# Patient Record
Sex: FEMALE | Race: WHITE | NOT HISPANIC OR LATINO | Employment: OTHER | URBAN - NONMETROPOLITAN AREA
[De-identification: names, ages, dates, MRNs, and addresses within clinical notes are randomized per-mention and may not be internally consistent; named-entity substitution may affect disease eponyms.]

---

## 2017-03-13 ENCOUNTER — OFFICE VISIT (OUTPATIENT)
Dept: OBSTETRICS AND GYNECOLOGY | Facility: CLINIC | Age: 82
End: 2017-03-13

## 2017-03-13 ENCOUNTER — PROCEDURE VISIT (OUTPATIENT)
Dept: OBSTETRICS AND GYNECOLOGY | Facility: CLINIC | Age: 82
End: 2017-03-13

## 2017-03-13 VITALS
BODY MASS INDEX: 17.85 KG/M2 | HEIGHT: 62 IN | SYSTOLIC BLOOD PRESSURE: 160 MMHG | WEIGHT: 97 LBS | DIASTOLIC BLOOD PRESSURE: 92 MMHG

## 2017-03-13 DIAGNOSIS — N83.202 CYSTS OF BOTH OVARIES: Primary | ICD-10-CM

## 2017-03-13 DIAGNOSIS — K58.1 IRRITABLE BOWEL SYNDROME WITH CONSTIPATION: ICD-10-CM

## 2017-03-13 DIAGNOSIS — N83.201 CYSTS OF BOTH OVARIES: Primary | ICD-10-CM

## 2017-03-13 PROCEDURE — 99213 OFFICE O/P EST LOW 20 MIN: CPT | Performed by: OBSTETRICS & GYNECOLOGY

## 2017-03-13 PROCEDURE — 76830 TRANSVAGINAL US NON-OB: CPT | Performed by: OBSTETRICS & GYNECOLOGY

## 2017-03-13 RX ORDER — MULTIVIT-MIN/FA/LYCOPEN/LUTEIN .4-300-25
TABLET ORAL
COMMUNITY

## 2017-03-13 RX ORDER — AMLODIPINE BESYLATE 5 MG/1
5 TABLET ORAL DAILY
COMMUNITY
Start: 2017-01-21 | End: 2020-11-09

## 2017-03-13 RX ORDER — CANDESARTAN 32 MG/1
TABLET ORAL
COMMUNITY
Start: 2017-02-27 | End: 2020-01-22

## 2017-03-13 RX ORDER — BUMETANIDE 0.5 MG/1
0.5 TABLET ORAL DAILY
COMMUNITY
Start: 2017-02-20 | End: 2020-08-17

## 2017-03-13 RX ORDER — CHOLECALCIFEROL (VITAMIN D3) 125 MCG
TABLET ORAL
COMMUNITY
End: 2020-06-10 | Stop reason: SDUPTHER

## 2017-03-13 RX ORDER — ESTRADIOL 0.1 MG/G
CREAM VAGINAL
COMMUNITY
Start: 2017-02-27 | End: 2017-06-26

## 2017-03-13 RX ORDER — LEVOTHYROXINE SODIUM 0.03 MG/1
25 TABLET ORAL DAILY
COMMUNITY
Start: 2016-03-22 | End: 2020-08-21

## 2017-03-13 RX ORDER — OMEPRAZOLE 20 MG/1
20 CAPSULE, DELAYED RELEASE ORAL 2 TIMES DAILY
COMMUNITY
Start: 2017-03-06 | End: 2021-05-07

## 2017-03-13 NOTE — PROGRESS NOTES
Subjective   Meenu Arteaga is a 89 y.o. female is here today for follow-up.    Ovarian Cyst   This is a chronic problem. The current episode started more than 1 year ago. The problem occurs constantly. The problem has been unchanged. Associated symptoms include abdominal pain (bloating). Pertinent negatives include no anorexia, arthralgias, change in bowel habit, chest pain, chills, congestion, coughing, diaphoresis, fatigue, fever, headaches, joint swelling, myalgias, nausea, neck pain, numbness, rash, sore throat, swollen glands, urinary symptoms, vertigo, visual change, vomiting or weakness. Nothing aggravates the symptoms. She has tried nothing for the symptoms. The treatment provided no relief.       The following portions of the patient's history were reviewed and updated as appropriate: allergies, current medications, past family history, past medical history, past social history, past surgical history and problem list.    Review of Systems   Constitutional: Negative for chills, diaphoresis, fatigue and fever.   HENT: Negative for congestion and sore throat.    Respiratory: Negative for cough.    Cardiovascular: Negative for chest pain.   Gastrointestinal: Positive for abdominal pain (bloating) and constipation. Negative for anorexia, blood in stool, change in bowel habit, diarrhea, nausea and vomiting.   Genitourinary: Negative for dyspareunia, menstrual problem, pelvic pain, vaginal bleeding and vaginal discharge.   Musculoskeletal: Negative for arthralgias, joint swelling, myalgias and neck pain.   Skin: Negative for rash.   Neurological: Negative for vertigo, weakness, numbness and headaches.   Psychiatric/Behavioral: Negative for confusion, decreased concentration and dysphoric mood.       Objective   Physical Exam   Constitutional: She is oriented to person, place, and time. She appears well-developed and well-nourished.   Neck: Normal range of motion. Neck supple. No tracheal deviation present. No  thyromegaly present.   Cardiovascular: Normal rate and regular rhythm.    Pulmonary/Chest: Effort normal and breath sounds normal.   Abdominal: Soft. Bowel sounds are normal. She exhibits no distension. There is no tenderness.   Neurological: She is alert and oriented to person, place, and time.   Skin: Skin is warm and dry.   Psychiatric: She has a normal mood and affect. Her behavior is normal. Judgment and thought content normal.   Nursing note and vitals reviewed.        Assessment/Plan   Meenu was seen today for ovarian cyst.    Diagnoses and all orders for this visit:    Cysts of both ovaries  Comments:  Stable in appearance and no symptoms    Irritable bowel syndrome with constipation  -     Ambulatory Referral to Gastroenterology        Kumar Short MD

## 2017-04-03 ENCOUNTER — OFFICE VISIT (OUTPATIENT)
Dept: GASTROENTEROLOGY | Facility: CLINIC | Age: 82
End: 2017-04-03

## 2017-04-03 VITALS
HEART RATE: 85 BPM | TEMPERATURE: 97.3 F | DIASTOLIC BLOOD PRESSURE: 90 MMHG | SYSTOLIC BLOOD PRESSURE: 166 MMHG | WEIGHT: 99.8 LBS | BODY MASS INDEX: 18.37 KG/M2 | OXYGEN SATURATION: 99 % | HEIGHT: 62 IN

## 2017-04-03 DIAGNOSIS — R10.13 EPIGASTRIC PAIN: ICD-10-CM

## 2017-04-03 DIAGNOSIS — R14.0 BLOATING: Primary | ICD-10-CM

## 2017-04-03 DIAGNOSIS — I10 ESSENTIAL HYPERTENSION: ICD-10-CM

## 2017-04-03 PROCEDURE — 99203 OFFICE O/P NEW LOW 30 MIN: CPT | Performed by: NURSE PRACTITIONER

## 2017-04-03 RX ORDER — DOCUSATE SODIUM 100 MG/1
CAPSULE, LIQUID FILLED ORAL 2 TIMES DAILY
COMMUNITY
End: 2017-06-02 | Stop reason: ALTCHOICE

## 2017-04-03 RX ORDER — ERGOCALCIFEROL 1.25 MG/1
50000 CAPSULE ORAL
COMMUNITY
End: 2020-04-20

## 2017-04-03 NOTE — PROGRESS NOTES
Chief Complaint   Patient presents with   • Bloated     Patient has been having problems with bloating and hasn't had an appetite over the past year but has gotten worse over the past 6 months.       Subjective     HPI    Persistent bloating that occurs daily and has been worsening over past 6 months.  Abdominal distention worsens through day.  Having a BM provides minimal relief.  She has difficulty with constipation.  This is relieved with use of MOM tablets and Metamucil.  No heartburn or indigestion.  No dysphagia.  Prilosec 20 mg bid controls heartburn.  No cough.  No N/V.  Last EGD 2013, procedure considered normal    CScope (Dr Patrick) 2012 anal tag removed      Past Medical History:   Diagnosis Date   • GERD (gastroesophageal reflux disease)    • Hypertension    • Hypothyroidism    • Liver cyst    • Neuropathy    • Ovarian cyst        Past Surgical History:   Procedure Laterality Date   • ABDOMINAL HYSTERECTOMY     • APPENDECTOMY     • BREAST BIOPSY     • CHOLECYSTECTOMY     • COLONOSCOPY     • COLONOSCOPY  01/12/2012   • HEMORRHOIDECTOMY     • UPPER GASTROINTESTINAL ENDOSCOPY  08/29/2013       Outpatient Prescriptions Marked as Taking for the 4/3/17 encounter (Office Visit) with CHAIM Roberts   Medication Sig Dispense Refill   • amLODIPine (NORVASC) 5 MG tablet      • bumetanide (BUMEX) 0.5 MG tablet      • candesartan (ATACAND) 32 MG tablet      • Cyanocobalamin ER 1000 MCG tablet controlled-release Take 1 tablet by mouth daily      • docusate sodium (COLACE) 100 MG capsule Take  by mouth 2 (Two) Times a Day.     • ESTRACE VAGINAL 0.1 MG/GM vaginal cream      • levothyroxine (SYNTHROID, LEVOTHROID) 25 MCG tablet Take 2 tablets by mouth Daily     • Multiple Vitamins-Minerals (CENTRUM SILVER ADULT 50+) tablet Take 1 tablet by mouth daily     • omeprazole (priLOSEC) 20 MG capsule      • Psyllium 30.9 % powder Take 2 tablets by mouth daily      • vitamin D (ERGOCALCIFEROL) 32777 UNITS capsule  capsule Take 50,000 Units by mouth Every 14 (Fourteen) Days.         Allergies   Allergen Reactions   • Ampicillin    • Cephalosporins    • Codeine    • Darifenacin Hydrobromide Er    • Doxycycline    • Duloxetine Hcl    • Fluocinolone    • Gabapentin    • Hydrochlorothiazide    • Levetiracetam    • Levofloxacin    • Phenazopyridine Hcl    • Pregabalin    • Quinolones    • Sulfa Antibiotics    • Sulfamethoxazole-Trimethoprim        Social History     Social History   • Marital status:      Spouse name: N/A   • Number of children: N/A   • Years of education: N/A     Occupational History   • Not on file.     Social History Main Topics   • Smoking status: Never Smoker   • Smokeless tobacco: Not on file   • Alcohol use No   • Drug use: Not on file   • Sexual activity: Not on file     Other Topics Concern   • Not on file     Social History Narrative       History reviewed. No pertinent family history.    Review of Systems   Constitutional: Negative for fatigue, fever and unexpected weight change.   HENT: Negative for hearing loss, sore throat and voice change.    Eyes: Negative for visual disturbance.   Respiratory: Negative for cough, shortness of breath and wheezing.    Cardiovascular: Negative for chest pain and palpitations.   Gastrointestinal: Negative for abdominal pain, blood in stool and vomiting.   Endocrine: Negative for polydipsia and polyuria.   Genitourinary: Negative for difficulty urinating, dysuria, hematuria and urgency.   Musculoskeletal: Negative for joint swelling and myalgias.   Skin: Negative for color change, rash and wound.   Neurological: Negative for dizziness, tremors, seizures and syncope.   Hematological: Does not bruise/bleed easily.   Psychiatric/Behavioral: Negative for agitation and confusion. The patient is not nervous/anxious.        Objective     Vitals:    04/03/17 1247   BP: 166/90   Pulse: 85   Temp: 97.3 °F (36.3 °C)   SpO2: 99%   Weight: 99 lb 12.8 oz (45.3 kg)   Height:  "62\" (157.5 cm)     Body mass index is 18.25 kg/(m^2).    Physical Exam   Constitutional: She is oriented to person, place, and time. She appears well-developed and well-nourished.   HENT:   Head: Normocephalic and atraumatic.   Eyes: Conjunctivae are normal. Pupils are equal, round, and reactive to light. No scleral icterus.   Neck: No JVD present. No thyroid mass and no thyromegaly present.   Cardiovascular: Normal rate, regular rhythm and normal heart sounds.  Exam reveals no gallop and no friction rub.    No murmur heard.  Pulmonary/Chest: Effort normal and breath sounds normal. No accessory muscle usage. No respiratory distress. She has no wheezes. She has no rales.   Abdominal: Soft. Bowel sounds are normal. She exhibits no distension, no ascites and no mass. There is no splenomegaly or hepatomegaly. There is tenderness (OSWALDO). There is no rebound and no guarding.   Genitourinary:   Genitourinary Comments: Rectal-Did not examine   Musculoskeletal: Normal range of motion. She exhibits no edema.   Neurological: She is alert and oriented to person, place, and time.   Deemed a reliable historian, able to converse without difficulty and able to move all extremities without difficulty   Skin: Skin is warm and dry.   Psychiatric: She has a normal mood and affect. Her behavior is normal.       Imaging Results (most recent)     None          Assessment/Plan     Meenu was seen today for bloated.    Diagnoses and all orders for this visit:    Bloating  -     Case request    Epigastric pain    Essential hypertension      ESOPHAGOGASTRODUODENOSCOPY WITH ANESTHESIA (N/A)    There are no Patient Instructions on file for this visit.     Constipation v H Pylori  If HPylori neg will discuss different bowel regimen    Patient is to continue all blood pressure and cardiac medications prior to procedure.     Advised pt to stop ASA day prior to procedure and to stop use of NSAIDs, Fish Oil, and MV 5 days prior to procedure.  Tylenol " based products are ok to take.  Pt verbalized understanding.    The risk of the endoscopy were discussed in detail.  We discussed the risk of perforation (one out of 7227-7932, riskier with dilation), bleeding (one out of 500), and the rare risks of infection, adverse reaction to anesthesia, respiratory failure, cardiac failure including MI and adverse reaction to medications, etc.  We discussed consequences that could occur if a risk were to develop such as the need for hospitalization, blood transfusion, surgical intervention, medications, pain and disability and death.  Alternatives include not doing anything, or pursuing an UGI series which only offers a diagnosis with potential less accuracy compared to egd.  The patient verbalizes understanding and agrees to proceed.

## 2017-06-02 ENCOUNTER — OFFICE VISIT (OUTPATIENT)
Dept: OBSTETRICS AND GYNECOLOGY | Facility: CLINIC | Age: 82
End: 2017-06-02

## 2017-06-02 VITALS
BODY MASS INDEX: 16.93 KG/M2 | DIASTOLIC BLOOD PRESSURE: 78 MMHG | SYSTOLIC BLOOD PRESSURE: 130 MMHG | HEIGHT: 62 IN | WEIGHT: 92 LBS

## 2017-06-02 DIAGNOSIS — N89.8 VAGINAL DRYNESS: ICD-10-CM

## 2017-06-02 DIAGNOSIS — R30.0 DYSURIA: Primary | ICD-10-CM

## 2017-06-02 DIAGNOSIS — R10.2 PELVIC PAIN: ICD-10-CM

## 2017-06-02 PROCEDURE — 99214 OFFICE O/P EST MOD 30 MIN: CPT | Performed by: OBSTETRICS & GYNECOLOGY

## 2017-06-02 NOTE — PROGRESS NOTES
Subjective   Meenu Arteaga is a 89 y.o. female is here today for follow-up.  2-3 month history of lower pelvic discomfort and dysuria.  No frequency.    Pelvic Pain   The patient's primary symptoms include pelvic pain. The patient's pertinent negatives include no genital itching, genital lesions, genital odor, genital rash, missed menses, vaginal bleeding or vaginal discharge. This is a new problem. The current episode started more than 1 month ago. The problem occurs daily. The problem has been unchanged. The pain is mild. The problem affects both sides. She is not pregnant. Associated symptoms include dysuria. Pertinent negatives include no abdominal pain, anorexia, back pain, chills, constipation, diarrhea, discolored urine, fever, flank pain, frequency, headaches, hematuria, joint pain, joint swelling, nausea, painful intercourse, rash, sore throat, urgency or vomiting. Nothing aggravates the symptoms. She has tried nothing for the symptoms. The treatment provided no relief.       The following portions of the patient's history were reviewed and updated as appropriate: allergies, current medications, past family history, past medical history, past social history, past surgical history and problem list.    Review of Systems   Constitutional: Negative for chills and fever.   HENT: Negative for sore throat.    Eyes: Negative for photophobia and visual disturbance.   Gastrointestinal: Negative for abdominal pain, anorexia, constipation, diarrhea, nausea and vomiting.   Endocrine: Negative for cold intolerance and heat intolerance.   Genitourinary: Positive for dysuria and pelvic pain. Negative for flank pain, frequency, hematuria, missed menses, urgency and vaginal discharge.   Musculoskeletal: Negative for back pain and joint pain.   Skin: Negative for rash.   Allergic/Immunologic: Negative for environmental allergies.   Neurological: Negative for headaches.   Hematological: Negative for adenopathy.    Psychiatric/Behavioral: Negative for confusion, decreased concentration and dysphoric mood.       Objective   Physical Exam   Constitutional: She is oriented to person, place, and time. She appears well-developed and well-nourished.   HENT:   Head: Normocephalic and atraumatic.   Neck: Normal range of motion. Neck supple.   Cardiovascular: Normal rate and regular rhythm.    Pulmonary/Chest: Effort normal and breath sounds normal.   Abdominal: Soft. Bowel sounds are normal. She exhibits no distension and no mass. There is no tenderness. There is no rebound and no guarding. No hernia. Hernia confirmed negative in the right inguinal area and confirmed negative in the left inguinal area.   Genitourinary: Vagina normal. Pelvic exam was performed with patient supine. No labial fusion. There is no rash, tenderness, lesion or injury on the right labia. There is no rash, tenderness, lesion or injury on the left labia. Right adnexum displays no mass, no tenderness and no fullness. Left adnexum displays no mass, no tenderness and no fullness. No erythema, tenderness or bleeding in the vagina. No foreign body in the vagina. No signs of injury around the vagina. No vaginal discharge found.       Lymphadenopathy:        Right: No inguinal adenopathy present.        Left: No inguinal adenopathy present.   Neurological: She is alert and oriented to person, place, and time.   Skin: Skin is warm and dry.   Psychiatric: She has a normal mood and affect. Her behavior is normal. Judgment and thought content normal.   Nursing note and vitals reviewed.        Assessment/Plan   Meenu was seen today for pelvic pain.    Diagnoses and all orders for this visit:    Dysuria  Comments:  Multiple allergies.  Given long duration of symptoms will not treat before U/A returns.  Orders:  -     Urinalysis With / Microscopic If Indicated  -     Urine Culture    Pelvic pain  Comments:  Ultrasound at follow up in 2 weeks    Vaginal  dryness  Comments:  Increase estrace to nightly for 2 weeks          Kumar Short MD

## 2017-06-07 LAB
APPEARANCE UR: CLEAR
BACTERIA #/AREA URNS HPF: ABNORMAL /HPF
BACTERIA UR CULT: ABNORMAL
BILIRUB UR QL STRIP: NEGATIVE
CASTS URNS MICRO: ABNORMAL
COLOR UR: YELLOW
EPI CELLS #/AREA URNS HPF: ABNORMAL /HPF
GLUCOSE UR QL: NEGATIVE
HGB UR QL STRIP: NEGATIVE
KETONES UR QL STRIP: NEGATIVE
LEUKOCYTE ESTERASE UR QL STRIP: ABNORMAL
MUCOUS THREADS URNS QL MICRO: ABNORMAL /HPF
NITRITE UR QL STRIP: NEGATIVE
OTHER ANTIBIOTIC SUSC ISLT: ABNORMAL
PH UR STRIP: 6.5 [PH] (ref 5–8)
PROT UR QL STRIP: ABNORMAL
RBC #/AREA URNS HPF: ABNORMAL /HPF
SP GR UR: 1.02 (ref 1–1.03)
UROBILINOGEN UR STRIP-MCNC: ABNORMAL MG/DL
WBC #/AREA URNS HPF: ABNORMAL /HPF

## 2017-06-08 RX ORDER — NITROFURANTOIN 25; 75 MG/1; MG/1
100 CAPSULE ORAL 2 TIMES DAILY
Qty: 6 CAPSULE | Refills: 0 | Status: SHIPPED | OUTPATIENT
Start: 2017-06-08 | End: 2017-06-11

## 2017-06-26 ENCOUNTER — PROCEDURE VISIT (OUTPATIENT)
Dept: OBSTETRICS AND GYNECOLOGY | Facility: CLINIC | Age: 82
End: 2017-06-26

## 2017-06-26 ENCOUNTER — OFFICE VISIT (OUTPATIENT)
Dept: OBSTETRICS AND GYNECOLOGY | Facility: CLINIC | Age: 82
End: 2017-06-26

## 2017-06-26 VITALS
HEIGHT: 62 IN | BODY MASS INDEX: 17.3 KG/M2 | SYSTOLIC BLOOD PRESSURE: 158 MMHG | DIASTOLIC BLOOD PRESSURE: 98 MMHG | WEIGHT: 94 LBS

## 2017-06-26 DIAGNOSIS — R10.2 PELVIC PAIN: ICD-10-CM

## 2017-06-26 DIAGNOSIS — N83.201 CYSTS OF BOTH OVARIES: ICD-10-CM

## 2017-06-26 DIAGNOSIS — N94.9 VAGINAL BURNING: Primary | ICD-10-CM

## 2017-06-26 DIAGNOSIS — N83.201 CYST OF RIGHT OVARY: Primary | ICD-10-CM

## 2017-06-26 DIAGNOSIS — Z78.9 NON-SMOKER: ICD-10-CM

## 2017-06-26 DIAGNOSIS — N83.202 CYSTS OF BOTH OVARIES: ICD-10-CM

## 2017-06-26 DIAGNOSIS — N95.1 VAGINAL DRYNESS, MENOPAUSAL: ICD-10-CM

## 2017-06-26 DIAGNOSIS — I10 ESSENTIAL HYPERTENSION: ICD-10-CM

## 2017-06-26 PROCEDURE — 76830 TRANSVAGINAL US NON-OB: CPT | Performed by: OBSTETRICS & GYNECOLOGY

## 2017-06-26 PROCEDURE — 99213 OFFICE O/P EST LOW 20 MIN: CPT | Performed by: OBSTETRICS & GYNECOLOGY

## 2017-06-26 NOTE — PROGRESS NOTES
Subjective   Meenu Arteaga is a 89 y.o. female is here today for follow-up.  Minimal improvement in symptoms.  Vag dryness no better with increase in estrace.  U/S shows stable appearance to ovarian cysts.    Ovarian Cyst   This is a chronic problem. The current episode started more than 1 year ago. The problem occurs constantly. The problem has been unchanged. Pertinent negatives include no abdominal pain, anorexia, arthralgias, change in bowel habit, chest pain, chills, congestion, coughing, diaphoresis, fatigue, fever, headaches, joint swelling, myalgias, nausea, neck pain, numbness, rash, sore throat, swollen glands, urinary symptoms, vertigo, visual change, vomiting or weakness. Nothing aggravates the symptoms. She has tried nothing for the symptoms. The treatment provided no relief.       The following portions of the patient's history were reviewed and updated as appropriate: allergies, current medications, past family history, past medical history, past social history, past surgical history and problem list.    Review of Systems   Constitutional: Negative for chills, diaphoresis, fatigue and fever.   HENT: Negative for congestion and sore throat.    Respiratory: Negative for cough.    Cardiovascular: Negative for chest pain.   Gastrointestinal: Negative for abdominal pain, anorexia, change in bowel habit, nausea and vomiting.   Genitourinary: Positive for pelvic pain. Negative for vaginal bleeding and vaginal discharge.   Musculoskeletal: Negative for arthralgias, joint swelling, myalgias and neck pain.   Skin: Negative for rash.   Neurological: Negative for vertigo, weakness, numbness and headaches.   All other systems reviewed and are negative.      Objective   Physical Exam   Constitutional: She is oriented to person, place, and time. She appears well-developed and well-nourished.   HENT:   Head: Normocephalic and atraumatic.   Neurological: She is alert and oriented to person, place, and time.   Skin: Skin  is warm and dry.   Psychiatric: She has a normal mood and affect. Her behavior is normal. Judgment and thought content normal.   Nursing note and vitals reviewed.        Assessment/Plan   Meenu was seen today for ovarian cyst.    Diagnoses and all orders for this visit:    Vaginal burning  Comments:  Unchanged      Cysts of both ovaries  Comments:  Images reviewed.  Stable in appearance    Pelvic pain    Vaginal dryness, menopausal  -     conjugated estrogens (PREMARIN) 0.625 MG/GM vaginal cream; Insert  into the vagina 3 (Three) Times a Week.    Non-smoker    Essential hypertension  Comments:  BP noted.  Did not take med today.  Will continue to follow up with PCP,        Kumar Short MD

## 2017-08-07 ENCOUNTER — OFFICE VISIT (OUTPATIENT)
Dept: UROLOGY | Age: 82
End: 2017-08-07
Payer: MEDICARE

## 2017-08-07 VITALS
SYSTOLIC BLOOD PRESSURE: 138 MMHG | WEIGHT: 97 LBS | TEMPERATURE: 97.4 F | OXYGEN SATURATION: 98 % | HEIGHT: 62 IN | HEART RATE: 68 BPM | DIASTOLIC BLOOD PRESSURE: 78 MMHG | BODY MASS INDEX: 17.85 KG/M2

## 2017-08-07 DIAGNOSIS — R30.0 DYSURIA: Primary | ICD-10-CM

## 2017-08-07 DIAGNOSIS — N95.2 ATROPHIC VAGINITIS: ICD-10-CM

## 2017-08-07 LAB
BILIRUBIN, POC: NORMAL
BLOOD URINE, POC: NORMAL
CLARITY, POC: NORMAL
COLOR, POC: NORMAL
GLUCOSE URINE, POC: NORMAL
KETONES, POC: NORMAL
LEUKOCYTE EST, POC: NORMAL
NITRITE, POC: NORMAL
PH, POC: 6.5
PROTEIN, POC: NORMAL
SPECIFIC GRAVITY, POC: 1
UROBILINOGEN, POC: 0.2

## 2017-08-07 PROCEDURE — 51798 US URINE CAPACITY MEASURE: CPT | Performed by: PHYSICIAN ASSISTANT

## 2017-08-07 PROCEDURE — 1090F PRES/ABSN URINE INCON ASSESS: CPT | Performed by: PHYSICIAN ASSISTANT

## 2017-08-07 PROCEDURE — 81002 URINALYSIS NONAUTO W/O SCOPE: CPT | Performed by: PHYSICIAN ASSISTANT

## 2017-08-07 PROCEDURE — 1036F TOBACCO NON-USER: CPT | Performed by: PHYSICIAN ASSISTANT

## 2017-08-07 PROCEDURE — G8419 CALC BMI OUT NRM PARAM NOF/U: HCPCS | Performed by: PHYSICIAN ASSISTANT

## 2017-08-07 PROCEDURE — 4040F PNEUMOC VAC/ADMIN/RCVD: CPT | Performed by: PHYSICIAN ASSISTANT

## 2017-08-07 PROCEDURE — G8427 DOCREV CUR MEDS BY ELIG CLIN: HCPCS | Performed by: PHYSICIAN ASSISTANT

## 2017-08-07 PROCEDURE — 1123F ACP DISCUSS/DSCN MKR DOCD: CPT | Performed by: PHYSICIAN ASSISTANT

## 2017-08-07 PROCEDURE — 99213 OFFICE O/P EST LOW 20 MIN: CPT | Performed by: PHYSICIAN ASSISTANT

## 2017-08-07 RX ORDER — HYDROCORTISONE 25 MG/ML
LOTION TOPICAL
COMMUNITY
Start: 2017-08-03 | End: 2019-09-14

## 2017-08-08 ASSESSMENT — ENCOUNTER SYMPTOMS
ABDOMINAL PAIN: 0
NAUSEA: 0
EYES NEGATIVE: 1
DIARRHEA: 0
RESPIRATORY NEGATIVE: 1
VOMITING: 0

## 2018-06-04 ENCOUNTER — PROCEDURE VISIT (OUTPATIENT)
Dept: OBSTETRICS AND GYNECOLOGY | Facility: CLINIC | Age: 83
End: 2018-06-04

## 2018-06-04 ENCOUNTER — OFFICE VISIT (OUTPATIENT)
Dept: OBSTETRICS AND GYNECOLOGY | Facility: CLINIC | Age: 83
End: 2018-06-04

## 2018-06-04 VITALS
DIASTOLIC BLOOD PRESSURE: 80 MMHG | HEIGHT: 62 IN | SYSTOLIC BLOOD PRESSURE: 162 MMHG | WEIGHT: 99 LBS | BODY MASS INDEX: 18.22 KG/M2

## 2018-06-04 DIAGNOSIS — N83.201 CYSTS OF BOTH OVARIES: Primary | ICD-10-CM

## 2018-06-04 DIAGNOSIS — Z78.9 NON-SMOKER: ICD-10-CM

## 2018-06-04 DIAGNOSIS — N83.202 CYSTS OF BOTH OVARIES: Primary | ICD-10-CM

## 2018-06-04 PROCEDURE — 99213 OFFICE O/P EST LOW 20 MIN: CPT | Performed by: OBSTETRICS & GYNECOLOGY

## 2018-06-04 PROCEDURE — 76830 TRANSVAGINAL US NON-OB: CPT | Performed by: OBSTETRICS & GYNECOLOGY

## 2018-06-04 NOTE — PROGRESS NOTES
Subjective   Meenu Arteaga is a 89 y.o. female is here today for follow-up.    Patient presents today for follow-up on her bilateral ovarian cyst.  She is without complaints.  She has no early satiety or abdominal bloating.  Ultrasound images are reviewed.  There is a right simple ovarian cyst measuring less than 5 cm and a smaller one on the left measuring 1.1 cm.  Comparisons are made to the ultrasound performed a year ago.  If anything, they are smaller today.  She is status post hysterectomy.    Ovarian Cyst   This is a chronic problem. The current episode started more than 1 year ago. The problem occurs constantly. The problem has been unchanged. Nothing aggravates the symptoms. She has tried nothing for the symptoms. The treatment provided no relief.       The following portions of the patient's history were reviewed and updated as appropriate: allergies, current medications, past family history, past medical history, past social history, past surgical history and problem list.    Review of Systems   All other systems reviewed and are negative.      Objective   Physical Exam   Constitutional: She is oriented to person, place, and time. She appears well-developed and well-nourished.   HENT:   Head: Normocephalic and atraumatic.   Neck: Normal range of motion. Neck supple. No tracheal deviation present. No thyromegaly present.   Cardiovascular: Normal rate and regular rhythm.    Pulmonary/Chest: Effort normal and breath sounds normal.   Abdominal: Soft. Bowel sounds are normal.   Neurological: She is alert and oriented to person, place, and time.   Skin: Skin is warm and dry.   Psychiatric: She has a normal mood and affect. Her behavior is normal. Judgment and thought content normal.   Nursing note and vitals reviewed.        Assessment/Plan   Meenu was seen today for ovarian cyst.    Diagnoses and all orders for this visit:    Cysts of both ovaries    Non-smoker      No need for further surveillance given the  stability and lack of symptoms.  Patient encouraged to call with pelvic pain, bloating, change in appetite or weight.    Kumar Short MD

## 2018-06-19 ENCOUNTER — TRANSCRIBE ORDERS (OUTPATIENT)
Dept: ADMINISTRATIVE | Facility: HOSPITAL | Age: 83
End: 2018-06-19

## 2018-06-19 ENCOUNTER — HOSPITAL ENCOUNTER (OUTPATIENT)
Dept: CT IMAGING | Facility: HOSPITAL | Age: 83
Discharge: HOME | End: 2018-06-19
Attending: INTERNAL MEDICINE | Admitting: INTERNAL MEDICINE

## 2018-06-19 DIAGNOSIS — R10.13 EPIGASTRIC PAIN: Primary | ICD-10-CM

## 2018-06-19 DIAGNOSIS — R10.13 EPIGASTRIC PAIN: ICD-10-CM

## 2018-06-19 PROCEDURE — 82565 ASSAY OF CREATININE: CPT

## 2018-06-19 PROCEDURE — 74177 CT ABD & PELVIS W/CONTRAST: CPT

## 2018-06-19 PROCEDURE — 0 IOHEXOL 300 MG/ML SOLUTION: Performed by: INTERNAL MEDICINE

## 2018-06-19 PROCEDURE — 25010000002 IOPAMIDOL 61 % SOLUTION: Performed by: INTERNAL MEDICINE

## 2018-06-19 RX ADMIN — IOPAMIDOL 100 ML: 612 INJECTION, SOLUTION INTRAVENOUS at 10:45

## 2018-06-19 RX ADMIN — IOHEXOL 50 ML: 300 INJECTION, SOLUTION INTRAVENOUS at 10:45

## 2018-06-28 LAB — CREAT BLDA-MCNC: 0.7 MG/DL (ref 0.6–1.3)

## 2018-07-25 ENCOUNTER — OFFICE VISIT (OUTPATIENT)
Dept: GASTROENTEROLOGY | Facility: CLINIC | Age: 83
End: 2018-07-25

## 2018-07-25 VITALS
OXYGEN SATURATION: 97 % | WEIGHT: 98 LBS | HEIGHT: 62 IN | TEMPERATURE: 97 F | BODY MASS INDEX: 18.03 KG/M2 | SYSTOLIC BLOOD PRESSURE: 150 MMHG | DIASTOLIC BLOOD PRESSURE: 84 MMHG | HEART RATE: 80 BPM

## 2018-07-25 DIAGNOSIS — R93.5 ABNORMAL CT OF THE ABDOMEN: Primary | ICD-10-CM

## 2018-07-25 PROCEDURE — 99214 OFFICE O/P EST MOD 30 MIN: CPT | Performed by: NURSE PRACTITIONER

## 2018-07-25 NOTE — PROGRESS NOTES
Chief Complaint   Patient presents with   • GI Problem     having stomach problems swelling can't eat lots of gas       Subjective     HPI    Persistent bloating x one year with abd distention.  Decreased appetite,  Food does not sound good.  No weight loss.  No heartburn or indigestion.  No dysphagia, no nausea or emesis.  Bowels move daily, she feels bowels are completely evacuated.  No BRBPR or melena.   Stomach under distended on CT 6/19/18.  She experiences flatulence.    CScope (Dr Patrick) 2012 anal tag removed    Past Medical History:   Diagnosis Date   • GERD (gastroesophageal reflux disease)    • Hypertension    • Hypothyroidism    • Liver cyst    • Neuropathy    • Ovarian cyst        Past Surgical History:   Procedure Laterality Date   • ABDOMINAL HYSTERECTOMY     • APPENDECTOMY     • BREAST BIOPSY     • CHOLECYSTECTOMY     • COLONOSCOPY  03/05/2008    normal   • COLONOSCOPY  01/12/2012   • ENDOSCOPY  08/29/2013    normal   • ENDOSCOPY  01/23/2012    eus with stacy  normal endoscopic ultrascope of the pancreas.fortunately there was no mass seen   • HEMORRHOIDECTOMY     • UPPER GASTROINTESTINAL ENDOSCOPY  08/29/2013       Outpatient Prescriptions Marked as Taking for the 7/25/18 encounter (Office Visit) with CHAIM Roberts   Medication Sig Dispense Refill   • amLODIPine (NORVASC) 5 MG tablet      • bumetanide (BUMEX) 0.5 MG tablet      • candesartan (ATACAND) 32 MG tablet      • conjugated estrogens (PREMARIN) 0.625 MG/GM vaginal cream Insert  into the vagina 3 (Three) Times a Week. 3 each 0   • Ergocalciferol (VITAMIN D2) 2000 UNITS tablet Take by mouth daily     • levothyroxine (SYNTHROID, LEVOTHROID) 25 MCG tablet Take 2 tablets by mouth Daily     • Magnesium Oxide 500 MG (LAX) tablet Take 2 tablets by mouth daily      • Multiple Vitamins-Minerals (CENTRUM SILVER ADULT 50+) tablet Take 1 tablet by mouth daily     • omeprazole (priLOSEC) 20 MG capsule      • terconazole (TERAZOL 7) 0.4 %  vaginal cream      • vitamin D (ERGOCALCIFEROL) 25841 UNITS capsule capsule Take 50,000 Units by mouth Every 14 (Fourteen) Days.         Allergies   Allergen Reactions   • Ampicillin    • Cephalosporins    • Codeine    • Darifenacin Hydrobromide Er    • Doxycycline    • Duloxetine Hcl    • Fluocinolone    • Gabapentin    • Hydrochlorothiazide    • Levetiracetam    • Levofloxacin    • Phenazopyridine Hcl    • Pregabalin    • Quinolones    • Sulfa Antibiotics    • Sulfamethoxazole-Trimethoprim        Social History     Social History   • Marital status:      Spouse name: N/A   • Number of children: N/A   • Years of education: N/A     Occupational History   • Not on file.     Social History Main Topics   • Smoking status: Never Smoker   • Smokeless tobacco: Never Used   • Alcohol use No   • Drug use: No   • Sexual activity: Not Currently     Other Topics Concern   • Not on file     Social History Narrative   • No narrative on file       Family History   Problem Relation Age of Onset   • Bipolar disorder Daughter    • Colon cancer Neg Hx    • Colon polyps Neg Hx    • Esophageal cancer Neg Hx        Review of Systems   Constitutional: Negative for fatigue, fever and unexpected weight change.   HENT: Negative for hearing loss, sore throat and voice change.    Eyes: Negative for visual disturbance.   Respiratory: Negative for cough, shortness of breath and wheezing.    Cardiovascular: Negative for chest pain and palpitations.   Gastrointestinal: Positive for abdominal distention. Negative for abdominal pain, blood in stool and vomiting.   Endocrine: Negative for polydipsia and polyuria.   Genitourinary: Negative for difficulty urinating, dysuria, hematuria and urgency.   Musculoskeletal: Negative for joint swelling and myalgias.   Skin: Negative for color change, rash and wound.   Neurological: Negative for dizziness, tremors, seizures and syncope.   Hematological: Does not bruise/bleed easily.  "  Psychiatric/Behavioral: Negative for agitation and confusion. The patient is not nervous/anxious.        Objective     Vitals:    07/25/18 1045   BP: 150/84   Pulse: 80   Temp: 97 °F (36.1 °C)   SpO2: 97%   Weight: 44.5 kg (98 lb)   Height: 157.5 cm (62\")     Body mass index is 17.92 kg/m².    Physical Exam   Constitutional: She is oriented to person, place, and time. She appears well-developed and well-nourished. She is cooperative.   HENT:   Head: Normocephalic and atraumatic.   Eyes: Pupils are equal, round, and reactive to light. Conjunctivae are normal. No scleral icterus.   Neck: Normal range of motion. Neck supple. No JVD present. No thyroid mass and no thyromegaly present.   Cardiovascular: Normal rate, regular rhythm and normal heart sounds.  Exam reveals no gallop and no friction rub.    No murmur heard.  Pulmonary/Chest: Effort normal and breath sounds normal. No accessory muscle usage. No respiratory distress. She has no wheezes. She has no rales.   Abdominal: Soft. Normal appearance and bowel sounds are normal. She exhibits no distension, no ascites and no mass. There is no hepatosplenomegaly. There is tenderness (periumbilical ). There is no rebound and no guarding.   Musculoskeletal: Normal range of motion. She exhibits no edema or tenderness.     Vascular Status -  Her right foot exhibits normal foot vasculature  and no edema. Her left foot exhibits normal foot vasculature  and no edema.  Lymphadenopathy:     She has no cervical adenopathy.   Neurological: She is alert and oriented to person, place, and time. She has normal strength. Gait normal.   Skin: Skin is warm, dry and intact. No rash noted.       Imaging Results (most recent)     None          Body mass index is 17.92 kg/m².    Assessment/Plan     Meenu was seen today for gi problem.    Diagnoses and all orders for this visit:    Abnormal CT of the abdomen  -     Case Request; Standing  -     Case Request    Other orders  -     Follow " Anesthesia Guidelines / Standing Orders; Future  -     Implement Anesthesia Orders Day of Procedure; Standing  -     Obtain Informed Consent; Standing        ESOPHAGOGASTRODUODENOSCOPY WITH ANESTHESIA (N/A)    CT scan from June 2018 reviewed with Dr Sim, Radiologist at [facility].  No abnormalities noted to pancreas  Encouraged Miralax on regular basis  Due to lack of cardiac/pulmonary related issues will proceed with EGD    Advised pt to stop ASA, use of NSAIDs, Fish Oil, and MV 5 days prior to procedure, per Dr Patrick protocol.  Tylenol based products are ok to take.  Pt verbalized understanding.    Patient is to continue all blood pressure and cardiac medications prior to procedure and has been advised to take medications morning of procedure   Pt verbalized understanding    The risk of the endoscopy were discussed in detail.  We discussed the risk of perforation (one out of 2574-6481, riskier with dilation), bleeding (one out of 500), and the rare risks of infection, adverse reaction to anesthesia, respiratory failure, cardiac failure including MI and adverse reaction to medications, etc.  We discussed consequences that could occur if a risk were to develop such as the need for hospitalization, blood transfusion, surgical intervention, medications, pain and disability and death.  Alternatives include not doing anything, or pursuing an UGI series which only offers a diagnosis with potential less accuracy compared to egd.  The patient verbalizes understanding and agrees to proceed.      Patient's Body mass index is 17.92 kg/m². BMI is below normal parameters. Recommendations include: no follow-up required.      There are no Patient Instructions on file for this visit.

## 2018-08-03 ENCOUNTER — HOSPITAL ENCOUNTER (OUTPATIENT)
Facility: HOSPITAL | Age: 83
Setting detail: HOSPITAL OUTPATIENT SURGERY
Discharge: HOME | End: 2018-08-03
Attending: INTERNAL MEDICINE | Admitting: INTERNAL MEDICINE

## 2018-08-03 ENCOUNTER — ANESTHESIA (OUTPATIENT)
Dept: GASTROENTEROLOGY | Facility: HOSPITAL | Age: 83
End: 2018-08-03

## 2018-08-03 ENCOUNTER — ANESTHESIA EVENT (OUTPATIENT)
Dept: GASTROENTEROLOGY | Facility: HOSPITAL | Age: 83
End: 2018-08-03

## 2018-08-03 VITALS
SYSTOLIC BLOOD PRESSURE: 140 MMHG | BODY MASS INDEX: 17.48 KG/M2 | OXYGEN SATURATION: 98 % | WEIGHT: 95 LBS | DIASTOLIC BLOOD PRESSURE: 67 MMHG | HEART RATE: 81 BPM | RESPIRATION RATE: 20 BRPM | TEMPERATURE: 97.3 F | HEIGHT: 62 IN

## 2018-08-03 DIAGNOSIS — R93.5 ABNORMAL CT OF THE ABDOMEN: ICD-10-CM

## 2018-08-03 PROCEDURE — 43239 EGD BIOPSY SINGLE/MULTIPLE: CPT | Performed by: INTERNAL MEDICINE

## 2018-08-03 PROCEDURE — 25010000002 PROPOFOL 10 MG/ML EMULSION: Performed by: NURSE ANESTHETIST, CERTIFIED REGISTERED

## 2018-08-03 PROCEDURE — 87081 CULTURE SCREEN ONLY: CPT | Performed by: INTERNAL MEDICINE

## 2018-08-03 RX ORDER — SODIUM CHLORIDE 0.9 % (FLUSH) 0.9 %
3 SYRINGE (ML) INJECTION AS NEEDED
Status: DISCONTINUED | OUTPATIENT
Start: 2018-08-03 | End: 2018-08-03 | Stop reason: HOSPADM

## 2018-08-03 RX ORDER — PROPOFOL 10 MG/ML
VIAL (ML) INTRAVENOUS AS NEEDED
Status: DISCONTINUED | OUTPATIENT
Start: 2018-08-03 | End: 2018-08-03 | Stop reason: SURG

## 2018-08-03 RX ORDER — LIDOCAINE HYDROCHLORIDE 20 MG/ML
INJECTION, SOLUTION INFILTRATION; PERINEURAL AS NEEDED
Status: DISCONTINUED | OUTPATIENT
Start: 2018-08-03 | End: 2018-08-03 | Stop reason: SURG

## 2018-08-03 RX ORDER — SODIUM CHLORIDE 9 MG/ML
500 INJECTION, SOLUTION INTRAVENOUS CONTINUOUS PRN
Status: DISCONTINUED | OUTPATIENT
Start: 2018-08-03 | End: 2018-08-03 | Stop reason: HOSPADM

## 2018-08-03 RX ADMIN — PROPOFOL 50 MG: 10 INJECTION, EMULSION INTRAVENOUS at 10:45

## 2018-08-03 RX ADMIN — SODIUM CHLORIDE 500 ML: 9 INJECTION, SOLUTION INTRAVENOUS at 09:20

## 2018-08-03 RX ADMIN — LIDOCAINE HYDROCHLORIDE 0.5 ML: 10 INJECTION, SOLUTION EPIDURAL; INFILTRATION; INTRACAUDAL; PERINEURAL at 09:20

## 2018-08-03 RX ADMIN — LIDOCAINE HYDROCHLORIDE 110 MG: 20 INJECTION, SOLUTION INFILTRATION; PERINEURAL at 10:45

## 2018-08-03 NOTE — H&P (VIEW-ONLY)
Chief Complaint   Patient presents with   • GI Problem     having stomach problems swelling can't eat lots of gas       Subjective     HPI    Persistent bloating x one year with abd distention.  Decreased appetite,  Food does not sound good.  No weight loss.  No heartburn or indigestion.  No dysphagia, no nausea or emesis.  Bowels move daily, she feels bowels are completely evacuated.  No BRBPR or melena.   Stomach under distended on CT 6/19/18.  She experiences flatulence.    CScope (Dr Patrick) 2012 anal tag removed    Past Medical History:   Diagnosis Date   • GERD (gastroesophageal reflux disease)    • Hypertension    • Hypothyroidism    • Liver cyst    • Neuropathy    • Ovarian cyst        Past Surgical History:   Procedure Laterality Date   • ABDOMINAL HYSTERECTOMY     • APPENDECTOMY     • BREAST BIOPSY     • CHOLECYSTECTOMY     • COLONOSCOPY  03/05/2008    normal   • COLONOSCOPY  01/12/2012   • ENDOSCOPY  08/29/2013    normal   • ENDOSCOPY  01/23/2012    eus with stacy  normal endoscopic ultrascope of the pancreas.fortunately there was no mass seen   • HEMORRHOIDECTOMY     • UPPER GASTROINTESTINAL ENDOSCOPY  08/29/2013       Outpatient Prescriptions Marked as Taking for the 7/25/18 encounter (Office Visit) with CHAIM Roberts   Medication Sig Dispense Refill   • amLODIPine (NORVASC) 5 MG tablet      • bumetanide (BUMEX) 0.5 MG tablet      • candesartan (ATACAND) 32 MG tablet      • conjugated estrogens (PREMARIN) 0.625 MG/GM vaginal cream Insert  into the vagina 3 (Three) Times a Week. 3 each 0   • Ergocalciferol (VITAMIN D2) 2000 UNITS tablet Take by mouth daily     • levothyroxine (SYNTHROID, LEVOTHROID) 25 MCG tablet Take 2 tablets by mouth Daily     • Magnesium Oxide 500 MG (LAX) tablet Take 2 tablets by mouth daily      • Multiple Vitamins-Minerals (CENTRUM SILVER ADULT 50+) tablet Take 1 tablet by mouth daily     • omeprazole (priLOSEC) 20 MG capsule      • terconazole (TERAZOL 7) 0.4 %  vaginal cream      • vitamin D (ERGOCALCIFEROL) 72180 UNITS capsule capsule Take 50,000 Units by mouth Every 14 (Fourteen) Days.         Allergies   Allergen Reactions   • Ampicillin    • Cephalosporins    • Codeine    • Darifenacin Hydrobromide Er    • Doxycycline    • Duloxetine Hcl    • Fluocinolone    • Gabapentin    • Hydrochlorothiazide    • Levetiracetam    • Levofloxacin    • Phenazopyridine Hcl    • Pregabalin    • Quinolones    • Sulfa Antibiotics    • Sulfamethoxazole-Trimethoprim        Social History     Social History   • Marital status:      Spouse name: N/A   • Number of children: N/A   • Years of education: N/A     Occupational History   • Not on file.     Social History Main Topics   • Smoking status: Never Smoker   • Smokeless tobacco: Never Used   • Alcohol use No   • Drug use: No   • Sexual activity: Not Currently     Other Topics Concern   • Not on file     Social History Narrative   • No narrative on file       Family History   Problem Relation Age of Onset   • Bipolar disorder Daughter    • Colon cancer Neg Hx    • Colon polyps Neg Hx    • Esophageal cancer Neg Hx        Review of Systems   Constitutional: Negative for fatigue, fever and unexpected weight change.   HENT: Negative for hearing loss, sore throat and voice change.    Eyes: Negative for visual disturbance.   Respiratory: Negative for cough, shortness of breath and wheezing.    Cardiovascular: Negative for chest pain and palpitations.   Gastrointestinal: Positive for abdominal distention. Negative for abdominal pain, blood in stool and vomiting.   Endocrine: Negative for polydipsia and polyuria.   Genitourinary: Negative for difficulty urinating, dysuria, hematuria and urgency.   Musculoskeletal: Negative for joint swelling and myalgias.   Skin: Negative for color change, rash and wound.   Neurological: Negative for dizziness, tremors, seizures and syncope.   Hematological: Does not bruise/bleed easily.  "  Psychiatric/Behavioral: Negative for agitation and confusion. The patient is not nervous/anxious.        Objective     Vitals:    07/25/18 1045   BP: 150/84   Pulse: 80   Temp: 97 °F (36.1 °C)   SpO2: 97%   Weight: 44.5 kg (98 lb)   Height: 157.5 cm (62\")     Body mass index is 17.92 kg/m².    Physical Exam   Constitutional: She is oriented to person, place, and time. She appears well-developed and well-nourished. She is cooperative.   HENT:   Head: Normocephalic and atraumatic.   Eyes: Pupils are equal, round, and reactive to light. Conjunctivae are normal. No scleral icterus.   Neck: Normal range of motion. Neck supple. No JVD present. No thyroid mass and no thyromegaly present.   Cardiovascular: Normal rate, regular rhythm and normal heart sounds.  Exam reveals no gallop and no friction rub.    No murmur heard.  Pulmonary/Chest: Effort normal and breath sounds normal. No accessory muscle usage. No respiratory distress. She has no wheezes. She has no rales.   Abdominal: Soft. Normal appearance and bowel sounds are normal. She exhibits no distension, no ascites and no mass. There is no hepatosplenomegaly. There is tenderness (periumbilical ). There is no rebound and no guarding.   Musculoskeletal: Normal range of motion. She exhibits no edema or tenderness.     Vascular Status -  Her right foot exhibits normal foot vasculature  and no edema. Her left foot exhibits normal foot vasculature  and no edema.  Lymphadenopathy:     She has no cervical adenopathy.   Neurological: She is alert and oriented to person, place, and time. She has normal strength. Gait normal.   Skin: Skin is warm, dry and intact. No rash noted.       Imaging Results (most recent)     None          Body mass index is 17.92 kg/m².    Assessment/Plan     Meenu was seen today for gi problem.    Diagnoses and all orders for this visit:    Abnormal CT of the abdomen  -     Case Request; Standing  -     Case Request    Other orders  -     Follow " Anesthesia Guidelines / Standing Orders; Future  -     Implement Anesthesia Orders Day of Procedure; Standing  -     Obtain Informed Consent; Standing        ESOPHAGOGASTRODUODENOSCOPY WITH ANESTHESIA (N/A)    CT scan from June 2018 reviewed with Dr Sim, Radiologist at [facility].  No abnormalities noted to pancreas  Encouraged Miralax on regular basis  Due to lack of cardiac/pulmonary related issues will proceed with EGD    Advised pt to stop ASA, use of NSAIDs, Fish Oil, and MV 5 days prior to procedure, per Dr Patrick protocol.  Tylenol based products are ok to take.  Pt verbalized understanding.    Patient is to continue all blood pressure and cardiac medications prior to procedure and has been advised to take medications morning of procedure   Pt verbalized understanding    The risk of the endoscopy were discussed in detail.  We discussed the risk of perforation (one out of 7399-9236, riskier with dilation), bleeding (one out of 500), and the rare risks of infection, adverse reaction to anesthesia, respiratory failure, cardiac failure including MI and adverse reaction to medications, etc.  We discussed consequences that could occur if a risk were to develop such as the need for hospitalization, blood transfusion, surgical intervention, medications, pain and disability and death.  Alternatives include not doing anything, or pursuing an UGI series which only offers a diagnosis with potential less accuracy compared to egd.  The patient verbalizes understanding and agrees to proceed.      Patient's Body mass index is 17.92 kg/m². BMI is below normal parameters. Recommendations include: no follow-up required.      There are no Patient Instructions on file for this visit.

## 2018-08-03 NOTE — ANESTHESIA POSTPROCEDURE EVALUATION
"Patient: Meenu Arteaga    Procedure Summary     Date:  08/03/18 Room / Location:  [facility] ENDOSCOPY 4 / [facility] ENDOSCOPY    Anesthesia Start:  1043 Anesthesia Stop:  1050    Procedure:  ESOPHAGOGASTRODUODENOSCOPY WITH ANESTHESIA (N/A ) Diagnosis:       Abnormal CT of the abdomen      (Abnormal CT of the abdomen [R93.5])    Surgeon:  Obed Patrick DO Provider:  Scotty Shi CRNA    Anesthesia Type:  general ASA Status:  2          Anesthesia Type: general  Last vitals  BP   150/85 (08/03/18 0857)   Temp   97.3 °F (36.3 °C) (08/03/18 0857)   Pulse   82 (08/03/18 0857)   Resp   18 (08/03/18 0857)     SpO2   100 % (08/03/18 0857)     Post Anesthesia Care and Evaluation    Patient location during evaluation: PACU  Patient participation: complete - patient participated  Level of consciousness: awake and alert  Pain management: adequate  Airway patency: patent  Anesthetic complications: No anesthetic complications    Cardiovascular status: acceptable  Respiratory status: acceptable  Hydration status: acceptable    Comments: Blood pressure 150/85, pulse 82, temperature 97.3 °F (36.3 °C), temperature source Temporal Artery , resp. rate 18, height 157.5 cm (62\"), weight 43.1 kg (95 lb), SpO2 100 %, not currently breastfeeding.    Pt discharged from PACU based on karli score >8      "

## 2018-08-03 NOTE — ANESTHESIA PREPROCEDURE EVALUATION
Anesthesia Evaluation     Patient summary reviewed   no history of anesthetic complications:  NPO Solid Status: > 8 hours             Airway   Mallampati: II  TM distance: >3 FB  Neck ROM: full  Dental      Pulmonary - negative pulmonary ROS   Cardiovascular   Exercise tolerance: good (4-7 METS)    (+) hypertension,       Neuro/Psych- negative ROS  GI/Hepatic/Renal/Endo    (+)  GERD,  hypothyroidism,     Musculoskeletal     Abdominal    Substance History      OB/GYN          Other                        Anesthesia Plan    ASA 2     general     intravenous induction   Anesthetic plan and risks discussed with patient.

## 2018-08-04 LAB — UREASE TISS QL: NEGATIVE

## 2018-09-02 ENCOUNTER — OFFICE VISIT (OUTPATIENT)
Dept: URGENT CARE | Age: 83
End: 2018-09-02
Payer: MEDICARE

## 2018-09-02 VITALS
SYSTOLIC BLOOD PRESSURE: 158 MMHG | BODY MASS INDEX: 18.29 KG/M2 | DIASTOLIC BLOOD PRESSURE: 80 MMHG | HEIGHT: 62 IN | WEIGHT: 99.4 LBS | OXYGEN SATURATION: 98 % | HEART RATE: 90 BPM | TEMPERATURE: 98.9 F | RESPIRATION RATE: 20 BRPM
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
## 2018-09-02 DIAGNOSIS — S40.811A ABRASION OF RIGHT UPPER ARM, INITIAL ENCOUNTER: Primary | ICD-10-CM

## 2018-09-02 PROCEDURE — 4040F PNEUMOC VAC/ADMIN/RCVD: CPT | Performed by: FAMILY MEDICINE

## 2018-09-02 PROCEDURE — 1036F TOBACCO NON-USER: CPT | Performed by: FAMILY MEDICINE

## 2018-09-02 PROCEDURE — 1090F PRES/ABSN URINE INCON ASSESS: CPT | Performed by: FAMILY MEDICINE

## 2018-09-02 PROCEDURE — 1123F ACP DISCUSS/DSCN MKR DOCD: CPT | Performed by: FAMILY MEDICINE

## 2018-09-02 PROCEDURE — G8427 DOCREV CUR MEDS BY ELIG CLIN: HCPCS | Performed by: FAMILY MEDICINE

## 2018-09-02 PROCEDURE — 99213 OFFICE O/P EST LOW 20 MIN: CPT | Performed by: FAMILY MEDICINE

## 2018-09-02 PROCEDURE — G8419 CALC BMI OUT NRM PARAM NOF/U: HCPCS | Performed by: FAMILY MEDICINE

## 2018-09-02 PROCEDURE — 1101F PT FALLS ASSESS-DOCD LE1/YR: CPT | Performed by: FAMILY MEDICINE

## 2018-09-02 ASSESSMENT — ENCOUNTER SYMPTOMS
VOMITING: 0
SHORTNESS OF BREATH: 0
DIARRHEA: 0
BACK PAIN: 0
SORE THROAT: 0
CONSTIPATION: 0
NAUSEA: 0
RHINORRHEA: 0
ABDOMINAL PAIN: 0
COUGH: 0

## 2018-09-02 NOTE — PROGRESS NOTES
Quinolones     Sulfa Antibiotics        Past Surgical History:   Procedure Laterality Date    APPENDECTOMY      CARDIAC CATHETERIZATION  5/26/15  MDL    with aortic root injection. EF 60%    CATARACT REMOVAL      CHOLECYSTECTOMY      CYST INCISION AND DRAINAGE      drained liver cyst    HEMORRHOID SURGERY      HYSTERECTOMY      NERVE BLOCK LUMB FACET LEVEL 1 BILATERAL Bilateral 2016    LUMBAR FACET CATE L4-5  performed by Aristeo Hoang at [facility]       Social History   Substance Use Topics    Smoking status: Never Smoker    Smokeless tobacco: Never Used    Alcohol use No       Family History   Problem Relation Age of Onset    Other Father         stroke, MI,  46       BP (!) 158/80   Pulse 90   Temp 98.9 °F (37.2 °C) (Oral)   Resp 20   Ht 5' 2\" (1.575 m)   Wt 99 lb 6.4 oz (45.1 kg)   LMP  (LMP Unknown)   SpO2 98%   BMI 18.18 kg/m²     Physical Exam   Constitutional: She is oriented to person, place, and time. She appears well-developed and well-nourished. No distress. HENT:   Head: Normocephalic and atraumatic. Right Ear: External ear normal.   Left Ear: External ear normal.   Nose: Nose normal.   Eyes: Conjunctivae and EOM are normal. Right eye exhibits no discharge. Left eye exhibits no discharge. No scleral icterus. Neck: Normal range of motion. Neck supple. Cardiovascular: Normal rate, regular rhythm, normal heart sounds and intact distal pulses. Exam reveals no gallop and no friction rub. No murmur heard. Pulmonary/Chest: Effort normal and breath sounds normal. No respiratory distress. She has no wheezes. She has no rales. Abdominal: Soft. Bowel sounds are normal. She exhibits no distension. There is no tenderness. Musculoskeletal: Normal range of motion. She exhibits tenderness (mild superficial tenderness right side with some redness in the area but no appreciable bruising. ).  She exhibits no edema (Bilateral lower extremities) or deformity (No gross Dispense:  30 g     Refill:  0     May substitute for cream for insurance purposes. There are no discontinued medications. Patient Instructions   Patient given educational handouts and has had all questions answered. Patient voices understanding and agrees to plans along with risks and benefits of plan. Patient is instructed to continue prior meds, diet, and exercise plans as instructed. Patient agrees to follow up as instructed and sooner if needed. Patient agrees to go to ER if condition becomes emergent. Return if symptoms worsen or fail to improve, for next scheduled follow up with PCP.

## 2019-08-13 PROBLEM — M81.0 AGE-RELATED OSTEOPOROSIS WITHOUT CURRENT PATHOLOGICAL FRACTURE: Status: ACTIVE | Noted: 2019-08-13

## 2019-08-16 ENCOUNTER — INFUSION (OUTPATIENT)
Dept: ONCOLOGY | Facility: HOSPITAL | Age: 84
End: 2019-08-16

## 2019-08-16 VITALS
HEART RATE: 80 BPM | WEIGHT: 100 LBS | HEIGHT: 62 IN | TEMPERATURE: 97.8 F | BODY MASS INDEX: 18.4 KG/M2 | RESPIRATION RATE: 18 BRPM | SYSTOLIC BLOOD PRESSURE: 165 MMHG | OXYGEN SATURATION: 100 % | DIASTOLIC BLOOD PRESSURE: 68 MMHG

## 2019-08-16 DIAGNOSIS — M81.0 AGE-RELATED OSTEOPOROSIS WITHOUT CURRENT PATHOLOGICAL FRACTURE: Primary | ICD-10-CM

## 2019-08-16 PROCEDURE — 96372 THER/PROPH/DIAG INJ SC/IM: CPT

## 2019-08-16 PROCEDURE — 25010000002 DENOSUMAB 60 MG/ML SOLUTION PREFILLED SYRINGE: Performed by: INTERNAL MEDICINE

## 2019-08-16 RX ADMIN — DENOSUMAB 60 MG: 60 INJECTION SUBCUTANEOUS at 14:38

## 2019-08-16 NOTE — PATIENT INSTRUCTIONS
Eating Plan for Osteoporosis  Osteoporosis causes your bones to become weak and brittle. This puts you at greater risk for bone breaks (fractures) from small bumps or falls. Making changes to your diet and increasing your physical activity can help strengthen your bones and improve your overall health.  Calcium and vitamin D are nutrients that play an important role in bone health. Vitamin D helps your body use calcium and strengthen bones. Therefore, it is important to get enough calcium and vitamin D as part of your eating plan for osteoporosis.  What are tips for following this plan?  Reading food labels  · Try to get at least 1,000 milligrams (mg) of calcium each day.  · Look for foods that have at least 50 mg of calcium per serving.  · Talk with your health care provider about taking a calcium supplement if you do not get enough calcium from food.  · Do not have more than 2,500 mg of calcium each day. This is the upper limit for food and nutritional supplements combined. Too much calcium may cause constipation and prevent you from absorbing other important nutrients.  · Choose foods that contain vitamin D.  · Take a daily vitamin supplement that contains 800-1,000 international units (IU) of vitamin D. The amount may be different depending on your age, body weight, ethnicity, and where you live. Talk with your dietitian or health care provider about how much vitamin D is right for you.  · Avoid foods that have more than 300 mg of sodium per serving. Too much sodium can cause your body to lose calcium.  · Talk with your dietitian or health care provider about how much sodium you are allowed each day.  Shopping  · Do not buy foods with added salt, including:  ? Salted snacks.  ? Pickles.  ? Canned soups.  ? Canned meats.  ? Processed meats, such as vaughn or cold cuts.  ? Smoked fish.  Meal planning  · Eat balanced meals that contain protein foods, fruits and vegetables, and foods rich in calcium and vitamin  D.  · Eat at least 5 servings of fruits and vegetables each day.  · Eat 5-6 oz. of lean meat, poultry, fish, eggs, or beans each day.  Lifestyle  · Do not use any products that contain nicotine or tobacco, such as cigarettes and e-cigarettes. If you need help quitting, ask your health care provider.  · If your health care provider recommends that you lose weight:  ? Work with a dietitian to develop an eating plan that will help you reach your desired weight goal.  ? Exercise for at least 30 minutes a day, 5 or more days a week, or as told by your health care provider.  · Work with a physical therapist to develop an exercise plan that includes flexibility, balance, and strength exercises.  · If you drink alcohol, limit how much you have. This means:  ? 0-1 drink a day for women.  ? 0-2 drinks a day for men.  ? Be aware of how much alcohol is in your drink. In the [place] one drink equals one typical bottle of beer (12 oz), one-half glass of wine (5 oz), or one shot of hard liquor (1½ oz).  What foods should I eat?  Foods high in calcium    · Yogurt. Yogurt with fruit.  · Milk. Evaporated skim milk. Dry milk powder.  · Calcium-fortified orange juice.  · Parmesan cheese. Part-skim ricotta cheese. Natural hard cheese. Cream cheese. Cottage cheese.  · Canned sardines. Canned salmon.  · Calcium-treated tofu. Calcium-fortified cereal bar. Calcium-fortified cereal. Calcium-fortified sabiha crackers.  · Cooked emilio greens. Turnip greens. Broccoli. Kale.  · Almonds.  · White beans.  · Corn tortilla.  Foods high in vitamin D  · Cod liver oil. Fatty fish, such as tuna, mackerel, and salmon.  · Milk. Fortified soy milk. Fortified fruit juice.  · Yogurt. Margarine.  · Egg yolks.  Foods high in protein  · Beef. Lamb. Pork tenderloin.  · Chicken breast.  · Tuna (canned). Fish fillet.  · Tofu.   · Soy beans (cooked). Soy goran. Beans (canned or cooked).  · Cottage cheese.  · Yogurt.  · Peanut butter.  · Pumpkin seeds. Nuts.  Sunflower seeds.  · Hard cheese.  · Milk or other milk products, such as soy milk.  The items listed above may not be a complete list of foods and beverages you can eat. Contact a dietitian for more options.  Summary  · Calcium and vitamin D are nutrients that play an important role in bone health and are an important part of your eating plan for osteoporosis.  · Eat balanced meals that contain protein foods, fruits and vegetables, and foods rich in calcium and vitamin D.  · Avoid foods that have more than 300 mg of sodium per serving. Too much sodium can cause your body to lose calcium.  · Exercise is an important part of prevention and treatment of osteoporosis. Aim for at least 30 minutes a day, 5 days a week.  This information is not intended to replace advice given to you by your health care provider. Make sure you discuss any questions you have with your health care provider.  Document Released: 02/25/2019 Document Revised: 02/25/2019 Document Reviewed: 02/25/2019  [employer] Interactive Patient Education © 2019 [employer].    Osteoporosis    Osteoporosis happens when your bones get thin and weak. This can cause your bones to break (fracture) more easily. You can do things at home to make your bones stronger.  Follow these instructions at home:    Activity  · Exercise as told by your doctor. Ask your doctor what activities are safe for you. You should do:  ? Exercises that make your muscles work to hold your body weight up (weight-bearing exercises). These include zarina chi, yoga, and walking.  ? Exercises to make your muscles stronger. One example is lifting weights.  Lifestyle  · Limit alcohol intake to no more than 1 drink a day for nonpregnant women and 2 drinks a day for men. One drink equals 12 oz of beer, 5 oz of wine, or 1½ oz of hard liquor.  · Do not use any products that have nicotine or tobacco in them. These include cigarettes and e-cigarettes. If you need help quitting, ask your doctor.  Preventing  falls  · Use tools to help you move around (mobility aids) as needed. These include canes, walkers, scooters, and crutches.  · Keep rooms well-lit and free of clutter.  · Put away things that could make you trip. These include cords and rugs.  · Install safety rails on stairs. Install grab bars in bathrooms.  · Use rubber mats in slippery areas, like bathrooms.  · Wear shoes that:  ? Fit you well.  ? Support your feet.  ? Have closed toes.  ? Have rubber soles or low heels.  · Tell your doctor about all of the medicines you are taking. Some medicines can make you more likely to fall.  General instructions  · Eat plenty of calcium and vitamin D. These nutrients are good for your bones. Good sources of calcium and vitamin D include:  ? Some fatty fish, such as salmon and tuna.  ? Foods that have calcium and vitamin D added to them (fortified foods). For example, some breakfast cereals are fortified with calcium and vitamin D.  ? Egg yolks.  ? Cheese.  ? Liver.  · Take over-the-counter and prescription medicines only as told by your doctor.  · Keep all follow-up visits as told by your doctor. This is important.  Contact a doctor if:  · You have not been tested (screened) for osteoporosis and you are:  ? A woman who is age 65 or older.  ? A man who is age 70 or older.  Get help right away if:  · You fall.  · You get hurt.  Summary  · Osteoporosis happens when your bones get thin and weak.  · Weak bones can break (fracture) more easily.  · Eat plenty of calcium and vitamin D. These nutrients are good for your bones.  · Tell your doctor about all of the medicines that you take.  This information is not intended to replace advice given to you by your health care provider. Make sure you discuss any questions you have with your health care provider.  Document Released: 03/11/2013 Document Revised: 10/12/2018 Document Reviewed: 10/12/2018  [employer] Interactive Patient Education © 2019 [employer].  Denosumab injection  What  is this medicine?  DENOSUMAB (den oh princess mab) slows bone breakdown. Prolia is used to treat osteoporosis in women after menopause and in men, and in people who are taking corticosteroids for 6 months or more. Xgeva is used to treat a high calcium level due to cancer and to prevent bone fractures and other bone problems caused by multiple myeloma or cancer bone metastases. Xgeva is also used to treat giant cell tumor of the bone.  This medicine may be used for other purposes; ask your health care provider or pharmacist if you have questions.  COMMON BRAND NAME(S): Prolia, XGEVA  What should I tell my health care provider before I take this medicine?  They need to know if you have any of these conditions:  -dental disease  -having surgery or tooth extraction  -infection  -kidney disease  -low levels of calcium or Vitamin D in the blood  -malnutrition  -on hemodialysis  -skin conditions or sensitivity  -thyroid or parathyroid disease  -an unusual reaction to denosumab, other medicines, foods, dyes, or preservatives  -pregnant or trying to get pregnant  -breast-feeding  How should I use this medicine?  This medicine is for injection under the skin. It is given by a health care professional in a hospital or clinic setting.  A special MedGuide will be given to you before each treatment. Be sure to read this information carefully each time.  For Prolia, talk to your pediatrician regarding the use of this medicine in children. Special care may be needed. For Xgeva, talk to your pediatrician regarding the use of this medicine in children. While this drug may be prescribed for children as young as 13 years for selected conditions, precautions do apply.  Overdosage: If you think you have taken too much of this medicine contact a poison control center or emergency room at once.  NOTE: This medicine is only for you. Do not share this medicine with others.  What if I miss a dose?  It is important not to miss your dose. Call your  doctor or health care professional if you are unable to keep an appointment.  What may interact with this medicine?  Do not take this medicine with any of the following medications:  -other medicines containing denosumab  This medicine may also interact with the following medications:  -medicines that lower your chance of fighting infection  -steroid medicines like prednisone or cortisone  This list may not describe all possible interactions. Give your health care provider a list of all the medicines, herbs, non-prescription drugs, or dietary supplements you use. Also tell them if you smoke, drink alcohol, or use illegal drugs. Some items may interact with your medicine.  What should I watch for while using this medicine?  Visit your doctor or health care professional for regular checks on your progress. Your doctor or health care professional may order blood tests and other tests to see how you are doing.  Call your doctor or health care professional for advice if you get a fever, chills or sore throat, or other symptoms of a cold or flu. Do not treat yourself. This drug may decrease your body's ability to fight infection. Try to avoid being around people who are sick.  You should make sure you get enough calcium and vitamin D while you are taking this medicine, unless your doctor tells you not to. Discuss the foods you eat and the vitamins you take with your health care professional.  See your dentist regularly. Brush and floss your teeth as directed. Before you have any dental work done, tell your dentist you are receiving this medicine.  Do not become pregnant while taking this medicine or for 5 months after stopping it. Talk with your doctor or health care professional about your birth control options while taking this medicine. Women should inform their doctor if they wish to become pregnant or think they might be pregnant. There is a potential for serious side effects to an unborn child. Talk to your health  care professional or pharmacist for more information.  What side effects may I notice from receiving this medicine?  Side effects that you should report to your doctor or health care professional as soon as possible:  -allergic reactions like skin rash, itching or hives, swelling of the face, lips, or tongue  -bone pain  -breathing problems  -dizziness  -jaw pain, especially after dental work  -redness, blistering, peeling of the skin  -signs and symptoms of infection like fever or chills; cough; sore throat; pain or trouble passing urine  -signs of low calcium like fast heartbeat, muscle cramps or muscle pain; pain, tingling, numbness in the hands or feet; seizures  -unusual bleeding or bruising  -unusually weak or tired  Side effects that usually do not require medical attention (report to your doctor or health care professional if they continue or are bothersome):  -constipation  -diarrhea  -headache  -joint pain  -loss of appetite  -muscle pain  -runny nose  -tiredness  -upset stomach  This list may not describe all possible side effects. Call your doctor for medical advice about side effects. You may report side effects to FDA at 6-664-FDA-8087.  Where should I keep my medicine?  This medicine is only given in a clinic, doctor's office, or other health care setting and will not be stored at home.  NOTE: This sheet is a summary. It may not cover all possible information. If you have questions about this medicine, talk to your doctor, pharmacist, or health care provider.  © 2019 Elsevier/Gold Standard (2019-04-26 16:10:44)

## 2019-09-07 ENCOUNTER — APPOINTMENT (OUTPATIENT)
Dept: CT IMAGING | Age: 84
End: 2019-09-07
Payer: MEDICARE

## 2019-09-07 ENCOUNTER — APPOINTMENT (OUTPATIENT)
Dept: GENERAL RADIOLOGY | Age: 84
End: 2019-09-07
Payer: MEDICARE

## 2019-09-07 ENCOUNTER — HOSPITAL ENCOUNTER (EMERGENCY)
Age: 84
Discharge: HOME | End: 2019-09-07
Attending: EMERGENCY MEDICINE
Payer: MEDICARE

## 2019-09-07 VITALS
BODY MASS INDEX: 18.03 KG/M2 | HEIGHT: 62 IN | WEIGHT: 98 LBS | SYSTOLIC BLOOD PRESSURE: 150 MMHG | DIASTOLIC BLOOD PRESSURE: 82 MMHG | HEART RATE: 85 BPM | TEMPERATURE: 98 F | OXYGEN SATURATION: 98 % | RESPIRATION RATE: 16 BRPM
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
## 2019-09-07 DIAGNOSIS — S01.311A LACERATION OF RIGHT EAR LOBE, INITIAL ENCOUNTER: ICD-10-CM

## 2019-09-07 DIAGNOSIS — S09.90XA CLOSED HEAD INJURY, INITIAL ENCOUNTER: Primary | ICD-10-CM

## 2019-09-07 LAB
ALBUMIN SERPL-MCNC: 4.1 G/DL (ref 3.5–5.2)
ALP BLD-CCNC: 61 U/L (ref 35–104)
ALT SERPL-CCNC: 15 U/L (ref 5–33)
ANION GAP SERPL CALCULATED.3IONS-SCNC: 14 MMOL/L (ref 7–19)
AST SERPL-CCNC: 31 U/L (ref 5–32)
BASOPHILS ABSOLUTE: 0 K/UL (ref 0–0.2)
BASOPHILS RELATIVE PERCENT: 0.6 % (ref 0–1)
BILIRUB SERPL-MCNC: 0.4 MG/DL (ref 0.2–1.2)
BUN BLDV-MCNC: 16 MG/DL (ref 8–23)
CALCIUM SERPL-MCNC: 9.8 MG/DL (ref 8.2–9.6)
CHLORIDE BLD-SCNC: 101 MMOL/L (ref 98–111)
CO2: 23 MMOL/L (ref 22–29)
CREAT SERPL-MCNC: 0.5 MG/DL (ref 0.5–0.9)
EOSINOPHILS ABSOLUTE: 0 K/UL (ref 0–0.6)
EOSINOPHILS RELATIVE PERCENT: 0.6 % (ref 0–5)
GFR NON-AFRICAN AMERICAN: >60
GLUCOSE BLD-MCNC: 103 MG/DL (ref 74–109)
HCT VFR BLD CALC: 37.2 % (ref 37–47)
HEMOGLOBIN: 12.6 G/DL (ref 12–16)
IMMATURE GRANULOCYTES #: 0 K/UL
LYMPHOCYTES ABSOLUTE: 0.9 K/UL (ref 1.1–4.5)
LYMPHOCYTES RELATIVE PERCENT: 18.5 % (ref 20–40)
MCH RBC QN AUTO: 32.5 PG (ref 27–31)
MCHC RBC AUTO-ENTMCNC: 33.9 G/DL (ref 33–37)
MCV RBC AUTO: 95.9 FL (ref 81–99)
MONOCYTES ABSOLUTE: 0.6 K/UL (ref 0–0.9)
MONOCYTES RELATIVE PERCENT: 11.2 % (ref 0–10)
NEUTROPHILS ABSOLUTE: 3.5 K/UL (ref 1.5–7.5)
NEUTROPHILS RELATIVE PERCENT: 68.5 % (ref 50–65)
PDW BLD-RTO: 13.4 % (ref 11.5–14.5)
PLATELET # BLD: 182 K/UL (ref 130–400)
PMV BLD AUTO: 11.2 FL (ref 9.4–12.3)
POTASSIUM SERPL-SCNC: 4 MMOL/L (ref 3.5–5)
RBC # BLD: 3.88 M/UL (ref 4.2–5.4)
SODIUM BLD-SCNC: 138 MMOL/L (ref 136–145)
TOTAL PROTEIN: 6.7 G/DL (ref 6.6–8.7)
WBC # BLD: 5.1 K/UL (ref 4.8–10.8)

## 2019-09-07 PROCEDURE — 96372 THER/PROPH/DIAG INJ SC/IM: CPT

## 2019-09-07 PROCEDURE — 36415 COLL VENOUS BLD VENIPUNCTURE: CPT

## 2019-09-07 PROCEDURE — 73080 X-RAY EXAM OF ELBOW: CPT

## 2019-09-07 PROCEDURE — 70450 CT HEAD/BRAIN W/O DYE: CPT

## 2019-09-07 PROCEDURE — 72125 CT NECK SPINE W/O DYE: CPT

## 2019-09-07 PROCEDURE — 6360000002 HC RX W HCPCS: Performed by: EMERGENCY MEDICINE

## 2019-09-07 PROCEDURE — 85025 COMPLETE CBC W/AUTO DIFF WBC: CPT

## 2019-09-07 PROCEDURE — 12011 RPR F/E/E/N/L/M 2.5 CM/<: CPT

## 2019-09-07 PROCEDURE — 6360000002 HC RX W HCPCS

## 2019-09-07 PROCEDURE — 80053 COMPREHEN METABOLIC PANEL: CPT

## 2019-09-07 PROCEDURE — 93005 ELECTROCARDIOGRAM TRACING: CPT

## 2019-09-07 PROCEDURE — 99284 EMERGENCY DEPT VISIT MOD MDM: CPT

## 2019-09-07 PROCEDURE — 96374 THER/PROPH/DIAG INJ IV PUSH: CPT

## 2019-09-07 RX ORDER — ONDANSETRON 2 MG/ML
INJECTION INTRAMUSCULAR; INTRAVENOUS
Status: COMPLETED
Start: 2019-09-07 | End: 2019-09-07

## 2019-09-07 RX ORDER — MORPHINE SULFATE 2 MG/ML
2 INJECTION, SOLUTION INTRAMUSCULAR; INTRAVENOUS ONCE
Status: COMPLETED | OUTPATIENT
Start: 2019-09-07 | End: 2019-09-07

## 2019-09-07 RX ORDER — ONDANSETRON 2 MG/ML
4 INJECTION INTRAMUSCULAR; INTRAVENOUS ONCE
Status: DISCONTINUED | OUTPATIENT
Start: 2019-09-07 | End: 2019-09-07 | Stop reason: HOSPADM

## 2019-09-07 RX ADMIN — MORPHINE SULFATE 2 MG: 2 INJECTION, SOLUTION INTRAMUSCULAR; INTRAVENOUS at 12:33

## 2019-09-07 RX ADMIN — ONDANSETRON 4 MG: 2 INJECTION INTRAMUSCULAR; INTRAVENOUS at 13:40

## 2019-09-07 RX ADMIN — ONDANSETRON 4 MG: 2 INJECTION INTRAMUSCULAR; INTRAVENOUS at 12:33

## 2019-09-07 ASSESSMENT — PAIN SCALES - GENERAL
PAINLEVEL_OUTOF10: 3
PAINLEVEL_OUTOF10: 3

## 2019-09-08 LAB
EKG P AXIS: 87 DEGREES
EKG P-R INTERVAL: 182 MS
EKG Q-T INTERVAL: 408 MS
EKG QRS DURATION: 130 MS
EKG QTC CALCULATION (BAZETT): 432 MS
EKG T AXIS: 114 DEGREES

## 2019-09-09 ENCOUNTER — HOSPITAL ENCOUNTER (OUTPATIENT)
Dept: ULTRASOUND IMAGING | Facility: HOSPITAL | Age: 84
Discharge: HOME | End: 2019-09-09
Admitting: INTERNAL MEDICINE

## 2019-09-09 ENCOUNTER — TRANSCRIBE ORDERS (OUTPATIENT)
Dept: ADMINISTRATIVE | Facility: HOSPITAL | Age: 84
End: 2019-09-09

## 2019-09-09 DIAGNOSIS — I65.23 BILATERAL CAROTID ARTERY OCCLUSION: ICD-10-CM

## 2019-09-09 DIAGNOSIS — I65.23 BILATERAL CAROTID ARTERY OCCLUSION: Primary | ICD-10-CM

## 2019-09-09 PROCEDURE — 93880 EXTRACRANIAL BILAT STUDY: CPT

## 2019-09-09 ASSESSMENT — ENCOUNTER SYMPTOMS
VOMITING: 0
DIARRHEA: 0
ABDOMINAL PAIN: 0
SHORTNESS OF BREATH: 0
CHEST TIGHTNESS: 0
NAUSEA: 0

## 2019-09-13 ENCOUNTER — HOSPITAL ENCOUNTER (OUTPATIENT)
Dept: NON INVASIVE DIAGNOSTICS | Age: 84
Discharge: HOME | End: 2019-09-13
Payer: MEDICARE

## 2019-09-13 LAB
LV EF: 65 %
LVEF MODALITY: NORMAL

## 2019-09-13 PROCEDURE — 93306 TTE W/DOPPLER COMPLETE: CPT

## 2019-09-14 ENCOUNTER — HOSPITAL ENCOUNTER (EMERGENCY)
Age: 84
Discharge: HOME | End: 2019-09-14
Attending: EMERGENCY MEDICINE
Payer: MEDICARE

## 2019-09-14 ENCOUNTER — APPOINTMENT (OUTPATIENT)
Dept: GENERAL RADIOLOGY | Age: 84
End: 2019-09-14
Payer: MEDICARE

## 2019-09-14 VITALS
RESPIRATION RATE: 16 BRPM | SYSTOLIC BLOOD PRESSURE: 143 MMHG | OXYGEN SATURATION: 100 % | HEART RATE: 77 BPM | HEIGHT: 62 IN | WEIGHT: 100 LBS | BODY MASS INDEX: 18.4 KG/M2 | TEMPERATURE: 98.4 F | DIASTOLIC BLOOD PRESSURE: 84 MMHG

## 2019-09-14 DIAGNOSIS — F41.0 ANXIETY ATTACK: Primary | ICD-10-CM

## 2019-09-14 LAB
ALBUMIN SERPL-MCNC: 4.9 G/DL (ref 3.5–5.2)
ALP BLD-CCNC: 75 U/L (ref 35–104)
ALT SERPL-CCNC: 14 U/L (ref 5–33)
ANION GAP SERPL CALCULATED.3IONS-SCNC: 19 MMOL/L (ref 7–19)
AST SERPL-CCNC: 25 U/L (ref 5–32)
BACTERIA: NEGATIVE /HPF
BASE EXCESS ARTERIAL: 4.2 MMOL/L (ref -2–2)
BASOPHILS ABSOLUTE: 0 K/UL (ref 0–0.2)
BASOPHILS RELATIVE PERCENT: 0.7 % (ref 0–1)
BILIRUB SERPL-MCNC: 0.7 MG/DL (ref 0.2–1.2)
BILIRUBIN URINE: NEGATIVE
BLOOD, URINE: NEGATIVE
BUN BLDV-MCNC: 14 MG/DL (ref 8–23)
CALCIUM SERPL-MCNC: 10 MG/DL (ref 8.2–9.6)
CARBOXYHEMOGLOBIN ARTERIAL: 1.5 % (ref 0–5)
CHLORIDE BLD-SCNC: 97 MMOL/L (ref 98–111)
CLARITY: CLEAR
CO2: 24 MMOL/L (ref 22–29)
COLOR: YELLOW
CREAT SERPL-MCNC: 0.6 MG/DL (ref 0.5–0.9)
EOSINOPHILS ABSOLUTE: 0 K/UL (ref 0–0.6)
EOSINOPHILS RELATIVE PERCENT: 0.4 % (ref 0–5)
EPITHELIAL CELLS, UA: 1 /HPF (ref 0–5)
GFR NON-AFRICAN AMERICAN: >60
GLUCOSE BLD-MCNC: 113 MG/DL (ref 74–109)
GLUCOSE URINE: NEGATIVE MG/DL
HCO3 ARTERIAL: 23.7 MMOL/L (ref 22–26)
HCT VFR BLD CALC: 41 % (ref 37–47)
HEMOGLOBIN, ART, EXTENDED: 12.8 G/DL (ref 12–16)
HEMOGLOBIN: 14 G/DL (ref 12–16)
HYALINE CASTS: 0 /HPF (ref 0–8)
IMMATURE GRANULOCYTES #: 0 K/UL
KETONES, URINE: NEGATIVE MG/DL
LEUKOCYTE ESTERASE, URINE: ABNORMAL
LYMPHOCYTES ABSOLUTE: 0.8 K/UL (ref 1.1–4.5)
LYMPHOCYTES RELATIVE PERCENT: 17 % (ref 20–40)
MAGNESIUM: 2.1 MG/DL (ref 1.7–2.3)
MCH RBC QN AUTO: 31.6 PG (ref 27–31)
MCHC RBC AUTO-ENTMCNC: 34.1 G/DL (ref 33–37)
MCV RBC AUTO: 92.6 FL (ref 81–99)
METHEMOGLOBIN ARTERIAL: 1.3 %
MONOCYTES ABSOLUTE: 0.4 K/UL (ref 0–0.9)
MONOCYTES RELATIVE PERCENT: 9.4 % (ref 0–10)
NEUTROPHILS ABSOLUTE: 3.2 K/UL (ref 1.5–7.5)
NEUTROPHILS RELATIVE PERCENT: 72.3 % (ref 50–65)
NITRITE, URINE: NEGATIVE
O2 CONTENT ARTERIAL: 17.5 ML/DL
O2 SAT, ARTERIAL: 96.6 %
O2 THERAPY: ABNORMAL
PCO2 ARTERIAL: 22 MMHG (ref 35–45)
PDW BLD-RTO: 12.9 % (ref 11.5–14.5)
PH ARTERIAL: 7.64 (ref 7.35–7.45)
PH UA: 7.5 (ref 5–8)
PLATELET # BLD: 250 K/UL (ref 130–400)
PMV BLD AUTO: 9.9 FL (ref 9.4–12.3)
PO2 ARTERIAL: 86 MMHG (ref 80–100)
POTASSIUM REFLEX MAGNESIUM: 3.1 MMOL/L (ref 3.5–5)
POTASSIUM, WHOLE BLOOD: 2.8
PROTEIN UA: NEGATIVE MG/DL
RBC # BLD: 4.43 M/UL (ref 4.2–5.4)
RBC UA: 2 /HPF (ref 0–4)
SODIUM BLD-SCNC: 140 MMOL/L (ref 136–145)
SPECIFIC GRAVITY UA: 1.01 (ref 1–1.03)
TOTAL PROTEIN: 7.8 G/DL (ref 6.6–8.7)
URINE REFLEX TO CULTURE: YES
UROBILINOGEN, URINE: 0.2 E.U./DL
WBC # BLD: 4.5 K/UL (ref 4.8–10.8)
WBC UA: 1 /HPF (ref 0–5)

## 2019-09-14 PROCEDURE — 84132 ASSAY OF SERUM POTASSIUM: CPT

## 2019-09-14 PROCEDURE — 82803 BLOOD GASES ANY COMBINATION: CPT

## 2019-09-14 PROCEDURE — 83735 ASSAY OF MAGNESIUM: CPT

## 2019-09-14 PROCEDURE — 96374 THER/PROPH/DIAG INJ IV PUSH: CPT

## 2019-09-14 PROCEDURE — 81001 URINALYSIS AUTO W/SCOPE: CPT

## 2019-09-14 PROCEDURE — 85025 COMPLETE CBC W/AUTO DIFF WBC: CPT

## 2019-09-14 PROCEDURE — 36415 COLL VENOUS BLD VENIPUNCTURE: CPT

## 2019-09-14 PROCEDURE — 87086 URINE CULTURE/COLONY COUNT: CPT

## 2019-09-14 PROCEDURE — 71045 X-RAY EXAM CHEST 1 VIEW: CPT

## 2019-09-14 PROCEDURE — 6360000002 HC RX W HCPCS: Performed by: EMERGENCY MEDICINE

## 2019-09-14 PROCEDURE — 99284 EMERGENCY DEPT VISIT MOD MDM: CPT

## 2019-09-14 PROCEDURE — 36600 WITHDRAWAL OF ARTERIAL BLOOD: CPT

## 2019-09-14 PROCEDURE — 80053 COMPREHEN METABOLIC PANEL: CPT

## 2019-09-14 PROCEDURE — 93005 ELECTROCARDIOGRAM TRACING: CPT | Performed by: EMERGENCY MEDICINE

## 2019-09-14 RX ORDER — LORAZEPAM 0.5 MG/1
0.5 TABLET ORAL EVERY 8 HOURS PRN
Qty: 15 TABLET | Refills: 0 | Status: SHIPPED | OUTPATIENT
Start: 2019-09-14 | End: 2019-09-19

## 2019-09-14 RX ORDER — LORAZEPAM 2 MG/ML
0.5 INJECTION INTRAMUSCULAR ONCE
Status: COMPLETED | OUTPATIENT
Start: 2019-09-14 | End: 2019-09-14

## 2019-09-14 RX ADMIN — LORAZEPAM 0.5 MG: 2 INJECTION INTRAMUSCULAR; INTRAVENOUS at 13:20

## 2019-09-14 ASSESSMENT — ENCOUNTER SYMPTOMS
NAUSEA: 0
SHORTNESS OF BREATH: 0
VISUAL CHANGE: 0
DIARRHEA: 1
VOMITING: 0

## 2019-09-14 NOTE — PROGRESS NOTES
Results for Porsha Meter (MRN [id number]) as of 9/14/2019 13:22   Ref.  Range 9/14/2019 13:11   Hemoglobin, Art, Extended Latest Ref Range: 12.0 - 16.0 g/dL 12.8   pH, Arterial Latest Ref Range: 7.350 - 7.450  7.640 (HH)   pCO2, Arterial Latest Ref Range: 35.0 - 45.0 mmHg 22.0 (L)   pO2, Arterial Latest Ref Range: 80.0 - 100.0 mmHg 86.0   HCO3, Arterial Latest Ref Range: 22.0 - 26.0 mmol/L 23.7   Base Excess, Arterial Latest Ref Range: -2.0 - 2.0 mmol/L 4.2 (H)   O2 Sat, Arterial Latest Ref Range: >92 % 96.6   O2 Content, Arterial Latest Ref Range: Not Established mL/dL 17.5   Methemoglobin, Arterial Latest Ref Range: <1.5 % 1.3   Carboxyhgb, Arterial Latest Ref Range: 0.0 - 5.0 % 1.5     Room air  Site RR  AT +

## 2019-09-15 LAB
EKG P AXIS: 81 DEGREES
EKG P-R INTERVAL: 176 MS
EKG Q-T INTERVAL: 416 MS
EKG QRS DURATION: 128 MS
EKG QTC CALCULATION (BAZETT): 452 MS
EKG T AXIS: 97 DEGREES

## 2019-09-16 LAB — URINE CULTURE, ROUTINE: NORMAL

## 2019-09-20 ENCOUNTER — HOSPITAL ENCOUNTER (OUTPATIENT)
Dept: NEUROLOGY | Age: 84
Discharge: HOME | End: 2019-09-20
Payer: MEDICARE

## 2019-09-20 DIAGNOSIS — R55 SYNCOPE AND COLLAPSE: ICD-10-CM

## 2019-09-20 PROCEDURE — 95816 EEG AWAKE AND DROWSY: CPT | Performed by: PSYCHIATRY & NEUROLOGY

## 2019-09-20 PROCEDURE — 95812 EEG 41-60 MINUTES: CPT

## 2019-09-23 PROBLEM — R55 SYNCOPE AND COLLAPSE: Status: ACTIVE | Noted: 2019-09-23

## 2019-10-02 ENCOUNTER — OFFICE VISIT (OUTPATIENT)
Dept: CARDIOLOGY | Age: 84
End: 2019-10-02
Payer: MEDICARE

## 2019-10-02 VITALS
BODY MASS INDEX: 17.66 KG/M2 | WEIGHT: 96 LBS | DIASTOLIC BLOOD PRESSURE: 80 MMHG | SYSTOLIC BLOOD PRESSURE: 128 MMHG | HEIGHT: 62 IN | HEART RATE: 88 BPM

## 2019-10-02 DIAGNOSIS — R55 SYNCOPE AND COLLAPSE: Primary | ICD-10-CM

## 2019-10-02 DIAGNOSIS — F41.9 ANXIETY: ICD-10-CM

## 2019-10-02 DIAGNOSIS — I36.1 NON-RHEUMATIC TRICUSPID VALVE INSUFFICIENCY: ICD-10-CM

## 2019-10-02 DIAGNOSIS — I10 ESSENTIAL HYPERTENSION: ICD-10-CM

## 2019-10-02 DIAGNOSIS — I34.0 NONRHEUMATIC MITRAL VALVE REGURGITATION: ICD-10-CM

## 2019-10-02 DIAGNOSIS — R00.2 PALPITATIONS: ICD-10-CM

## 2019-10-02 PROCEDURE — 0296T PR EXT ECG > 48HR TO 21 DAY RCRD W/CONECT INTL RCRD: CPT | Performed by: CLINICAL NURSE SPECIALIST

## 2019-10-02 PROCEDURE — 1036F TOBACCO NON-USER: CPT | Performed by: CLINICAL NURSE SPECIALIST

## 2019-10-02 PROCEDURE — G8484 FLU IMMUNIZE NO ADMIN: HCPCS | Performed by: CLINICAL NURSE SPECIALIST

## 2019-10-02 PROCEDURE — 99204 OFFICE O/P NEW MOD 45 MIN: CPT | Performed by: CLINICAL NURSE SPECIALIST

## 2019-10-02 PROCEDURE — 4040F PNEUMOC VAC/ADMIN/RCVD: CPT | Performed by: CLINICAL NURSE SPECIALIST

## 2019-10-02 PROCEDURE — 1123F ACP DISCUSS/DSCN MKR DOCD: CPT | Performed by: CLINICAL NURSE SPECIALIST

## 2019-10-02 PROCEDURE — G8419 CALC BMI OUT NRM PARAM NOF/U: HCPCS | Performed by: CLINICAL NURSE SPECIALIST

## 2019-10-02 PROCEDURE — 1090F PRES/ABSN URINE INCON ASSESS: CPT | Performed by: CLINICAL NURSE SPECIALIST

## 2019-10-02 PROCEDURE — G8427 DOCREV CUR MEDS BY ELIG CLIN: HCPCS | Performed by: CLINICAL NURSE SPECIALIST

## 2019-10-02 RX ORDER — DOCUSATE SODIUM 100 MG/1
100 CAPSULE, LIQUID FILLED ORAL 2 TIMES DAILY
COMMUNITY

## 2019-10-02 RX ORDER — ERGOCALCIFEROL 1.25 MG/1
50000 CAPSULE ORAL WEEKLY
Status: ON HOLD | COMMUNITY
End: 2022-06-22

## 2019-10-02 RX ORDER — LORAZEPAM 0.5 MG/1
0.5 TABLET ORAL
COMMUNITY

## 2019-10-02 ASSESSMENT — ENCOUNTER SYMPTOMS
CHEST TIGHTNESS: 0
NAUSEA: 0
ABDOMINAL PAIN: 0
EYE REDNESS: 0
VOMITING: 0
SHORTNESS OF BREATH: 0
WHEEZING: 0
COUGH: 0
FACIAL SWELLING: 0

## 2019-10-03 ENCOUNTER — TELEPHONE (OUTPATIENT)
Dept: CARDIOLOGY | Age: 84
End: 2019-10-03

## 2019-11-08 ENCOUNTER — OFFICE VISIT (OUTPATIENT)
Dept: CARDIOLOGY | Age: 84
End: 2019-11-08
Payer: MEDICARE

## 2019-11-08 VITALS
DIASTOLIC BLOOD PRESSURE: 82 MMHG | BODY MASS INDEX: 18.14 KG/M2 | HEART RATE: 84 BPM | SYSTOLIC BLOOD PRESSURE: 162 MMHG | HEIGHT: 62 IN | WEIGHT: 98.6 LBS

## 2019-11-08 DIAGNOSIS — F41.9 ANXIETY: ICD-10-CM

## 2019-11-08 DIAGNOSIS — Q21.0 VENTRICULAR SEPTAL DEFECT: ICD-10-CM

## 2019-11-08 DIAGNOSIS — I10 ESSENTIAL HYPERTENSION: ICD-10-CM

## 2019-11-08 DIAGNOSIS — I36.1 NONRHEUMATIC TRICUSPID VALVE REGURGITATION: ICD-10-CM

## 2019-11-08 DIAGNOSIS — R55 SYNCOPE AND COLLAPSE: Primary | ICD-10-CM

## 2019-11-08 DIAGNOSIS — I34.0 NONRHEUMATIC MITRAL VALVE REGURGITATION: ICD-10-CM

## 2019-11-08 DIAGNOSIS — R00.2 PALPITATIONS: ICD-10-CM

## 2019-11-08 PROCEDURE — 1036F TOBACCO NON-USER: CPT | Performed by: CLINICAL NURSE SPECIALIST

## 2019-11-08 PROCEDURE — G8419 CALC BMI OUT NRM PARAM NOF/U: HCPCS | Performed by: CLINICAL NURSE SPECIALIST

## 2019-11-08 PROCEDURE — 4040F PNEUMOC VAC/ADMIN/RCVD: CPT | Performed by: CLINICAL NURSE SPECIALIST

## 2019-11-08 PROCEDURE — 99214 OFFICE O/P EST MOD 30 MIN: CPT | Performed by: CLINICAL NURSE SPECIALIST

## 2019-11-08 PROCEDURE — G8484 FLU IMMUNIZE NO ADMIN: HCPCS | Performed by: CLINICAL NURSE SPECIALIST

## 2019-11-08 PROCEDURE — G8427 DOCREV CUR MEDS BY ELIG CLIN: HCPCS | Performed by: CLINICAL NURSE SPECIALIST

## 2019-11-08 PROCEDURE — 1090F PRES/ABSN URINE INCON ASSESS: CPT | Performed by: CLINICAL NURSE SPECIALIST

## 2019-11-08 PROCEDURE — 1123F ACP DISCUSS/DSCN MKR DOCD: CPT | Performed by: CLINICAL NURSE SPECIALIST

## 2019-11-08 RX ORDER — METOPROLOL SUCCINATE 25 MG/1
25 TABLET, EXTENDED RELEASE ORAL NIGHTLY
Qty: 30 TABLET | Refills: 5 | Status: ON HOLD | OUTPATIENT
Start: 2019-11-08 | End: 2022-06-22

## 2019-11-08 ASSESSMENT — ENCOUNTER SYMPTOMS
SHORTNESS OF BREATH: 0
EYE REDNESS: 0
COUGH: 0
VOMITING: 0
WHEEZING: 0
CHEST TIGHTNESS: 0
FACIAL SWELLING: 0
ABDOMINAL PAIN: 0
NAUSEA: 0

## 2020-01-22 RX ORDER — CANDESARTAN 32 MG/1
TABLET ORAL
Qty: 90 TABLET | Refills: 3 | Status: SHIPPED | OUTPATIENT
Start: 2020-01-22 | End: 2021-01-18

## 2020-01-23 ENCOUNTER — OFFICE VISIT (OUTPATIENT)
Dept: INTERNAL MEDICINE | Facility: CLINIC | Age: 85
End: 2020-01-23

## 2020-01-23 VITALS
SYSTOLIC BLOOD PRESSURE: 128 MMHG | DIASTOLIC BLOOD PRESSURE: 78 MMHG | HEART RATE: 68 BPM | BODY MASS INDEX: 18.26 KG/M2 | HEIGHT: 62 IN | WEIGHT: 99.2 LBS

## 2020-01-23 DIAGNOSIS — G62.9 NEUROPATHY: Primary | ICD-10-CM

## 2020-01-23 DIAGNOSIS — E03.8 HYPOTHYROIDISM DUE TO HASHIMOTO'S THYROIDITIS: ICD-10-CM

## 2020-01-23 DIAGNOSIS — E06.3 HYPOTHYROIDISM DUE TO HASHIMOTO'S THYROIDITIS: ICD-10-CM

## 2020-01-23 DIAGNOSIS — I35.9 AORTIC VALVE DISEASE: ICD-10-CM

## 2020-01-23 PROBLEM — M53.3 SACRAL PAIN: Status: ACTIVE | Noted: 2020-01-23

## 2020-01-23 PROBLEM — F41.9 ANXIETY: Status: ACTIVE | Noted: 2020-01-23

## 2020-01-23 PROBLEM — I10 BENIGN HYPERTENSION: Status: ACTIVE | Noted: 2020-01-23

## 2020-01-23 PROBLEM — I44.7 COMPLETE LEFT BUNDLE BRANCH BLOCK: Status: ACTIVE | Noted: 2020-01-23

## 2020-01-23 PROBLEM — E78.00 HIGH CHOLESTEROL: Status: ACTIVE | Noted: 2020-01-23

## 2020-01-23 PROBLEM — M81.0 OSTEOPOROSIS: Status: ACTIVE | Noted: 2020-01-23

## 2020-01-23 PROBLEM — M48.061 LUMBAR SPINAL STENOSIS: Status: ACTIVE | Noted: 2020-01-23

## 2020-01-23 PROCEDURE — 99214 OFFICE O/P EST MOD 30 MIN: CPT | Performed by: INTERNAL MEDICINE

## 2020-01-23 RX ORDER — LORAZEPAM 0.5 MG/1
0.5 TABLET ORAL 2 TIMES DAILY
COMMUNITY
End: 2020-05-05

## 2020-01-23 RX ORDER — DOCUSATE SODIUM 100 MG/1
1 CAPSULE, LIQUID FILLED ORAL AS NEEDED
COMMUNITY
End: 2021-06-22

## 2020-01-23 RX ORDER — ESTRADIOL 0.1 MG/G
CREAM VAGINAL
COMMUNITY
End: 2020-03-12

## 2020-01-23 RX ORDER — GABAPENTIN 100 MG/1
100 CAPSULE ORAL NIGHTLY
Qty: 30 CAPSULE | Refills: 5 | Status: SHIPPED | OUTPATIENT
Start: 2020-01-23 | End: 2020-06-10 | Stop reason: SINTOL

## 2020-01-23 NOTE — PROGRESS NOTES
Subjective   Meenu Arteaga is a 89 y.o. female.   Chief Complaint   Patient presents with   • Anxiety     Discuss Medication       Chandler is complaining of both her legs aching.  She is also complaining of her abdomen being distended in addition to that she thinks that her Atacand may be causing her to feel weak and tired during the day.       The following portions of the patient's history were reviewed and updated as appropriate: allergies, current medications, past family history, past medical history, past social history, past surgical history and problem list.    Review of Systems   Constitutional: Negative for activity change, appetite change, fever, unexpected weight gain and unexpected weight loss. Fatigue: thinks related to meds.   HENT: Negative for swollen glands, trouble swallowing and voice change.    Eyes: Negative for blurred vision and visual disturbance.   Respiratory: Negative for cough and shortness of breath.    Cardiovascular: Negative for chest pain, palpitations and leg swelling.   Gastrointestinal: Positive for abdominal distention. Negative for abdominal pain, constipation, diarrhea, nausea, vomiting and indigestion.   Endocrine: Negative for cold intolerance, heat intolerance, polydipsia and polyphagia.   Genitourinary: Negative for dysuria and frequency.   Musculoskeletal: Positive for arthralgias and myalgias. Negative for back pain, joint swelling and neck pain.   Skin: Negative for color change, rash and skin lesions.   Neurological: Negative for dizziness, weakness, headache, memory problem and confusion.   Hematological: Does not bruise/bleed easily.   Psychiatric/Behavioral: Negative for agitation, hallucinations and suicidal ideas. The patient is not nervous/anxious.        Objective   Vitals:    01/23/20 1118   BP: 128/78   Pulse: 68     Past Medical History:   Diagnosis Date   • Arthritis    • GERD (gastroesophageal reflux disease)    • Hyperlipidemia    • Hypertension    •  Hypothyroidism    • Liver cyst    • Neuropathy    • Ovarian cyst       Past Surgical History:   Procedure Laterality Date   • ABDOMINAL HYSTERECTOMY     • APPENDECTOMY     • BREAST BIOPSY     • CHOLECYSTECTOMY     • COLONOSCOPY  03/05/2008    normal   • COLONOSCOPY  01/12/2012   • ENDOSCOPY  08/29/2013    normal   • ENDOSCOPY  01/23/2012    eus with stacy  normal endoscopic ultrascope of the pancreas.fortunately there was no mass seen   • ENDOSCOPY N/A 8/3/2018    Procedure: ESOPHAGOGASTRODUODENOSCOPY WITH ANESTHESIA;  Surgeon: Obed Patrick DO;  Location: [facility] ENDOSCOPY;  Service: Gastroenterology   • HEMORRHOIDECTOMY     • UPPER GASTROINTESTINAL ENDOSCOPY  08/29/2013        Current Outpatient Medications:   •  bumetanide (BUMEX) 0.5 MG tablet, Take 0.5 mg by mouth Daily., Disp: , Rfl:   •  candesartan (ATACAND) 32 MG tablet, TAKE ONE TABLET BY MOUTH DAILY (Patient taking differently: Take 32 mg by mouth Daily.), Disp: 90 tablet, Rfl: 3  •  Ergocalciferol (VITAMIN D2) 2000 UNITS tablet, Take by mouth daily, Disp: , Rfl:   •  levothyroxine (SYNTHROID, LEVOTHROID) 25 MCG tablet, Take 25 mcg by mouth Daily., Disp: , Rfl:   •  LORazepam (ATIVAN) 0.5 MG tablet, Take 0.5 mg by mouth 2 (Two) Times a Day., Disp: , Rfl:   •  Magnesium Oxide 500 MG (LAX) tablet, 1 tablet As Needed., Disp: , Rfl:   •  Multiple Vitamins-Minerals (CENTRUM SILVER ADULT 50+) tablet, Take 1 tablet by mouth daily, Disp: , Rfl:   •  omeprazole (priLOSEC) 20 MG capsule, Take 20 mg by mouth 2 (Two) Times a Day., Disp: , Rfl:   •  Psyllium 30.9 % powder, Take 2 tablets by mouth daily , Disp: , Rfl:   •  vitamin D (ERGOCALCIFEROL) 14994 UNITS capsule capsule, Take 50,000 Units by mouth Every 14 (Fourteen) Days., Disp: , Rfl:   •  amLODIPine (NORVASC) 5 MG tablet, , Disp: , Rfl:   •  Ascorbic Acid (VITAMIN C PO), Daily., Disp: , Rfl:   •  conjugated estrogens (PREMARIN) 0.625 MG/GM vaginal cream, Insert  into the vagina 3 (Three) Times a Week.,  Disp: 3 each, Rfl: 0  •  Cyanocobalamin ER 1000 MCG tablet controlled-release, Take 1 tablet by mouth daily , Disp: , Rfl:   •  docusate sodium (COLACE) 100 MG capsule, Take 1 tablet by mouth As Needed., Disp: , Rfl:   •  estradiol (ESTRACE VAGINAL) 0.1 MG/GM vaginal cream, Unsure doseage, Disp: , Rfl:   •  Psyllium (METAMUCIL FIBER PO), Take 1 package by mouth As Needed., Disp: , Rfl:   •  terconazole (TERAZOL 7) 0.4 % vaginal cream, , Disp: , Rfl:    Physical Exam   Constitutional: She is oriented to person, place, and time. She appears well-developed and well-nourished.   HENT:   Head: Normocephalic and atraumatic.   Right Ear: External ear normal.   Left Ear: External ear normal.   Nose: Nose normal.   Mouth/Throat: Oropharynx is clear and moist.   Eyes: Pupils are equal, round, and reactive to light. Conjunctivae and EOM are normal.   Neck: Normal range of motion. Neck supple. No thyromegaly present.   Cardiovascular: Normal rate, regular rhythm, normal heart sounds and intact distal pulses.   Pulmonary/Chest: Effort normal and breath sounds normal.   Abdominal: Soft. Bowel sounds are normal.   Lymphadenopathy:     She has no cervical adenopathy.   Neurological: She is alert and oriented to person, place, and time.   Skin: Skin is warm and dry.   Psychiatric: She has a normal mood and affect. Her behavior is normal. Thought content normal.   Nursing note and vitals reviewed.        Patient's Body mass index is 18.14 kg/m². BMI is below normal parameters. Recommendations include: treating the underlying disease process.      Assessment/Plan   Diagnoses and all orders for this visit:    1. Aortic valve disease (Primary)    2. Hypothyroidism due to Hashimoto's thyroiditis    3.  Bilateral lower extremity neuropathy    4.  Abdominal disc distention likely related to underlying medication    5 side effect from medication likely causing problems with weakness I have asked her to change the way she takes her Atacand  to nighttime only    As far as her neuropathy we are going to once again try Neurontin which she she states is an allergy or an intolerance is actually some nausea associated with that she is willing to try it again

## 2020-02-11 ENCOUNTER — OFFICE VISIT (OUTPATIENT)
Dept: INTERNAL MEDICINE | Facility: CLINIC | Age: 85
End: 2020-02-11

## 2020-02-11 VITALS
HEIGHT: 62 IN | BODY MASS INDEX: 18.03 KG/M2 | DIASTOLIC BLOOD PRESSURE: 78 MMHG | SYSTOLIC BLOOD PRESSURE: 120 MMHG | HEART RATE: 82 BPM | WEIGHT: 98 LBS | OXYGEN SATURATION: 100 %

## 2020-02-11 DIAGNOSIS — F51.01 PRIMARY INSOMNIA: ICD-10-CM

## 2020-02-11 DIAGNOSIS — F41.9 ANXIETY: ICD-10-CM

## 2020-02-11 DIAGNOSIS — K58.9 IRRITABLE BOWEL SYNDROME WITHOUT DIARRHEA: Primary | ICD-10-CM

## 2020-02-11 DIAGNOSIS — G89.29 OTHER CHRONIC PAIN: ICD-10-CM

## 2020-02-11 PROBLEM — H91.90 HEARING LOSS: Status: ACTIVE | Noted: 2020-02-11

## 2020-02-11 PROBLEM — F41.0 PANIC ATTACK: Status: ACTIVE | Noted: 2020-02-11

## 2020-02-11 PROBLEM — R49.0 DYSPHONIA OF ORGANIC TREMOR: Status: ACTIVE | Noted: 2020-02-11

## 2020-02-11 PROBLEM — R01.1 HEART MURMUR: Status: ACTIVE | Noted: 2020-02-11

## 2020-02-11 PROBLEM — D70.9 NEUTROPENIA: Status: ACTIVE | Noted: 2020-02-11

## 2020-02-11 PROBLEM — M54.12 CERVICAL RADICULOPATHY: Status: ACTIVE | Noted: 2020-02-11

## 2020-02-11 PROBLEM — I07.1 SEVERE TRICUSPID REGURGITATION: Status: ACTIVE | Noted: 2019-10-02

## 2020-02-11 PROBLEM — M81.0 SENILE OSTEOPOROSIS: Status: ACTIVE | Noted: 2020-02-11

## 2020-02-11 PROBLEM — M53.3: Status: ACTIVE | Noted: 2020-02-11

## 2020-02-11 PROBLEM — M51.36 DEGENERATIVE DISC DISEASE, LUMBAR: Status: ACTIVE | Noted: 2020-02-11

## 2020-02-11 PROBLEM — Q21.0 VENTRICULAR SEPTAL DEFECT: Status: ACTIVE | Noted: 2019-11-08

## 2020-02-11 PROBLEM — N60.19 FIBROCYSTIC BREAST: Status: ACTIVE | Noted: 2020-02-11

## 2020-02-11 PROBLEM — K59.00 CONSTIPATION: Status: ACTIVE | Noted: 2020-02-11

## 2020-02-11 PROBLEM — N39.3 FEMALE STRESS INCONTINENCE: Status: ACTIVE | Noted: 2020-02-11

## 2020-02-11 PROBLEM — M35.9 COLLAGEN VASCULAR DISEASE (HCC): Status: ACTIVE | Noted: 2020-02-11

## 2020-02-11 PROBLEM — I73.00 RAYNAUD'S PHENOMENON (SECONDARY): Status: ACTIVE | Noted: 2020-02-11

## 2020-02-11 PROBLEM — R25.1 DYSPHONIA OF ORGANIC TREMOR: Status: ACTIVE | Noted: 2020-02-11

## 2020-02-11 PROBLEM — E55.9 VITAMIN D DEFICIENCY: Status: ACTIVE | Noted: 2020-02-11

## 2020-02-11 PROBLEM — G47.00 INSOMNIA: Status: ACTIVE | Noted: 2020-02-11

## 2020-02-11 PROBLEM — R16.0 HEPATOMEGALY: Status: ACTIVE | Noted: 2020-02-11

## 2020-02-11 PROBLEM — F41.1 GENERALIZED ANXIETY DISORDER: Status: ACTIVE | Noted: 2020-02-11

## 2020-02-11 PROBLEM — I65.23 CAROTID OCCLUSION, BILATERAL: Status: ACTIVE | Noted: 2020-02-11

## 2020-02-11 PROBLEM — N83.209 OVARIAN CYST: Status: ACTIVE | Noted: 2020-02-11

## 2020-02-11 PROBLEM — G25.81 RESTLESS LEGS SYNDROME: Status: ACTIVE | Noted: 2020-02-11

## 2020-02-11 PROBLEM — H81.13 BPPV (BENIGN PAROXYSMAL POSITIONAL VERTIGO), BILATERAL: Status: ACTIVE | Noted: 2020-02-11

## 2020-02-11 PROCEDURE — 99214 OFFICE O/P EST MOD 30 MIN: CPT | Performed by: INTERNAL MEDICINE

## 2020-02-11 NOTE — PROGRESS NOTES
Subjective   Meenu Arteaga is a 89 y.o. female.   Chief Complaint   Patient presents with   • Hypothyroidism   • Peripheral Neuropathy     discuss medications   • Abdominal Pain     swelling       Patient is concerned about her difficulties with urination her abdominal discomfort her difficulties with ambulating and her lower back pain these are all chronic problems and not new       The following portions of the patient's history were reviewed and updated as appropriate: allergies, current medications, past family history, past medical history, past social history, past surgical history and problem list.    Review of Systems   Constitutional: Negative for activity change, appetite change, fatigue, fever, unexpected weight gain and unexpected weight loss.   HENT: Negative for swollen glands, trouble swallowing and voice change.    Eyes: Negative for blurred vision and visual disturbance.   Respiratory: Negative for cough and shortness of breath.    Cardiovascular: Negative for chest pain, palpitations and leg swelling.   Gastrointestinal: Positive for abdominal distention. Negative for abdominal pain, constipation, diarrhea, nausea, vomiting and indigestion.   Endocrine: Negative for cold intolerance, heat intolerance, polydipsia and polyphagia.   Genitourinary: Negative for dysuria and frequency.   Musculoskeletal: Negative for arthralgias, back pain, joint swelling and neck pain.   Skin: Negative for color change, rash and skin lesions.   Neurological: Negative for dizziness, weakness, headache, memory problem and confusion.   Hematological: Does not bruise/bleed easily.   Psychiatric/Behavioral: Positive for sleep disturbance and depressed mood. Negative for agitation, hallucinations and suicidal ideas. The patient is nervous/anxious.        Objective   Past Medical History:   Diagnosis Date   • Arthritis    • GERD (gastroesophageal reflux disease)    • Hyperlipidemia    • Hypertension    • Hypothyroidism    •  Liver cyst    • Neuropathy    • Ovarian cyst       Past Surgical History:   Procedure Laterality Date   • ABDOMINAL HYSTERECTOMY     • APPENDECTOMY     • BREAST BIOPSY     • CHOLECYSTECTOMY     • COLONOSCOPY  03/05/2008    normal   • COLONOSCOPY  01/12/2012   • ENDOSCOPY  08/29/2013    normal   • ENDOSCOPY  01/23/2012    eus with stacy  normal endoscopic ultrascope of the pancreas.fortunately there was no mass seen   • ENDOSCOPY N/A 8/3/2018    Procedure: ESOPHAGOGASTRODUODENOSCOPY WITH ANESTHESIA;  Surgeon: Obed Patrick DO;  Location: [facility] ENDOSCOPY;  Service: Gastroenterology   • HEMORRHOIDECTOMY     • UPPER GASTROINTESTINAL ENDOSCOPY  08/29/2013        Current Outpatient Medications:   •  amLODIPine (NORVASC) 5 MG tablet, , Disp: , Rfl:   •  Ascorbic Acid (VITAMIN C PO), Daily., Disp: , Rfl:   •  bumetanide (BUMEX) 0.5 MG tablet, Take 0.5 mg by mouth Daily., Disp: , Rfl:   •  candesartan (ATACAND) 32 MG tablet, TAKE ONE TABLET BY MOUTH DAILY (Patient taking differently: Take 32 mg by mouth Daily.), Disp: 90 tablet, Rfl: 3  •  conjugated estrogens (PREMARIN) 0.625 MG/GM vaginal cream, Insert  into the vagina 3 (Three) Times a Week., Disp: 3 each, Rfl: 0  •  Cyanocobalamin ER 1000 MCG tablet controlled-release, Take 1 tablet by mouth daily , Disp: , Rfl:   •  docusate sodium (COLACE) 100 MG capsule, Take 1 tablet by mouth As Needed., Disp: , Rfl:   •  Ergocalciferol (VITAMIN D2) 2000 UNITS tablet, Take by mouth daily, Disp: , Rfl:   •  estradiol (ESTRACE VAGINAL) 0.1 MG/GM vaginal cream, Unsure doseage, Disp: , Rfl:   •  gabapentin (NEURONTIN) 100 MG capsule, Take 1 capsule by mouth Every Night., Disp: 30 capsule, Rfl: 5  •  levothyroxine (SYNTHROID, LEVOTHROID) 25 MCG tablet, Take 25 mcg by mouth Daily., Disp: , Rfl:   •  LORazepam (ATIVAN) 0.5 MG tablet, Take 0.5 mg by mouth 2 (Two) Times a Day., Disp: , Rfl:   •  Magnesium Oxide 500 MG (LAX) tablet, 1 tablet As Needed., Disp: , Rfl:   •  Multiple  Vitamins-Minerals (CENTRUM SILVER ADULT 50+) tablet, Take 1 tablet by mouth daily, Disp: , Rfl:   •  omeprazole (priLOSEC) 20 MG capsule, Take 20 mg by mouth 2 (Two) Times a Day., Disp: , Rfl:   •  Psyllium (METAMUCIL FIBER PO), Take 1 package by mouth As Needed., Disp: , Rfl:   •  Psyllium 30.9 % powder, Take 2 tablets by mouth daily , Disp: , Rfl:   •  terconazole (TERAZOL 7) 0.4 % vaginal cream, , Disp: , Rfl:   •  vitamin D (ERGOCALCIFEROL) 49559 UNITS capsule capsule, Take 50,000 Units by mouth Every 14 (Fourteen) Days., Disp: , Rfl:    Vitals:    02/11/20 0917   BP: 120/78   Pulse: 82   SpO2: 100%     Physical Exam   Constitutional: She is oriented to person, place, and time. She appears well-developed and well-nourished.   HENT:   Head: Normocephalic and atraumatic.   Right Ear: External ear normal.   Left Ear: External ear normal.   Nose: Nose normal.   Eyes: Pupils are equal, round, and reactive to light. Conjunctivae and EOM are normal.   Neck: Normal range of motion. Neck supple. No thyromegaly present.   Cardiovascular: Normal rate, regular rhythm, normal heart sounds and intact distal pulses.   Pulmonary/Chest: Effort normal and breath sounds normal.   Abdominal: Soft. Bowel sounds are normal.   Slightly tender midepigastric area   Lymphadenopathy:     She has no cervical adenopathy.   Neurological: She is alert and oriented to person, place, and time.   Skin: Skin is warm and dry.   Psychiatric: She has a normal mood and affect. Her behavior is normal. Thought content normal.   Nursing note and vitals reviewed.        Patient's Body mass index is 17.92 kg/m². BMI is below normal parameters. Recommendations include: treating the underlying disease process.      Assessment/Plan   Diagnoses and all orders for this visit:    1. Irritable bowel syndrome without diarrhea (Primary)    2. Anxiety    3. Primary insomnia    4. Other chronic pain    Plan recommendations at this point I have reviewed patient's  medications her current list of problems her intolerance to multiple medications.  I have recommended that we not change her current course of therapy that she use her Ativan when she feels anxious or nervous.  She is using quarter tablets at a time

## 2020-02-25 ENCOUNTER — TELEPHONE (OUTPATIENT)
Dept: INTERNAL MEDICINE | Facility: CLINIC | Age: 85
End: 2020-02-25

## 2020-02-25 NOTE — TELEPHONE ENCOUNTER
Called pt as we have her on a recall list for Prolia for February.  She is not sure if she is still taking this or not, as is having memory issues.  She says she will have her  call and verify.

## 2020-02-26 ENCOUNTER — OFFICE VISIT (OUTPATIENT)
Dept: INTERNAL MEDICINE | Facility: CLINIC | Age: 85
End: 2020-02-26

## 2020-02-26 VITALS
BODY MASS INDEX: 18.14 KG/M2 | DIASTOLIC BLOOD PRESSURE: 80 MMHG | WEIGHT: 98.6 LBS | HEART RATE: 74 BPM | HEIGHT: 62 IN | TEMPERATURE: 98.6 F | SYSTOLIC BLOOD PRESSURE: 140 MMHG

## 2020-02-26 DIAGNOSIS — J06.9 ACUTE URI: ICD-10-CM

## 2020-02-26 DIAGNOSIS — R50.9 FEVER, UNSPECIFIED FEVER CAUSE: Primary | ICD-10-CM

## 2020-02-26 DIAGNOSIS — I10 BENIGN HYPERTENSION: ICD-10-CM

## 2020-02-26 LAB
EXPIRATION DATE: NORMAL
FLUAV AG NPH QL: NEGATIVE
FLUBV AG NPH QL: NEGATIVE
INTERNAL CONTROL: NORMAL
Lab: NORMAL

## 2020-02-26 PROCEDURE — 99213 OFFICE O/P EST LOW 20 MIN: CPT | Performed by: INTERNAL MEDICINE

## 2020-02-26 PROCEDURE — 87804 INFLUENZA ASSAY W/OPTIC: CPT | Performed by: INTERNAL MEDICINE

## 2020-02-26 RX ORDER — AZITHROMYCIN 250 MG/1
TABLET, FILM COATED ORAL
Qty: 6 TABLET | Refills: 0 | Status: SHIPPED | OUTPATIENT
Start: 2020-02-26 | End: 2020-06-10

## 2020-02-26 NOTE — TELEPHONE ENCOUNTER
"Pt./ in today and Dr. Curiel discussed risks vs benefits of Prolia and they will consider and let us know.  She has only had one injection and voiced \"problems\" after injection, but has a long list of allergies and intolerances to medication.  "

## 2020-02-26 NOTE — PROGRESS NOTES
"Subjective   Meneu Arteaga is a 89 y.o. female.   Chief Complaint   Patient presents with   • URI     c/o \"head cold\", sore throat, N/P cough, nasal congestion       Patient has been ill for about 48 to 72 hours sinus congestion sore throat mild cough.  She is very sensitive to antibiotics and has a very lengthy list of intolerances most of these are not true allergies       The following portions of the patient's history were reviewed and updated as appropriate: allergies, current medications, past family history, past medical history, past social history, past surgical history and problem list.    Review of Systems   Constitutional: Positive for fever. Negative for activity change, appetite change, fatigue, unexpected weight gain and unexpected weight loss.   HENT: Positive for postnasal drip, sore throat and swollen glands. Negative for trouble swallowing and voice change.    Eyes: Negative for blurred vision and visual disturbance.   Respiratory: Positive for cough. Negative for shortness of breath.    Cardiovascular: Negative for chest pain, palpitations and leg swelling.   Gastrointestinal: Negative for abdominal pain, constipation, diarrhea, nausea, vomiting and indigestion.   Endocrine: Negative for cold intolerance, heat intolerance, polydipsia and polyphagia.   Genitourinary: Negative for dysuria and frequency.   Musculoskeletal: Negative for arthralgias, back pain, joint swelling and neck pain.   Skin: Negative for color change, rash and skin lesions.   Neurological: Negative for dizziness, weakness, headache, memory problem and confusion.   Hematological: Does not bruise/bleed easily.   Psychiatric/Behavioral: Negative for agitation, hallucinations and suicidal ideas. The patient is not nervous/anxious.        Objective   Past Medical History:   Diagnosis Date   • Arthritis    • GERD (gastroesophageal reflux disease)    • Hyperlipidemia    • Hypertension    • Hypothyroidism    • Liver cyst    • " Neuropathy    • Ovarian cyst       Past Surgical History:   Procedure Laterality Date   • ABDOMINAL HYSTERECTOMY     • APPENDECTOMY     • BREAST BIOPSY     • CHOLECYSTECTOMY     • COLONOSCOPY  03/05/2008    normal   • COLONOSCOPY  01/12/2012   • ENDOSCOPY  08/29/2013    normal   • ENDOSCOPY  01/23/2012    eus with stacy  normal endoscopic ultrascope of the pancreas.fortunately there was no mass seen   • ENDOSCOPY N/A 8/3/2018    Procedure: ESOPHAGOGASTRODUODENOSCOPY WITH ANESTHESIA;  Surgeon: Obed Patrick DO;  Location: [facility] ENDOSCOPY;  Service: Gastroenterology   • HEMORRHOIDECTOMY     • UPPER GASTROINTESTINAL ENDOSCOPY  08/29/2013        Current Outpatient Medications:   •  amLODIPine (NORVASC) 5 MG tablet, Take 5 mg by mouth Daily., Disp: , Rfl:   •  bumetanide (BUMEX) 0.5 MG tablet, Take 0.5 mg by mouth Daily., Disp: , Rfl:   •  candesartan (ATACAND) 32 MG tablet, TAKE ONE TABLET BY MOUTH DAILY (Patient taking differently: Take 32 mg by mouth Daily.), Disp: 90 tablet, Rfl: 3  •  estradiol (ESTRACE VAGINAL) 0.1 MG/GM vaginal cream, Unsure doseage, Disp: , Rfl:   •  levothyroxine (SYNTHROID, LEVOTHROID) 25 MCG tablet, Take 25 mcg by mouth Daily., Disp: , Rfl:   •  Multiple Vitamins-Minerals (CENTRUM SILVER ADULT 50+) tablet, Take 1 tablet by mouth daily, Disp: , Rfl:   •  omeprazole (priLOSEC) 20 MG capsule, Take 20 mg by mouth 2 (Two) Times a Day., Disp: , Rfl:   •  vitamin D (ERGOCALCIFEROL) 88963 UNITS capsule capsule, Take 50,000 Units by mouth Every 14 (Fourteen) Days., Disp: , Rfl:   •  Ascorbic Acid (VITAMIN C PO), Daily., Disp: , Rfl:   •  azithromycin (ZITHROMAX) 250 MG tablet, Take 2 tablets the first day, then 1 tablet daily for 4 days., Disp: 6 tablet, Rfl: 0  •  conjugated estrogens (PREMARIN) 0.625 MG/GM vaginal cream, Insert  into the vagina 3 (Three) Times a Week., Disp: 3 each, Rfl: 0  •  Cyanocobalamin ER 1000 MCG tablet controlled-release, Take 1 tablet by mouth daily , Disp: , Rfl:   •   docusate sodium (COLACE) 100 MG capsule, Take 1 tablet by mouth As Needed., Disp: , Rfl:   •  Ergocalciferol (VITAMIN D2) 2000 UNITS tablet, Take by mouth daily, Disp: , Rfl:   •  gabapentin (NEURONTIN) 100 MG capsule, Take 1 capsule by mouth Every Night., Disp: 30 capsule, Rfl: 5  •  LORazepam (ATIVAN) 0.5 MG tablet, Take 0.5 mg by mouth 2 (Two) Times a Day., Disp: , Rfl:   •  Magnesium Oxide 500 MG (LAX) tablet, 1 tablet As Needed., Disp: , Rfl:   •  Psyllium (METAMUCIL FIBER PO), Take 1 package by mouth As Needed., Disp: , Rfl:   •  Psyllium 30.9 % powder, Take 2 tablets by mouth daily , Disp: , Rfl:   •  terconazole (TERAZOL 7) 0.4 % vaginal cream, , Disp: , Rfl:      Vitals:    02/26/20 1052   BP: 140/80   Pulse:    Temp:          02/26/20  1032   Weight: 44.7 kg (98 lb 9.6 oz)     Patient's Body mass index is 18.03 kg/m². BMI is below normal parameters. Recommendations include: treating the underlying disease process.      Physical Exam   Constitutional: She is oriented to person, place, and time. She appears well-developed and well-nourished.   HENT:   Head: Normocephalic and atraumatic.   Right Ear: External ear normal.   Left Ear: External ear normal.   Nose: Nose normal.   Pharynx is injected   Eyes: Pupils are equal, round, and reactive to light. Conjunctivae and EOM are normal.   Neck: Normal range of motion. Neck supple. No thyromegaly present.   Cardiovascular: Normal rate, regular rhythm, normal heart sounds and intact distal pulses.   Pulmonary/Chest: Effort normal and breath sounds normal.   Abdominal: Soft. Bowel sounds are normal.   Lymphadenopathy:     She has no cervical adenopathy.   Neurological: She is alert and oriented to person, place, and time.   Skin: Skin is warm and dry.   Psychiatric: She has a normal mood and affect. Her behavior is normal. Thought content normal.   Nursing note and vitals reviewed.            Assessment/Plan   Diagnoses and all orders for this visit:    1. Fever,  unspecified fever cause (Primary)  -     POCT Influenza A/B    2. Benign hypertension    3. Acute URI    Other orders  -     azithromycin (ZITHROMAX) 250 MG tablet; Take 2 tablets the first day, then 1 tablet daily for 4 days.  Dispense: 6 tablet; Refill: 0      We are treating patient with a azithromycin for what appears to be upper respiratory infection sinusitis she is intolerant to multiple medications.  She has routine follow-up in the office

## 2020-03-12 RX ORDER — ESTRADIOL 0.1 MG/G
CREAM VAGINAL
Qty: 42.5 EACH | Refills: 10 | Status: SHIPPED | OUTPATIENT
Start: 2020-03-12 | End: 2020-06-10

## 2020-04-13 ENCOUNTER — CLINICAL SUPPORT (OUTPATIENT)
Dept: INTERNAL MEDICINE | Facility: CLINIC | Age: 85
End: 2020-04-13

## 2020-04-13 DIAGNOSIS — R31.9 URINARY TRACT INFECTION WITH HEMATURIA, SITE UNSPECIFIED: Primary | ICD-10-CM

## 2020-04-13 DIAGNOSIS — N39.0 URINARY TRACT INFECTION WITH HEMATURIA, SITE UNSPECIFIED: Primary | ICD-10-CM

## 2020-04-13 LAB
BILIRUB BLD-MCNC: NEGATIVE MG/DL
CLARITY, POC: CLEAR
COLOR UR: YELLOW
GLUCOSE UR STRIP-MCNC: NEGATIVE MG/DL
KETONES UR QL: NEGATIVE
LEUKOCYTE EST, POC: ABNORMAL
NITRITE UR-MCNC: NEGATIVE MG/ML
PH UR: 5.5 [PH] (ref 5–8)
PROT UR STRIP-MCNC: NEGATIVE MG/DL
RBC # UR STRIP: ABNORMAL /UL
SP GR UR: 1.01 (ref 1–1.03)
UROBILINOGEN UR QL: NORMAL

## 2020-04-13 PROCEDURE — 99211 OFF/OP EST MAY X REQ PHY/QHP: CPT | Performed by: INTERNAL MEDICINE

## 2020-04-13 PROCEDURE — 81003 URINALYSIS AUTO W/O SCOPE: CPT | Performed by: INTERNAL MEDICINE

## 2020-04-13 RX ORDER — NITROFURANTOIN 25; 75 MG/1; MG/1
100 CAPSULE ORAL 2 TIMES DAILY
Qty: 14 CAPSULE | Refills: 0 | Status: SHIPPED | OUTPATIENT
Start: 2020-04-13 | End: 2020-06-10

## 2020-04-13 NOTE — PROGRESS NOTES
Patient's  brought in patient's urine sample.   Per Meenu hollingsworth to send in Macrobid 100mg bid x 7 days. Arnel  Will send for culture.

## 2020-04-15 LAB
BACTERIA UR CULT: NORMAL
BACTERIA UR CULT: NORMAL

## 2020-04-20 RX ORDER — ERGOCALCIFEROL 1.25 MG/1
CAPSULE ORAL
Qty: 6 CAPSULE | Refills: 3 | Status: SHIPPED | OUTPATIENT
Start: 2020-04-20 | End: 2021-04-12

## 2020-04-22 ENCOUNTER — CLINICAL SUPPORT (OUTPATIENT)
Dept: INTERNAL MEDICINE | Facility: CLINIC | Age: 85
End: 2020-04-22

## 2020-04-22 ENCOUNTER — TELEPHONE (OUTPATIENT)
Dept: INTERNAL MEDICINE | Facility: CLINIC | Age: 85
End: 2020-04-22

## 2020-04-22 DIAGNOSIS — N39.0 URINARY TRACT INFECTION WITH HEMATURIA, SITE UNSPECIFIED: Primary | ICD-10-CM

## 2020-04-22 DIAGNOSIS — R31.9 URINARY TRACT INFECTION WITH HEMATURIA, SITE UNSPECIFIED: Primary | ICD-10-CM

## 2020-04-22 LAB
BILIRUB BLD-MCNC: NEGATIVE MG/DL
CLARITY, POC: CLEAR
COLOR UR: YELLOW
GLUCOSE UR STRIP-MCNC: NEGATIVE MG/DL
KETONES UR QL: NEGATIVE
LEUKOCYTE EST, POC: NEGATIVE
NITRITE UR-MCNC: NEGATIVE MG/ML
PH UR: 7 [PH] (ref 5–8)
PROT UR STRIP-MCNC: NEGATIVE MG/DL
RBC # UR STRIP: NEGATIVE /UL
SP GR UR: 1.01 (ref 1–1.03)
UROBILINOGEN UR QL: NORMAL

## 2020-04-22 PROCEDURE — 81003 URINALYSIS AUTO W/O SCOPE: CPT | Performed by: NURSE PRACTITIONER

## 2020-04-23 NOTE — TELEPHONE ENCOUNTER
Are you saying that they are going to be giving them to her or what is it that I need to actually do?

## 2020-05-05 DIAGNOSIS — F41.9 ANXIETY: Primary | ICD-10-CM

## 2020-05-05 RX ORDER — LORAZEPAM 0.5 MG/1
TABLET ORAL
Qty: 30 TABLET | Refills: 5 | Status: SHIPPED | OUTPATIENT
Start: 2020-05-08 | End: 2020-10-29

## 2020-06-10 ENCOUNTER — OFFICE VISIT (OUTPATIENT)
Dept: INTERNAL MEDICINE | Facility: CLINIC | Age: 85
End: 2020-06-10

## 2020-06-10 VITALS
HEART RATE: 81 BPM | OXYGEN SATURATION: 98 % | HEIGHT: 62 IN | WEIGHT: 100 LBS | SYSTOLIC BLOOD PRESSURE: 160 MMHG | TEMPERATURE: 99.9 F | BODY MASS INDEX: 18.4 KG/M2 | DIASTOLIC BLOOD PRESSURE: 80 MMHG

## 2020-06-10 DIAGNOSIS — F06.4 ANXIETY DISORDER DUE TO KNOWN PHYSIOLOGICAL CONDITION: ICD-10-CM

## 2020-06-10 DIAGNOSIS — R30.0 BURNING WITH URINATION: Primary | ICD-10-CM

## 2020-06-10 DIAGNOSIS — G62.9 NEUROPATHY: ICD-10-CM

## 2020-06-10 PROCEDURE — 99213 OFFICE O/P EST LOW 20 MIN: CPT | Performed by: INTERNAL MEDICINE

## 2020-06-10 RX ORDER — ESTRADIOL 0.1 MG/G
2 CREAM VAGINAL 3 TIMES WEEKLY
COMMUNITY
End: 2021-04-12

## 2020-06-10 NOTE — PROGRESS NOTES
"Subjective   Meenu Arteaga is a 89 y.o. female.   Chief Complaint   Patient presents with   • Anxiety     States she is very anxious, but Ativan makes her very sleepy and can't think.  She is taking a 1/4 tablet mid morning and mid afternoon, then a 1/2 tablet at night.     • Difficulty Urinating     Treated recently for UTI and yeast, she is still burning w/urination and unsure if r/t UTI or yeast.  Unable to collect sufficient urine for testing today.   • \"Enlarged stomach\"     States was told her enlarged stomach was related to medication, but nothing has been done about it.   • Peripheral Neuropathy     c/o \"neuropathy\", that starts in her toes and goes to her stomach.  Has tried medication but didn't tolerate.       Patient has complaints of feeling anxious.  She also feels as if she has burning in her vaginal bladder area.  She was concerned about a possible yeast infection or urinary tract infection she was unable to give us a urine specimen today.       The following portions of the patient's history were reviewed and updated as appropriate: allergies, current medications, past family history, past medical history, past social history, past surgical history and problem list.    Review of Systems   Constitutional: Positive for fatigue. Negative for activity change, appetite change, fever, unexpected weight gain and unexpected weight loss.   HENT: Negative for swollen glands, trouble swallowing and voice change.    Eyes: Negative for blurred vision and visual disturbance.   Respiratory: Negative for cough and shortness of breath.    Cardiovascular: Negative for chest pain, palpitations and leg swelling.   Gastrointestinal: Negative for abdominal pain, constipation, diarrhea, nausea, vomiting and indigestion.   Endocrine: Negative for cold intolerance, heat intolerance, polydipsia and polyphagia.   Genitourinary: Positive for urgency and vaginal pain. Negative for dysuria and frequency.   Musculoskeletal: " Negative for arthralgias, back pain, joint swelling and neck pain.   Skin: Negative for color change, rash and skin lesions.   Neurological: Negative for dizziness, weakness, headache, memory problem and confusion.   Hematological: Does not bruise/bleed easily.   Psychiatric/Behavioral: Negative for agitation, hallucinations and suicidal ideas. The patient is not nervous/anxious.        Objective   Past Medical History:   Diagnosis Date   • Arthritis    • GERD (gastroesophageal reflux disease)    • Hyperlipidemia    • Hypertension    • Hypothyroidism    • Liver cyst    • Neuropathy    • Ovarian cyst       Past Surgical History:   Procedure Laterality Date   • ABDOMINAL HYSTERECTOMY     • APPENDECTOMY     • BREAST BIOPSY     • CHOLECYSTECTOMY     • COLONOSCOPY  03/05/2008    normal   • COLONOSCOPY  01/12/2012   • ENDOSCOPY  08/29/2013    normal   • ENDOSCOPY  01/23/2012    eus with stacy  normal endoscopic ultrascope of the pancreas.fortunately there was no mass seen   • ENDOSCOPY N/A 8/3/2018    Procedure: ESOPHAGOGASTRODUODENOSCOPY WITH ANESTHESIA;  Surgeon: Obed Patrick DO;  Location: [facility] ENDOSCOPY;  Service: Gastroenterology   • HEMORRHOIDECTOMY     • UPPER GASTROINTESTINAL ENDOSCOPY  08/29/2013        Current Outpatient Medications:   •  amLODIPine (NORVASC) 5 MG tablet, Take 5 mg by mouth Daily., Disp: , Rfl:   •  Ascorbic Acid (VITAMIN C PO), Daily., Disp: , Rfl:   •  bumetanide (BUMEX) 0.5 MG tablet, Take 0.5 mg by mouth Daily., Disp: , Rfl:   •  candesartan (ATACAND) 32 MG tablet, TAKE ONE TABLET BY MOUTH DAILY (Patient taking differently: Take 32 mg by mouth Daily.), Disp: 90 tablet, Rfl: 3  •  Cyanocobalamin ER 1000 MCG tablet controlled-release, Take 1 tablet by mouth daily , Disp: , Rfl:   •  docusate sodium (COLACE) 100 MG capsule, Take 1 tablet by mouth As Needed., Disp: , Rfl:   •  estradiol (ESTRACE VAGINAL) 0.1 MG/GM vaginal cream, Insert 2 g into the vagina 3 (Three) Times a Week., Disp: ,  Rfl:   •  levothyroxine (SYNTHROID, LEVOTHROID) 25 MCG tablet, Take 25 mcg by mouth Daily., Disp: , Rfl:   •  LORazepam (ATIVAN) 0.5 MG tablet, TAKE ONE-HALF TABLET BY MOUTH TWICE A DAY (Patient taking differently: 1/4 tablet twice daily, 1/2 at bedtime), Disp: 30 tablet, Rfl: 5  •  Multiple Vitamins-Minerals (CENTRUM SILVER ADULT 50+) tablet, Take 1 tablet by mouth daily, Disp: , Rfl:   •  omeprazole (priLOSEC) 20 MG capsule, Take 20 mg by mouth 2 (Two) Times a Day., Disp: , Rfl:   •  Psyllium (METAMUCIL FIBER PO), Take 1 package by mouth As Needed., Disp: , Rfl:   •  vitamin D (ERGOCALCIFEROL) 1.25 MG (08975 UT) capsule capsule, TAKE ONE CAPSULE BY MOUTH EVERY OTHER WEEK, Disp: 6 capsule, Rfl: 3     Vitals:    06/10/20 0936   BP: 160/80   Pulse: 81   Temp: 99.9 °F (37.7 °C)   SpO2: 98%         06/10/20  0936   Weight: 45.4 kg (100 lb)     Patient's Body mass index is 18.29 kg/m². BMI is below normal parameters. Recommendations include: treating the underlying disease process.      Physical Exam   Constitutional: She is oriented to person, place, and time. She appears well-developed and well-nourished.   HENT:   Head: Normocephalic and atraumatic.   Right Ear: External ear normal.   Left Ear: External ear normal.   Nose: Nose normal.   Eyes: Pupils are equal, round, and reactive to light. Conjunctivae and EOM are normal.   Neck: Normal range of motion. Neck supple. No thyromegaly present.   Cardiovascular: Normal rate, regular rhythm, normal heart sounds and intact distal pulses.   Pulmonary/Chest: Effort normal and breath sounds normal.   Genitourinary: Vagina normal. No vaginal discharge found.   Lymphadenopathy:     She has no cervical adenopathy.   Neurological: She is alert and oriented to person, place, and time.   Skin: Skin is warm and dry.   Psychiatric: She has a normal mood and affect. Her behavior is normal. Thought content normal.   Nursing note and vitals reviewed.            Assessment/Plan    Diagnoses and all orders for this visit:    1. 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 Neuropathy    After thorough examination patient does not have a yeast infection of her dysesthesia in her pelvic area in her legs are a associated neuropathy.  She does not tolerate medications well therefore we are not going to add additional meds at this time.  I have just reassured her that she does not have a yeast infection that seem to be her primary concern.  In regard to the anxiety disorder we are going to have her go ahead and take her Ativan full tablet in the evenings to see if she can rest better and try to avoid the sedation that she feels by taking a quarter in the morning.  In other words she will no longer be taking Ativan in the morning.

## 2020-07-21 ENCOUNTER — OFFICE VISIT (OUTPATIENT)
Dept: CARDIOLOGY | Age: 85
End: 2020-07-21
Payer: MEDICARE

## 2020-07-21 VITALS
HEIGHT: 62 IN | HEART RATE: 77 BPM | OXYGEN SATURATION: 97 % | DIASTOLIC BLOOD PRESSURE: 80 MMHG | SYSTOLIC BLOOD PRESSURE: 114 MMHG | BODY MASS INDEX: 18.58 KG/M2 | WEIGHT: 101 LBS

## 2020-07-21 PROCEDURE — 93000 ELECTROCARDIOGRAM COMPLETE: CPT | Performed by: INTERNAL MEDICINE

## 2020-07-21 PROCEDURE — 99213 OFFICE O/P EST LOW 20 MIN: CPT | Performed by: INTERNAL MEDICINE

## 2020-07-21 ASSESSMENT — ENCOUNTER SYMPTOMS
ANAL BLEEDING: 0
BLOOD IN STOOL: 0

## 2020-07-21 NOTE — PROGRESS NOTES
Office Visit  Kyle Archibald is a 80 y.o. female; who present today for New Patient (NTP) and Palpitations      HPI  I am seeing this 25-year-old white female for the first time personally but she has been followed in this office previously. Her history is significant for palpitations that apparently were anxiety related. Additionally, it sounds like she may have had syncope in the past that she and her  report is being anxiety related. This is not occurred any time in the recent past.  Additionally, her records indicate that she has a very small VSD noted by prior echocardiogram.  He is here for routine follow-up. She is not having any new cardiac issues. She is hard of hearing but remarkably well for age. She does not experience any chest discomfort. Her racing heart sensation only occurs with significant activity. She denies any significant dyspnea. There is been no more presyncope or syncope. Current Outpatient Medications   Medication Sig Dispense Refill    docusate sodium (COLACE) 100 MG capsule Take 100 mg by mouth 2 times daily      vitamin D (ERGOCALCIFEROL) 12462 units CAPS capsule Take 50,000 Units by mouth once a week      LORazepam (ATIVAN) 0.5 MG tablet Take 0.5 mg by mouth every 6 hours as needed for Anxiety.       conjugated estrogens (PREMARIN) 0.625 MG/GM vaginal cream Place vaginally      levothyroxine (SYNTHROID) 25 MCG tablet Take 2 tablets by mouth Daily 30 tablet 3    omeprazole (PRILOSEC) 20 MG capsule Take 20 mg by mouth 2 times daily      Multiple Vitamins-Minerals (CENTRUM SILVER ADULT 50+) TABS Take 1 tablet by mouth daily      bumetanide (BUMEX) 0.5 MG tablet   Take 0.5 mg by mouth daily       Magnesium Oxide (WHIPPLE PO) Take 2 tablets by mouth daily       estradiol (ESTRACE) 0.1 MG/GM vaginal cream Place 2 g vaginally daily      Psyllium (METAMUCIL PO) Take 2 tablets by mouth daily       metoprolol succinate (TOPROL XL) 25 MG extended release tablet Take 1 tablet by mouth nightly (Patient not taking: Reported on 7/21/2020) 30 tablet 5    candesartan (ATACAND) 32 MG tablet Take 32 mg by mouth daily       No current facility-administered medications for this visit.        Medications Discontinued During This Encounter   Medication Reason    amLODIPine (NORVASC) 5 MG tablet DISCONTINUED BY ANOTHER CLINICIAN        Allergies   Allergen Reactions    Ampicillin     Bactrim [Sulfamethoxazole-Trimethoprim]     Cephalosporins     Ciprocinonide [Fluocinolone]     Codeine     Cymbalta [Duloxetine Hcl]     Doxycycline     Enablex [Darifenacin Hydrobromide Er]     Gabapentin     Hctz [Hydrochlorothiazide]     Keppra [Levetiracetam]     Levaquin [Levofloxacin In D5w]     Lyrica [Pregabalin]     Pyridium [Phenazopyridine Hcl]     Quinolones     Sulfa Antibiotics        Past Medical History:   Diagnosis Date    Anxiety 10/2/2019    Chest tightness or pressure 8/26/2013 8/26/2013  lexiscan negative for myocardial ischemia, EF 61%    Edema     Essential and other specified forms of tremor     Generalized anxiety disorder     HTN (hypertension)     Hyperlipidemia     Hypertension     Insomnia, unspecified     Neuropathy     both legs up to both hips    Other and unspecified ovarian cyst     Other left bundle branch block     Pure hypercholesterolemia     Restless legs syndrome (RLS)     Solitary cyst of breast     Spinal stenosis, lumbar region, without neurogenic claudication     Unspecified hypothyroidism     Urinary frequency      Negative - Past Medical History for  Past Medical History Pertinent Negatives:   Diagnosis Date Noted    ADHD (attention deficit hyperactivity disorder) 09/02/2018    Allergic rhinitis 09/02/2018    Anticoagulant long-term use 09/02/2018    Asthma 09/02/2018    Atrial fibrillation ([facility] 09/02/2018    Bipolar disorder ([facility] 09/02/2018    CAD (coronary artery disease) 09/02/2018    Cancer ([facility] 09/02/2018    Carotid artery stenosis 2018    Cerebrovascular disease 2018    CHF (congestive heart failure) ([facility] 2018    Chronic back pain 2018    Chronic kidney disease 2018    Congenital heart disease 2018    COPD (chronic obstructive pulmonary disease) ([facility] 2018    Depression 2018    Diabetes mellitus ([address] 2018    Emphysema of lung ([facility] 2018    Fibromyalgia 2018    GERD (gastroesophageal reflux disease) 2018    Headache 2018    Hearing loss 2018    Hyperthyroidism 2018    Irritable bowel syndrome 2018    Liver disease 2018    Obesity 2018    Osteoarthritis 2018    Peripheral vascular disease ([facility] 2018    Seizures ([facility] 2018    Sleep apnea 2018    Substance abuse ([facility] 2018    Type 2 diabetes mellitus without complication ([facility]     Unspecified transient cerebral ischemia 2016    pt stated unaware of this    Urinary incontinence 2018       Past Surgical History:   Procedure Laterality Date    APPENDECTOMY      CARDIAC CATHETERIZATION  5/26/15  MDL    with aortic root injection. EF 60%    CATARACT REMOVAL      CHOLECYSTECTOMY      CYST INCISION AND DRAINAGE      drained liver cyst    HEMORRHOID SURGERY      HYSTERECTOMY      NERVE BLOCK LUMB FACET LEVEL 1 BILATERAL Bilateral 2016    LUMBAR FACET CATE L4-5  performed by [facility] at [facility] History     Occupational History    Not on file   Tobacco Use    Smoking status: Never Smoker    Smokeless tobacco: Never Used   Substance and Sexual Activity    Alcohol use: No    Drug use: No    Sexual activity: Not on file        Family History   Problem Relation Age of Onset    Other Father         stroke, MI,  46       Review of Systems  Review of Systems   Constitutional: Positive for fever.    Gastrointestinal: Negative for anal bleeding and blood in stool.   Neurological: Negative for facial asymmetry, speech difficulty and headaches. Physical Exam  /80   Pulse 77   Ht 5' 2\" (1.575 m)   Wt 101 lb (45.8 kg)   LMP  (LMP Unknown)   SpO2 97%   BMI 18.47 kg/m²    Physical Exam  Constitutional:       Appearance: Normal appearance. She is normal weight. Cardiovascular:      Rate and Rhythm: Normal rate and regular rhythm. Heart sounds: Murmur present. Systolic murmur present with a grade of 1/6. No gallop. Comments: Murmur heard left sternal border  Pulmonary:      Effort: Pulmonary effort is normal.      Breath sounds: Normal breath sounds. Abdominal:      General: Abdomen is flat. Bowel sounds are normal.      Palpations: Abdomen is soft. Musculoskeletal:         General: No swelling. Skin:     General: Skin is warm and dry. Neurological:      Mental Status: She is alert. Assessment/Plan    EKG Findings:  Normal sinus rhythm, left bundle branch (noted previously). Problem List Items Addressed This Visit        Cardiology Problems    HTN (hypertension)    Relevant Orders    EKG 12 lead (Completed)    Syncope and collapse    Ventricular septal defect       Other    Palpitations - Primary    Relevant Orders    EKG 12 lead (Completed)           Diagnosis Orders   1. Palpitations  EKG 12 lead    Symptomatic sudden cardiac   2. Essential hypertension  EKG 12 lead   3. Syncope and collapse      Normal ZIO patch, 2019 with triggered associated symptoms reflecting normal sinus rhythm   4. Ventricular septal defect      Very small by echocardiography, 2019       Recommendations:   Diet: Low-sodium diet  Activity: Low levels of activity with fall precautions  Medication Changes: No medication change    No orders of the defined types were placed in this encounter.     Orders Placed This Encounter   Procedures    EKG 12 lead     Order Specific Question:   Reason for Exam?     Answer:   Hypertension       Return in about 6 months (around 1/21/2021) for me, routine f/u.

## 2020-08-17 RX ORDER — BUMETANIDE 0.5 MG/1
TABLET ORAL
Qty: 30 TABLET | Refills: 5 | Status: SHIPPED | OUTPATIENT
Start: 2020-08-17 | End: 2021-08-02

## 2020-08-19 ENCOUNTER — LAB (OUTPATIENT)
Dept: INTERNAL MEDICINE | Facility: CLINIC | Age: 85
End: 2020-08-19

## 2020-08-19 DIAGNOSIS — E78.00 HIGH CHOLESTEROL: ICD-10-CM

## 2020-08-19 DIAGNOSIS — E55.9 VITAMIN D DEFICIENCY: ICD-10-CM

## 2020-08-19 DIAGNOSIS — E06.3 HYPOTHYROIDISM DUE TO HASHIMOTO'S THYROIDITIS: ICD-10-CM

## 2020-08-19 DIAGNOSIS — I10 BENIGN HYPERTENSION: Primary | ICD-10-CM

## 2020-08-19 DIAGNOSIS — E03.8 HYPOTHYROIDISM DUE TO HASHIMOTO'S THYROIDITIS: ICD-10-CM

## 2020-08-20 LAB
25(OH)D3+25(OH)D2 SERPL-MCNC: 65.6 NG/ML (ref 30–100)
ALBUMIN SERPL-MCNC: 4.5 G/DL (ref 3.5–5.2)
ALBUMIN/GLOB SERPL: 2.8 G/DL
ALP SERPL-CCNC: 49 U/L (ref 39–117)
ALT SERPL-CCNC: 12 U/L (ref 1–33)
APPEARANCE UR: ABNORMAL
AST SERPL-CCNC: 26 U/L (ref 1–32)
BACTERIA #/AREA URNS HPF: ABNORMAL /HPF
BASOPHILS # BLD AUTO: 0.04 10*3/MM3 (ref 0–0.2)
BASOPHILS NFR BLD AUTO: 0.9 % (ref 0–1.5)
BILIRUB SERPL-MCNC: 0.5 MG/DL (ref 0–1.2)
BILIRUB UR QL STRIP: NEGATIVE
BUN SERPL-MCNC: 17 MG/DL (ref 8–23)
BUN/CREAT SERPL: 26.2 (ref 7–25)
CALCIUM SERPL-MCNC: 9.2 MG/DL (ref 8.2–9.6)
CASTS URNS MICRO: ABNORMAL
CHLORIDE SERPL-SCNC: 101 MMOL/L (ref 98–107)
CHOLEST SERPL-MCNC: 216 MG/DL (ref 0–200)
CO2 SERPL-SCNC: 25.8 MMOL/L (ref 22–29)
COLOR UR: YELLOW
CREAT SERPL-MCNC: 0.65 MG/DL (ref 0.57–1)
EOSINOPHIL # BLD AUTO: 0.05 10*3/MM3 (ref 0–0.4)
EOSINOPHIL NFR BLD AUTO: 1.2 % (ref 0.3–6.2)
EPI CELLS #/AREA URNS HPF: ABNORMAL /HPF
ERYTHROCYTE [DISTWIDTH] IN BLOOD BY AUTOMATED COUNT: 12.7 % (ref 12.3–15.4)
GLOBULIN SER CALC-MCNC: 1.6 GM/DL
GLUCOSE SERPL-MCNC: 90 MG/DL (ref 65–99)
GLUCOSE UR QL: NEGATIVE
HCT VFR BLD AUTO: 37 % (ref 34–46.6)
HDLC SERPL-MCNC: 73 MG/DL (ref 40–60)
HGB BLD-MCNC: 12.4 G/DL (ref 12–15.9)
HGB UR QL STRIP: NEGATIVE
IMM GRANULOCYTES # BLD AUTO: 0.01 10*3/MM3 (ref 0–0.05)
IMM GRANULOCYTES NFR BLD AUTO: 0.2 % (ref 0–0.5)
KETONES UR QL STRIP: NEGATIVE
LDLC SERPL CALC-MCNC: 122 MG/DL (ref 0–100)
LEUKOCYTE ESTERASE UR QL STRIP: ABNORMAL
LYMPHOCYTES # BLD AUTO: 1.06 10*3/MM3 (ref 0.7–3.1)
LYMPHOCYTES NFR BLD AUTO: 24.5 % (ref 19.6–45.3)
MAGNESIUM SERPL-MCNC: 2.2 MG/DL (ref 1.7–2.3)
MCH RBC QN AUTO: 32.1 PG (ref 26.6–33)
MCHC RBC AUTO-ENTMCNC: 33.5 G/DL (ref 31.5–35.7)
MCV RBC AUTO: 95.9 FL (ref 79–97)
MONOCYTES # BLD AUTO: 0.52 10*3/MM3 (ref 0.1–0.9)
MONOCYTES NFR BLD AUTO: 12 % (ref 5–12)
NEUTROPHILS # BLD AUTO: 2.65 10*3/MM3 (ref 1.7–7)
NEUTROPHILS NFR BLD AUTO: 61.2 % (ref 42.7–76)
NITRITE UR QL STRIP: NEGATIVE
NRBC BLD AUTO-RTO: 0 /100 WBC (ref 0–0.2)
PH UR STRIP: 8 [PH] (ref 5–8)
PLATELET # BLD AUTO: 234 10*3/MM3 (ref 140–450)
POTASSIUM SERPL-SCNC: 3.6 MMOL/L (ref 3.5–5.2)
PROT SERPL-MCNC: 6.1 G/DL (ref 6–8.5)
PROT UR QL STRIP: NEGATIVE
RBC # BLD AUTO: 3.86 10*6/MM3 (ref 3.77–5.28)
RBC #/AREA URNS HPF: ABNORMAL /HPF
SODIUM SERPL-SCNC: 140 MMOL/L (ref 136–145)
SP GR UR: 1.01 (ref 1–1.03)
TRIGL SERPL-MCNC: 105 MG/DL (ref 0–150)
TSH SERPL DL<=0.005 MIU/L-ACNC: 4.12 UIU/ML (ref 0.27–4.2)
URATE SERPL-MCNC: 3.3 MG/DL (ref 2.4–5.7)
UROBILINOGEN UR STRIP-MCNC: ABNORMAL MG/DL
VLDLC SERPL CALC-MCNC: 21 MG/DL
WBC # BLD AUTO: 4.33 10*3/MM3 (ref 3.4–10.8)
WBC #/AREA URNS HPF: ABNORMAL /HPF

## 2020-08-21 RX ORDER — LEVOTHYROXINE SODIUM 0.03 MG/1
TABLET ORAL
Qty: 90 TABLET | Refills: 3 | Status: SHIPPED | OUTPATIENT
Start: 2020-08-21 | End: 2021-08-17

## 2020-08-25 ENCOUNTER — OFFICE VISIT (OUTPATIENT)
Dept: INTERNAL MEDICINE | Facility: CLINIC | Age: 85
End: 2020-08-25

## 2020-08-25 VITALS
TEMPERATURE: 98.4 F | HEIGHT: 62 IN | DIASTOLIC BLOOD PRESSURE: 78 MMHG | WEIGHT: 100.2 LBS | HEART RATE: 83 BPM | SYSTOLIC BLOOD PRESSURE: 142 MMHG | OXYGEN SATURATION: 96 % | BODY MASS INDEX: 18.44 KG/M2

## 2020-08-25 DIAGNOSIS — H61.23 BILATERAL IMPACTED CERUMEN: ICD-10-CM

## 2020-08-25 DIAGNOSIS — I65.23 CAROTID OCCLUSION, BILATERAL: ICD-10-CM

## 2020-08-25 DIAGNOSIS — I10 BENIGN HYPERTENSION: ICD-10-CM

## 2020-08-25 DIAGNOSIS — Z23 NEED FOR VACCINATION WITH 13-POLYVALENT PNEUMOCOCCAL CONJUGATE VACCINE: Primary | ICD-10-CM

## 2020-08-25 DIAGNOSIS — R10.13 EPIGASTRIC PAIN: ICD-10-CM

## 2020-08-25 PROCEDURE — 69210 REMOVE IMPACTED EAR WAX UNI: CPT | Performed by: INTERNAL MEDICINE

## 2020-08-25 PROCEDURE — 90670 PCV13 VACCINE IM: CPT | Performed by: INTERNAL MEDICINE

## 2020-08-25 PROCEDURE — G0009 ADMIN PNEUMOCOCCAL VACCINE: HCPCS | Performed by: INTERNAL MEDICINE

## 2020-08-25 PROCEDURE — G0439 PPPS, SUBSEQ VISIT: HCPCS | Performed by: INTERNAL MEDICINE

## 2020-08-25 NOTE — PROGRESS NOTES
The ABCs of the Annual Wellness Visit  Initial Medicare Wellness Visit    Chief Complaint   Patient presents with   • Medicare Wellness-Initial Visit       Subjective   History of Present Illness:  Meenu Arteaga is a 89 y.o. female who presents for an Initial Medicare Wellness Visit.  Patient is complaining of abdominal pain and distention this is been going on for quite some time.  She is very intolerant to medications.    HEALTH RISK ASSESSMENT    Recent Hospitalizations:  No hospitalization(s) within the last year.    Current Medical Providers:  Patient Care Team:  Andrea Curiel MD as PCP - General (Internal Medicine)  Levy Tovar APRN (Gastroenterology)    Smoking Status:  Social History     Tobacco Use   Smoking Status Never Smoker   Smokeless Tobacco Never Used       Alcohol Consumption:  Social History     Substance and Sexual Activity   Alcohol Use No       Depression Screen:   PHQ-2/PHQ-9 Depression Screening 8/25/2020   Little interest or pleasure in doing things 0   Feeling down, depressed, or hopeless 1   Total Score 1       Fall Risk Screen:  STEADI Fall Risk Assessment was completed, and patient is at LOW risk for falls.Assessment completed on:8/25/2020    Health Habits and Functional and Cognitive Screening:  No flowsheet data found.      Does the patient have evidence of cognitive impairment? No    Asprin use counseling:Does not need ASA (and currently is not on it)    Age-appropriate Screening Schedule:  Refer to the list below for future screening recommendations based on patient's age, sex and/or medical conditions. Orders for these recommended tests are listed in the plan section. The patient has been provided with a written plan.    Health Maintenance   Topic Date Due   • TDAP/TD VACCINES (1 - Tdap) 11/10/1937   • ZOSTER VACCINE (1 of 2) 11/10/1976   • MAMMOGRAM  08/25/2020   • INFLUENZA VACCINE  08/01/2020   • LIPID PANEL  08/19/2021   • DXA SCAN  08/19/2021          The following  portions of the patient's history were reviewed and updated as appropriate: allergies, current medications, past family history, past medical history, past social history, past surgical history and problem list.    Outpatient Medications Prior to Visit   Medication Sig Dispense Refill   • amLODIPine (NORVASC) 5 MG tablet Take 5 mg by mouth Daily.     • Ascorbic Acid (VITAMIN C PO) Daily.     • bumetanide (BUMEX) 0.5 MG tablet TAKE ONE TABLET BY MOUTH EVERY MORNING 30 tablet 5   • candesartan (ATACAND) 32 MG tablet TAKE ONE TABLET BY MOUTH DAILY (Patient taking differently: Take 32 mg by mouth Daily.) 90 tablet 3   • estradiol (ESTRACE VAGINAL) 0.1 MG/GM vaginal cream Insert 2 g into the vagina 3 (Three) Times a Week.     • levothyroxine (SYNTHROID, LEVOTHROID) 25 MCG tablet TAKE ONE TABLET BY MOUTH DAILY 90 tablet 3   • LORazepam (ATIVAN) 0.5 MG tablet TAKE ONE-HALF TABLET BY MOUTH TWICE A DAY (Patient taking differently: 1/4 tablet twice daily, 1/2 at bedtime) 30 tablet 5   • Multiple Vitamins-Minerals (CENTRUM SILVER ADULT 50+) tablet Take 1 tablet by mouth daily     • omeprazole (priLOSEC) 20 MG capsule Take 20 mg by mouth 2 (Two) Times a Day.     • Psyllium (METAMUCIL FIBER PO) Take 1 package by mouth As Needed.     • vitamin D (ERGOCALCIFEROL) 1.25 MG (63983 UT) capsule capsule TAKE ONE CAPSULE BY MOUTH EVERY OTHER WEEK 6 capsule 3   • Cyanocobalamin ER 1000 MCG tablet controlled-release Take 1 tablet by mouth daily      • docusate sodium (COLACE) 100 MG capsule Take 1 tablet by mouth As Needed.       No facility-administered medications prior to visit.        Patient Active Problem List   Diagnosis   • Abnormal CT of the abdomen   • Age-related osteoporosis without current pathological fracture   • Anxiety   • Aortic valve disease   • Benign hypertension   • Complete left bundle branch block   • Sacral pain   • High cholesterol   • Hypothyroidism due to Hashimoto's thyroiditis   • Lumbar spinal stenosis   •  Osteoporosis   • BPPV (benign paroxysmal positional vertigo), bilateral   • Carotid occlusion, bilateral   • Cervical radiculopathy   • Chronic pain   • Collagen vascular disease (CMS/HCC)   • Constipation   • Degenerative disc disease, lumbar   • Dysphonia of organic tremor   • Female stress incontinence   • Fibrocystic breast   • Generalized anxiety disorder   • Hearing loss   • Heart murmur   • Hepatomegaly   • IBS (irritable bowel syndrome)   • Insomnia   • Spondylolisthesis, lumbar region   • Neutropenia (CMS/HCC)   • Severe tricuspid regurgitation   • Ovarian cyst   • Panic attack   • Raynaud's phenomenon (secondary)   • Restless legs syndrome   • Ventricular septal defect   • Vitamin D deficiency   • Senile osteoporosis   • Other disorders of coccyx   • Anxiety disorder due to known physiological condition   • Burning with urination   • Neuropathy   • Epigastric pain   • Bilateral impacted cerumen       Advanced Care Planning:  ACP discussion was held with the patient during this visit. Patient has an advance directive in EMR which is still valid.     Review of Systems   Constitutional: Negative for activity change, appetite change and chills.   HENT: Negative for congestion, ear pain and facial swelling.    Eyes: Negative for pain, discharge and itching.   Respiratory: Negative for apnea, cough and shortness of breath.    Cardiovascular: Negative for chest pain, palpitations and leg swelling.   Gastrointestinal: Negative for abdominal distention, abdominal pain and anal bleeding.   Endocrine: Negative for cold intolerance and heat intolerance.   Genitourinary: Negative for difficulty urinating, dysuria and flank pain.   Musculoskeletal: Positive for arthralgias. Negative for back pain and joint swelling.   Skin: Negative for color change, pallor and rash.   Allergic/Immunologic: Negative for environmental allergies and food allergies.   Neurological: Negative for dizziness and facial asymmetry.        Pain  "in legs   Hematological: Negative for adenopathy. Does not bruise/bleed easily.   Psychiatric/Behavioral: Negative for agitation, behavioral problems and confusion.        Decreased memory       Compared to one year ago, the patient feels her physical health is the same.  Compared to one year ago, the patient feels her mental health is the same.    Reviewed chart for potential of high risk medication in the elderly: yes  Reviewed chart for potential of harmful drug interactions in the elderly:yes    Objective         Vitals:    08/25/20 1015   BP: 142/78   BP Location: Left arm   Patient Position: Sitting   Cuff Size: Adult   Pulse: 83   Temp: 98.4 °F (36.9 °C)   TempSrc: Temporal   SpO2: 96%   Weight: 45.5 kg (100 lb 3.2 oz)   Height: 157.5 cm (62\")   PainSc: 0-No pain       Body mass index is 18.33 kg/m².  Discussed the patient's BMI with her. The BMI is below average; BMI management plan is completed.    Physical Exam   Constitutional: She is oriented to person, place, and time. She appears well-developed and well-nourished.   HENT:   Head: Normocephalic and atraumatic.   Mouth/Throat: Oropharynx is clear and moist.   Eyes: Pupils are equal, round, and reactive to light. EOM are normal.   Neck: Normal range of motion. Neck supple.   Cardiovascular: Normal rate and regular rhythm.   Pulmonary/Chest: Effort normal and breath sounds normal.   Abdominal: Soft. Bowel sounds are normal.   Musculoskeletal: Normal range of motion. She exhibits no edema.   Neurological: She is alert and oriented to person, place, and time.   Skin: Skin is warm and dry.   Psychiatric: She has a normal mood and affect. Her behavior is normal.       Lab Results   Component Value Date    GLU 90 08/19/2020    CHLPL 216 (H) 08/19/2020    TRIG 105 08/19/2020    HDL 73 (H) 08/19/2020     (H) 08/19/2020    VLDL 21 08/19/2020        Assessment/Plan   Medicare Risks and Personalized Health Plan  CMS Preventative Services Quick " Reference  Advance Directive Discussion  Breast Cancer/Mammogram Screening  Colon Cancer Screening  Diabetic Lab Screening   Immunizations Discussed/Encouraged (specific immunizations; Influenza, Pneumococcal 23 and Prevnar )  Osteoprorosis Risk    The above risks/problems have been discussed with the patient.  Pertinent information has been shared with the patient in the After Visit Summary.  Follow up plans and orders are seen below in the Assessment/Plan Section.    Diagnoses and all orders for this visit:    1. Need for vaccination with 13-polyvalent pneumococcal conjugate vaccine (Primary)  -     Pneumococcal Conjugate Vaccine 13-Valent All    2. Benign hypertension    3. Carotid occlusion, bilateral    4. Epigastric pain    5. Bilateral impacted cerumen    Procedure: Bilateral impacted ear canals.  Wax was removed by instrumentation both canals no complications patient tolerated procedure well      Follow Up:  Return in about 6 months (around 2/25/2021).  I spent some time talking to patient about her abdominal distention.  Because she is so intolerant to medication I made no adjustment in her medications I just encouraged a healthy diet drinking plenty of fluids.    An After Visit Summary and PPPS were given to the patient.

## 2020-10-07 ENCOUNTER — OFFICE VISIT (OUTPATIENT)
Dept: INTERNAL MEDICINE | Facility: CLINIC | Age: 85
End: 2020-10-07

## 2020-10-07 VITALS
OXYGEN SATURATION: 98 % | DIASTOLIC BLOOD PRESSURE: 80 MMHG | BODY MASS INDEX: 18.77 KG/M2 | HEART RATE: 78 BPM | TEMPERATURE: 97.7 F | SYSTOLIC BLOOD PRESSURE: 140 MMHG | WEIGHT: 102 LBS | HEIGHT: 62 IN

## 2020-10-07 DIAGNOSIS — S80.811A ABRASION OF SKIN OF RIGHT LOWER LEG: Primary | ICD-10-CM

## 2020-10-07 DIAGNOSIS — Z23 NEED FOR PROPHYLACTIC VACCINATION USING TETANUS AND DIPHTHERIA TOXOIDS ADSORBED (TD) VACCINE: ICD-10-CM

## 2020-10-07 PROCEDURE — 90714 TD VACC NO PRESV 7 YRS+ IM: CPT | Performed by: NURSE PRACTITIONER

## 2020-10-07 PROCEDURE — 90471 IMMUNIZATION ADMIN: CPT | Performed by: NURSE PRACTITIONER

## 2020-10-07 PROCEDURE — 99213 OFFICE O/P EST LOW 20 MIN: CPT | Performed by: NURSE PRACTITIONER

## 2020-10-07 NOTE — PROGRESS NOTES
Subjective   Meenu Arteaga is a 89 y.o. female.   Chief Complaint   Patient presents with   • Leg Injury     Right lower leg abrasion.       Meenu presents today with a right shin abrasion that initially occurred about 1 week ago.  She states she accidentally kicked the car door.  She cleansed the area with soap and water and has been keeping it covered with polysporin and taped dressing.  She presents today due to some redness around the site.  The area is still painful and has not scabbed over.  She denies seeing any drainage from the area other than a little blood at times.    She is unsure when her last tetanus shot was.        The following portions of the patient's history were reviewed and updated as appropriate: allergies, current medications, past family history, past medical history, past social history, past surgical history and problem list.    Review of Systems   Constitutional: Negative for activity change, appetite change, fatigue, fever, unexpected weight gain and unexpected weight loss.   HENT: Negative for swollen glands, trouble swallowing and voice change.    Eyes: Negative for blurred vision and visual disturbance.   Respiratory: Negative for cough and shortness of breath.    Cardiovascular: Negative for chest pain, palpitations and leg swelling.   Gastrointestinal: Negative for abdominal pain, constipation, diarrhea, nausea, vomiting and indigestion.   Endocrine: Negative for cold intolerance, heat intolerance, polydipsia and polyphagia.   Genitourinary: Negative for dysuria and frequency.   Musculoskeletal: Negative for arthralgias, back pain, joint swelling and neck pain.   Skin: Negative for color change, rash and skin lesions.        Wound to right leg     Neurological: Negative for dizziness, weakness, headache, memory problem and confusion.   Hematological: Does not bruise/bleed easily.   Psychiatric/Behavioral: Negative for agitation, hallucinations and suicidal ideas. The patient is not  nervous/anxious.        Objective   Past Medical History:   Diagnosis Date   • Arthritis    • GERD (gastroesophageal reflux disease)    • Hyperlipidemia    • Hypothyroidism    • Liver cyst    • Neuropathy    • Ovarian cyst       Past Surgical History:   Procedure Laterality Date   • ABDOMINAL HYSTERECTOMY     • APPENDECTOMY     • BREAST BIOPSY     • CHOLECYSTECTOMY     • COLONOSCOPY  03/05/2008    normal   • COLONOSCOPY  01/12/2012   • ENDOSCOPY  08/29/2013    normal   • ENDOSCOPY  01/23/2012    eus with stacy  normal endoscopic ultrascope of the pancreas.fortunately there was no mass seen   • ENDOSCOPY N/A 8/3/2018    Procedure: ESOPHAGOGASTRODUODENOSCOPY WITH ANESTHESIA;  Surgeon: Obed Patrick DO;  Location: [facility] ENDOSCOPY;  Service: Gastroenterology   • HEMORRHOIDECTOMY     • UPPER GASTROINTESTINAL ENDOSCOPY  08/29/2013        Current Outpatient Medications:   •  amLODIPine (NORVASC) 5 MG tablet, Take 5 mg by mouth Daily., Disp: , Rfl:   •  Ascorbic Acid (VITAMIN C PO), Daily., Disp: , Rfl:   •  bumetanide (BUMEX) 0.5 MG tablet, TAKE ONE TABLET BY MOUTH EVERY MORNING, Disp: 30 tablet, Rfl: 5  •  candesartan (ATACAND) 32 MG tablet, TAKE ONE TABLET BY MOUTH DAILY (Patient taking differently: Take 32 mg by mouth Daily.), Disp: 90 tablet, Rfl: 3  •  Cyanocobalamin ER 1000 MCG tablet controlled-release, Take 1 tablet by mouth daily , Disp: , Rfl:   •  docusate sodium (COLACE) 100 MG capsule, Take 1 tablet by mouth As Needed., Disp: , Rfl:   •  estradiol (ESTRACE VAGINAL) 0.1 MG/GM vaginal cream, Insert 2 g into the vagina 3 (Three) Times a Week., Disp: , Rfl:   •  levothyroxine (SYNTHROID, LEVOTHROID) 25 MCG tablet, TAKE ONE TABLET BY MOUTH DAILY, Disp: 90 tablet, Rfl: 3  •  LORazepam (ATIVAN) 0.5 MG tablet, TAKE ONE-HALF TABLET BY MOUTH TWICE A DAY (Patient taking differently: 1/4 tablet twice daily, 1/2 at bedtime), Disp: 30 tablet, Rfl: 5  •  Multiple Vitamins-Minerals (CENTRUM SILVER ADULT 50+) tablet,  Take 1 tablet by mouth daily, Disp: , Rfl:   •  omeprazole (priLOSEC) 20 MG capsule, Take 20 mg by mouth 2 (Two) Times a Day., Disp: , Rfl:   •  Psyllium (METAMUCIL FIBER PO), Take 1 package by mouth As Needed., Disp: , Rfl:   •  vitamin D (ERGOCALCIFEROL) 1.25 MG (71406 UT) capsule capsule, TAKE ONE CAPSULE BY MOUTH EVERY OTHER WEEK, Disp: 6 capsule, Rfl: 3  •  mupirocin (Bactroban) 2 % ointment, Apply  topically to the appropriate area as directed 3 (Three) Times a Day., Disp: 15 g, Rfl: 0     Vitals:    10/07/20 1005   BP: 140/80   Pulse: 78   Temp: 97.7 °F (36.5 °C)   SpO2: 98%         10/07/20  1005   Weight: 46.3 kg (102 lb)           Physical Exam  Constitutional:       General: She is not in acute distress.     Appearance: She is well-developed.   HENT:      Head: Normocephalic.      Right Ear: External ear normal.      Left Ear: External ear normal.      Mouth/Throat:      Mouth: No oral lesions.   Eyes:      Conjunctiva/sclera: Conjunctivae normal.   Cardiovascular:      Rate and Rhythm: Normal rate and regular rhythm.      Pulses: Normal pulses.      Heart sounds: Murmur present.   Pulmonary:      Effort: Pulmonary effort is normal.      Breath sounds: Normal breath sounds.   Skin:     General: Skin is warm and dry.          Neurological:      Mental Status: She is alert and oriented to person, place, and time.      Gait: Gait normal.   Psychiatric:         Mood and Affect: Mood normal.         Speech: Speech normal.         Behavior: Behavior normal.         Thought Content: Thought content normal.               Assessment/Plan   Diagnoses and all orders for this visit:    1. Abrasion of skin of right lower leg (Primary)  -     mupirocin (Bactroban) 2 % ointment; Apply  topically to the appropriate area as directed 3 (Three) Times a Day.  Dispense: 15 g; Refill: 0    Meenu does not tolerate oral antibiotics well.  At this point I feel we are still ok to proceed with topical mupirocin and 1-2 times daily  washes with soap and water.  Advised to keep wound uncovered while at home to allow air and drying.  Keep covered when active or outside of the home.  I have instructed to monitor the area closely.  If symptoms worsen, redness spreads, drainage, swelling, pain, etc...will need further evaluation and management.  She is not diabetic.    Will update Tetanus (Td) today. Last documented in 2006.

## 2020-10-19 ENCOUNTER — OFFICE VISIT (OUTPATIENT)
Dept: INTERNAL MEDICINE | Facility: CLINIC | Age: 85
End: 2020-10-19

## 2020-10-19 VITALS
SYSTOLIC BLOOD PRESSURE: 130 MMHG | WEIGHT: 102.4 LBS | HEART RATE: 82 BPM | DIASTOLIC BLOOD PRESSURE: 82 MMHG | HEIGHT: 62 IN | BODY MASS INDEX: 18.84 KG/M2 | TEMPERATURE: 97.1 F | OXYGEN SATURATION: 100 %

## 2020-10-19 DIAGNOSIS — S81.801A WOUND OF RIGHT LEG, INITIAL ENCOUNTER: ICD-10-CM

## 2020-10-19 DIAGNOSIS — R14.0 ABDOMINAL DISTENSION: Primary | ICD-10-CM

## 2020-10-19 DIAGNOSIS — N83.201 CYST OF RIGHT OVARY: ICD-10-CM

## 2020-10-19 PROBLEM — S81.802S WOUND OF LEFT LEG, SEQUELA: Status: ACTIVE | Noted: 2020-02-11

## 2020-10-19 PROCEDURE — 99214 OFFICE O/P EST MOD 30 MIN: CPT | Performed by: INTERNAL MEDICINE

## 2020-10-19 NOTE — PROGRESS NOTES
Subjective   Meenu Arteaga is a 89 y.o. female.   Chief Complaint   Patient presents with   • Leg Injury     sore on right leg, got about a month ago when hit leg on car door.       Patient is presents today because of a nonhealing wound right lower extremity.  She states that she was given Bactroban however that makes her sick to her stomach.  She also tells me that she has been evaluated by Dr. Patrick in the last 2 months and he has done x-rays of her stomach however I have gone back into the medical record I cannot see where Dr. Patrick is seen her for at least 2 years.  She may be talking about my nurse practitioner Meenu Bliss who did see her recently.  I cannot find x-rays of her abdomen in the medical record.       The following portions of the patient's history were reviewed and updated as appropriate: allergies, current medications, past family history, past medical history, past social history, past surgical history and problem list.    Review of Systems   Constitutional: Negative for activity change, appetite change, fatigue, fever, unexpected weight gain and unexpected weight loss.   HENT: Positive for hearing loss. Negative for swollen glands, trouble swallowing and voice change.    Eyes: Negative for blurred vision and visual disturbance.   Respiratory: Negative for cough and shortness of breath.    Cardiovascular: Negative for chest pain, palpitations and leg swelling.   Gastrointestinal: Positive for abdominal distention. Negative for abdominal pain, constipation, diarrhea, nausea, vomiting and indigestion.   Endocrine: Negative for cold intolerance, heat intolerance, polydipsia and polyphagia.   Genitourinary: Negative for dysuria and frequency.   Musculoskeletal: Negative for arthralgias, back pain, joint swelling and neck pain.   Skin: Positive for skin lesions. Negative for color change and rash.   Neurological: Negative for dizziness, weakness, headache, memory problem and confusion.    Hematological: Does not bruise/bleed easily.   Psychiatric/Behavioral: Negative for agitation, hallucinations and suicidal ideas. The patient is not nervous/anxious.        Objective   Past Medical History:   Diagnosis Date   • Arthritis    • GERD (gastroesophageal reflux disease)    • Hyperlipidemia    • Hypothyroidism    • Liver cyst    • Neuropathy    • Ovarian cyst       Past Surgical History:   Procedure Laterality Date   • ABDOMINAL HYSTERECTOMY     • APPENDECTOMY     • BREAST BIOPSY     • CHOLECYSTECTOMY     • COLONOSCOPY  03/05/2008    normal   • COLONOSCOPY  01/12/2012   • ENDOSCOPY  08/29/2013    normal   • ENDOSCOPY  01/23/2012    eus with stacy  normal endoscopic ultrascope of the pancreas.fortunately there was no mass seen   • ENDOSCOPY N/A 8/3/2018    Procedure: ESOPHAGOGASTRODUODENOSCOPY WITH ANESTHESIA;  Surgeon: Obed Patrick DO;  Location: [facility] ENDOSCOPY;  Service: Gastroenterology   • HEMORRHOIDECTOMY     • UPPER GASTROINTESTINAL ENDOSCOPY  08/29/2013        Current Outpatient Medications:   •  amLODIPine (NORVASC) 5 MG tablet, Take 5 mg by mouth Daily., Disp: , Rfl:   •  Ascorbic Acid (VITAMIN C PO), Daily., Disp: , Rfl:   •  bumetanide (BUMEX) 0.5 MG tablet, TAKE ONE TABLET BY MOUTH EVERY MORNING, Disp: 30 tablet, Rfl: 5  •  candesartan (ATACAND) 32 MG tablet, TAKE ONE TABLET BY MOUTH DAILY (Patient taking differently: Take 32 mg by mouth Daily.), Disp: 90 tablet, Rfl: 3  •  Cyanocobalamin ER 1000 MCG tablet controlled-release, Take 1 tablet by mouth daily , Disp: , Rfl:   •  docusate sodium (COLACE) 100 MG capsule, Take 1 tablet by mouth As Needed., Disp: , Rfl:   •  estradiol (ESTRACE VAGINAL) 0.1 MG/GM vaginal cream, Insert 2 g into the vagina 3 (Three) Times a Week., Disp: , Rfl:   •  levothyroxine (SYNTHROID, LEVOTHROID) 25 MCG tablet, TAKE ONE TABLET BY MOUTH DAILY, Disp: 90 tablet, Rfl: 3  •  LORazepam (ATIVAN) 0.5 MG tablet, TAKE ONE-HALF TABLET BY MOUTH TWICE A DAY (Patient  taking differently: 1/4 tablet twice daily, 1/2 at bedtime), Disp: 30 tablet, Rfl: 5  •  Multiple Vitamins-Minerals (CENTRUM SILVER ADULT 50+) tablet, Take 1 tablet by mouth daily, Disp: , Rfl:   •  mupirocin (Bactroban) 2 % ointment, Apply  topically to the appropriate area as directed 3 (Three) Times a Day., Disp: 15 g, Rfl: 0  •  omeprazole (priLOSEC) 20 MG capsule, Take 20 mg by mouth 2 (Two) Times a Day., Disp: , Rfl:   •  Psyllium (METAMUCIL FIBER PO), Take 1 package by mouth As Needed., Disp: , Rfl:   •  vitamin D (ERGOCALCIFEROL) 1.25 MG (65516 UT) capsule capsule, TAKE ONE CAPSULE BY MOUTH EVERY OTHER WEEK, Disp: 6 capsule, Rfl: 3     Vitals:    10/19/20 1132   BP: 130/82   Pulse: 82   Temp: 97.1 °F (36.2 °C)   SpO2: 100%         10/19/20  1132   Weight: 46.4 kg (102 lb 6.4 oz)     Patient's Body mass index is 18.73 kg/m². BMI is .      Physical Exam  Constitutional:       Appearance: Normal appearance. She is well-developed.   HENT:      Head: Normocephalic and atraumatic.      Right Ear: External ear normal.      Left Ear: External ear normal.   Eyes:      Extraocular Movements: Extraocular movements intact.      Conjunctiva/sclera: Conjunctivae normal.      Pupils: Pupils are equal, round, and reactive to light.   Neck:      Musculoskeletal: Normal range of motion and neck supple. No neck rigidity.      Vascular: No carotid bruit.   Cardiovascular:      Rate and Rhythm: Normal rate and regular rhythm.      Pulses: Normal pulses.      Heart sounds: Normal heart sounds. No murmur. No gallop.    Pulmonary:      Effort: Pulmonary effort is normal.      Breath sounds: Normal breath sounds.   Musculoskeletal: Normal range of motion.         General: No swelling or tenderness.   Skin:     General: Skin is warm and dry.      Comments: Patient's leg wound appears to be improving according the patient she tells me  the redness has gone down tremendously.  However it is not completely healed   Neurological:       General: No focal deficit present.      Mental Status: She is alert and oriented to person, place, and time.   Psychiatric:         Mood and Affect: Mood normal.         Behavior: Behavior normal.               Assessment/Plan   Diagnoses and all orders for this visit:    1. Abdominal distension (Primary)  -     CT Abdomen Pelvis Without Contrast; Future    2. Wound of right leg, initial encounter    3. Cyst of right ovary      I am not satisfied with the way her leg looks however according to patient's account it is improving on a regular basis.  She has multiple allergies unable to take antibiotics she is now telling me that the Bactroban causes her to be nauseated as well she is also concerned about some abdominal distention we will going to repeat a CAT scan she has a known right ovarian cyst.

## 2020-10-26 ENCOUNTER — HOSPITAL ENCOUNTER (OUTPATIENT)
Dept: CT IMAGING | Facility: HOSPITAL | Age: 85
Discharge: HOME | End: 2020-10-26
Admitting: INTERNAL MEDICINE

## 2020-10-26 ENCOUNTER — TELEPHONE (OUTPATIENT)
Dept: INTERNAL MEDICINE | Facility: CLINIC | Age: 85
End: 2020-10-26

## 2020-10-26 DIAGNOSIS — R14.0 ABDOMINAL DISTENSION: ICD-10-CM

## 2020-10-26 PROCEDURE — 74176 CT ABD & PELVIS W/O CONTRAST: CPT

## 2020-10-26 NOTE — TELEPHONE ENCOUNTER
SPOKE WITH , HE WANTS TO GO TO \[facility]\""   WILL CALL AND [address]

## 2020-10-26 NOTE — TELEPHONE ENCOUNTER
Pt's  Alex, called in with questions re: CT Scan.     Caller stated he was told pt would have to fast. Caller is concerned pt would not be able to fast.     Test is schd for tomorrow at 9:30.      Please call [phone]

## 2020-10-27 ENCOUNTER — APPOINTMENT (OUTPATIENT)
Dept: CT IMAGING | Facility: HOSPITAL | Age: 85
End: 2020-10-27

## 2020-10-28 DIAGNOSIS — F41.9 ANXIETY: ICD-10-CM

## 2020-10-29 ENCOUNTER — OFFICE VISIT (OUTPATIENT)
Dept: INTERNAL MEDICINE | Facility: CLINIC | Age: 85
End: 2020-10-29

## 2020-10-29 VITALS
BODY MASS INDEX: 18.4 KG/M2 | HEART RATE: 85 BPM | OXYGEN SATURATION: 95 % | SYSTOLIC BLOOD PRESSURE: 160 MMHG | HEIGHT: 62 IN | TEMPERATURE: 97.1 F | WEIGHT: 100 LBS | DIASTOLIC BLOOD PRESSURE: 80 MMHG

## 2020-10-29 DIAGNOSIS — T14.8XXA NONHEALING NONSURGICAL WOUND: Primary | ICD-10-CM

## 2020-10-29 DIAGNOSIS — K59.01 SLOW TRANSIT CONSTIPATION: ICD-10-CM

## 2020-10-29 DIAGNOSIS — N83.201 CYST OF RIGHT OVARY: ICD-10-CM

## 2020-10-29 PROCEDURE — 99213 OFFICE O/P EST LOW 20 MIN: CPT | Performed by: INTERNAL MEDICINE

## 2020-10-29 RX ORDER — LORAZEPAM 0.5 MG/1
TABLET ORAL
Qty: 30 TABLET | Refills: 5 | Status: SHIPPED | OUTPATIENT
Start: 2020-10-29 | End: 2021-04-27

## 2020-10-29 NOTE — PROGRESS NOTES
Subjective   Meenu Arteaga is a 89 y.o. female.   Chief Complaint   Patient presents with   • Follow-up     10 DAY FOLLOW UP WOUND ON LEG    • CT FOLLOW UP       Patient is here to follow-up for her abdominal pain she is also complaining of a lesion on her right lower leg which is been very slow to heal.  She is concerned about a black area and that lesion.  Regard to her abdominal pain she is doing quite well she is started the milk of magnesia encourage her to finish a 3-day course of milk of magnesia to get her completely cleaned out.  The ovarian cyst that was noted on the CAT scan is old though it is slightly larger than previously described.       The following portions of the patient's history were reviewed and updated as appropriate: allergies, current medications, past family history, past medical history, past social history, past surgical history and problem list.    Review of Systems   Constitutional: Negative for activity change, appetite change, fatigue, fever, unexpected weight gain and unexpected weight loss.   HENT: Negative for swollen glands, trouble swallowing and voice change.    Eyes: Negative for blurred vision and visual disturbance.   Respiratory: Negative for cough and shortness of breath.    Cardiovascular: Negative for chest pain, palpitations and leg swelling.   Gastrointestinal: Positive for abdominal pain. Negative for constipation, diarrhea, nausea, vomiting and indigestion.   Endocrine: Negative for cold intolerance, heat intolerance, polydipsia and polyphagia.   Genitourinary: Negative for dysuria and frequency.   Musculoskeletal: Negative for arthralgias, back pain, joint swelling and neck pain.   Skin: Positive for skin lesions ( Right anterior shin). Negative for color change and rash.   Neurological: Negative for dizziness, weakness, headache, memory problem and confusion.   Hematological: Does not bruise/bleed easily.   Psychiatric/Behavioral: Negative for agitation,  hallucinations and suicidal ideas. The patient is not nervous/anxious.        Objective   Past Medical History:   Diagnosis Date   • Arthritis    • GERD (gastroesophageal reflux disease)    • Hyperlipidemia    • Hypothyroidism    • Liver cyst    • Neuropathy    • Ovarian cyst       Past Surgical History:   Procedure Laterality Date   • ABDOMINAL HYSTERECTOMY     • APPENDECTOMY     • BREAST BIOPSY     • CHOLECYSTECTOMY     • COLONOSCOPY  03/05/2008    normal   • COLONOSCOPY  01/12/2012   • ENDOSCOPY  08/29/2013    normal   • ENDOSCOPY  01/23/2012    eus with stacy  normal endoscopic ultrascope of the pancreas.fortunately there was no mass seen   • ENDOSCOPY N/A 8/3/2018    Procedure: ESOPHAGOGASTRODUODENOSCOPY WITH ANESTHESIA;  Surgeon: Obed Patrick DO;  Location: [facility] ENDOSCOPY;  Service: Gastroenterology   • HEMORRHOIDECTOMY     • UPPER GASTROINTESTINAL ENDOSCOPY  08/29/2013        Current Outpatient Medications:   •  amLODIPine (NORVASC) 5 MG tablet, Take 5 mg by mouth Daily., Disp: , Rfl:   •  Ascorbic Acid (VITAMIN C PO), Daily., Disp: , Rfl:   •  bumetanide (BUMEX) 0.5 MG tablet, TAKE ONE TABLET BY MOUTH EVERY MORNING, Disp: 30 tablet, Rfl: 5  •  candesartan (ATACAND) 32 MG tablet, TAKE ONE TABLET BY MOUTH DAILY (Patient taking differently: Take 32 mg by mouth Daily.), Disp: 90 tablet, Rfl: 3  •  Cyanocobalamin ER 1000 MCG tablet controlled-release, Take 1 tablet by mouth daily , Disp: , Rfl:   •  docusate sodium (COLACE) 100 MG capsule, Take 1 tablet by mouth As Needed., Disp: , Rfl:   •  estradiol (ESTRACE VAGINAL) 0.1 MG/GM vaginal cream, Insert 2 g into the vagina 3 (Three) Times a Week., Disp: , Rfl:   •  levothyroxine (SYNTHROID, LEVOTHROID) 25 MCG tablet, TAKE ONE TABLET BY MOUTH DAILY, Disp: 90 tablet, Rfl: 3  •  LORazepam (ATIVAN) 0.5 MG tablet, 1/4 tablet twice daily, 1/2 at bedtime, Disp: 30 tablet, Rfl: 5  •  Multiple Vitamins-Minerals (CENTRUM SILVER ADULT 50+) tablet, Take 1 tablet by  mouth daily, Disp: , Rfl:   •  mupirocin (Bactroban) 2 % ointment, Apply  topically to the appropriate area as directed 3 (Three) Times a Day., Disp: 15 g, Rfl: 0  •  omeprazole (priLOSEC) 20 MG capsule, Take 20 mg by mouth 2 (Two) Times a Day., Disp: , Rfl:   •  Psyllium (METAMUCIL FIBER PO), Take 1 package by mouth As Needed., Disp: , Rfl:   •  vitamin D (ERGOCALCIFEROL) 1.25 MG (99863 UT) capsule capsule, TAKE ONE CAPSULE BY MOUTH EVERY OTHER WEEK, Disp: 6 capsule, Rfl: 3     Vitals:    10/29/20 1421   BP: 160/80   Pulse: 85   Temp: 97.1 °F (36.2 °C)   SpO2: 95%         10/29/20  1421   Weight: 45.4 kg (100 lb)     Patient's Body mass index is 18.29 kg/m². BMI is .      Physical Exam  Constitutional:       Appearance: Normal appearance. She is well-developed.   HENT:      Head: Normocephalic and atraumatic.      Right Ear: External ear normal.      Left Ear: External ear normal.   Eyes:      Extraocular Movements: Extraocular movements intact.      Conjunctiva/sclera: Conjunctivae normal.      Pupils: Pupils are equal, round, and reactive to light.   Neck:      Musculoskeletal: Normal range of motion and neck supple. No neck rigidity.      Vascular: No carotid bruit.   Cardiovascular:      Rate and Rhythm: Normal rate and regular rhythm.      Pulses: Normal pulses.      Heart sounds: Normal heart sounds. No murmur. No gallop.    Pulmonary:      Effort: Pulmonary effort is normal.      Breath sounds: Normal breath sounds.   Musculoskeletal: Normal range of motion.         General: No swelling or tenderness.   Skin:     General: Skin is warm and dry.      Findings: Lesion ( This lesion is from a injury obtained from hitting her leg on a car door.) present.   Neurological:      General: No focal deficit present.      Mental Status: She is alert and oriented to person, place, and time.   Psychiatric:         Mood and Affect: Mood normal.         Behavior: Behavior normal.     Procedures debridement of wound.   Patient's right lower leg was cleansed with Betadine I used a curette to debride approximately a 1-1/2 cm by half centimeter linear wound.  Patient was dressed with triple antibiotic ointment occlusive bandage.  Patient was encouraged to remove bandaging clean with Hibiclens daily and then coat with white Vaseline and bandage.  Until it heals patient tolerated procedure well            Assessment/Plan   Diagnoses and all orders for this visit:    1. Nonhealing nonsurgical wound (Primary)    2. Slow transit constipation    3. Cyst of right ovary      I debrided patient so nonhealing wound right anterior leg it actually looks quite good after debridement it did not bleed.  In regard to patient's abdominal pain and constipation we reiterated previous instructions of taking milk of magnesia daily for 3 days.  The right ovarian cyst has not changed I do not believe that is a cause for pain.

## 2020-11-09 RX ORDER — AMLODIPINE BESYLATE 5 MG/1
TABLET ORAL
Qty: 90 TABLET | Refills: 3 | Status: SHIPPED | OUTPATIENT
Start: 2020-11-09 | End: 2021-11-01

## 2020-12-05 ENCOUNTER — APPOINTMENT (OUTPATIENT)
Dept: CT IMAGING | Age: 85
End: 2020-12-05
Payer: MEDICARE

## 2020-12-05 ENCOUNTER — HOSPITAL ENCOUNTER (EMERGENCY)
Age: 85
Discharge: HOME | End: 2020-12-05
Attending: EMERGENCY MEDICINE
Payer: MEDICARE

## 2020-12-05 VITALS
WEIGHT: 101 LBS | BODY MASS INDEX: 18.58 KG/M2 | RESPIRATION RATE: 18 BRPM | SYSTOLIC BLOOD PRESSURE: 131 MMHG | HEIGHT: 62 IN | HEART RATE: 92 BPM | OXYGEN SATURATION: 99 % | DIASTOLIC BLOOD PRESSURE: 68 MMHG | TEMPERATURE: 99 F

## 2020-12-05 LAB
ADENOVIRUS BY PCR: NOT DETECTED
ALBUMIN SERPL-MCNC: 4.4 G/DL (ref 3.5–5.2)
ALP BLD-CCNC: 67 U/L (ref 35–104)
ALT SERPL-CCNC: 13 U/L (ref 5–33)
ANION GAP SERPL CALCULATED.3IONS-SCNC: 11 MMOL/L (ref 7–19)
AST SERPL-CCNC: 35 U/L (ref 5–32)
BACTERIA: NEGATIVE /HPF
BASOPHILS ABSOLUTE: 0 K/UL (ref 0–0.2)
BASOPHILS RELATIVE PERCENT: 0.7 % (ref 0–1)
BILIRUB SERPL-MCNC: 0.3 MG/DL (ref 0.2–1.2)
BILIRUBIN URINE: NEGATIVE
BLOOD, URINE: ABNORMAL
BORDETELLA PARAPERTUSSIS BY PCR: NOT DETECTED
BORDETELLA PERTUSSIS BY PCR: NOT DETECTED
BUN BLDV-MCNC: 16 MG/DL (ref 8–23)
CALCIUM SERPL-MCNC: 9.9 MG/DL (ref 8.2–9.6)
CHLAMYDOPHILIA PNEUMONIAE BY PCR: NOT DETECTED
CHLORIDE BLD-SCNC: 103 MMOL/L (ref 98–111)
CLARITY: CLEAR
CO2: 26 MMOL/L (ref 22–29)
COLOR: YELLOW
CORONAVIRUS 229E BY PCR: NOT DETECTED
CORONAVIRUS HKU1 BY PCR: NOT DETECTED
CORONAVIRUS NL63 BY PCR: NOT DETECTED
CORONAVIRUS OC43 BY PCR: NOT DETECTED
CREAT SERPL-MCNC: 0.6 MG/DL (ref 0.5–0.9)
CRYSTALS, UA: ABNORMAL /HPF
EOSINOPHILS ABSOLUTE: 0 K/UL (ref 0–0.6)
EOSINOPHILS RELATIVE PERCENT: 0.2 % (ref 0–5)
EPITHELIAL CELLS, UA: 1 /HPF (ref 0–5)
GFR AFRICAN AMERICAN: >59
GFR NON-AFRICAN AMERICAN: >60
GLUCOSE BLD-MCNC: 137 MG/DL (ref 74–109)
GLUCOSE URINE: NEGATIVE MG/DL
HCT VFR BLD CALC: 39.5 % (ref 37–47)
HEMOGLOBIN: 12.8 G/DL (ref 12–16)
HUMAN METAPNEUMOVIRUS BY PCR: NOT DETECTED
HUMAN RHINOVIRUS/ENTEROVIRUS BY PCR: NOT DETECTED
HYALINE CASTS: 0 /HPF (ref 0–8)
IMMATURE GRANULOCYTES #: 0 K/UL
INFLUENZA A BY PCR: NOT DETECTED
INFLUENZA B BY PCR: NOT DETECTED
KETONES, URINE: NEGATIVE MG/DL
LACTIC ACID: 1.9 MMOL/L (ref 0.5–1.9)
LEUKOCYTE ESTERASE, URINE: ABNORMAL
LIPASE: 73 U/L (ref 13–60)
LYMPHOCYTES ABSOLUTE: 0.7 K/UL (ref 1.1–4.5)
LYMPHOCYTES RELATIVE PERCENT: 13.1 % (ref 20–40)
MCH RBC QN AUTO: 31.8 PG (ref 27–31)
MCHC RBC AUTO-ENTMCNC: 32.4 G/DL (ref 33–37)
MCV RBC AUTO: 98 FL (ref 81–99)
MONOCYTES ABSOLUTE: 0.5 K/UL (ref 0–0.9)
MONOCYTES RELATIVE PERCENT: 8.1 % (ref 0–10)
MYCOPLASMA PNEUMONIAE BY PCR: NOT DETECTED
NEUTROPHILS ABSOLUTE: 4.4 K/UL (ref 1.5–7.5)
NEUTROPHILS RELATIVE PERCENT: 77.4 % (ref 50–65)
NITRITE, URINE: NEGATIVE
PARAINFLUENZA VIRUS 1 BY PCR: NOT DETECTED
PARAINFLUENZA VIRUS 2 BY PCR: NOT DETECTED
PARAINFLUENZA VIRUS 3 BY PCR: NOT DETECTED
PARAINFLUENZA VIRUS 4 BY PCR: NOT DETECTED
PDW BLD-RTO: 13.3 % (ref 11.5–14.5)
PH UA: 8 (ref 5–8)
PLATELET # BLD: 223 K/UL (ref 130–400)
PMV BLD AUTO: 10.5 FL (ref 9.4–12.3)
POTASSIUM REFLEX MAGNESIUM: 3.9 MMOL/L (ref 3.5–5)
PROTEIN UA: NEGATIVE MG/DL
RBC # BLD: 4.03 M/UL (ref 4.2–5.4)
RBC UA: 2 /HPF (ref 0–4)
RESPIRATORY SYNCYTIAL VIRUS BY PCR: NOT DETECTED
SARS-COV-2, PCR: NOT DETECTED
SODIUM BLD-SCNC: 140 MMOL/L (ref 136–145)
SPECIFIC GRAVITY UA: 1.01 (ref 1–1.03)
TOTAL PROTEIN: 7.3 G/DL (ref 6.6–8.7)
TROPONIN: <0.01 NG/ML (ref 0–0.03)
TSH REFLEX FT4: 3.6 UIU/ML (ref 0.35–5.5)
UROBILINOGEN, URINE: 0.2 E.U./DL
WBC # BLD: 5.7 K/UL (ref 4.8–10.8)
WBC UA: 6 /HPF (ref 0–5)

## 2020-12-05 PROCEDURE — 83605 ASSAY OF LACTIC ACID: CPT

## 2020-12-05 PROCEDURE — 80053 COMPREHEN METABOLIC PANEL: CPT

## 2020-12-05 PROCEDURE — 71275 CT ANGIOGRAPHY CHEST: CPT

## 2020-12-05 PROCEDURE — 93005 ELECTROCARDIOGRAM TRACING: CPT | Performed by: EMERGENCY MEDICINE

## 2020-12-05 PROCEDURE — 84484 ASSAY OF TROPONIN QUANT: CPT

## 2020-12-05 PROCEDURE — 83690 ASSAY OF LIPASE: CPT

## 2020-12-05 PROCEDURE — 70450 CT HEAD/BRAIN W/O DYE: CPT

## 2020-12-05 PROCEDURE — 84443 ASSAY THYROID STIM HORMONE: CPT

## 2020-12-05 PROCEDURE — 85025 COMPLETE CBC W/AUTO DIFF WBC: CPT

## 2020-12-05 PROCEDURE — 74177 CT ABD & PELVIS W/CONTRAST: CPT

## 2020-12-05 PROCEDURE — 36415 COLL VENOUS BLD VENIPUNCTURE: CPT

## 2020-12-05 PROCEDURE — 81001 URINALYSIS AUTO W/SCOPE: CPT

## 2020-12-05 PROCEDURE — 0202U NFCT DS 22 TRGT SARS-COV-2: CPT

## 2020-12-05 PROCEDURE — 6360000004 HC RX CONTRAST MEDICATION: Performed by: EMERGENCY MEDICINE

## 2020-12-05 PROCEDURE — 99283 EMERGENCY DEPT VISIT LOW MDM: CPT

## 2020-12-05 RX ORDER — AMLODIPINE BESYLATE 5 MG/1
5 TABLET ORAL DAILY
COMMUNITY
End: 2022-07-28 | Stop reason: SINTOL

## 2020-12-05 RX ORDER — ASCORBIC ACID 500 MG
500 TABLET ORAL DAILY
COMMUNITY

## 2020-12-05 RX ADMIN — IOPAMIDOL 90 ML: 755 INJECTION, SOLUTION INTRAVENOUS at 15:37

## 2020-12-05 ASSESSMENT — PAIN SCALES - GENERAL: PAINLEVEL_OUTOF10: 7

## 2020-12-05 ASSESSMENT — ENCOUNTER SYMPTOMS
VOMITING: 0
ABDOMINAL PAIN: 0
DIARRHEA: 0
BACK PAIN: 0
EYE PAIN: 0
SHORTNESS OF BREATH: 0

## 2020-12-05 NOTE — ED PROVIDER NOTES
[facility] EMERGENCY DEPT  eMERGENCY dEPARTMENT eNCOUnter      Pt Name: Siria Burt  MRN: [id number]  Armsmackgfurt 11/10/1926  Date of evaluation: 12/5/2020  Provider: Diane Ojeda MD    [address] [place] [address]       Chief Complaint   Patient presents with    Loss of Consciousness     Twice today         HISTORY OF PRESENT ILLNESS   (Location/Symptom, Timing/Onset,Context/Setting, Quality, Duration, Modifying Factors, Severity)  Note limiting factors. Siria Burt is a 80 y.o. female who presents to the emergency department due to near syncope. Patient is very anxious. Tells me she does with anxiety chronically. Has felt little anxious today. Tells me she had 2 episodes today of near syncope. The first was whenever she was getting out of the shower. She began feeling lightheaded and had to sit down. Felt better after couple minutes and then while she was getting dressed had a similar issue where she began feeling lightheaded. This also passed after couple minutes. No headache vision changes numbness or weakness. No chest pain shortness of breath palpitations. No unilateral leg swelling or pain. Tells me she has abdominal pain and constipation chronically which is unchanged from baseline. No urinary symptoms. No dysuria or flank pain. No unilateral leg swelling or pain. No history of DVT or PE. Tells me she has anxiety and does seem anxious. Denies history of any cardiac disease. Said the second time she got lightheaded she did bump the side of her head into the wall because she stumbled and ran into the wall. No loss of consciousness or headache. No other injuries. HPI    NursingNotes were reviewed. REVIEW OF SYSTEMS    (2-9 systems for level 4, 10 or more for level 5)     Review of Systems   Constitutional: Negative for fever. Eyes: Negative for pain and visual disturbance. Respiratory: Negative for shortness of breath.     Cardiovascular: Negative for chest pain, palpitations and leg swelling. Gastrointestinal: Negative for abdominal pain, diarrhea and vomiting. Genitourinary: Negative for dysuria. Musculoskeletal: Negative for back pain and neck pain. Skin: Negative for rash. Neurological: Positive for light-headedness (resolved). Negative for weakness and headaches. Psychiatric/Behavioral: The patient is nervous/anxious. All other systems reviewed and are negative. A complete review of systems was performed and is negative except as noted above in the HPI. PAST MEDICAL HISTORY     Past Medical History:   Diagnosis Date    Anxiety 10/2/2019    Chest tightness or pressure 8/26/2013 8/26/2013  lexiscan negative for myocardial ischemia, EF 61%    Edema     Essential and other specified forms of tremor     Generalized anxiety disorder     HTN (hypertension)     Hyperlipidemia     Hypertension     Insomnia, unspecified     Neuropathy     both legs up to both hips    Other and unspecified ovarian cyst     Other left bundle branch block     Pure hypercholesterolemia     Restless legs syndrome (RLS)     Solitary cyst of breast     Spinal stenosis, lumbar region, without neurogenic claudication     Unspecified hypothyroidism     Urinary frequency          SURGICAL HISTORY       Past Surgical History:   Procedure Laterality Date    APPENDECTOMY      CARDIAC CATHETERIZATION  5/26/15  MDL    with aortic root injection.  EF 60%    CATARACT REMOVAL      CHOLECYSTECTOMY      CYST INCISION AND DRAINAGE      drained liver cyst    HEMORRHOID SURGERY      HYSTERECTOMY      NERVE BLOCK LUMB FACET LEVEL 1 BILATERAL Bilateral 1/19/2016    LUMBAR FACET CATE L4-5  performed by Pio Martinez at [address]       Discharge Medication List as of 12/5/2020  4:15 PM      CONTINUE these medications which have NOT CHANGED    Details   amLODIPine (NORVASC) 5 MG tablet Take 5 mg by mouth dailyHistorical Med      vitamin C (ASCORBIC ACID) 500 MG tablet Take 500 mg by mouth dailyHistorical Med      vitamin D (ERGOCALCIFEROL) 91271 units CAPS capsule Take 50,000 Units by mouth once a weekHistorical Med      LORazepam (ATIVAN) 0.5 MG tablet Take 0.5 mg by mouth every 6 hours as needed for Anxiety. Historical Med      levothyroxine (SYNTHROID) 25 MCG tablet Take 2 tablets by mouth Daily, Disp-30 tablet, R-3      omeprazole (PRILOSEC) 20 MG capsule Take 20 mg by mouth 2 times daily      Multiple Vitamins-Minerals (CENTRUM SILVER ADULT 50+) TABS Take 1 tablet by mouth daily      bumetanide (BUMEX) 0.5 MG tablet   Take 0.5 mg by mouth daily       Magnesium Oxide (WHIPPLE PO) Take 2 tablets by mouth daily       candesartan (ATACAND) 32 MG tablet Take 32 mg by mouth daily      estradiol (ESTRACE) 0.1 MG/GM vaginal cream Place 2 g vaginally daily      Psyllium (METAMUCIL PO) Take 2 tablets by mouth daily       metoprolol succinate (TOPROL XL) 25 MG extended release tablet Take 1 tablet by mouth nightly, Disp-30 tablet, R-5Normal      docusate sodium (COLACE) 100 MG capsule Take 100 mg by mouth 2 times dailyHistorical Med      conjugated estrogens (PREMARIN) 0.625 MG/GM vaginal cream Place vaginally, Vaginal, Starting 2017, Until Discontinued, Historical Med             ALLERGIES     Ampicillin; Bactrim [sulfamethoxazole-trimethoprim]; Cephalosporins; Ciprocinonide [fluocinolone]; Codeine; Cymbalta [duloxetine hcl]; Doxycycline; Enablex [darifenacin hydrobromide er]; Gabapentin; Hctz [hydrochlorothiazide]; Keppra [levetiracetam]; Levaquin [levofloxacin in d5w]; Lyrica [pregabalin]; Pyridium [phenazopyridine hcl];  Quinolones; and Sulfa antibiotics    FAMILY HISTORY       Family History   Problem Relation Age of Onset    Other Father         stroke, MI,  46          SOCIAL HISTORY       Social History     Socioeconomic History    Marital status:      Spouse name: None    Number of children: None    Years of education: None    Highest education level: None   Occupational History    None   Social Needs    Financial resource strain: None    Food insecurity     Worry: None     Inability: None    Transportation needs     Medical: None     Non-medical: None   Tobacco Use    Smoking status: Never Smoker    Smokeless tobacco: Never Used   Substance and Sexual Activity    Alcohol use: No    Drug use: No    Sexual activity: None   Lifestyle    Physical activity     Days per week: None     Minutes per session: None    Stress: None   Relationships    Social connections     Talks on phone: None     Gets together: None     Attends L[employer] service: None     Active member of club or organization: None     Attends meetings of clubs or organizations: None     Relationship status: None    Intimate partner violence     Fear of current or ex partner: None     Emotionally abused: None     Physically abused: None     Forced sexual activity: None   Other Topics Concern    None   Social History Narrative    None       SCREENINGS             PHYSICAL EXAM    (up to 7 for level 4, 8 or more for level 5)     ED Triage Vitals [12/05/20 1302]   BP Temp Temp src Pulse Resp SpO2 Height Weight   (!) 180/95 99 °F (37.2 °C) -- 110 18 94 % 5' 2\" (1.575 m) 101 lb (45.8 kg)       Physical Exam  Vitals signs reviewed. Constitutional:       General: She is not in acute distress. Appearance: She is well-developed. HENT:      Head: Normocephalic and atraumatic. Right Ear: Tympanic membrane, ear canal and external ear normal.      Left Ear: Tympanic membrane, ear canal and external ear normal.      Mouth/Throat:      Mouth: Mucous membranes are moist.      Pharynx: Oropharynx is clear. Eyes:      General: No scleral icterus. Extraocular Movements: Extraocular movements intact. Pupils: Pupils are equal, round, and reactive to light.       Visual Fields: Right eye visual fields normal and left eye visual fields normal.   Neck:      Musculoskeletal: Normal range of motion and neck supple. Vascular: No JVD. Cardiovascular:      Rate and Rhythm: Normal rate and regular rhythm. Pulses: Normal pulses. Heart sounds: Normal heart sounds. No murmur. Pulmonary:      Effort: Pulmonary effort is normal. No respiratory distress. Breath sounds: Normal breath sounds. Abdominal:      General: There is no distension. Palpations: Abdomen is soft. Tenderness: There is no abdominal tenderness. There is no guarding or rebound. Musculoskeletal:         General: No swelling or tenderness. Right lower leg: No edema. Left lower leg: No edema. Skin:     General: Skin is warm and dry. Capillary Refill: Capillary refill takes less than 2 seconds. Neurological:      General: No focal deficit present. Mental Status: She is alert and oriented to person, place, and time. GCS: GCS eye subscore is 4. GCS verbal subscore is 5. GCS motor subscore is 6. Cranial Nerves: Cranial nerves are intact. Sensory: Sensation is intact. Motor: Motor function is intact. Coordination: Coordination is intact. Gait: Gait is intact. Psychiatric:         Mood and Affect: Mood is anxious. Behavior: Behavior normal.         DIAGNOSTIC RESULTS     EKG: All EKG's are interpreted by the Emergency Department Physician who either signs or Co-signs this chart in the absence of a cardiologist.    Normal sinus rhythm. Normal QT. Left bundle branch block. RADIOLOGY:   Non-plain film images such as CT, Ultrasound and MRI are read by the radiologist. Javier Gall images are visualized and preliminarily interpreted by the emergency physician with the below findings:    Interpretation per the Radiologist below, if available at the time of this note:    CT ABDOMEN PELVIS W IV CONTRAST Additional Contrast? None   Final Result   1. No acute abnormality or traumatic injury is seen.    2. A 6 cm cyst within the right side of the pelvis measured   approximately 4 cm 9 years ago. Signed by Dr Marcy Holder on 12/5/2020 3:53 PM      CTA PULMONARY W CONTRAST   Final Result   1. No pulmonary embolism. 2. Basilar atelectasis. Signed by Dr Marcy Holder on 12/5/2020 3:49 PM      CT Head WO Contrast   Final Result   1. No acute intracranial abnormality is seen. Signed by Dr Marcy Holder on 12/5/2020 3:45 PM            LABS:  Labs Reviewed   CBC WITH AUTO DIFFERENTIAL - Abnormal; Notable for the following components:       Result Value    RBC 4.03 (*)     MCH 31.8 (*)     MCHC 32.4 (*)     Neutrophils % 77.4 (*)     Lymphocytes % 13.1 (*)     Lymphocytes Absolute 0.7 (*)     All other components within normal limits   COMPREHENSIVE METABOLIC PANEL W/ REFLEX TO MG FOR LOW K - Abnormal; Notable for the following components:    Glucose 137 (*)     Calcium 9.9 (*)     AST 35 (*)     All other components within normal limits   LIPASE - Abnormal; Notable for the following components:    Lipase 73 (*)     All other components within normal limits   URINE RT REFLEX TO CULTURE - Abnormal; Notable for the following components:    Blood, Urine TRACE (*)     Leukocyte Esterase, Urine SMALL (*)     All other components within normal limits   MICROSCOPIC URINALYSIS - Abnormal; Notable for the following components:    Bacteria, UA NEGATIVE (*)     Crystals, UA NEG (*)     WBC, UA 6 (*)     All other components within normal limits   RESPIRATORY PANEL, MOLECULAR, WITH COVID-19   LACTIC ACID, PLASMA   TSH WITH REFLEX TO FT4   TROPONIN       All other labs were within normal range or not returned as of this dictation.     EMERGENCY DEPARTMENT COURSE and DIFFERENTIALDIAGNOSIS/MDM:   Vitals:    Vitals:    12/05/20 1400 12/05/20 1430 12/05/20 1500 12/05/20 1600   BP: (!) 142/70 (!) 145/67 (!) 140/63 131/68   Pulse: 82 100 92 92   Resp: 14 23 27 18   Temp:       SpO2: 99% 98% 99% 99%   Weight:       Height:           MDM  Few WBCs in urine but patient has no urinary symptoms so I doubt she has UTI. Do not see indication for antibiotics at this point. Patient symptoms are all better. Anxiety is much better. Blood pressure is improving. Talked to patient at length. Told her I am uncertain as far as etiology of her symptoms. Told patient that I suspect this could just be anxiety but cannot be certain. Discussed her results with her. Offered to admit for further monitoring and evaluation which patient declined. Told patient to follow-up with her primary doctor for further evaluation and to notify her doctor about her visit here today so they can discuss all of her results from today's visit. Told to return to the ER for change worsening symptoms or new concerns. Patient agreeable plan. I have discussed my findings, my impression, and my differential diagnosis to the patient. The patient understands the risks, benefits, and alternatives of an inpatient versus outpatient continued evaluation and treatment. The patient has capacity to make medical decisions. The patient declines hospital admission or further observation in the emergency department. The patient understands the importance of follow-up and agrees to return if the condition changes, worsens, or if the patient changes his/her mind about inpatient evaluation and care. CONSULTS:  None    PROCEDURES:  Unless otherwise notedbelow, none     Procedures    FINAL IMPRESSION     1. Near syncope    2. Injury of head, initial encounter    3.  Anxiety state          DISPOSITION/PLAN   DISPOSITION Decision To Discharge 12/05/2020 04:14:56 PM      PATIENT REFERRED TO:  @FUP@    DISCHARGE MEDICATIONS:  Discharge Medication List as of 12/5/2020  4:15 PM             (Please note that portions of this note were completed with a voice recognition program.  Efforts were made to edit the dictations butoccasionally words are mis-transcribed.)    Dionna Gore MD (electronically signed)  AttendingEmergency Physician         Carol Garay MD  12/06/20 7729

## 2020-12-08 ENCOUNTER — OFFICE VISIT (OUTPATIENT)
Dept: INTERNAL MEDICINE | Facility: CLINIC | Age: 85
End: 2020-12-08

## 2020-12-08 VITALS
BODY MASS INDEX: 18.22 KG/M2 | SYSTOLIC BLOOD PRESSURE: 160 MMHG | OXYGEN SATURATION: 98 % | DIASTOLIC BLOOD PRESSURE: 90 MMHG | TEMPERATURE: 97.3 F | HEART RATE: 76 BPM | HEIGHT: 62 IN | WEIGHT: 99 LBS

## 2020-12-08 DIAGNOSIS — I10 BENIGN HYPERTENSION: ICD-10-CM

## 2020-12-08 DIAGNOSIS — R42 DISEQUILIBRIUM: Primary | ICD-10-CM

## 2020-12-08 LAB
EKG P AXIS: 70 DEGREES
EKG P-R INTERVAL: 180 MS
EKG Q-T INTERVAL: 404 MS
EKG QRS DURATION: 126 MS
EKG QTC CALCULATION (BAZETT): 437 MS
EKG T AXIS: 102 DEGREES

## 2020-12-08 PROCEDURE — 93010 ELECTROCARDIOGRAM REPORT: CPT | Performed by: INTERNAL MEDICINE

## 2020-12-08 PROCEDURE — 99214 OFFICE O/P EST MOD 30 MIN: CPT | Performed by: INTERNAL MEDICINE

## 2020-12-08 NOTE — PROGRESS NOTES
Subjective   Meenu Arteaga is a 89 y.o. female.   Chief Complaint   Patient presents with   • Follow-up     follow up er visit; near syncope 12/05/2020 : tcm:  passed out and fell    • trouble walking     worsening since fall   • Altered Mental Status     feels foggy; hit head when passed out        Patient is here today to tell me about a fall that she had in her bathroom.  She apparently was taking a shower standing up perhaps washing her hair with her hands over her head when she lost her balance of felt dizzy and fell.  She was subsequently taken to the hospital emergency room and evaluated by Dr. Yang at [facility].  No significant findings on either her CT of head chest abdomen or pelvis.  They did find a large ovarian cyst which is well known.       The following portions of the patient's history were reviewed and updated as appropriate: allergies, current medications, past family history, past medical history, past social history, past surgical history and problem list.    Review of Systems   Constitutional: Negative for activity change, appetite change, fatigue, fever, unexpected weight gain and unexpected weight loss.   HENT: Negative for swollen glands, trouble swallowing and voice change.    Eyes: Negative for blurred vision and visual disturbance.   Respiratory: Negative for cough and shortness of breath.    Cardiovascular: Negative for chest pain, palpitations and leg swelling.   Gastrointestinal: Negative for abdominal pain, constipation, diarrhea, nausea, vomiting and indigestion.   Endocrine: Negative for cold intolerance, heat intolerance, polydipsia and polyphagia.   Genitourinary: Negative for dysuria and frequency.   Musculoskeletal: Positive for arthralgias and back pain. Negative for joint swelling and neck pain.   Skin: Negative for color change, rash and skin lesions.   Neurological: Negative for dizziness, weakness, headache, memory problem and confusion.   Hematological: Does not  bruise/bleed easily.   Psychiatric/Behavioral: Negative for agitation, hallucinations and suicidal ideas. The patient is not nervous/anxious.        Objective   Past Medical History:   Diagnosis Date   • Arthritis    • GERD (gastroesophageal reflux disease)    • Hyperlipidemia    • Hypothyroidism    • Liver cyst    • Neuropathy    • Ovarian cyst       Past Surgical History:   Procedure Laterality Date   • ABDOMINAL HYSTERECTOMY     • APPENDECTOMY     • BREAST BIOPSY     • CHOLECYSTECTOMY     • COLONOSCOPY  03/05/2008    normal   • COLONOSCOPY  01/12/2012   • ENDOSCOPY  08/29/2013    normal   • ENDOSCOPY  01/23/2012    eus with stacy  normal endoscopic ultrascope of the pancreas.fortunately there was no mass seen   • ENDOSCOPY N/A 8/3/2018    Procedure: ESOPHAGOGASTRODUODENOSCOPY WITH ANESTHESIA;  Surgeon: Obed Patrick DO;  Location: [facility] ENDOSCOPY;  Service: Gastroenterology   • HEMORRHOIDECTOMY     • UPPER GASTROINTESTINAL ENDOSCOPY  08/29/2013        Current Outpatient Medications:   •  amLODIPine (NORVASC) 5 MG tablet, TAKE ONE TABLET BY MOUTH DAILY, Disp: 90 tablet, Rfl: 3  •  Ascorbic Acid (VITAMIN C PO), Daily., Disp: , Rfl:   •  bumetanide (BUMEX) 0.5 MG tablet, TAKE ONE TABLET BY MOUTH EVERY MORNING, Disp: 30 tablet, Rfl: 5  •  candesartan (ATACAND) 32 MG tablet, TAKE ONE TABLET BY MOUTH DAILY (Patient taking differently: Take 32 mg by mouth Daily.), Disp: 90 tablet, Rfl: 3  •  Cyanocobalamin ER 1000 MCG tablet controlled-release, Take 1 tablet by mouth daily , Disp: , Rfl:   •  docusate sodium (COLACE) 100 MG capsule, Take 1 tablet by mouth As Needed., Disp: , Rfl:   •  estradiol (ESTRACE VAGINAL) 0.1 MG/GM vaginal cream, Insert 2 g into the vagina 3 (Three) Times a Week., Disp: , Rfl:   •  levothyroxine (SYNTHROID, LEVOTHROID) 25 MCG tablet, TAKE ONE TABLET BY MOUTH DAILY, Disp: 90 tablet, Rfl: 3  •  LORazepam (ATIVAN) 0.5 MG tablet, 1/4 tablet twice daily, 1/2 at bedtime, Disp: 30 tablet, Rfl:  5  •  Multiple Vitamins-Minerals (CENTRUM SILVER ADULT 50+) tablet, Take 1 tablet by mouth daily, Disp: , Rfl:   •  mupirocin (Bactroban) 2 % ointment, Apply  topically to the appropriate area as directed 3 (Three) Times a Day., Disp: 15 g, Rfl: 0  •  omeprazole (priLOSEC) 20 MG capsule, Take 20 mg by mouth 2 (Two) Times a Day., Disp: , Rfl:   •  Psyllium (METAMUCIL FIBER PO), Take 1 package by mouth As Needed., Disp: , Rfl:   •  vitamin D (ERGOCALCIFEROL) 1.25 MG (95426 UT) capsule capsule, TAKE ONE CAPSULE BY MOUTH EVERY OTHER WEEK, Disp: 6 capsule, Rfl: 3     Vitals:    12/08/20 1520   BP: 160/90   Pulse: 76   Temp: 97.3 °F (36.3 °C)   SpO2: 98%         12/08/20  1520   Weight: 44.9 kg (99 lb)     Patient's Body mass index is 18.11 kg/m². BMI is .      Physical Exam  Constitutional:       Appearance: Normal appearance. She is well-developed.   HENT:      Head: Normocephalic and atraumatic.      Right Ear: External ear normal.      Left Ear: External ear normal.   Eyes:      Extraocular Movements: Extraocular movements intact.      Conjunctiva/sclera: Conjunctivae normal.      Pupils: Pupils are equal, round, and reactive to light.   Neck:      Musculoskeletal: Normal range of motion and neck supple. No neck rigidity.      Vascular: No carotid bruit.   Cardiovascular:      Rate and Rhythm: Normal rate and regular rhythm.      Pulses: Normal pulses.      Heart sounds: Normal heart sounds. No murmur. No gallop.    Pulmonary:      Effort: Pulmonary effort is normal.      Breath sounds: Normal breath sounds.   Musculoskeletal: Normal range of motion.         General: No swelling or tenderness.   Skin:     General: Skin is warm and dry.   Neurological:      General: No focal deficit present.      Mental Status: She is alert and oriented to person, place, and time.   Psychiatric:         Mood and Affect: Mood normal.         Behavior: Behavior normal.               Assessment/Plan   Diagnoses and all orders for this  visit:    1. Disequilibrium (Primary)    2. Benign hypertension    Today we spent most of our time talking about the need for a shower chair or a hand wand in her shower for showers.  I really think that she was looking up with her hands overhead and became dizzy and fell.  I reviewed her studies which all appear to be okay with the exception of a known cyst on her ovary.  I am asking that we not change any of her blood pressure medicines despite her blood pressure being slightly elevated as I think she is somewhat upset over having fallen.

## 2021-01-18 RX ORDER — CANDESARTAN 32 MG/1
32 TABLET ORAL DAILY
Qty: 90 TABLET | Refills: 3 | Status: SHIPPED | OUTPATIENT
Start: 2021-01-18 | End: 2022-01-24

## 2021-02-15 ENCOUNTER — TELEPHONE (OUTPATIENT)
Dept: CARDIOLOGY CLINIC | Age: 86
End: 2021-02-15

## 2021-02-15 NOTE — TELEPHONE ENCOUNTER
Called both patient lines and never was able to get through, called the last number and got Mrs. Lee, She stated that she would call Mr. Ildefonso Burns who helps with making all of the appt for the Pt, and have him call the office back to reschedule. Provided Mrs. Lee with our office number.

## 2021-02-22 ENCOUNTER — IMMUNIZATION (OUTPATIENT)
Dept: VACCINE CLINIC | Facility: HOSPITAL | Age: 86
End: 2021-02-22

## 2021-02-22 PROCEDURE — 91301 HC SARSCO02 VAC 100MCG/0.5ML IM: CPT | Performed by: OBSTETRICS & GYNECOLOGY

## 2021-02-22 PROCEDURE — 0011A: CPT | Performed by: OBSTETRICS & GYNECOLOGY

## 2021-03-11 ENCOUNTER — OFFICE VISIT (OUTPATIENT)
Dept: INTERNAL MEDICINE | Facility: CLINIC | Age: 86
End: 2021-03-11

## 2021-03-11 VITALS
HEIGHT: 62 IN | RESPIRATION RATE: 16 BRPM | OXYGEN SATURATION: 96 % | SYSTOLIC BLOOD PRESSURE: 158 MMHG | WEIGHT: 100.2 LBS | TEMPERATURE: 99.1 F | HEART RATE: 85 BPM | DIASTOLIC BLOOD PRESSURE: 88 MMHG | BODY MASS INDEX: 18.44 KG/M2

## 2021-03-11 DIAGNOSIS — G62.9 PERIPHERAL POLYNEUROPATHY: ICD-10-CM

## 2021-03-11 DIAGNOSIS — R22.41 NODULE OF SKIN OF RIGHT LOWER EXTREMITY: Primary | ICD-10-CM

## 2021-03-11 DIAGNOSIS — F06.4 ANXIETY DISORDER DUE TO KNOWN PHYSIOLOGICAL CONDITION: ICD-10-CM

## 2021-03-11 PROCEDURE — 99214 OFFICE O/P EST MOD 30 MIN: CPT | Performed by: INTERNAL MEDICINE

## 2021-03-11 RX ORDER — NITROFURANTOIN 25; 75 MG/1; MG/1
100 CAPSULE ORAL 2 TIMES DAILY
Qty: 14 CAPSULE | Refills: 0 | Status: SHIPPED | OUTPATIENT
Start: 2021-03-11 | End: 2021-04-07 | Stop reason: SINTOL

## 2021-03-11 NOTE — PROGRESS NOTES
Subjective   Meenu Arteaga is a 89 y.o. female.   Chief Complaint   Patient presents with   • infection on leg     She states there is a spot on right leg that is not getting any better for 3 weeks now.   • Abdominal Pain     She states she is swollen in abdomen.   • Anxiety     She states that this has been going on for a year now.    • Peripheral Neuropathy     She states this in both legs and feet.        History of Present Illness patient has a painful nodule right lower shin area.  She had had a similar lesion in the lower leg just about 4 inches above her current lesion and to the right.  Lesion has not been draining but is tender she has been doing Vaseline dressings which is what we had treated her when she had an open wound before however this lesion is not draining and is nodular.    The following portions of the patient's history were reviewed and updated as appropriate: allergies, current medications, past family history, past medical history, past social history, past surgical history and problem list.    Review of Systems   Constitutional: Negative for activity change, appetite change, fatigue, fever, unexpected weight gain and unexpected weight loss.   HENT: Positive for hearing loss. Negative for swollen glands, trouble swallowing and voice change.    Eyes: Negative for blurred vision and visual disturbance.   Respiratory: Negative for cough and shortness of breath.    Cardiovascular: Negative for chest pain, palpitations and leg swelling.   Gastrointestinal: Negative for abdominal pain, constipation, diarrhea, nausea, vomiting and indigestion.   Endocrine: Negative for cold intolerance, heat intolerance, polydipsia and polyphagia.   Genitourinary: Negative for dysuria and frequency.   Musculoskeletal: Positive for arthralgias and back pain. Negative for joint swelling and neck pain.   Skin: Positive for skin lesions ( Nodular lesion right anterior shin). Negative for color change and rash.    Neurological: Negative for dizziness, weakness, headache, memory problem and confusion.   Hematological: Does not bruise/bleed easily.   Psychiatric/Behavioral: Negative for agitation, hallucinations and suicidal ideas. The patient is not nervous/anxious.        Objective   Past Medical History:   Diagnosis Date   • Arthritis    • GERD (gastroesophageal reflux disease)    • Hyperlipidemia    • Hypothyroidism    • Liver cyst    • Neuropathy    • Ovarian cyst       Past Surgical History:   Procedure Laterality Date   • ABDOMINAL HYSTERECTOMY     • APPENDECTOMY     • BREAST BIOPSY     • CHOLECYSTECTOMY     • COLONOSCOPY  03/05/2008    normal   • COLONOSCOPY  01/12/2012   • ENDOSCOPY  08/29/2013    normal   • ENDOSCOPY  01/23/2012    eus with stacy  normal endoscopic ultrascope of the pancreas.fortunately there was no mass seen   • ENDOSCOPY N/A 8/3/2018    Procedure: ESOPHAGOGASTRODUODENOSCOPY WITH ANESTHESIA;  Surgeon: Obed Patrick DO;  Location: [facility] ENDOSCOPY;  Service: Gastroenterology   • HEMORRHOIDECTOMY     • UPPER GASTROINTESTINAL ENDOSCOPY  08/29/2013        Current Outpatient Medications:   •  amLODIPine (NORVASC) 5 MG tablet, TAKE ONE TABLET BY MOUTH DAILY, Disp: 90 tablet, Rfl: 3  •  Ascorbic Acid (VITAMIN C PO), Daily., Disp: , Rfl:   •  bumetanide (BUMEX) 0.5 MG tablet, TAKE ONE TABLET BY MOUTH EVERY MORNING, Disp: 30 tablet, Rfl: 5  •  candesartan (ATACAND) 32 MG tablet, Take 1 tablet by mouth Daily., Disp: 90 tablet, Rfl: 3  •  Cyanocobalamin ER 1000 MCG tablet controlled-release, Take 1 tablet by mouth daily , Disp: , Rfl:   •  docusate sodium (COLACE) 100 MG capsule, Take 1 tablet by mouth As Needed., Disp: , Rfl:   •  estradiol (ESTRACE VAGINAL) 0.1 MG/GM vaginal cream, Insert 2 g into the vagina 3 (Three) Times a Week., Disp: , Rfl:   •  levothyroxine (SYNTHROID, LEVOTHROID) 25 MCG tablet, TAKE ONE TABLET BY MOUTH DAILY, Disp: 90 tablet, Rfl: 3  •  LORazepam (ATIVAN) 0.5 MG tablet, 1/4  tablet twice daily, 1/2 at bedtime, Disp: 30 tablet, Rfl: 5  •  Multiple Vitamins-Minerals (CENTRUM SILVER ADULT 50+) tablet, Take 1 tablet by mouth daily, Disp: , Rfl:   •  mupirocin (Bactroban) 2 % ointment, Apply  topically to the appropriate area as directed 3 (Three) Times a Day., Disp: 15 g, Rfl: 0  •  omeprazole (priLOSEC) 20 MG capsule, Take 20 mg by mouth 2 (Two) Times a Day., Disp: , Rfl:   •  Psyllium (METAMUCIL FIBER PO), Take 1 package by mouth As Needed., Disp: , Rfl:   •  vitamin D (ERGOCALCIFEROL) 1.25 MG (64664 UT) capsule capsule, TAKE ONE CAPSULE BY MOUTH EVERY OTHER WEEK, Disp: 6 capsule, Rfl: 3  •  nitrofurantoin, macrocrystal-monohydrate, (Macrobid) 100 MG capsule, Take 1 capsule by mouth 2 (Two) Times a Day., Disp: 14 capsule, Rfl: 0     Vitals:    03/11/21 1358   BP: 158/88   Pulse: 85   Resp: 16   Temp: 99.1 °F (37.3 °C)   SpO2: 96%         03/11/21  1358   Weight: 45.5 kg (100 lb 3.2 oz)     Patient's Body mass index is 18.33 kg/m². BMI is .      Physical Exam  Constitutional:       Appearance: Normal appearance. She is well-developed.   HENT:      Head: Normocephalic and atraumatic.      Right Ear: External ear normal.      Left Ear: External ear normal.   Eyes:      Extraocular Movements: Extraocular movements intact.      Conjunctiva/sclera: Conjunctivae normal.      Pupils: Pupils are equal, round, and reactive to light.   Neck:      Vascular: No carotid bruit.   Cardiovascular:      Rate and Rhythm: Normal rate and regular rhythm.      Pulses: Normal pulses.      Heart sounds: Normal heart sounds. No murmur. No gallop.    Pulmonary:      Effort: Pulmonary effort is normal.      Breath sounds: Normal breath sounds.   Musculoskeletal:         General: No swelling or tenderness. Normal range of motion.      Cervical back: Normal range of motion and neck supple. No rigidity.   Skin:     General: Skin is warm and dry.      Findings: Lesion ( Patient has a nodule on her right anterior shin  measures about 1 sonometer in diameter.  This lesion is not draining) present.   Neurological:      General: No focal deficit present.      Mental Status: She is alert and oriented to person, place, and time.   Psychiatric:         Mood and Affect: Mood normal.         Behavior: Behavior normal.         Procedure: I&D of nodule.  Patient was prepped with alcohol I did a 2 mm incision into the lesion no significant drainage occurred lesion was dressed with a Band-Aid there were no complications patient tolerated this she required no anesthesia.      Assessment/Plan   Diagnoses and all orders for this visit:    1. Nodule of skin of right lower extremity (Primary)    2. 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  Dispense: 14 capsule; Refill: 0      I tried to do a slight I&D of this skin nodule it did not drain.  The lesion appeared to have some colloidal like fluid however none of it was expressed through the wound I made about a 2 mm incision with a sharp stab blade.  No dressings were applied other than just a Band-Aid I encouraged her to put a warm compress on this 4 times a day 20 minutes each I am going to head giving her the only antibiotic that she can take which is Macrobid.  She will likely come back and require reevaluation ideally I would like to have her get into a dermatologist and an diagnosis some of the lesions that would look similar to this would be pyoderma gangrenosum.  She was also concerned about her nerves this is been an ongoing process for some time she has medication to take on a as needed basis I have encouraged her to use it as little as possible.  Also she is has numb feet she has a developing neuropathy since she is so intolerant to medication I am not adding new medication I just given her some  reassurance.

## 2021-03-22 ENCOUNTER — IMMUNIZATION (OUTPATIENT)
Dept: VACCINE CLINIC | Facility: HOSPITAL | Age: 86
End: 2021-03-22

## 2021-03-22 PROCEDURE — 91301 HC SARSCO02 VAC 100MCG/0.5ML IM: CPT | Performed by: OBSTETRICS & GYNECOLOGY

## 2021-03-22 PROCEDURE — 0012A: CPT | Performed by: OBSTETRICS & GYNECOLOGY

## 2021-04-07 ENCOUNTER — OFFICE VISIT (OUTPATIENT)
Dept: INTERNAL MEDICINE | Facility: CLINIC | Age: 86
End: 2021-04-07

## 2021-04-07 VITALS
WEIGHT: 97 LBS | TEMPERATURE: 97.4 F | OXYGEN SATURATION: 98 % | SYSTOLIC BLOOD PRESSURE: 148 MMHG | HEART RATE: 83 BPM | DIASTOLIC BLOOD PRESSURE: 80 MMHG | BODY MASS INDEX: 17.85 KG/M2 | HEIGHT: 62 IN

## 2021-04-07 DIAGNOSIS — C44.722 SQUAMOUS CELL CARCINOMA OF SKIN OF RIGHT LOWER EXTREMITY: Primary | ICD-10-CM

## 2021-04-07 DIAGNOSIS — I87.303 STASIS EDEMA OF BOTH LOWER EXTREMITIES: ICD-10-CM

## 2021-04-07 DIAGNOSIS — G25.0 BENIGN ESSENTIAL TREMOR: ICD-10-CM

## 2021-04-07 DIAGNOSIS — I10 BENIGN HYPERTENSION: ICD-10-CM

## 2021-04-07 PROCEDURE — 99214 OFFICE O/P EST MOD 30 MIN: CPT | Performed by: INTERNAL MEDICINE

## 2021-04-07 NOTE — PROGRESS NOTES
Subjective   Meenu Arteaga is a 89 y.o. female.   Chief Complaint   Patient presents with   • skin nodule     right leg ;more red an irrated looking, has been weeping and does ache on occasion  ongoing issue; was not able to take macrobid due to GI issues; only antibiotic she can take is azithromycin; granddaughter wonders about an injection insted of pills?   • Joint Swelling     both ankles; HEAL PAIN; LAST 2 WEEKS    • Shaking     seems to be unable to write due to shaking        History of Present Illness patient has a very large nodule on her right anterior shin.  She had had what she says was a similar lesion above that that healed on its own however this lesion is not improving it is getting larger.  Patient is also noticed that she is having some swelling in both her ankles and having some heel pain.  She continues to have shaking which is a chronic problem.    The following portions of the patient's history were reviewed and updated as appropriate: allergies, current medications, past family history, past medical history, past social history, past surgical history and problem list.    Review of Systems   Constitutional: Positive for fatigue. Negative for activity change, appetite change, fever, unexpected weight gain and unexpected weight loss.   HENT: Negative for swollen glands, trouble swallowing and voice change.    Eyes: Negative for blurred vision and visual disturbance.   Respiratory: Negative for cough and shortness of breath.    Cardiovascular: Negative for chest pain, palpitations and leg swelling.   Gastrointestinal: Negative for abdominal pain, constipation, diarrhea, nausea, vomiting and indigestion.   Endocrine: Negative for cold intolerance, heat intolerance, polydipsia and polyphagia.   Genitourinary: Negative for dysuria and frequency.   Musculoskeletal: Negative for arthralgias, back pain, joint swelling and neck pain.   Skin: Positive for skin lesions. Negative for color change and rash.    Neurological: Positive for tremors. Negative for dizziness, weakness, headache, memory problem and confusion.   Hematological: Does not bruise/bleed easily.   Psychiatric/Behavioral: Negative for agitation, hallucinations and suicidal ideas. The patient is not nervous/anxious.        Objective   Past Medical History:   Diagnosis Date   • Arthritis    • GERD (gastroesophageal reflux disease)    • Hyperlipidemia    • Hypothyroidism    • Liver cyst    • Neuropathy    • Ovarian cyst       Past Surgical History:   Procedure Laterality Date   • ABDOMINAL HYSTERECTOMY     • APPENDECTOMY     • BREAST BIOPSY     • CHOLECYSTECTOMY     • COLONOSCOPY  03/05/2008    normal   • COLONOSCOPY  01/12/2012   • ENDOSCOPY  08/29/2013    normal   • ENDOSCOPY  01/23/2012    eus with stacy  normal endoscopic ultrascope of the pancreas.fortunately there was no mass seen   • ENDOSCOPY N/A 8/3/2018    Procedure: ESOPHAGOGASTRODUODENOSCOPY WITH ANESTHESIA;  Surgeon: Obed Patrick DO;  Location: [facility] ENDOSCOPY;  Service: Gastroenterology   • HEMORRHOIDECTOMY     • UPPER GASTROINTESTINAL ENDOSCOPY  08/29/2013        Current Outpatient Medications:   •  amLODIPine (NORVASC) 5 MG tablet, TAKE ONE TABLET BY MOUTH DAILY, Disp: 90 tablet, Rfl: 3  •  Ascorbic Acid (VITAMIN C PO), Daily., Disp: , Rfl:   •  bumetanide (BUMEX) 0.5 MG tablet, TAKE ONE TABLET BY MOUTH EVERY MORNING, Disp: 30 tablet, Rfl: 5  •  candesartan (ATACAND) 32 MG tablet, Take 1 tablet by mouth Daily., Disp: 90 tablet, Rfl: 3  •  Cyanocobalamin ER 1000 MCG tablet controlled-release, Take 1 tablet by mouth daily , Disp: , Rfl:   •  docusate sodium (COLACE) 100 MG capsule, Take 1 tablet by mouth As Needed., Disp: , Rfl:   •  estradiol (ESTRACE VAGINAL) 0.1 MG/GM vaginal cream, Insert 2 g into the vagina 3 (Three) Times a Week., Disp: , Rfl:   •  levothyroxine (SYNTHROID, LEVOTHROID) 25 MCG tablet, TAKE ONE TABLET BY MOUTH DAILY, Disp: 90 tablet, Rfl: 3  •  LORazepam (ATIVAN)  0.5 MG tablet, 1/4 tablet twice daily, 1/2 at bedtime, Disp: 30 tablet, Rfl: 5  •  Multiple Vitamins-Minerals (CENTRUM SILVER ADULT 50+) tablet, Take 1 tablet by mouth daily, Disp: , Rfl:   •  mupirocin (Bactroban) 2 % ointment, Apply  topically to the appropriate area as directed 3 (Three) Times a Day., Disp: 15 g, Rfl: 0  •  omeprazole (priLOSEC) 20 MG capsule, Take 20 mg by mouth 2 (Two) Times a Day., Disp: , Rfl:   •  Psyllium (METAMUCIL FIBER PO), Take 1 package by mouth As Needed., Disp: , Rfl:   •  vitamin D (ERGOCALCIFEROL) 1.25 MG (04070 UT) capsule capsule, TAKE ONE CAPSULE BY MOUTH EVERY OTHER WEEK, Disp: 6 capsule, Rfl: 3     Vitals:    04/07/21 1451   BP: 148/80   Pulse: 83   Temp: 97.4 °F (36.3 °C)   SpO2: 98%         04/07/21  1451   Weight: 44 kg (97 lb)     Patient's Body mass index is 17.74 kg/m². BMI is .      Physical Exam  Constitutional:       Appearance: Normal appearance. She is well-developed.   HENT:      Head: Normocephalic and atraumatic.      Right Ear: External ear normal.      Left Ear: External ear normal.   Eyes:      Extraocular Movements: Extraocular movements intact.      Conjunctiva/sclera: Conjunctivae normal.      Pupils: Pupils are equal, round, and reactive to light.   Neck:      Vascular: No carotid bruit.   Cardiovascular:      Rate and Rhythm: Normal rate and regular rhythm.      Pulses: Normal pulses.      Heart sounds: Normal heart sounds. No murmur heard.   No gallop.    Pulmonary:      Effort: Pulmonary effort is normal.      Breath sounds: Normal breath sounds.   Musculoskeletal:         General: No swelling or tenderness. Normal range of motion.      Cervical back: Normal range of motion and neck supple. No rigidity.   Skin:     General: Skin is warm and dry.      Comments: There is a 1 sonometer lesion anterior mid shin which appears slightly ulcerated.   Neurological:      General: No focal deficit present.      Mental Status: She is alert and oriented to person,  place, and time.   Psychiatric:         Mood and Affect: Mood normal.         Behavior: Behavior normal.               Assessment/Plan   Diagnoses and all orders for this visit:    1. 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 Benign hypertension    3. Stasis edema of both lower extremities    4. Benign essential tremor      This point I think the lesion on her anterior shin is not similar to the lesion that she has been referring to her on her upper leg which healed spontaneously.  This looks like a squamous cell at this point or an aggressive basal cell.  She needs surgical excision of the lesion will see if Dr. Cuellar see her if not we will get her over to Dr. Kacie Massey.  In regard to the swelling that she is experiencing this is secondary to the amlodipine.  I do not want to increase her diuretics nor do I want to decrease her amlodipine due to her intolerance to medications I have just reassured her and her granddaughter.  In regard to the tremor this is a chronic problem she is very intolerant to medications no changes are made there.

## 2021-04-08 ENCOUNTER — TELEPHONE (OUTPATIENT)
Dept: INTERNAL MEDICINE | Facility: CLINIC | Age: 86
End: 2021-04-08

## 2021-04-08 DIAGNOSIS — C44.722 SQUAMOUS CELL CARCINOMA OF SKIN OF RIGHT LOWER EXTREMITY: Primary | ICD-10-CM

## 2021-04-08 NOTE — TELEPHONE ENCOUNTER
PALMIRA for dgt Narcisa Rubio @ [phone] to call back to inform that pt has an appt with Dr. Delgado, general surgeon, for skin cancer on leg, for 4/28 @ 9:15.

## 2021-04-08 NOTE — TELEPHONE ENCOUNTER
Pt daughter returned Wendi JURADO Call. I read off appt information and she said this is diff Dr that was mentioned yesterday. Please call her back to discuss this.

## 2021-04-12 RX ORDER — ESTRADIOL 0.1 MG/G
CREAM VAGINAL
Qty: 42.5 EACH | Refills: 3 | Status: SHIPPED | OUTPATIENT
Start: 2021-04-12 | End: 2021-06-30

## 2021-04-12 RX ORDER — ERGOCALCIFEROL 1.25 MG/1
CAPSULE ORAL
Qty: 6 CAPSULE | Refills: 3 | Status: SHIPPED | OUTPATIENT
Start: 2021-04-12 | End: 2022-02-11

## 2021-04-26 DIAGNOSIS — F41.9 ANXIETY: ICD-10-CM

## 2021-04-27 RX ORDER — LORAZEPAM 0.5 MG/1
TABLET ORAL
Qty: 30 TABLET | Refills: 5 | Status: SHIPPED | OUTPATIENT
Start: 2021-04-27 | End: 2021-10-25

## 2021-05-07 RX ORDER — OMEPRAZOLE 20 MG/1
CAPSULE, DELAYED RELEASE ORAL
Qty: 180 CAPSULE | Refills: 3 | Status: SHIPPED | OUTPATIENT
Start: 2021-05-07 | End: 2022-05-16 | Stop reason: SDUPTHER

## 2021-06-22 ENCOUNTER — OFFICE VISIT (OUTPATIENT)
Dept: INTERNAL MEDICINE | Facility: CLINIC | Age: 86
End: 2021-06-22

## 2021-06-22 VITALS
HEIGHT: 62 IN | BODY MASS INDEX: 17.66 KG/M2 | WEIGHT: 96 LBS | OXYGEN SATURATION: 96 % | TEMPERATURE: 98.2 F | SYSTOLIC BLOOD PRESSURE: 156 MMHG | DIASTOLIC BLOOD PRESSURE: 80 MMHG | HEART RATE: 95 BPM

## 2021-06-22 DIAGNOSIS — C44.702 CANCER OF SKIN OF RIGHT LOWER LEG: ICD-10-CM

## 2021-06-22 DIAGNOSIS — L02.32 BOIL OF BUTTOCK: Primary | ICD-10-CM

## 2021-06-22 PROCEDURE — 99213 OFFICE O/P EST LOW 20 MIN: CPT | Performed by: NURSE PRACTITIONER

## 2021-06-22 RX ORDER — AZITHROMYCIN 250 MG/1
TABLET, FILM COATED ORAL
Qty: 6 TABLET | Refills: 0 | Status: SHIPPED | OUTPATIENT
Start: 2021-06-22 | End: 2021-06-30

## 2021-06-22 RX ORDER — CHLORHEXIDINE GLUCONATE 213 G/1000ML
SOLUTION TOPICAL DAILY PRN
Qty: 236 ML | Refills: 0 | Status: SHIPPED | OUTPATIENT
Start: 2021-06-22 | End: 2021-08-16

## 2021-06-22 NOTE — PROGRESS NOTES
"Chief Complaint  Abscess (Started about 1 week ago. left tailbone/rectum area.), Med Refill (Discuss stopping Estrace cream), and Skin Lesion (Right shin)    Subjective          Meenu Arteaga presents to [facility]  Ms. Arteaga present to the office for acute assessment of abscess on sacrum region. She reports symptoms started 7 days ago, but noticed increase in pain and swelling over the last 3 days. She has been washing with soap and water, but no other OTC therapies. She reports numerous intolerances and allergies to antibiotics. The only medicine she has tolerated is azithromycin.     She is also wanting to wean off of her vaginal estrogen cream. She reports the cost is too much and would like to be off a medicine if it is not necessary. She was using this for vaginal irritation and dryness. She reports using cream every 3rd day. She denies history of recurrent UTIs.     She has a wound on her right shin. She has been seen by dermatologist whom would like to remove the cancerous lesion. Patient at this time the patient has refused related to 's recent stroke and her being the sole caregiver since her daughters and niece recent passing. She is applying muprion to the site daily and washing with soap and water daily.    Abscess  This is a new problem. The current episode started in the past 7 days. The problem has been gradually worsening. Pertinent negatives include no anorexia, change in bowel habit, chills, fatigue, fever, myalgias or nausea. Associated symptoms comments: Pain when sitting; warmth and drainage at the site. Exacerbated by: palpation and sitting. She has tried nothing for the symptoms.       Objective   Vital Signs:   /80 (BP Location: Left arm)   Pulse 95   Temp 98.2 °F (36.8 °C)   Ht 157.5 cm (62\")   Wt 43.5 kg (96 lb)   SpO2 96%   BMI 17.56 kg/m²     Physical Exam  Vitals and nursing note reviewed.   Constitutional:       Appearance: She is " well-developed.   HENT:      Head: Normocephalic and atraumatic.      Right Ear: External ear normal.      Left Ear: External ear normal.      Nose: Nose normal.   Eyes:      Conjunctiva/sclera: Conjunctivae normal.      Pupils: Pupils are equal, round, and reactive to light.   Neck:      Thyroid: No thyromegaly.   Cardiovascular:      Rate and Rhythm: Normal rate and regular rhythm.      Heart sounds: Normal heart sounds.   Pulmonary:      Effort: Pulmonary effort is normal.      Breath sounds: Normal breath sounds.   Abdominal:      General: Bowel sounds are normal.      Palpations: Abdomen is soft.   Genitourinary:         Comments: No hemorrhoids observed on external exam  Musculoskeletal:      Cervical back: Normal range of motion and neck supple.   Lymphadenopathy:      Cervical: No cervical adenopathy.   Skin:     General: Skin is warm and dry.      Capillary Refill: Capillary refill takes less than 2 seconds.      Findings: Abscess, erythema, lesion and wound present.          Neurological:      Mental Status: She is alert and oriented to person, place, and time.   Psychiatric:         Behavior: Behavior normal.         Thought Content: Thought content normal.        Result Review :   The following data was reviewed by: CHAIM Farley on 06/22/2021:  Common labs    Common Labsle 8/19/20 8/19/20 8/19/20 8/19/20 12/5/20    0733 0733 0733 0733    Glucose 90       BUN 17       Creatinine 0.65       eGFR Non  Am 85       eGFR African Am 103       Sodium 140       Potassium 3.6       Chloride 101       Calcium 9.2       Total Protein 6.1       Albumin 4.50       Total Bilirubin 0.5       Alkaline Phosphatase 49       AST (SGOT) 26       ALT (SGPT) 12       WBC   4.33  5.7   Hemoglobin   12.4  12.8   Hematocrit   37.0  39.5   Platelets   234  223   Total Cholesterol  216 (A)      Triglycerides  105      HDL Cholesterol  73 (A)      LDL Cholesterol   122 (A)      Uric Acid    3.3    (A) Abnormal value        Comments are available for some flowsheets but are not being displayed.                    Assessment and Plan    Diagnoses and all orders for this visit:    1. Boil of buttock (Primary)  -     azithromycin (Zithromax Z-Ehsan) 250 MG tablet; Take 2 tablets by mouth the first day, then 1 tablet daily for 4 days.  Dispense: 6 tablet; Refill: 0  -     chlorhexidine (Hibiclens) 4 % external liquid; Apply  topically to the appropriate area as directed Daily As Needed for Wound Care.  Dispense: 236 mL; Refill: 0    2. Cancer of skin of right lower leg      I spent 20 minutes caring for Meenu on this date of service. This time includes time spent by me in the following activities:preparing for the visit, reviewing tests, obtaining and/or reviewing a separately obtained history, performing a medically appropriate examination and/or evaluation , counseling and educating the patient/family/caregiver, ordering medications, tests, or procedures, documenting information in the medical record, independently interpreting results and communicating that information with the patient/family/caregiver and care coordination     Follow Up     Return in about 1 week (around 6/29/2021) for Recheck boil; only Dr. Curiel.     Expressed exudate from wound today and patient verbalized improvement in discomfort. The size, location, and limited antibiotic coverage held me from completing I&D today. She prefers the care of Dr. Curiel at this time. Will need to schedule a follow up per their request with him for wound assessment. Will start her on azithromycin and start her on hibclen's baths every other day. Will need to consider longer dose of azithromycin if no complete resolution of wound. Possible I& D next visit if wound has decreased in size.    Patient will discontinue estrace cream and trial coconut oil for topical therapy as replacement. Can resume cream less frequently if symptoms not controlled as well.    Will continue treatment  plan for skin cancer on shin as recommended by dermatologist.   Patient was given instructions and counseling regarding her condition or for health maintenance advice. Please see specific information pulled into the AVS if appropriate.

## 2021-06-30 ENCOUNTER — OFFICE VISIT (OUTPATIENT)
Dept: INTERNAL MEDICINE | Facility: CLINIC | Age: 86
End: 2021-06-30

## 2021-06-30 VITALS
SYSTOLIC BLOOD PRESSURE: 152 MMHG | DIASTOLIC BLOOD PRESSURE: 70 MMHG | OXYGEN SATURATION: 98 % | TEMPERATURE: 97.9 F | HEART RATE: 80 BPM | WEIGHT: 95 LBS | HEIGHT: 62 IN | BODY MASS INDEX: 17.48 KG/M2

## 2021-06-30 DIAGNOSIS — I10 BENIGN HYPERTENSION: Primary | ICD-10-CM

## 2021-06-30 DIAGNOSIS — M48.061 SPINAL STENOSIS OF LUMBAR REGION, UNSPECIFIED WHETHER NEUROGENIC CLAUDICATION PRESENT: ICD-10-CM

## 2021-06-30 DIAGNOSIS — K58.9 IRRITABLE BOWEL SYNDROME WITHOUT DIARRHEA: ICD-10-CM

## 2021-06-30 PROCEDURE — 99213 OFFICE O/P EST LOW 20 MIN: CPT | Performed by: INTERNAL MEDICINE

## 2021-06-30 NOTE — PROGRESS NOTES
"Subjective   Meneu Arteaga is a 89 y.o. female.   Chief Complaint   Patient presents with   • Follow-up     follow up for boil on buttock; saw marcelino on 06/22/2021; says it is feeling some better    • Bloated     on going issue for at least 6 months    • neuropathy   • Fatigue     stays sleepy all the time; has been worsening over the last 3 months; described it as a \" druged feeling\"    • sore on leg     right leg, per Dr. Delgado it is cancerous.   • discuss medication     wants to know about stopping estrace.  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    The following portions of the patient's history were reviewed and updated as appropriate: allergies, current medications, past family history, past medical history, past social history, past surgical history and problem list.    Review of Systems   Constitutional: Negative for activity change, appetite change, fatigue, fever, unexpected weight gain and unexpected weight loss.   HENT: Negative for swollen glands, trouble swallowing and voice change.    Eyes: Negative for blurred vision and visual disturbance.   Respiratory: Negative for cough and shortness of breath.    Cardiovascular: Negative for chest pain, palpitations and leg swelling.   Gastrointestinal: Negative for abdominal pain, constipation, diarrhea, nausea, vomiting and indigestion.   Endocrine: Negative for cold intolerance, heat intolerance, polydipsia and polyphagia.   Genitourinary: Negative for dysuria and frequency.   Musculoskeletal: Negative for arthralgias, back pain, joint swelling and neck pain.   Skin: Negative for color change, rash and skin lesions. "   Neurological: Negative for dizziness, weakness, headache, memory problem and confusion.   Hematological: Does not bruise/bleed easily.   Psychiatric/Behavioral: Negative for agitation, hallucinations and suicidal ideas. The patient is not nervous/anxious.        Objective   Past Medical History:   Diagnosis Date   • Arthritis    • GERD (gastroesophageal reflux disease)    • Hyperlipidemia    • Hypothyroidism    • Liver cyst    • Neuropathy    • Ovarian cyst       Past Surgical History:   Procedure Laterality Date   • ABDOMINAL HYSTERECTOMY     • APPENDECTOMY     • BREAST BIOPSY     • CHOLECYSTECTOMY     • COLONOSCOPY  03/05/2008    normal   • COLONOSCOPY  01/12/2012   • ENDOSCOPY  08/29/2013    normal   • ENDOSCOPY  01/23/2012    eus with stacy  normal endoscopic ultrascope of the pancreas.fortunately there was no mass seen   • ENDOSCOPY N/A 8/3/2018    Procedure: ESOPHAGOGASTRODUODENOSCOPY WITH ANESTHESIA;  Surgeon: Obed Patrick DO;  Location: [facility] ENDOSCOPY;  Service: Gastroenterology   • HEMORRHOIDECTOMY     • UPPER GASTROINTESTINAL ENDOSCOPY  08/29/2013        Current Outpatient Medications:   •  amLODIPine (NORVASC) 5 MG tablet, TAKE ONE TABLET BY MOUTH DAILY, Disp: 90 tablet, Rfl: 3  •  Ascorbic Acid (VITAMIN C PO), Daily., Disp: , Rfl:   •  bumetanide (BUMEX) 0.5 MG tablet, TAKE ONE TABLET BY MOUTH EVERY MORNING, Disp: 30 tablet, Rfl: 5  •  candesartan (ATACAND) 32 MG tablet, Take 1 tablet by mouth Daily., Disp: 90 tablet, Rfl: 3  •  chlorhexidine (Hibiclens) 4 % external liquid, Apply  topically to the appropriate area as directed Daily As Needed for Wound Care., Disp: 236 mL, Rfl: 0  •  Cyanocobalamin ER 1000 MCG tablet controlled-release, Take 1 tablet by mouth daily , Disp: , Rfl:   •  levothyroxine (SYNTHROID, LEVOTHROID) 25 MCG tablet, TAKE ONE TABLET BY MOUTH DAILY, Disp: 90 tablet, Rfl: 3  •  LORazepam (ATIVAN) 0.5 MG tablet, TAKE 1/4 BY MOUTH TWO TIMES A DAY DURING THE DAY AND 1/2 TABLET BY  MOUTH EVERY NIGHT AT BEDTIME, Disp: 30 tablet, Rfl: 5  •  Multiple Vitamins-Minerals (CENTRUM SILVER ADULT 50+) tablet, Take 1 tablet by mouth daily, Disp: , Rfl:   •  mupirocin (Bactroban) 2 % ointment, Apply  topically to the appropriate area as directed 3 (Three) Times a Day., Disp: 15 g, Rfl: 0  •  omeprazole (priLOSEC) 20 MG capsule, TAKE ONE CAPSULE BY MOUTH TWICE A DAY, Disp: 180 capsule, Rfl: 3  •  Psyllium (METAMUCIL FIBER PO), Take 1 package by mouth As Needed., Disp: , Rfl:   •  vitamin D (ERGOCALCIFEROL) 1.25 MG (25458 UT) capsule capsule, TAKE ONE CAPSULE BY MOUTH EVERY OTHER WEEK, Disp: 6 capsule, Rfl: 3     Vitals:    06/30/21 1340   BP: 152/70   Pulse: 80   Temp: 97.9 °F (36.6 °C)   SpO2: 98%         06/30/21  1340   Weight: 43.1 kg (95 lb)         Physical Exam  Constitutional:       Appearance: She is well-developed.   HENT:      Head: Normocephalic and atraumatic.   Eyes:      Pupils: Pupils are equal, round, and reactive to light.   Cardiovascular:      Rate and Rhythm: Normal rate and regular rhythm.   Pulmonary:      Effort: Pulmonary effort is normal.      Breath sounds: Normal breath sounds.   Abdominal:      General: Bowel sounds are normal.      Palpations: Abdomen is soft.   Musculoskeletal:         General: Normal range of motion.      Cervical back: Normal range of motion and neck supple.   Skin:     General: Skin is warm and dry.   Neurological:      Mental Status: She is alert and oriented to person, place, and time.   Psychiatric:         Behavior: Behavior normal.               Assessment/Plan   Diagnoses and all orders for this visit:    1. Benign hypertension (Primary)    2. Spinal stenosis of lumbar region, unspecified whether neurogenic claudication present    3. Irritable bowel syndrome without diarrhea        Patient's blood pressure is slightly higher than desired but due to her advanced age I do not want to alter her medication.  The second thing is she has had a buttock boil  that has totally resolved I did not see anything on physical exam at all.  She does have a lesion on her anterior shin that she has been putting off getting taking care of I told her she need to get that taken care of as soon as possible.

## 2021-08-02 RX ORDER — BUMETANIDE 0.5 MG/1
TABLET ORAL
Qty: 30 TABLET | Refills: 11 | Status: SHIPPED | OUTPATIENT
Start: 2021-08-02 | End: 2022-06-29

## 2021-08-16 ENCOUNTER — OFFICE VISIT (OUTPATIENT)
Dept: INTERNAL MEDICINE | Facility: CLINIC | Age: 86
End: 2021-08-16

## 2021-08-16 VITALS
HEIGHT: 62 IN | OXYGEN SATURATION: 98 % | BODY MASS INDEX: 17.66 KG/M2 | WEIGHT: 96 LBS | TEMPERATURE: 97.3 F | DIASTOLIC BLOOD PRESSURE: 82 MMHG | HEART RATE: 86 BPM | SYSTOLIC BLOOD PRESSURE: 160 MMHG

## 2021-08-16 DIAGNOSIS — R39.9 UTI SYMPTOMS: Primary | ICD-10-CM

## 2021-08-16 LAB
BILIRUB BLD-MCNC: NEGATIVE MG/DL
CLARITY, POC: CLEAR
COLOR UR: YELLOW
GLUCOSE UR STRIP-MCNC: NEGATIVE MG/DL
KETONES UR QL: NEGATIVE
LEUKOCYTE EST, POC: ABNORMAL
NITRITE UR-MCNC: NEGATIVE MG/ML
PH UR: 7 [PH] (ref 5–8)
PROT UR STRIP-MCNC: NEGATIVE MG/DL
RBC # UR STRIP: ABNORMAL /UL
SP GR UR: 1.01 (ref 1–1.03)
UROBILINOGEN UR QL: NORMAL

## 2021-08-16 PROCEDURE — 96372 THER/PROPH/DIAG INJ SC/IM: CPT | Performed by: NURSE PRACTITIONER

## 2021-08-16 PROCEDURE — 81003 URINALYSIS AUTO W/O SCOPE: CPT | Performed by: NURSE PRACTITIONER

## 2021-08-16 PROCEDURE — 99213 OFFICE O/P EST LOW 20 MIN: CPT | Performed by: NURSE PRACTITIONER

## 2021-08-16 RX ORDER — LIDOCAINE HYDROCHLORIDE 10 MG/ML
2 INJECTION, SOLUTION INFILTRATION; PERINEURAL ONCE
Status: COMPLETED | OUTPATIENT
Start: 2021-08-16 | End: 2021-08-17

## 2021-08-16 RX ORDER — CEFTRIAXONE 500 MG/1
1000 INJECTION, POWDER, FOR SOLUTION INTRAMUSCULAR; INTRAVENOUS EVERY 24 HOURS
Status: DISCONTINUED | OUTPATIENT
Start: 2021-08-16 | End: 2021-08-17

## 2021-08-16 RX ADMIN — LIDOCAINE HYDROCHLORIDE 2 ML: 10 INJECTION, SOLUTION INFILTRATION; PERINEURAL at 16:10

## 2021-08-16 RX ADMIN — CEFTRIAXONE 1000 MG: 500 INJECTION, POWDER, FOR SOLUTION INTRAMUSCULAR; INTRAVENOUS at 16:11

## 2021-08-16 NOTE — PROGRESS NOTES
Subjective   Meenu Arteaga is a 89 y.o. female.   Chief Complaint   Patient presents with   • Urinary Tract Infection     burning and pain, started getting worse within the last week       Mrs. Arteaga presents to the office today related to urinary symptoms. Patient states symptoms started last week. She reports burning with urination and urine with foul odor. She denies fever, chills or muscle aches. Patient has numerous drug allergies and intolerances. She reports in the past they had to give her rocephin injections for 5 days.       The following portions of the patient's history were reviewed and updated as appropriate: allergies, current medications, past family history, past medical history, past social history, past surgical history and problem list.    Review of Systems   Constitutional: Negative for activity change, appetite change, fatigue, fever, unexpected weight gain and unexpected weight loss.   HENT: Negative for swollen glands, trouble swallowing and voice change.    Eyes: Negative for blurred vision and visual disturbance.   Respiratory: Negative for cough and shortness of breath.    Cardiovascular: Negative for chest pain, palpitations and leg swelling.   Gastrointestinal: Negative for abdominal pain, constipation, diarrhea, nausea, vomiting and indigestion.   Endocrine: Negative for cold intolerance, heat intolerance, polydipsia and polyphagia.   Genitourinary: Positive for difficulty urinating, hematuria and urgency. Negative for decreased urine volume, dysuria, frequency and pelvic pain.   Musculoskeletal: Negative for arthralgias, back pain, joint swelling and neck pain.   Skin: Negative for color change, rash and skin lesions.   Neurological: Negative for dizziness, weakness, headache, memory problem and confusion.   Hematological: Does not bruise/bleed easily.   Psychiatric/Behavioral: Negative for agitation, hallucinations and suicidal ideas. The patient is not nervous/anxious.         Objective   Past Medical History:   Diagnosis Date   • Arthritis    • GERD (gastroesophageal reflux disease)    • Hyperlipidemia    • Hypothyroidism    • Liver cyst    • Neuropathy    • Ovarian cyst       Past Surgical History:   Procedure Laterality Date   • ABDOMINAL HYSTERECTOMY     • APPENDECTOMY     • BREAST BIOPSY     • CHOLECYSTECTOMY     • COLONOSCOPY  03/05/2008    normal   • COLONOSCOPY  01/12/2012   • ENDOSCOPY  08/29/2013    normal   • ENDOSCOPY  01/23/2012    eus with stacy  normal endoscopic ultrascope of the pancreas.fortunately there was no mass seen   • ENDOSCOPY N/A 8/3/2018    Procedure: ESOPHAGOGASTRODUODENOSCOPY WITH ANESTHESIA;  Surgeon: Obed Patrick DO;  Location: [facility] ENDOSCOPY;  Service: Gastroenterology   • HEMORRHOIDECTOMY     • UPPER GASTROINTESTINAL ENDOSCOPY  08/29/2013        Current Outpatient Medications:   •  amLODIPine (NORVASC) 5 MG tablet, TAKE ONE TABLET BY MOUTH DAILY, Disp: 90 tablet, Rfl: 3  •  bumetanide (BUMEX) 0.5 MG tablet, TAKE ONE TABLET BY MOUTH EVERY MORNING, Disp: 30 tablet, Rfl: 11  •  candesartan (ATACAND) 32 MG tablet, Take 1 tablet by mouth Daily., Disp: 90 tablet, Rfl: 3  •  LORazepam (ATIVAN) 0.5 MG tablet, TAKE 1/4 BY MOUTH TWO TIMES A DAY DURING THE DAY AND 1/2 TABLET BY MOUTH EVERY NIGHT AT BEDTIME, Disp: 30 tablet, Rfl: 5  •  Multiple Vitamins-Minerals (CENTRUM SILVER ADULT 50+) tablet, Take 1 tablet by mouth daily, Disp: , Rfl:   •  omeprazole (priLOSEC) 20 MG capsule, TAKE ONE CAPSULE BY MOUTH TWICE A DAY, Disp: 180 capsule, Rfl: 3  •  Psyllium (METAMUCIL FIBER PO), Take 1 package by mouth As Needed., Disp: , Rfl:   •  vitamin D (ERGOCALCIFEROL) 1.25 MG (29906 UT) capsule capsule, TAKE ONE CAPSULE BY MOUTH EVERY OTHER WEEK, Disp: 6 capsule, Rfl: 3  •  Ascorbic Acid (VITAMIN C PO), Daily., Disp: , Rfl:   •  levothyroxine (SYNTHROID, LEVOTHROID) 25 MCG tablet, TAKE ONE TABLET BY MOUTH DAILY, Disp: 90 tablet, Rfl: 3    Current  Facility-Administered Medications:   •  cefTRIAXone (ROCEPHIN) injection 500 mg, 500 mg, Intramuscular, Q24H, Lashae Patrickmattdaniela, APRN     Vitals:    08/16/21 1519   BP: 160/82   Pulse: 86   Temp: 97.3 °F (36.3 °C)   SpO2: 98%         08/16/21  1519   Weight: 43.5 kg (96 lb)         Physical Exam  Constitutional:       Appearance: Normal appearance. She is well-developed and underweight.   HENT:      Head: Normocephalic and atraumatic.   Eyes:      Pupils: Pupils are equal, round, and reactive to light.   Cardiovascular:      Rate and Rhythm: Normal rate and regular rhythm.   Pulmonary:      Effort: Pulmonary effort is normal.      Breath sounds: Normal breath sounds.   Abdominal:      General: Bowel sounds are normal.      Palpations: Abdomen is soft.   Genitourinary:     Comments: Declines exam  Musculoskeletal:         General: Normal range of motion.      Cervical back: Normal range of motion and neck supple.   Skin:     General: Skin is warm and dry.   Neurological:      Mental Status: She is alert and oriented to person, place, and time.   Psychiatric:         Behavior: Behavior normal. Behavior is cooperative.               Assessment/Plan   Diagnoses and all orders for this visit:    1. UTI symptoms (Primary)  -     Urine Culture - Urine, Urine, Clean Catch  -     POC Urinalysis Dipstick, Multipro  -     Discontinue: cefTRIAXone (ROCEPHIN) injection 1,000 mg  -     lidocaine (XYLOCAINE) 1 % injection 2 mL      Patient will return to the office for rocephin injections. If unable to tolerate 1 gram, will reduce 500 mg for 5 days. Will culture urine. Can use fosfomycin considering she has tolerated azithromycin in the past.

## 2021-08-17 ENCOUNTER — CLINICAL SUPPORT (OUTPATIENT)
Dept: INTERNAL MEDICINE | Facility: CLINIC | Age: 86
End: 2021-08-17

## 2021-08-17 DIAGNOSIS — R31.9 URINARY TRACT INFECTION WITH HEMATURIA, SITE UNSPECIFIED: ICD-10-CM

## 2021-08-17 DIAGNOSIS — N39.0 URINARY TRACT INFECTION WITH HEMATURIA, SITE UNSPECIFIED: ICD-10-CM

## 2021-08-17 PROCEDURE — 96372 THER/PROPH/DIAG INJ SC/IM: CPT | Performed by: NURSE PRACTITIONER

## 2021-08-17 RX ORDER — LEVOTHYROXINE SODIUM 0.03 MG/1
TABLET ORAL
Qty: 90 TABLET | Refills: 3 | Status: SHIPPED | OUTPATIENT
Start: 2021-08-17 | End: 2022-08-10 | Stop reason: SDUPTHER

## 2021-08-17 RX ADMIN — LIDOCAINE HYDROCHLORIDE 1 ML: 10 INJECTION, SOLUTION INFILTRATION; PERINEURAL at 16:00

## 2021-08-18 ENCOUNTER — TELEPHONE (OUTPATIENT)
Dept: INTERNAL MEDICINE | Facility: CLINIC | Age: 86
End: 2021-08-18

## 2021-08-18 LAB
BACTERIA UR CULT: NORMAL
BACTERIA UR CULT: NORMAL

## 2021-08-18 NOTE — TELEPHONE ENCOUNTER
----- Message from CHAIM Diggs sent at 8/18/2021  7:09 AM CDT -----  Nothing cultured out in her urine. Tell her we are going to stop antibiotics. Have her start taking cranberry supplements daily and increase her water. If symptoms persist, will need to complete a perineal exam.

## 2021-08-18 NOTE — PATIENT INSTRUCTIONS

## 2021-08-24 ENCOUNTER — HOSPITAL ENCOUNTER (OUTPATIENT)
Dept: GENERAL RADIOLOGY | Facility: HOSPITAL | Age: 86
Discharge: HOME | End: 2021-08-24
Admitting: NURSE PRACTITIONER

## 2021-08-24 ENCOUNTER — OFFICE VISIT (OUTPATIENT)
Dept: INTERNAL MEDICINE | Facility: CLINIC | Age: 86
End: 2021-08-24

## 2021-08-24 VITALS
HEART RATE: 76 BPM | BODY MASS INDEX: 17.52 KG/M2 | DIASTOLIC BLOOD PRESSURE: 88 MMHG | OXYGEN SATURATION: 99 % | HEIGHT: 62 IN | SYSTOLIC BLOOD PRESSURE: 158 MMHG | WEIGHT: 95.2 LBS | TEMPERATURE: 97 F

## 2021-08-24 DIAGNOSIS — Z87.440 RECENT URINARY TRACT INFECTION: ICD-10-CM

## 2021-08-24 DIAGNOSIS — Z87.19 HISTORY OF CONSTIPATION: ICD-10-CM

## 2021-08-24 DIAGNOSIS — R10.2 PELVIC PAIN: Primary | ICD-10-CM

## 2021-08-24 PROBLEM — N30.00 ACUTE CYSTITIS WITHOUT HEMATURIA: Status: ACTIVE | Noted: 2021-08-24

## 2021-08-24 PROBLEM — R39.9 UTI SYMPTOMS: Status: ACTIVE | Noted: 2021-08-24

## 2021-08-24 LAB
BILIRUB BLD-MCNC: NEGATIVE MG/DL
CLARITY, POC: CLEAR
COLOR UR: YELLOW
GLUCOSE UR STRIP-MCNC: NEGATIVE MG/DL
KETONES UR QL: NEGATIVE
LEUKOCYTE EST, POC: ABNORMAL
NITRITE UR-MCNC: NEGATIVE MG/ML
PH UR: 7 [PH] (ref 5–8)
PROT UR STRIP-MCNC: NEGATIVE MG/DL
RBC # UR STRIP: NEGATIVE /UL
SP GR UR: 1.01 (ref 1–1.03)
UROBILINOGEN UR QL: NORMAL

## 2021-08-24 PROCEDURE — 81003 URINALYSIS AUTO W/O SCOPE: CPT | Performed by: NURSE PRACTITIONER

## 2021-08-24 PROCEDURE — 99214 OFFICE O/P EST MOD 30 MIN: CPT | Performed by: NURSE PRACTITIONER

## 2021-08-24 PROCEDURE — 74018 RADEX ABDOMEN 1 VIEW: CPT

## 2021-08-24 NOTE — PROGRESS NOTES
Subjective   Meenu Arteaga is a 89 y.o. female.   Chief Complaint   Patient presents with   • UTI Symptoms     burning, pressure x 2 weeks, continued       Meenu presents today for continued burning sensation and pressure to the lower abdomen for the past 2 weeks.  She was previously evaluated and started on Rocephin injections.  She received one, 1gm dose and a 500mg dose.  She is very sensitive to medication and has several medication allergies.  The urine culture resulted as clear and she was then stopped on her Rocephin injections.  She was advised to start a cranberry supplement and increase water intake.  She reports symptoms are the same but not as bad as they have been with past infections.  She denies fever, worsening back pain, hematuria, foul odor.  There is no vaginal itching or discharge.  She denies pain to the urethra.  She does struggle with constipation and takes medication to keep stools soft every day.  She denies any recent change in stools.  She reports a total hysterectomy but I can see in a past CT abd/pelvis from 2020 that a ovarian cystic mass was present.        The following portions of the patient's history were reviewed and updated as appropriate: allergies, current medications, past family history, past medical history, past social history, past surgical history and problem list.    Review of Systems   Constitutional: Negative for activity change, appetite change, fatigue, fever, unexpected weight gain and unexpected weight loss.   HENT: Negative for swollen glands, trouble swallowing and voice change.    Eyes: Negative for blurred vision and visual disturbance.   Respiratory: Negative for cough and shortness of breath.    Cardiovascular: Negative for chest pain, palpitations and leg swelling.   Gastrointestinal: Positive for abdominal pain. Negative for constipation, diarrhea, nausea, vomiting and indigestion.   Endocrine: Negative for cold intolerance, heat intolerance, polydipsia and  polyphagia.   Genitourinary: Positive for dysuria (To lower abdomen/pelvis; not assocaited with the urethra or vaginal area) and pelvic pain. Negative for frequency, vaginal bleeding and vaginal discharge.   Musculoskeletal: Negative for arthralgias, back pain, joint swelling and neck pain.   Skin: Negative for color change, rash and skin lesions.   Neurological: Negative for dizziness, weakness, headache, memory problem and confusion.   Hematological: Does not bruise/bleed easily.   Psychiatric/Behavioral: Negative for agitation, hallucinations and suicidal ideas. The patient is not nervous/anxious.        Objective   Past Medical History:   Diagnosis Date   • Arthritis    • GERD (gastroesophageal reflux disease)    • Hyperlipidemia    • Hypothyroidism    • Liver cyst    • Neuropathy    • Ovarian cyst       Past Surgical History:   Procedure Laterality Date   • ABDOMINAL HYSTERECTOMY     • APPENDECTOMY     • BREAST BIOPSY     • CHOLECYSTECTOMY     • COLONOSCOPY  03/05/2008    normal   • COLONOSCOPY  01/12/2012   • ENDOSCOPY  08/29/2013    normal   • ENDOSCOPY  01/23/2012    eus with stacy  normal endoscopic ultrascope of the pancreas.fortunately there was no mass seen   • ENDOSCOPY N/A 8/3/2018    Procedure: ESOPHAGOGASTRODUODENOSCOPY WITH ANESTHESIA;  Surgeon: Obed Patrick DO;  Location: [facility] ENDOSCOPY;  Service: Gastroenterology   • HEMORRHOIDECTOMY     • UPPER GASTROINTESTINAL ENDOSCOPY  08/29/2013        Current Outpatient Medications:   •  amLODIPine (NORVASC) 5 MG tablet, TAKE ONE TABLET BY MOUTH DAILY, Disp: 90 tablet, Rfl: 3  •  Ascorbic Acid (VITAMIN C PO), Daily., Disp: , Rfl:   •  bumetanide (BUMEX) 0.5 MG tablet, TAKE ONE TABLET BY MOUTH EVERY MORNING, Disp: 30 tablet, Rfl: 11  •  candesartan (ATACAND) 32 MG tablet, Take 1 tablet by mouth Daily., Disp: 90 tablet, Rfl: 3  •  levothyroxine (SYNTHROID, LEVOTHROID) 25 MCG tablet, TAKE ONE TABLET BY MOUTH DAILY, Disp: 90 tablet, Rfl: 3  •  LORazepam  (ATIVAN) 0.5 MG tablet, TAKE 1/4 BY MOUTH TWO TIMES A DAY DURING THE DAY AND 1/2 TABLET BY MOUTH EVERY NIGHT AT BEDTIME, Disp: 30 tablet, Rfl: 5  •  Multiple Vitamins-Minerals (CENTRUM SILVER ADULT 50+) tablet, Take 1 tablet by mouth daily, Disp: , Rfl:   •  omeprazole (priLOSEC) 20 MG capsule, TAKE ONE CAPSULE BY MOUTH TWICE A DAY, Disp: 180 capsule, Rfl: 3  •  Psyllium (METAMUCIL FIBER PO), Take 1 package by mouth As Needed., Disp: , Rfl:   •  vitamin D (ERGOCALCIFEROL) 1.25 MG (49536 UT) capsule capsule, TAKE ONE CAPSULE BY MOUTH EVERY OTHER WEEK, Disp: 6 capsule, Rfl: 3     Vitals:    08/24/21 1047   BP: 158/88   Pulse: 76   Temp: 97 °F (36.1 °C)   SpO2: 99%         08/24/21  1047   Weight: 43.2 kg (95 lb 3.2 oz)         Physical Exam  Constitutional:       General: She is not in acute distress.     Appearance: She is well-developed.   HENT:      Head: Normocephalic.      Right Ear: External ear normal.      Left Ear: External ear normal.      Mouth/Throat:      Mouth: No oral lesions.   Eyes:      Conjunctiva/sclera: Conjunctivae normal.   Cardiovascular:      Rate and Rhythm: Normal rate and regular rhythm.      Heart sounds: Normal heart sounds.   Pulmonary:      Effort: Pulmonary effort is normal.      Breath sounds: Normal breath sounds.   Abdominal:      General: Abdomen is flat. Bowel sounds are increased. There is no distension.      Palpations: Abdomen is soft. There is no mass.      Tenderness: There is generalized abdominal tenderness. There is no right CVA tenderness, left CVA tenderness or guarding.   Genitourinary:     Labia:         Right: No rash, tenderness or lesion.         Left: No rash, tenderness or lesion.       Vagina: No vaginal discharge or lesions.      Comments: Able to palpate bladder easily during bimanual exam; there is mild tenderness with palpation.   Skin:     General: Skin is warm and dry.   Neurological:      Mental Status: She is alert and oriented to person, place, and time.       Gait: Gait normal.   Psychiatric:         Mood and Affect: Mood normal.         Speech: Speech normal.         Behavior: Behavior normal.         Thought Content: Thought content normal.               Assessment/Plan   Diagnoses and all orders for this visit:    1. Pelvic pain (Primary)  -     POC Urinalysis Dipstick, Multipro  -     Urine Culture - Urine, Urine, Clean Catch  -     Ambulatory Referral to Obstetrics / Gynecology  -     [facility] BV & Candida - Swab, Vagina    2. Recent urinary tract infection    3. History of constipation  -     XR Abdomen KUB; Future      U/A repeated in office today shows 1+ leukocytes.  No nitrites or blood noted.  Will send for culture but will hold on further antibiotics at this time.  She has generalized abdominal tenderness with lower pelvic burning.  During her pelvic exam today it is suggestive that her bladder is somewhat prolapsed, possibly causing her to not fully empty the bladder with voiding- contributing to UTI.  I've recommended scheduled voiding every 2 hours, standing and then sitting again and seeing if more urine passes.  I have discussed seeing GYN to discuss possible pessary.  I have collected a swab in the vagina today to rule out any vaginal yeast or BV that could be contributing to her symptoms, though I feel this is not likely.  She is also having some generalized abdominal discomfort.  With her constipation history, would like to get a Abd KUB today to assess for bowel in the colon.  I have informed her that we may eventually need to repeat CT Abd/pelvis depending on her symptoms and findings from these tests.

## 2021-08-24 NOTE — PROGRESS NOTES
Moderate stool in the colon.  I'd recommend adding Miralax to her daily regimen at this time and see if this helps her symptoms.

## 2021-08-26 LAB
A VAGINAE DNA VAG QL NAA+PROBE: NORMAL SCORE
BVAB2 DNA VAG QL NAA+PROBE: NORMAL SCORE
C ALBICANS DNA VAG QL NAA+PROBE: NEGATIVE
C GLABRATA DNA VAG QL NAA+PROBE: NEGATIVE
MEGA1 DNA VAG QL NAA+PROBE: NORMAL SCORE

## 2021-08-27 DIAGNOSIS — N30.00 ACUTE CYSTITIS WITHOUT HEMATURIA: Primary | ICD-10-CM

## 2021-08-27 LAB
BACTERIA UR CULT: ABNORMAL
BACTERIA UR CULT: ABNORMAL
OTHER ANTIBIOTIC SUSC ISLT: ABNORMAL

## 2021-08-27 RX ORDER — ONDANSETRON 4 MG/1
4 TABLET, ORALLY DISINTEGRATING ORAL EVERY 8 HOURS PRN
Qty: 5 TABLET | Refills: 0 | Status: SHIPPED | OUTPATIENT
Start: 2021-08-27 | End: 2021-09-01

## 2021-08-27 RX ORDER — CIPROFLOXACIN 250 MG/1
250 TABLET, FILM COATED ORAL 2 TIMES DAILY
Qty: 10 TABLET | Refills: 0 | Status: SHIPPED | OUTPATIENT
Start: 2021-08-27 | End: 2021-09-01

## 2021-08-27 NOTE — PROGRESS NOTES
I've sent a lower dose of Cipro to the pharmacy along with Zofran.  Increase water intake.  If unable to tolerate and symptoms worsen over the weekend, will need to go to ER or Urgent Care.  Otherwise, will follow up on Monday

## 2021-08-30 ENCOUNTER — OFFICE VISIT (OUTPATIENT)
Dept: INTERNAL MEDICINE | Facility: CLINIC | Age: 86
End: 2021-08-30

## 2021-08-30 VITALS
HEART RATE: 81 BPM | WEIGHT: 94 LBS | OXYGEN SATURATION: 97 % | HEIGHT: 62 IN | DIASTOLIC BLOOD PRESSURE: 70 MMHG | SYSTOLIC BLOOD PRESSURE: 142 MMHG | TEMPERATURE: 98.4 F | BODY MASS INDEX: 17.3 KG/M2

## 2021-08-30 DIAGNOSIS — B95.2 ENTEROCOCCUS UTI: ICD-10-CM

## 2021-08-30 DIAGNOSIS — N39.0 ENTEROCOCCUS UTI: ICD-10-CM

## 2021-08-30 DIAGNOSIS — C44.702 CANCER OF SKIN OF RIGHT LOWER LEG: Primary | ICD-10-CM

## 2021-08-30 PROCEDURE — 99213 OFFICE O/P EST LOW 20 MIN: CPT | Performed by: NURSE PRACTITIONER

## 2021-08-30 NOTE — PROGRESS NOTES
"Subjective   Meenu Arteaga is a 89 y.o. female.   Chief Complaint   Patient presents with   • Urinary Tract Infection       Ms. Arteaga present to the office to discuss antibiotic therapy for her UTI. She is concerned about the side effects of Cipro  Vs.  Vancomycin infuison. Patient has numerous intolerances to antibiotics and other medicines. She reports only tolerating a handful of medicines. She was prescribed cipro by Dr. Villagran on Friday and was told to take a zofran 30 minutes to 1 hour prior to taking Cipro. Patient had initially refused worried about \"tendon rupture\" or \" worsening peripheral neuropathy\". Patient was educated on the potential side effects of IV vancomycin as well. Patient reports continued UTI symptoms. She did take 1 cipro with zofran 30 minutes prior and did not have digestive complaints. She verbalizes agreement to taking cipro by the end of our visit today.    Patient reports that she had been told by multiple physicians that the lesion on her right leg was most likely skin cancer. She was going to get it resected, but her  had a stroke earlier this year and was unable to complete while he was recovering. Patient's  has recovered and lesion on her leg has, according to the patient \"doubled in size\". She would like a referral to Dr. Delgado.        The following portions of the patient's history were reviewed and updated as appropriate: allergies, current medications, past family history, past medical history, past social history, past surgical history and problem list.    Review of Systems   Constitutional: Negative for activity change, appetite change, fatigue, fever, unexpected weight gain and unexpected weight loss.   HENT: Negative for swollen glands, trouble swallowing and voice change.    Eyes: Negative for blurred vision and visual disturbance.   Respiratory: Negative for cough and shortness of breath.    Cardiovascular: Negative for chest pain, palpitations and leg " swelling.   Gastrointestinal: Negative for abdominal pain, constipation, diarrhea, nausea, vomiting and indigestion.   Endocrine: Negative for cold intolerance, heat intolerance, polydipsia and polyphagia.   Genitourinary: Positive for dysuria, frequency and urgency.   Musculoskeletal: Negative for arthralgias, back pain, joint swelling and neck pain.   Skin: Positive for color change, dry skin and skin lesions. Negative for rash.   Neurological: Negative for dizziness, weakness, headache, memory problem and confusion.   Hematological: Does not bruise/bleed easily.   Psychiatric/Behavioral: Negative for agitation, hallucinations and suicidal ideas. The patient is not nervous/anxious.        Objective   Past Medical History:   Diagnosis Date   • Arthritis    • GERD (gastroesophageal reflux disease)    • Hyperlipidemia    • Hypothyroidism    • Liver cyst    • Neuropathy    • Ovarian cyst       Past Surgical History:   Procedure Laterality Date   • ABDOMINAL HYSTERECTOMY     • APPENDECTOMY     • BREAST BIOPSY     • CHOLECYSTECTOMY     • COLONOSCOPY  03/05/2008    normal   • COLONOSCOPY  01/12/2012   • ENDOSCOPY  08/29/2013    normal   • ENDOSCOPY  01/23/2012    eus with stacy  normal endoscopic ultrascope of the pancreas.fortunately there was no mass seen   • ENDOSCOPY N/A 8/3/2018    Procedure: ESOPHAGOGASTRODUODENOSCOPY WITH ANESTHESIA;  Surgeon: Obed Patrick DO;  Location: [facility] ENDOSCOPY;  Service: Gastroenterology   • HEMORRHOIDECTOMY     • UPPER GASTROINTESTINAL ENDOSCOPY  08/29/2013        Current Outpatient Medications:   •  amLODIPine (NORVASC) 5 MG tablet, TAKE ONE TABLET BY MOUTH DAILY, Disp: 90 tablet, Rfl: 3  •  Ascorbic Acid (VITAMIN C PO), Daily., Disp: , Rfl:   •  bumetanide (BUMEX) 0.5 MG tablet, TAKE ONE TABLET BY MOUTH EVERY MORNING, Disp: 30 tablet, Rfl: 11  •  candesartan (ATACAND) 32 MG tablet, Take 1 tablet by mouth Daily., Disp: 90 tablet, Rfl: 3  •  ciprofloxacin (Cipro) 250 MG tablet,  Take 1 tablet by mouth 2 (Two) Times a Day for 5 days., Disp: 10 tablet, Rfl: 0  •  levothyroxine (SYNTHROID, LEVOTHROID) 25 MCG tablet, TAKE ONE TABLET BY MOUTH DAILY, Disp: 90 tablet, Rfl: 3  •  LORazepam (ATIVAN) 0.5 MG tablet, TAKE 1/4 BY MOUTH TWO TIMES A DAY DURING THE DAY AND 1/2 TABLET BY MOUTH EVERY NIGHT AT BEDTIME, Disp: 30 tablet, Rfl: 5  •  Multiple Vitamins-Minerals (CENTRUM SILVER ADULT 50+) tablet, Take 1 tablet by mouth daily, Disp: , Rfl:   •  omeprazole (priLOSEC) 20 MG capsule, TAKE ONE CAPSULE BY MOUTH TWICE A DAY, Disp: 180 capsule, Rfl: 3  •  ondansetron ODT (Zofran ODT) 4 MG disintegrating tablet, Place 1 tablet on the tongue Every 8 (Eight) Hours As Needed for Nausea or Vomiting., Disp: 5 tablet, Rfl: 0  •  Psyllium (METAMUCIL FIBER PO), Take 1 package by mouth As Needed., Disp: , Rfl:   •  vitamin D (ERGOCALCIFEROL) 1.25 MG (50463 UT) capsule capsule, TAKE ONE CAPSULE BY MOUTH EVERY OTHER WEEK, Disp: 6 capsule, Rfl: 3   Body mass index is 17.19 kg/m².   Vitals:    08/30/21 1514   BP: 142/70   Pulse: 81   Temp: 98.4 °F (36.9 °C)   SpO2: 97%         08/30/21  1514   Weight: 42.6 kg (94 lb)         Physical Exam  Vitals and nursing note reviewed.   Constitutional:       Appearance: She is well-developed.   HENT:      Head: Normocephalic and atraumatic.      Right Ear: External ear normal.      Left Ear: External ear normal.      Nose: Nose normal.   Eyes:      Conjunctiva/sclera: Conjunctivae normal.      Pupils: Pupils are equal, round, and reactive to light.   Neck:      Thyroid: No thyromegaly.   Cardiovascular:      Rate and Rhythm: Normal rate and regular rhythm.      Heart sounds: Normal heart sounds.   Pulmonary:      Effort: Pulmonary effort is normal.      Breath sounds: Normal breath sounds.   Abdominal:      General: Bowel sounds are normal.      Palpations: Abdomen is soft.   Musculoskeletal:      Cervical back: Normal range of motion and neck supple.   Lymphadenopathy:       Cervical: No cervical adenopathy.   Skin:     General: Skin is warm and dry.      Capillary Refill: Capillary refill takes less than 2 seconds.      Findings: Lesion present.          Neurological:      Mental Status: She is alert and oriented to person, place, and time.   Psychiatric:         Behavior: Behavior normal.         Thought Content: Thought content normal.               Assessment/Plan   Diagnoses and all orders for this visit:    1. Cancer of skin of right lower leg (Primary)  -     Ambulatory Referral to General Surgery    2. Enterococcus UTI  Comments:  did not take antibiotic over weekend, took today and tolerating with zofran prior to use      Referral for assessment of skin lesion to Dr. Delgado has been sent per the request of the patient. Will have him determine if skin is appropriate for resection.    Patient is agreeable to completing Cipro. Advised patient on side effects of both vancomycin and cipro therapy. Gave them a written schedule on when to take the cipro after zofran and a meal to help with stomach upset. If she develops complications with 5 days of cipro will consider vancomycin infusion per pharmacy dosing, max 7 days.

## 2021-09-01 DIAGNOSIS — N30.00 ACUTE CYSTITIS WITHOUT HEMATURIA: ICD-10-CM

## 2021-09-01 RX ORDER — ONDANSETRON 4 MG/1
TABLET, ORALLY DISINTEGRATING ORAL
Qty: 8 TABLET | Refills: 0 | Status: SHIPPED | OUTPATIENT
Start: 2021-09-01 | End: 2021-09-07

## 2021-09-01 RX ORDER — ONDANSETRON 4 MG/1
TABLET, ORALLY DISINTEGRATING ORAL
Qty: 5 TABLET | Refills: 0 | OUTPATIENT
Start: 2021-09-01

## 2021-09-01 NOTE — TELEPHONE ENCOUNTER
PT'S  CALLED STATING SHE IS OUT OF THIS MEDICATION, NEEDS IT THIS EVENING IF POSSIBLE    CALLBACK [phone]

## 2021-09-01 NOTE — TELEPHONE ENCOUNTER
Caller: Alex Arteaga    Relationship: Emergency Contact    Best call back number: [phone]     Medication needed:   Requested Prescriptions     Pending Prescriptions Disp Refills   • ondansetron ODT (ZOFRAN-ODT) 4 MG disintegrating tablet [Pharmacy Med Name: ONDANSETRON ODT 4 MG TABLET] 8 tablet 0     Sig: DISSOLVE ONE TABLET BY MOUTH EVERY 8 HOURS AS NEEDED FOR NAUSEA AND VOMITING       When do you need the refill by: 09/01/2021    What additional details did the patient provide when requesting the medication:  COMPLETELY OUT     Does the patient have less than a 3 day supply:  [x] Yes  [] No    What is the patient's preferred pharmacy: [facility] [address] [place] [address] AT  60 - [phone] [facility] [phone] FX

## 2021-09-07 ENCOUNTER — OFFICE VISIT (OUTPATIENT)
Dept: INTERNAL MEDICINE | Facility: CLINIC | Age: 86
End: 2021-09-07

## 2021-09-07 VITALS
BODY MASS INDEX: 16.75 KG/M2 | TEMPERATURE: 97.8 F | WEIGHT: 91 LBS | HEART RATE: 82 BPM | DIASTOLIC BLOOD PRESSURE: 78 MMHG | HEIGHT: 62 IN | SYSTOLIC BLOOD PRESSURE: 132 MMHG | OXYGEN SATURATION: 98 %

## 2021-09-07 DIAGNOSIS — K59.01 SLOW TRANSIT CONSTIPATION: ICD-10-CM

## 2021-09-07 DIAGNOSIS — E06.3 HYPOTHYROIDISM DUE TO HASHIMOTO'S THYROIDITIS: ICD-10-CM

## 2021-09-07 DIAGNOSIS — F41.9 ANXIETY: ICD-10-CM

## 2021-09-07 DIAGNOSIS — C44.722 SQUAMOUS CELL CARCINOMA OF SKIN OF RIGHT LOWER EXTREMITY: ICD-10-CM

## 2021-09-07 DIAGNOSIS — R39.9 UTI SYMPTOMS: Primary | ICD-10-CM

## 2021-09-07 DIAGNOSIS — E03.8 HYPOTHYROIDISM DUE TO HASHIMOTO'S THYROIDITIS: ICD-10-CM

## 2021-09-07 LAB
BILIRUB BLD-MCNC: NEGATIVE MG/DL
CLARITY, POC: CLEAR
COLOR UR: YELLOW
GLUCOSE UR STRIP-MCNC: NEGATIVE MG/DL
KETONES UR QL: NEGATIVE
LEUKOCYTE EST, POC: NEGATIVE
NITRITE UR-MCNC: NEGATIVE MG/ML
PH UR: 7 [PH] (ref 5–8)
PROT UR STRIP-MCNC: NEGATIVE MG/DL
RBC # UR STRIP: ABNORMAL /UL
SP GR UR: 1.01 (ref 1–1.03)
UROBILINOGEN UR QL: NORMAL

## 2021-09-07 PROCEDURE — 81003 URINALYSIS AUTO W/O SCOPE: CPT | Performed by: INTERNAL MEDICINE

## 2021-09-07 PROCEDURE — 99214 OFFICE O/P EST MOD 30 MIN: CPT | Performed by: INTERNAL MEDICINE

## 2021-09-07 NOTE — PROGRESS NOTES
Subjective   Meenu Arteaga is a 89 y.o. female.   Chief Complaint   Patient presents with   • Urinary Tract Infection     follow up; saw Lashae last on 08/30/2021. states she still is having some pain and frequency and urgency        History of Present Illness patient presents for evaluation of what she feels may be a urinary tract infection.  She is having some dysuria we went ahead and performed a urine analysis today the urine analysis shows no evidence of urinary tract infection.  On discussing further she apparently had a KUB which showed fecal stasis.  In addition to that patient wanted me to recheck a lesion on her right lower leg which she has not had operated on at this point.    The following portions of the patient's history were reviewed and updated as appropriate: allergies, current medications, past family history, past medical history, past social history, past surgical history and problem list.    Review of Systems   Constitutional: Positive for fatigue. Negative for activity change, appetite change, fever, unexpected weight gain and unexpected weight loss.   HENT: Positive for hearing loss. Negative for swollen glands, trouble swallowing and voice change.    Eyes: Negative for blurred vision and visual disturbance.   Respiratory: Negative for cough and shortness of breath.    Cardiovascular: Negative for chest pain, palpitations and leg swelling.   Gastrointestinal: Positive for constipation. Negative for abdominal pain, diarrhea, nausea, vomiting and indigestion.   Endocrine: Negative for cold intolerance, heat intolerance, polydipsia and polyphagia.   Genitourinary: Negative for dysuria and frequency.   Musculoskeletal: Negative for arthralgias, back pain, joint swelling and neck pain.   Skin: Negative for color change, rash and skin lesions.   Neurological: Negative for dizziness, weakness, headache, memory problem and confusion.   Hematological: Does not bruise/bleed easily.    Psychiatric/Behavioral: Negative for agitation, hallucinations and suicidal ideas. The patient is not nervous/anxious.        Objective   Past Medical History:   Diagnosis Date   • Arthritis    • GERD (gastroesophageal reflux disease)    • Hyperlipidemia    • Hypothyroidism    • Liver cyst    • Neuropathy    • Ovarian cyst       Past Surgical History:   Procedure Laterality Date   • ABDOMINAL HYSTERECTOMY     • APPENDECTOMY     • BREAST BIOPSY     • CHOLECYSTECTOMY     • COLONOSCOPY  03/05/2008    normal   • COLONOSCOPY  01/12/2012   • ENDOSCOPY  08/29/2013    normal   • ENDOSCOPY  01/23/2012    eus with stacy  normal endoscopic ultrascope of the pancreas.fortunately there was no mass seen   • ENDOSCOPY N/A 8/3/2018    Procedure: ESOPHAGOGASTRODUODENOSCOPY WITH ANESTHESIA;  Surgeon: Obed Patrick DO;  Location: [facility] ENDOSCOPY;  Service: Gastroenterology   • HEMORRHOIDECTOMY     • UPPER GASTROINTESTINAL ENDOSCOPY  08/29/2013        Current Outpatient Medications:   •  amLODIPine (NORVASC) 5 MG tablet, TAKE ONE TABLET BY MOUTH DAILY, Disp: 90 tablet, Rfl: 3  •  Ascorbic Acid (VITAMIN C PO), Daily., Disp: , Rfl:   •  bumetanide (BUMEX) 0.5 MG tablet, TAKE ONE TABLET BY MOUTH EVERY MORNING, Disp: 30 tablet, Rfl: 11  •  candesartan (ATACAND) 32 MG tablet, Take 1 tablet by mouth Daily., Disp: 90 tablet, Rfl: 3  •  levothyroxine (SYNTHROID, LEVOTHROID) 25 MCG tablet, TAKE ONE TABLET BY MOUTH DAILY, Disp: 90 tablet, Rfl: 3  •  LORazepam (ATIVAN) 0.5 MG tablet, TAKE 1/4 BY MOUTH TWO TIMES A DAY DURING THE DAY AND 1/2 TABLET BY MOUTH EVERY NIGHT AT BEDTIME, Disp: 30 tablet, Rfl: 5  •  Multiple Vitamins-Minerals (CENTRUM SILVER ADULT 50+) tablet, Take 1 tablet by mouth daily, Disp: , Rfl:   •  omeprazole (priLOSEC) 20 MG capsule, TAKE ONE CAPSULE BY MOUTH TWICE A DAY, Disp: 180 capsule, Rfl: 3  •  Psyllium (METAMUCIL FIBER PO), Take 1 package by mouth As Needed., Disp: , Rfl:   •  vitamin D (ERGOCALCIFEROL) 1.25 MG  (68053 UT) capsule capsule, TAKE ONE CAPSULE BY MOUTH EVERY OTHER WEEK, Disp: 6 capsule, Rfl: 3      Vitals:    09/07/21 1508   BP: 132/78   Pulse: 82   Temp: 97.8 °F (36.6 °C)   SpO2: 98%         09/07/21  1508   Weight: 41.3 kg (91 lb)       Body mass index is 16.64 kg/m².    Physical Exam  Constitutional:       Appearance: She is well-developed.   HENT:      Head: Normocephalic and atraumatic.   Eyes:      Pupils: Pupils are equal, round, and reactive to light.   Cardiovascular:      Rate and Rhythm: Normal rate and regular rhythm.   Pulmonary:      Effort: Pulmonary effort is normal.      Breath sounds: Normal breath sounds.   Abdominal:      General: Bowel sounds are normal.      Palpations: Abdomen is soft.   Musculoskeletal:         General: Normal range of motion.      Cervical back: Normal range of motion and neck supple.   Skin:     General: Skin is warm and dry.      Findings: Lesion ( Patient has roughly a 1 sonometer lesion her lower extremity which appears to be a squamous cell cancer visually it is friable.) present.   Neurological:      Mental Status: She is alert and oriented to person, place, and time.   Psychiatric:         Behavior: Behavior normal.               Assessment/Plan   Diagnoses and all orders for this visit:    1. UTI symptoms (Primary)  -     POC Urinalysis Dipstick, Automated    2. 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 Hypothyroidism due to Hashimoto's thyroiditis  -     Comprehensive Metabolic Panel  -     CBC & Differential  -     TSH; Future  -     T4, Free; Future  -     T3, Free; Future    4. Slow transit constipation    5. Anxiety        Patient has some symptoms of nervousness anxiety she is on Ativan I am not comfortable increasing any more nerve pills with her due to her memory disorder.  Today she was concerned about urinary tract symptoms  there is no urine infection at this point.  Her urine analysis was clear I did review KUB which shows fecal stasis.  In regard to the lesion on her lower leg this needs urgent operatory management.  I have suggested she see Dr. Gonzalez to see if this can be removed with Mohs surgery she likely will need a skin graft at some point.  Also she is concerned about her thyroid and its been almost a year since that is been tested so thyroid function tests have been ordered.

## 2021-09-08 ENCOUNTER — TELEPHONE (OUTPATIENT)
Dept: INTERNAL MEDICINE | Facility: CLINIC | Age: 86
End: 2021-09-08

## 2021-09-08 LAB
ALBUMIN SERPL-MCNC: 4.7 G/DL (ref 3.5–5.2)
ALBUMIN/GLOB SERPL: 2.2 G/DL
ALP SERPL-CCNC: 67 U/L (ref 39–117)
ALT SERPL-CCNC: 14 U/L (ref 1–33)
AST SERPL-CCNC: 35 U/L (ref 1–32)
BASOPHILS # BLD AUTO: 0.04 10*3/MM3 (ref 0–0.2)
BASOPHILS NFR BLD AUTO: 0.8 % (ref 0–1.5)
BILIRUB SERPL-MCNC: 0.3 MG/DL (ref 0–1.2)
BUN SERPL-MCNC: 17 MG/DL (ref 8–23)
BUN/CREAT SERPL: 20.7 (ref 7–25)
CALCIUM SERPL-MCNC: 10 MG/DL (ref 8.2–9.6)
CHLORIDE SERPL-SCNC: 99 MMOL/L (ref 98–107)
CO2 SERPL-SCNC: 25.2 MMOL/L (ref 22–29)
CREAT SERPL-MCNC: 0.82 MG/DL (ref 0.57–1)
EOSINOPHIL # BLD AUTO: 0.04 10*3/MM3 (ref 0–0.4)
EOSINOPHIL NFR BLD AUTO: 0.8 % (ref 0.3–6.2)
ERYTHROCYTE [DISTWIDTH] IN BLOOD BY AUTOMATED COUNT: 12.8 % (ref 12.3–15.4)
GLOBULIN SER CALC-MCNC: 2.1 GM/DL
GLUCOSE SERPL-MCNC: 97 MG/DL (ref 65–99)
HCT VFR BLD AUTO: 36.1 % (ref 34–46.6)
HGB BLD-MCNC: 12.1 G/DL (ref 12–15.9)
IMM GRANULOCYTES # BLD AUTO: 0.01 10*3/MM3 (ref 0–0.05)
IMM GRANULOCYTES NFR BLD AUTO: 0.2 % (ref 0–0.5)
LYMPHOCYTES # BLD AUTO: 1.03 10*3/MM3 (ref 0.7–3.1)
LYMPHOCYTES NFR BLD AUTO: 20.7 % (ref 19.6–45.3)
MCH RBC QN AUTO: 32 PG (ref 26.6–33)
MCHC RBC AUTO-ENTMCNC: 33.5 G/DL (ref 31.5–35.7)
MCV RBC AUTO: 95.5 FL (ref 79–97)
MONOCYTES # BLD AUTO: 0.69 10*3/MM3 (ref 0.1–0.9)
MONOCYTES NFR BLD AUTO: 13.9 % (ref 5–12)
NEUTROPHILS # BLD AUTO: 3.16 10*3/MM3 (ref 1.7–7)
NEUTROPHILS NFR BLD AUTO: 63.6 % (ref 42.7–76)
NRBC BLD AUTO-RTO: 0 /100 WBC (ref 0–0.2)
PLATELET # BLD AUTO: 258 10*3/MM3 (ref 140–450)
POTASSIUM SERPL-SCNC: 3.4 MMOL/L (ref 3.5–5.2)
PROT SERPL-MCNC: 6.8 G/DL (ref 6–8.5)
RBC # BLD AUTO: 3.78 10*6/MM3 (ref 3.77–5.28)
SODIUM SERPL-SCNC: 138 MMOL/L (ref 136–145)
WBC # BLD AUTO: 4.97 10*3/MM3 (ref 3.4–10.8)

## 2021-09-08 RX ORDER — POTASSIUM CHLORIDE 750 MG/1
10 CAPSULE, EXTENDED RELEASE ORAL DAILY
Qty: 90 CAPSULE | Refills: 3 | Status: SHIPPED | OUTPATIENT
Start: 2021-09-08 | End: 2021-11-01

## 2021-09-08 NOTE — TELEPHONE ENCOUNTER
----- Message from Andrea Curiel MD sent at 9/8/2021  7:45 AM CDT -----  Her potassium is low I would recommend Micro-K 10 1 daily recheck BMP 2 weeks

## 2021-09-14 PROCEDURE — 88305 TISSUE EXAM BY PATHOLOGIST: CPT | Performed by: SPECIALIST

## 2021-09-15 ENCOUNTER — LAB REQUISITION (OUTPATIENT)
Dept: LAB | Facility: HOSPITAL | Age: 86
End: 2021-09-15

## 2021-09-15 DIAGNOSIS — Z00.00 ENCOUNTER FOR GENERAL ADULT MEDICAL EXAMINATION WITHOUT ABNORMAL FINDINGS: ICD-10-CM

## 2021-09-17 LAB
CYTO UR: NORMAL
LAB AP CASE REPORT: NORMAL
LAB AP CLINICAL INFORMATION: NORMAL
LAB AP DIAGNOSIS COMMENT: NORMAL
PATH REPORT.FINAL DX SPEC: NORMAL
PATH REPORT.GROSS SPEC: NORMAL

## 2021-09-22 ENCOUNTER — TELEPHONE (OUTPATIENT)
Dept: INTERNAL MEDICINE | Facility: CLINIC | Age: 86
End: 2021-09-22

## 2021-10-22 DIAGNOSIS — F41.9 ANXIETY: ICD-10-CM

## 2021-10-25 RX ORDER — LORAZEPAM 0.5 MG/1
TABLET ORAL
Qty: 30 TABLET | Refills: 5 | Status: SHIPPED | OUTPATIENT
Start: 2021-10-25 | End: 2022-04-22 | Stop reason: SDUPTHER

## 2021-11-01 ENCOUNTER — OFFICE VISIT (OUTPATIENT)
Dept: INTERNAL MEDICINE | Facility: CLINIC | Age: 86
End: 2021-11-01

## 2021-11-01 VITALS
HEART RATE: 78 BPM | SYSTOLIC BLOOD PRESSURE: 140 MMHG | HEIGHT: 62 IN | TEMPERATURE: 97.1 F | BODY MASS INDEX: 17.11 KG/M2 | OXYGEN SATURATION: 98 % | DIASTOLIC BLOOD PRESSURE: 76 MMHG | WEIGHT: 93 LBS

## 2021-11-01 DIAGNOSIS — E03.8 HYPOTHYROIDISM DUE TO HASHIMOTO'S THYROIDITIS: ICD-10-CM

## 2021-11-01 DIAGNOSIS — E06.3 HYPOTHYROIDISM DUE TO HASHIMOTO'S THYROIDITIS: ICD-10-CM

## 2021-11-01 DIAGNOSIS — L30.9 DERMATITIS: ICD-10-CM

## 2021-11-01 DIAGNOSIS — I10 BENIGN HYPERTENSION: Primary | ICD-10-CM

## 2021-11-01 PROCEDURE — 99214 OFFICE O/P EST MOD 30 MIN: CPT | Performed by: INTERNAL MEDICINE

## 2021-11-01 RX ORDER — AMLODIPINE BESYLATE 5 MG/1
TABLET ORAL
Qty: 90 TABLET | Refills: 3 | Status: SHIPPED | OUTPATIENT
Start: 2021-11-01 | End: 2022-11-15

## 2021-11-01 NOTE — PROGRESS NOTES
Subjective   Meenu Arteaga is a 89 y.o. female.   Chief Complaint   Patient presents with   • neuropathy     seems to be getting wose, anything else she can do besides medicaiton   • abd issues     pt states that she is having issues with her stomach and bowel movements   • Memory Loss     memory issues seems to be getting worse, especially with short term and it is noticed more so when she is cooking    • sores     sores on left  upper arm and hand and right upper arm , and right leg    • discuss medications     potassium that patient was put on made her feel bad/ sick to her stomach        History of Present Illness patient is here complaining of lesion on her lower leg. She said that biopsied by Dr. Monk. I have reviewed the biopsy there is no signs of cancer. She has developed a couple more lesions at this point one on her left hand left upper arm right arm. She is also complaining about memory loss which is a chronic problem and not that I can tell any worse or better. In regard to potassium that was prescribed she stopped it as she was intolerant to that medication she did not know what the medication was for. She had a potassium of 3.4 last visit. She also complains of neuropathy.    The following portions of the patient's history were reviewed and updated as appropriate: allergies, current medications, past family history, past medical history, past social history, past surgical history and problem list.    Review of Systems   Constitutional: Negative for activity change, appetite change, fatigue, fever, unexpected weight gain and unexpected weight loss.   HENT: Negative for swollen glands, trouble swallowing and voice change.    Eyes: Negative for blurred vision and visual disturbance.   Respiratory: Negative for cough and shortness of breath.    Cardiovascular: Negative for chest pain, palpitations and leg swelling.   Gastrointestinal: Negative for abdominal pain, constipation, diarrhea, nausea,  vomiting and indigestion.   Endocrine: Negative for cold intolerance, heat intolerance, polydipsia and polyphagia.   Genitourinary: Negative for dysuria and frequency.   Musculoskeletal: Positive for myalgias ( Leg aches). Negative for arthralgias, back pain, joint swelling and neck pain.   Skin: Positive for rash ( Patient has nodule right and left upper arm left hand and lower leg. The lesion on her lower leg has not appreciably changed since I saw her last.). Negative for color change and skin lesions.   Neurological: Negative for dizziness, weakness, headache, memory problem and confusion.   Hematological: Does not bruise/bleed easily.   Psychiatric/Behavioral: Negative for agitation, hallucinations and suicidal ideas. The patient is not nervous/anxious.        Objective   Past Medical History:   Diagnosis Date   • Arthritis    • GERD (gastroesophageal reflux disease)    • Hyperlipidemia    • Hypothyroidism    • Liver cyst    • Neuropathy    • Ovarian cyst       Past Surgical History:   Procedure Laterality Date   • ABDOMINAL HYSTERECTOMY     • APPENDECTOMY     • BREAST BIOPSY     • CHOLECYSTECTOMY     • COLONOSCOPY  03/05/2008    normal   • COLONOSCOPY  01/12/2012   • ENDOSCOPY  08/29/2013    normal   • ENDOSCOPY  01/23/2012    eus with stacy  normal endoscopic ultrascope of the pancreas.fortunately there was no mass seen   • ENDOSCOPY N/A 8/3/2018    Procedure: ESOPHAGOGASTRODUODENOSCOPY WITH ANESTHESIA;  Surgeon: Obed Patrick DO;  Location: [facility] ENDOSCOPY;  Service: Gastroenterology   • HEMORRHOIDECTOMY     • UPPER GASTROINTESTINAL ENDOSCOPY  08/29/2013        Current Outpatient Medications:   •  Ascorbic Acid (VITAMIN C PO), Daily., Disp: , Rfl:   •  bumetanide (BUMEX) 0.5 MG tablet, TAKE ONE TABLET BY MOUTH EVERY MORNING, Disp: 30 tablet, Rfl: 11  •  candesartan (ATACAND) 32 MG tablet, Take 1 tablet by mouth Daily., Disp: 90 tablet, Rfl: 3  •  levothyroxine (SYNTHROID, LEVOTHROID) 25 MCG tablet,  TAKE ONE TABLET BY MOUTH DAILY, Disp: 90 tablet, Rfl: 3  •  LORazepam (ATIVAN) 0.5 MG tablet, TAKEK ONE-FOURTH TABLET BY MOUTH TWO TIMES A DAY DURING THE DAY AND ONE-HALF TABLET BY MOUTH EVERY NIGHT AT BEDTIME, Disp: 30 tablet, Rfl: 5  •  Multiple Vitamins-Minerals (CENTRUM SILVER ADULT 50+) tablet, Take 1 tablet by mouth daily, Disp: , Rfl:   •  Psyllium (METAMUCIL FIBER PO), Take 1 package by mouth As Needed., Disp: , Rfl:   •  vitamin D (ERGOCALCIFEROL) 1.25 MG (87247 UT) capsule capsule, TAKE ONE CAPSULE BY MOUTH EVERY OTHER WEEK, Disp: 6 capsule, Rfl: 3  •  amLODIPine (NORVASC) 5 MG tablet, TAKE ONE TABLET BY MOUTH DAILY, Disp: 90 tablet, Rfl: 3  •  omeprazole (priLOSEC) 20 MG capsule, TAKE ONE CAPSULE BY MOUTH TWICE A DAY, Disp: 180 capsule, Rfl: 3      Vitals:    11/01/21 0925   BP: 140/76   Pulse: 78   Temp: 97.1 °F (36.2 °C)   SpO2: 98%         11/01/21 0925   Weight: 42.2 kg (93 lb)       Body mass index is 17.01 kg/m².    Physical Exam  Constitutional:       Appearance: She is well-developed.   HENT:      Head: Normocephalic and atraumatic.   Eyes:      Pupils: Pupils are equal, round, and reactive to light.   Cardiovascular:      Rate and Rhythm: Normal rate and regular rhythm.   Pulmonary:      Effort: Pulmonary effort is normal.      Breath sounds: Normal breath sounds.   Abdominal:      General: Bowel sounds are normal.      Palpations: Abdomen is soft.   Musculoskeletal:         General: Normal range of motion.      Cervical back: Normal range of motion and neck supple.   Skin:     General: Skin is warm and dry.      Comments: Patient has a to sonometer patch with a raised posterior nodule right lower extremity. She also has a nodule left hand between her thumb and index finger. She also has a nodule left upper arm and right upper arm measure about half sonometer each.   Neurological:      Mental Status: She is alert.   Psychiatric:         Behavior: Behavior normal.               Assessment/Plan    Diagnoses and all orders for this visit:    1. Benign hypertension (Primary)  -     Basic Metabolic Panel    2. Dermatitis    3. Hypothyroidism due to Hashimoto's thyroiditis        At this point patient's memory disorder is unchanged over the last examination. She is hypokalemic she is not taking her potassium we will recheck a BMP today. In regard to the dermatitis that her lower leg she really needs to get plugged in with a dermatologist we had made an appointment with  she still waiting to see him. In regard to her other problems neuropathy I encouraged her to get up and walk I will check her potassium and change her potassium supplement if need be.

## 2021-11-02 ENCOUNTER — TELEPHONE (OUTPATIENT)
Dept: INTERNAL MEDICINE | Facility: CLINIC | Age: 86
End: 2021-11-02

## 2021-11-02 LAB
BUN SERPL-MCNC: 20 MG/DL (ref 8–23)
BUN/CREAT SERPL: 27.4 (ref 7–25)
CALCIUM SERPL-MCNC: 9.9 MG/DL (ref 8.2–9.6)
CHLORIDE SERPL-SCNC: 98 MMOL/L (ref 98–107)
CO2 SERPL-SCNC: 28.4 MMOL/L (ref 22–29)
CREAT SERPL-MCNC: 0.73 MG/DL (ref 0.57–1)
GLUCOSE SERPL-MCNC: 97 MG/DL (ref 65–99)
POTASSIUM SERPL-SCNC: 3.5 MMOL/L (ref 3.5–5.2)
SODIUM SERPL-SCNC: 138 MMOL/L (ref 136–145)

## 2021-11-02 NOTE — TELEPHONE ENCOUNTER
----- Message from Andrea Curiel MD sent at 11/2/2021  7:45 AM CDT -----  Potassium was low normal encourage her to have a banana a day with orange juice something that has natural potassium since she cannot tolerate potassium tablets

## 2022-01-17 ENCOUNTER — OFFICE VISIT (OUTPATIENT)
Dept: INTERNAL MEDICINE | Facility: CLINIC | Age: 87
End: 2022-01-17

## 2022-01-17 VITALS
HEIGHT: 62 IN | SYSTOLIC BLOOD PRESSURE: 154 MMHG | BODY MASS INDEX: 17.11 KG/M2 | DIASTOLIC BLOOD PRESSURE: 96 MMHG | HEART RATE: 80 BPM | WEIGHT: 93 LBS | TEMPERATURE: 97.6 F | OXYGEN SATURATION: 98 %

## 2022-01-17 DIAGNOSIS — E03.8 HYPOTHYROIDISM DUE TO HASHIMOTO'S THYROIDITIS: ICD-10-CM

## 2022-01-17 DIAGNOSIS — E06.3 HYPOTHYROIDISM DUE TO HASHIMOTO'S THYROIDITIS: ICD-10-CM

## 2022-01-17 DIAGNOSIS — G62.9 NEUROPATHY: ICD-10-CM

## 2022-01-17 DIAGNOSIS — I10 BENIGN HYPERTENSION: ICD-10-CM

## 2022-01-17 DIAGNOSIS — R39.9 UTI SYMPTOMS: Primary | ICD-10-CM

## 2022-01-17 DIAGNOSIS — R41.3 MEMORY DISORDER: ICD-10-CM

## 2022-01-17 LAB
BILIRUB BLD-MCNC: NEGATIVE MG/DL
CLARITY, POC: CLEAR
COLOR UR: YELLOW
EXPIRATION DATE: ABNORMAL
GLUCOSE UR STRIP-MCNC: NEGATIVE MG/DL
KETONES UR QL: NEGATIVE
LEUKOCYTE EST, POC: ABNORMAL
Lab: 1112
NITRITE UR-MCNC: NEGATIVE MG/ML
PH UR: 7 [PH] (ref 5–8)
PROT UR STRIP-MCNC: NEGATIVE MG/DL
RBC # UR STRIP: ABNORMAL /UL
SP GR UR: 1.01 (ref 1–1.03)
UROBILINOGEN UR QL: NORMAL

## 2022-01-17 PROCEDURE — 99214 OFFICE O/P EST MOD 30 MIN: CPT | Performed by: INTERNAL MEDICINE

## 2022-01-17 PROCEDURE — 81003 URINALYSIS AUTO W/O SCOPE: CPT | Performed by: INTERNAL MEDICINE

## 2022-01-17 NOTE — PROGRESS NOTES
Subjective   Meenu Arteaga is a 89 y.o. female.   Chief Complaint   Patient presents with   • other     wants to discuss bowel issues    • neuropathy     pt states there is a medicaiton she has taken previoulsy that she had side effects from. wants to know if there is any thing else to try. pt is unure of what medication she took or what problems it caused    • Rash     on both sides of lower back denies pain only states it itches.    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  She also has some rash on her chest as well.  Apparently she takes showers daily along with warm showers at that.  She is complaining of memory loss but she and I have been talking about her memory loss for probably 15years and it is gradually changing.    The following portions of the patient's history were reviewed and updated as appropriate: allergies, current medications, past family history, past medical history, past social history, past surgical history and problem list.    Review of Systems   Constitutional: Negative for activity change, appetite change, fatigue, fever, unexpected weight gain and unexpected weight loss.   HENT: Negative for swollen glands, trouble swallowing and voice change.    Eyes: Negative for blurred vision and visual disturbance.   Respiratory: Negative for cough and shortness of breath.    Cardiovascular: Negative for chest pain, palpitations and leg swelling.   Gastrointestinal: Negative for abdominal pain, constipation, diarrhea, nausea, vomiting and indigestion.   Endocrine: Negative for cold intolerance, heat intolerance, polydipsia and polyphagia.   Genitourinary: Negative  for dysuria and frequency.   Musculoskeletal: Negative for arthralgias, back pain, joint swelling and neck pain.   Skin: Negative for color change, rash and skin lesions.   Neurological: Negative for dizziness, weakness, headache, memory problem and confusion.   Hematological: Does not bruise/bleed easily.   Psychiatric/Behavioral: Negative for agitation, hallucinations and suicidal ideas. The patient is not nervous/anxious.        Objective   Past Medical History:   Diagnosis Date   • Arthritis    • GERD (gastroesophageal reflux disease)    • Hyperlipidemia    • Hypothyroidism    • Liver cyst    • Neuropathy    • Ovarian cyst       Past Surgical History:   Procedure Laterality Date   • ABDOMINAL HYSTERECTOMY     • APPENDECTOMY     • BREAST BIOPSY     • CHOLECYSTECTOMY     • COLONOSCOPY  03/05/2008    normal   • COLONOSCOPY  01/12/2012   • ENDOSCOPY  08/29/2013    normal   • ENDOSCOPY  01/23/2012    eus with stacy  normal endoscopic ultrascope of the pancreas.fortunately there was no mass seen   • ENDOSCOPY N/A 8/3/2018    Procedure: ESOPHAGOGASTRODUODENOSCOPY WITH ANESTHESIA;  Surgeon: Obed Patrick DO;  Location: [facility] ENDOSCOPY;  Service: Gastroenterology   • HEMORRHOIDECTOMY     • SKIN CANCER EXCISION  11/27/2021    left arm and left leg    • UPPER GASTROINTESTINAL ENDOSCOPY  08/29/2013        Current Outpatient Medications:   •  amLODIPine (NORVASC) 5 MG tablet, TAKE ONE TABLET BY MOUTH DAILY, Disp: 90 tablet, Rfl: 3  •  Ascorbic Acid (VITAMIN C PO), Daily., Disp: , Rfl:   •  bumetanide (BUMEX) 0.5 MG tablet, TAKE ONE TABLET BY MOUTH EVERY MORNING, Disp: 30 tablet, Rfl: 11  •  candesartan (ATACAND) 32 MG tablet, Take 1 tablet by mouth Daily., Disp: 90 tablet, Rfl: 3  •  levothyroxine (SYNTHROID, LEVOTHROID) 25 MCG tablet, TAKE ONE TABLET BY MOUTH DAILY, Disp: 90 tablet, Rfl: 3  •  LORazepam (ATIVAN) 0.5 MG tablet, TAKEK ONE-FOURTH TABLET BY MOUTH TWO TIMES A DAY DURING THE DAY AND ONE-HALF TABLET BY MOUTH  EVERY NIGHT AT BEDTIME, Disp: 30 tablet, Rfl: 5  •  Multiple Vitamins-Minerals (CENTRUM SILVER ADULT 50+) tablet, Take 1 tablet by mouth daily, Disp: , Rfl:   •  omeprazole (priLOSEC) 20 MG capsule, TAKE ONE CAPSULE BY MOUTH TWICE A DAY, Disp: 180 capsule, Rfl: 3  •  Psyllium (METAMUCIL FIBER PO), Take 1 package by mouth As Needed., Disp: , Rfl:   •  vitamin D (ERGOCALCIFEROL) 1.25 MG (53634 UT) capsule capsule, TAKE ONE CAPSULE BY MOUTH EVERY OTHER WEEK, Disp: 6 capsule, Rfl: 3      Vitals:    01/17/22 1510   BP: 154/96   Pulse: 80   Temp: 97.6 °F (36.4 °C)   SpO2: 98%         01/17/22  1510   Weight: 42.2 kg (93 lb)       Body mass index is 17.01 kg/m².    Physical Exam  Constitutional:       Appearance: She is well-developed.   HENT:      Head: Normocephalic and atraumatic.   Eyes:      Pupils: Pupils are equal, round, and reactive to light.   Cardiovascular:      Rate and Rhythm: Normal rate and regular rhythm.   Pulmonary:      Effort: Pulmonary effort is normal.      Breath sounds: Normal breath sounds.   Abdominal:      General: Bowel sounds are normal.      Palpations: Abdomen is soft.   Musculoskeletal:         General: Normal range of motion.      Cervical back: Normal range of motion and neck supple.   Skin:     General: Skin is warm and dry.   Neurological:      Mental Status: She is alert and oriented to person, place, and time.   Psychiatric:         Behavior: Behavior normal.               Assessment/Plan   Diagnoses and all orders for this visit:    1. UTI symptoms (Primary)  -     POC Urinalysis Dipstick, Automated    2. Memory disorder    3. Benign hypertension    4. Hypothyroidism due to Hashimoto's thyroiditis    5. Neuropathy      At today's visit we looked at her urine analysis which shows some mild leukocytosis nothing to suggest that she has a urinary tract infection.  This is an ongoing and recurrent problem for her.  Likely she has aseptic cystitis.  In regard to her memory disorder this is  been going on for some 15 years we have had numerous conversations about it testing has not shown an etiology I suspect this is small vessel disease or possibly a very low-grade Alzheimer's process.  In regard to her hypothyroidism I think she is under control she has failed numerous medicines for neuropathy including gabapentin and pregabalin.  Spent some time talking to her about her long warm showers and asked her to only sponge bath daily and to use lotion on the affected areas.

## 2022-01-24 RX ORDER — CANDESARTAN 32 MG/1
TABLET ORAL
Qty: 90 TABLET | Refills: 1 | Status: SHIPPED | OUTPATIENT
Start: 2022-01-24 | End: 2022-07-25 | Stop reason: SDUPTHER

## 2022-02-09 ENCOUNTER — OFFICE VISIT (OUTPATIENT)
Dept: INTERNAL MEDICINE | Facility: CLINIC | Age: 87
End: 2022-02-09

## 2022-02-09 VITALS
SYSTOLIC BLOOD PRESSURE: 152 MMHG | HEART RATE: 69 BPM | OXYGEN SATURATION: 94 % | WEIGHT: 95 LBS | HEIGHT: 62 IN | DIASTOLIC BLOOD PRESSURE: 76 MMHG | TEMPERATURE: 96.9 F | BODY MASS INDEX: 17.48 KG/M2

## 2022-02-09 DIAGNOSIS — R39.9 UTI SYMPTOMS: ICD-10-CM

## 2022-02-09 DIAGNOSIS — N30.01 ACUTE CYSTITIS WITH HEMATURIA: Primary | ICD-10-CM

## 2022-02-09 LAB
BILIRUB BLD-MCNC: NEGATIVE MG/DL
CLARITY, POC: ABNORMAL
COLOR UR: ABNORMAL
EXPIRATION DATE: ABNORMAL
GLUCOSE UR STRIP-MCNC: NEGATIVE MG/DL
KETONES UR QL: ABNORMAL
LEUKOCYTE EST, POC: ABNORMAL
Lab: ABNORMAL
NITRITE UR-MCNC: POSITIVE MG/ML
PH UR: 7 [PH] (ref 5–8)
PROT UR STRIP-MCNC: ABNORMAL MG/DL
RBC # UR STRIP: ABNORMAL /UL
SP GR UR: 1.01 (ref 1–1.03)
UROBILINOGEN UR QL: NORMAL

## 2022-02-09 PROCEDURE — 99213 OFFICE O/P EST LOW 20 MIN: CPT | Performed by: NURSE PRACTITIONER

## 2022-02-09 PROCEDURE — 81003 URINALYSIS AUTO W/O SCOPE: CPT | Performed by: NURSE PRACTITIONER

## 2022-02-09 RX ORDER — DOXYCYCLINE HYCLATE 100 MG/1
100 CAPSULE ORAL 2 TIMES DAILY
Qty: 20 CAPSULE | Refills: 0 | Status: SHIPPED | OUTPATIENT
Start: 2022-02-09 | End: 2022-02-17 | Stop reason: SINTOL

## 2022-02-09 RX ORDER — ONDANSETRON 4 MG/1
4 TABLET, ORALLY DISINTEGRATING ORAL EVERY 8 HOURS PRN
Qty: 10 TABLET | Refills: 0 | Status: SHIPPED | OUTPATIENT
Start: 2022-02-09 | End: 2022-02-18 | Stop reason: SDUPTHER

## 2022-02-09 NOTE — PROGRESS NOTES
Subjective   Meenu Arteaga is a 89 y.o. female.   Chief Complaint   Patient presents with   • Urinary Tract Infection     painfull and buring urination ( pt also states that when she gets an antibiotic she needs something to help keep her stomach calm as well )        Urinary Tract Infection   This is a new problem. Episode onset: worsened about 1 week ago; frequency started prior to that. The problem has been gradually worsening. The quality of the pain is described as burning. There has been no fever. Associated symptoms include frequency. Pertinent negatives include no nausea or vomiting. Her past medical history is significant for recurrent UTIs.        The following portions of the patient's history were reviewed and updated as appropriate: allergies, current medications, past family history, past medical history, past social history, past surgical history and problem list.    Review of Systems   Constitutional: Negative for activity change, appetite change, fatigue, fever, unexpected weight gain and unexpected weight loss.   HENT: Negative for swollen glands, trouble swallowing and voice change.    Eyes: Negative for blurred vision and visual disturbance.   Respiratory: Negative for cough and shortness of breath.    Cardiovascular: Negative for chest pain, palpitations and leg swelling.   Gastrointestinal: Negative for abdominal pain, constipation, diarrhea, nausea, vomiting and indigestion.   Endocrine: Negative for cold intolerance, heat intolerance, polydipsia and polyphagia.   Genitourinary: Positive for dysuria and frequency.   Musculoskeletal: Negative for arthralgias, back pain, joint swelling and neck pain.   Skin: Negative for color change, rash and skin lesions.   Neurological: Negative for dizziness, weakness, headache, memory problem and confusion.   Hematological: Does not bruise/bleed easily.   Psychiatric/Behavioral: Negative for agitation, hallucinations and suicidal ideas. The patient is not  nervous/anxious.        Objective   Past Medical History:   Diagnosis Date   • Arthritis    • GERD (gastroesophageal reflux disease)    • Hyperlipidemia    • Hypothyroidism    • Liver cyst    • Neuropathy    • Ovarian cyst       Past Surgical History:   Procedure Laterality Date   • ABDOMINAL HYSTERECTOMY     • APPENDECTOMY     • BREAST BIOPSY     • CHOLECYSTECTOMY     • COLONOSCOPY  03/05/2008    normal   • COLONOSCOPY  01/12/2012   • ENDOSCOPY  08/29/2013    normal   • ENDOSCOPY  01/23/2012    eus with stacy  normal endoscopic ultrascope of the pancreas.fortunately there was no mass seen   • ENDOSCOPY N/A 8/3/2018    Procedure: ESOPHAGOGASTRODUODENOSCOPY WITH ANESTHESIA;  Surgeon: Obed Patrick DO;  Location: [facility] ENDOSCOPY;  Service: Gastroenterology   • HEMORRHOIDECTOMY     • SKIN CANCER EXCISION  11/27/2021    left arm and left leg    • UPPER GASTROINTESTINAL ENDOSCOPY  08/29/2013        Current Outpatient Medications:   •  amLODIPine (NORVASC) 5 MG tablet, TAKE ONE TABLET BY MOUTH DAILY, Disp: 90 tablet, Rfl: 3  •  Ascorbic Acid (VITAMIN C PO), Daily., Disp: , Rfl:   •  bumetanide (BUMEX) 0.5 MG tablet, TAKE ONE TABLET BY MOUTH EVERY MORNING, Disp: 30 tablet, Rfl: 11  •  candesartan (ATACAND) 32 MG tablet, TAKE ONE TABLET BY MOUTH DAILY, Disp: 90 tablet, Rfl: 1  •  levothyroxine (SYNTHROID, LEVOTHROID) 25 MCG tablet, TAKE ONE TABLET BY MOUTH DAILY, Disp: 90 tablet, Rfl: 3  •  LORazepam (ATIVAN) 0.5 MG tablet, TAKEK ONE-FOURTH TABLET BY MOUTH TWO TIMES A DAY DURING THE DAY AND ONE-HALF TABLET BY MOUTH EVERY NIGHT AT BEDTIME, Disp: 30 tablet, Rfl: 5  •  Multiple Vitamins-Minerals (CENTRUM SILVER ADULT 50+) tablet, Take 1 tablet by mouth daily, Disp: , Rfl:   •  omeprazole (priLOSEC) 20 MG capsule, TAKE ONE CAPSULE BY MOUTH TWICE A DAY, Disp: 180 capsule, Rfl: 3  •  Psyllium (METAMUCIL FIBER PO), Take 1 package by mouth As Needed., Disp: , Rfl:   •  vitamin D (ERGOCALCIFEROL) 1.25 MG (56251 UT) capsule  capsule, TAKE ONE CAPSULE BY MOUTH EVERY OTHER WEEK, Disp: 6 capsule, Rfl: 3  •  doxycycline (VIBRAMYCIN) 100 MG capsule, Take 1 capsule by mouth 2 (Two) Times a Day for 10 days., Disp: 20 capsule, Rfl: 0  •  ondansetron ODT (Zofran ODT) 4 MG disintegrating tablet, Place 1 tablet on the tongue Every 8 (Eight) Hours As Needed for Nausea or Vomiting., Disp: 10 tablet, Rfl: 0      Vitals:    02/09/22 1111   BP: 152/76   Pulse: 69   Temp: 96.9 °F (36.1 °C)   SpO2: 94%         02/09/22  1111   Weight: 43.1 kg (95 lb)       Body mass index is 17.38 kg/m².    Physical Exam  Constitutional:       General: She is not in acute distress.     Appearance: She is well-developed.   HENT:      Head: Normocephalic.      Right Ear: Tympanic membrane and external ear normal.      Left Ear: Tympanic membrane and external ear normal.      Mouth/Throat:      Mouth: Mucous membranes are moist. No oral lesions.      Pharynx: Oropharynx is clear.   Eyes:      Conjunctiva/sclera: Conjunctivae normal.   Cardiovascular:      Rate and Rhythm: Normal rate and regular rhythm.      Pulses: Normal pulses.      Heart sounds: Normal heart sounds.   Pulmonary:      Effort: Pulmonary effort is normal.      Breath sounds: Normal breath sounds.   Abdominal:      Tenderness: There is abdominal tenderness (mild; ongoing related to constipation issues). There is no right CVA tenderness or left CVA tenderness.   Skin:     General: Skin is warm and dry.   Neurological:      Mental Status: She is alert and oriented to person, place, and time.      Gait: Gait normal.   Psychiatric:         Mood and Affect: Mood normal.         Speech: Speech normal.         Behavior: Behavior normal.         Thought Content: Thought content normal.               Assessment/Plan   Diagnoses and all orders for this visit:    1. Acute cystitis with hematuria (Primary)  -     doxycycline (VIBRAMYCIN) 100 MG capsule; Take 1 capsule by mouth 2 (Two) Times a Day for 10 days.   Dispense: 20 capsule; Refill: 0  -     ondansetron ODT (Zofran ODT) 4 MG disintegrating tablet; Place 1 tablet on the tongue Every 8 (Eight) Hours As Needed for Nausea or Vomiting.  Dispense: 10 tablet; Refill: 0    2. UTI symptoms  -     POC Urinalysis Dipstick, Automated  -     Urine Culture - Urine, Urine, Clean Catch      Patient brings medication bottles to the office today.  One is Doxycyline just prescribed in 11/2021.  She states she tolerated without difficulty as far as she can remember.  Will start with this today as she has several medication intolerances and would prefer to avoid Cipro.  Will send Zofran to help with nausea that she frequently gets with medication.  Will need to retest urine after treatment is completed.

## 2022-02-11 RX ORDER — ERGOCALCIFEROL 1.25 MG/1
CAPSULE ORAL
Qty: 6 CAPSULE | Refills: 3 | Status: SHIPPED | OUTPATIENT
Start: 2022-02-11 | End: 2023-03-29

## 2022-02-13 LAB
BACTERIA UR CULT: ABNORMAL
BACTERIA UR CULT: ABNORMAL
OTHER ANTIBIOTIC SUSC ISLT: ABNORMAL

## 2022-02-17 ENCOUNTER — OFFICE VISIT (OUTPATIENT)
Dept: INTERNAL MEDICINE | Facility: CLINIC | Age: 87
End: 2022-02-17

## 2022-02-17 VITALS
TEMPERATURE: 97.3 F | WEIGHT: 95 LBS | HEIGHT: 62 IN | HEART RATE: 90 BPM | BODY MASS INDEX: 17.48 KG/M2 | SYSTOLIC BLOOD PRESSURE: 130 MMHG | OXYGEN SATURATION: 97 % | DIASTOLIC BLOOD PRESSURE: 70 MMHG

## 2022-02-17 DIAGNOSIS — R30.0 DYSURIA: Primary | ICD-10-CM

## 2022-02-17 DIAGNOSIS — Z91.89 AT RISK FOR NAUSEA: ICD-10-CM

## 2022-02-17 LAB
BILIRUB BLD-MCNC: NEGATIVE MG/DL
CLARITY, POC: ABNORMAL
COLOR UR: YELLOW
GLUCOSE UR STRIP-MCNC: NEGATIVE MG/DL
KETONES UR QL: NEGATIVE
LEUKOCYTE EST, POC: ABNORMAL
NITRITE UR-MCNC: NEGATIVE MG/ML
PH UR: 8 [PH] (ref 5–8)
PROT UR STRIP-MCNC: NEGATIVE MG/DL
RBC # UR STRIP: ABNORMAL /UL
SP GR UR: 1.01 (ref 1–1.03)
UROBILINOGEN UR QL: NORMAL

## 2022-02-17 PROCEDURE — 99213 OFFICE O/P EST LOW 20 MIN: CPT | Performed by: NURSE PRACTITIONER

## 2022-02-17 PROCEDURE — 81003 URINALYSIS AUTO W/O SCOPE: CPT | Performed by: NURSE PRACTITIONER

## 2022-02-17 RX ORDER — PROMETHAZINE HYDROCHLORIDE 12.5 MG/1
TABLET ORAL
Qty: 10 TABLET | Refills: 0 | Status: SHIPPED | OUTPATIENT
Start: 2022-02-17 | End: 2022-09-21 | Stop reason: ALTCHOICE

## 2022-02-17 NOTE — PROGRESS NOTES
Subjective   Meenu Arteaga is a 89 y.o. female.   Chief Complaint   Patient presents with   • Urinary Tract Infection     Has been taking doxycycline hyclate but had some side effects to it-took it x 1 wk, dyuria, discomfort       Ms. Arteaga returns the office today for ongoing urinary tract symptoms and reporting that she is unable to tolerate the doxycycline prescribed by Dr. Ruby. However patient states that she was taking the Zofran 30 minutes after taking the doxycycline. Patient has taken half of the medication. Repeat a urine which did show still significant infection. Most recent culture it did previously culture out to Pseudomonas. Patient has numerous intolerances to medications especially antibiotics.       The following portions of the patient's history were reviewed and updated as appropriate: allergies, current medications, past family history, past medical history, past social history, past surgical history and problem list.    Review of Systems   Constitutional: Negative for activity change, appetite change, fatigue, fever, unexpected weight gain and unexpected weight loss.   HENT: Negative for swollen glands, trouble swallowing and voice change.    Eyes: Negative for blurred vision and visual disturbance.   Respiratory: Negative for cough and shortness of breath.    Cardiovascular: Negative for chest pain, palpitations and leg swelling.   Gastrointestinal: Negative for abdominal pain, constipation, diarrhea, nausea, vomiting and indigestion.   Endocrine: Negative for cold intolerance, heat intolerance, polydipsia and polyphagia.   Genitourinary: Positive for dysuria, frequency, hematuria, pelvic pain and urgency. Negative for flank pain.   Musculoskeletal: Negative for arthralgias, back pain, joint swelling and neck pain.   Skin: Negative for color change, rash and skin lesions.   Neurological: Negative for dizziness, weakness, headache, memory problem and confusion.   Hematological: Does not  bruise/bleed easily.   Psychiatric/Behavioral: Negative for agitation, hallucinations and suicidal ideas. The patient is not nervous/anxious.        Objective   Past Medical History:   Diagnosis Date   • Arthritis    • GERD (gastroesophageal reflux disease)    • Hyperlipidemia    • Hypothyroidism    • Liver cyst    • Neuropathy    • Ovarian cyst       Past Surgical History:   Procedure Laterality Date   • ABDOMINAL HYSTERECTOMY     • APPENDECTOMY     • BREAST BIOPSY     • CHOLECYSTECTOMY     • COLONOSCOPY  03/05/2008    normal   • COLONOSCOPY  01/12/2012   • ENDOSCOPY  08/29/2013    normal   • ENDOSCOPY  01/23/2012    eus with stacy  normal endoscopic ultrascope of the pancreas.fortunately there was no mass seen   • ENDOSCOPY N/A 8/3/2018    Procedure: ESOPHAGOGASTRODUODENOSCOPY WITH ANESTHESIA;  Surgeon: Obed Patrick DO;  Location: [facility] ENDOSCOPY;  Service: Gastroenterology   • HEMORRHOIDECTOMY     • SKIN CANCER EXCISION  11/27/2021    left arm and left leg    • UPPER GASTROINTESTINAL ENDOSCOPY  08/29/2013        Current Outpatient Medications:   •  amLODIPine (NORVASC) 5 MG tablet, TAKE ONE TABLET BY MOUTH DAILY, Disp: 90 tablet, Rfl: 3  •  Ascorbic Acid (VITAMIN C PO), Daily., Disp: , Rfl:   •  bumetanide (BUMEX) 0.5 MG tablet, TAKE ONE TABLET BY MOUTH EVERY MORNING, Disp: 30 tablet, Rfl: 11  •  candesartan (ATACAND) 32 MG tablet, TAKE ONE TABLET BY MOUTH DAILY, Disp: 90 tablet, Rfl: 1  •  levothyroxine (SYNTHROID, LEVOTHROID) 25 MCG tablet, TAKE ONE TABLET BY MOUTH DAILY, Disp: 90 tablet, Rfl: 3  •  LORazepam (ATIVAN) 0.5 MG tablet, TAKEK ONE-FOURTH TABLET BY MOUTH TWO TIMES A DAY DURING THE DAY AND ONE-HALF TABLET BY MOUTH EVERY NIGHT AT BEDTIME, Disp: 30 tablet, Rfl: 5  •  Multiple Vitamins-Minerals (CENTRUM SILVER ADULT 50+) tablet, Take 1 tablet by mouth daily, Disp: , Rfl:   •  omeprazole (priLOSEC) 20 MG capsule, TAKE ONE CAPSULE BY MOUTH TWICE A DAY, Disp: 180 capsule, Rfl: 3  •  ondansetron ODT  (Zofran ODT) 4 MG disintegrating tablet, Place 1 tablet on the tongue Every 8 (Eight) Hours As Needed for Nausea or Vomiting., Disp: 10 tablet, Rfl: 0  •  Psyllium (METAMUCIL FIBER PO), Take 1 package by mouth As Needed., Disp: , Rfl:   •  vitamin D (ERGOCALCIFEROL) 1.25 MG (52732 UT) capsule capsule, TAKE 1 CAPSULE BY MOUTH EVERY OTHER WEEK, Disp: 6 capsule, Rfl: 3  •  promethazine (PHENERGAN) 12.5 MG tablet, Take 30 minutes before antibiotic medicines, Disp: 10 tablet, Rfl: 0      Vitals:    02/17/22 1012   BP: 130/70   Pulse: 90   Temp: 97.3 °F (36.3 °C)   SpO2: 97%         02/17/22  1012   Weight: 43.1 kg (95 lb)       Body mass index is 17.38 kg/m².    Physical Exam  Constitutional:       Appearance: She is well-developed.   HENT:      Head: Normocephalic and atraumatic.   Eyes:      Pupils: Pupils are equal, round, and reactive to light.   Cardiovascular:      Rate and Rhythm: Normal rate and regular rhythm.   Pulmonary:      Effort: Pulmonary effort is normal.      Breath sounds: Normal breath sounds.   Abdominal:      General: Bowel sounds are normal.      Palpations: Abdomen is soft.      Tenderness: There is abdominal tenderness in the suprapubic area.   Musculoskeletal:         General: Normal range of motion.      Cervical back: Normal range of motion and neck supple.   Skin:     General: Skin is warm and dry.   Neurological:      Mental Status: She is alert and oriented to person, place, and time.   Psychiatric:         Behavior: Behavior normal.               Assessment/Plan   Diagnoses and all orders for this visit:    1. Dysuria (Primary)  -     Cancel: POC Urinalysis Dipstick, Multipro  -     Urine Culture - Urine, Urine, Clean Catch  -     POC Urinalysis Dipstick, Multipro    2. At risk for nausea  -     promethazine (PHENERGAN) 12.5 MG tablet; Take 30 minutes before antibiotic medicines  Dispense: 10 tablet; Refill: 0        We did recheck a urine on her today, but after further discussion  realized that she was not taking her medicine as Dr. Villagran had advised. Will have her take a different nausea medicine since she is indicating in the office that Zofran is now making her more nauseated as well. Most likely she was having nausea because of how she was using Zofran ( 30 minutes after she had taken her doxycycline). Will have her take promethazine 30 minutes before her antibiotic and advised her as well to eat something prior to taking doxycycline related to significant intolerance. We did discuss in detail on how rocephin injections are not appropriate because we were getting close to the weekend and would need at least 5 days of IM injection.

## 2022-02-18 ENCOUNTER — TELEPHONE (OUTPATIENT)
Dept: INTERNAL MEDICINE | Facility: CLINIC | Age: 87
End: 2022-02-18

## 2022-02-18 DIAGNOSIS — N30.01 ACUTE CYSTITIS WITH HEMATURIA: ICD-10-CM

## 2022-02-18 RX ORDER — ONDANSETRON 4 MG/1
4 TABLET, ORALLY DISINTEGRATING ORAL EVERY 8 HOURS PRN
Qty: 6 TABLET | Refills: 0 | Status: SHIPPED | OUTPATIENT
Start: 2022-02-18 | End: 2022-02-22 | Stop reason: SDUPTHER

## 2022-02-21 ENCOUNTER — TELEPHONE (OUTPATIENT)
Dept: INTERNAL MEDICINE | Facility: CLINIC | Age: 87
End: 2022-02-21

## 2022-02-21 LAB
BACTERIA UR CULT: ABNORMAL
BACTERIA UR CULT: ABNORMAL
OTHER ANTIBIOTIC SUSC ISLT: ABNORMAL

## 2022-02-21 NOTE — TELEPHONE ENCOUNTER
Caller: Alex Arteaga    Relationship: Emergency Contact    Best call back number: [phone]    What medications are you currently taking:   Current Outpatient Medications on File Prior to Visit   Medication Sig Dispense Refill   • amLODIPine (NORVASC) 5 MG tablet TAKE ONE TABLET BY MOUTH DAILY 90 tablet 3   • Ascorbic Acid (VITAMIN C PO) Daily.     • bumetanide (BUMEX) 0.5 MG tablet TAKE ONE TABLET BY MOUTH EVERY MORNING 30 tablet 11   • candesartan (ATACAND) 32 MG tablet TAKE ONE TABLET BY MOUTH DAILY 90 tablet 1   • levothyroxine (SYNTHROID, LEVOTHROID) 25 MCG tablet TAKE ONE TABLET BY MOUTH DAILY 90 tablet 3   • LORazepam (ATIVAN) 0.5 MG tablet TAKEK ONE-FOURTH TABLET BY MOUTH TWO TIMES A DAY DURING THE DAY AND ONE-HALF TABLET BY MOUTH EVERY NIGHT AT BEDTIME 30 tablet 5   • Multiple Vitamins-Minerals (CENTRUM SILVER ADULT 50+) tablet Take 1 tablet by mouth daily     • omeprazole (priLOSEC) 20 MG capsule TAKE ONE CAPSULE BY MOUTH TWICE A  capsule 3   • ondansetron ODT (Zofran ODT) 4 MG disintegrating tablet Place 1 tablet on the tongue Every 8 (Eight) Hours As Needed for Nausea or Vomiting. 6 tablet 0   • promethazine (PHENERGAN) 12.5 MG tablet Take 30 minutes before antibiotic medicines 10 tablet 0   • Psyllium (METAMUCIL FIBER PO) Take 1 package by mouth As Needed.     • vitamin D (ERGOCALCIFEROL) 1.25 MG (38954 UT) capsule capsule TAKE 1 CAPSULE BY MOUTH EVERY OTHER WEEK 6 capsule 3     No current facility-administered medications on file prior to visit.          Which medication are you concerned about: DOXYCYCLINE    Who prescribed you this medication: CHANDRA MARQUEZ    What are your concerns: PATIENTS EMERGENCY CONTACT STATES THEY WERE TOLD TO CALLBACK WHEN REACHING THE END OF THIS MEDICATION. HE STATES SHE IS STILL EXPERIENCING A LITTLE DISCOMFORT AND BURNING WITH URINATION. WOULD LIKE CALLBACK WITH NEXT STEPS OF CARE.

## 2022-02-21 NOTE — TELEPHONE ENCOUNTER
Doesn't look like urine culture has reported out yet and will likely need these results before further recommendations or treatment can be given.

## 2022-02-22 DIAGNOSIS — N30.01 ACUTE CYSTITIS WITH HEMATURIA: Primary | ICD-10-CM

## 2022-02-22 RX ORDER — DOXYCYCLINE HYCLATE 100 MG/1
100 CAPSULE ORAL 2 TIMES DAILY
Qty: 6 CAPSULE | Refills: 0 | Status: SHIPPED | OUTPATIENT
Start: 2022-02-22 | End: 2022-11-15

## 2022-02-22 NOTE — TELEPHONE ENCOUNTER
Culture has reported and Sakina has contacted .   [Follow-Up Visit] : a follow-up visit for [Obesity] : obesity

## 2022-02-25 ENCOUNTER — TELEPHONE (OUTPATIENT)
Dept: INTERNAL MEDICINE | Facility: CLINIC | Age: 87
End: 2022-02-25

## 2022-02-25 DIAGNOSIS — N30.01 ACUTE CYSTITIS WITH HEMATURIA: ICD-10-CM

## 2022-02-25 RX ORDER — CIPROFLOXACIN 250 MG/1
250 TABLET, FILM COATED ORAL 2 TIMES DAILY
Qty: 10 TABLET | Refills: 0 | Status: SHIPPED | OUTPATIENT
Start: 2022-02-25 | End: 2022-03-03 | Stop reason: SDUPTHER

## 2022-02-25 RX ORDER — DOXYCYCLINE HYCLATE 100 MG/1
100 CAPSULE ORAL 2 TIMES DAILY
Qty: 6 CAPSULE | Refills: 0 | Status: CANCELLED | OUTPATIENT
Start: 2022-02-25

## 2022-02-25 NOTE — TELEPHONE ENCOUNTER
Caller: Meenu Arteaga    Relationship: Self    Best call back number: [phone]    Requested Prescriptions:   Requested Prescriptions     Pending Prescriptions Disp Refills   • doxycycline (VIBRAMYCIN) 100 MG capsule 6 capsule 0     Sig: Take 1 capsule by mouth 2 (Two) Times a Day.      Pharmacy where request should be sent: [facility] [address] [place] [address] AT [facility] - [phone]  - [phone] FX     Additional details provided by patient: PT IS STILL HAVING BLADDER DISCOMFORT, WANTS TO KNOW IF DOXYCYCLINE CAN BE REFILLED OR IF SHE NEEDS A STRONGER ANTIBIOTIC    Does the patient have less than a 3 day supply:  [x] Yes  [] No    Dave Zeng Rep   02/25/22 13:37 CST

## 2022-02-25 NOTE — TELEPHONE ENCOUNTER
Spoke with patient's .  Feel she would best respond to Cipro based on Culture results, especially if still having symptoms after 8 days of Doxycycline.  She has taken and tolerated this.  She has Zofran available.  Cipro sent to pharmacy.

## 2022-03-02 ENCOUNTER — TELEPHONE (OUTPATIENT)
Dept: INTERNAL MEDICINE | Facility: CLINIC | Age: 87
End: 2022-03-02

## 2022-03-02 NOTE — TELEPHONE ENCOUNTER
Caller: Meenu Arteaga    Relationship: Self    Best call back number:  [phone] ([phone]     What medication are you requesting: abx    What are your current symptoms: same symptoms as visit on 2/17. Finished medication, symptoms are still present.       If a prescription is needed, what is your preferred pharmacy and phone number:    [facility] [address] [place] [address] AT [facility] - [phone]  - [phone]   [phone]    Additional notes:  Prefers not to come back into the office. Would like a call if appt is required.

## 2022-03-03 ENCOUNTER — CLINICAL SUPPORT (OUTPATIENT)
Dept: INTERNAL MEDICINE | Facility: CLINIC | Age: 87
End: 2022-03-03

## 2022-03-03 ENCOUNTER — TELEPHONE (OUTPATIENT)
Dept: INTERNAL MEDICINE | Facility: CLINIC | Age: 87
End: 2022-03-03

## 2022-03-03 DIAGNOSIS — R39.9 UTI SYMPTOMS: Primary | ICD-10-CM

## 2022-03-03 LAB
BILIRUB BLD-MCNC: NEGATIVE MG/DL
CLARITY, POC: ABNORMAL
COLOR UR: YELLOW
GLUCOSE UR STRIP-MCNC: NEGATIVE MG/DL
KETONES UR QL: NEGATIVE
LEUKOCYTE EST, POC: ABNORMAL
NITRITE UR-MCNC: NEGATIVE MG/ML
PH UR: 7 [PH] (ref 5–8)
PROT UR STRIP-MCNC: NEGATIVE MG/DL
RBC # UR STRIP: ABNORMAL /UL
SP GR UR: 1.02 (ref 1–1.03)
UROBILINOGEN UR QL: NORMAL

## 2022-03-03 PROCEDURE — 81003 URINALYSIS AUTO W/O SCOPE: CPT | Performed by: NURSE PRACTITIONER

## 2022-03-03 PROCEDURE — 99211 OFF/OP EST MAY X REQ PHY/QHP: CPT | Performed by: NURSE PRACTITIONER

## 2022-03-03 RX ORDER — CIPROFLOXACIN 250 MG/1
250 TABLET, FILM COATED ORAL 2 TIMES DAILY
Qty: 6 TABLET | Refills: 0 | OUTPATIENT
Start: 2022-03-03 | End: 2022-03-04 | Stop reason: SDUPTHER

## 2022-03-03 NOTE — PROGRESS NOTES
had called, stating patient still having symptoms and took her last dose of Cipro this morning.  Repeat dipstick UA today still showing trace of blood and small WBC's.  Lashae says to give Cipro 250mg bid x 3 days and will repeat C&S today.   informed and voiced understanding.

## 2022-03-03 NOTE — TELEPHONE ENCOUNTER
Called  and informed that Lashae said she would need another UA before we would give more medication.  He voiced understanding and will bring her to collect UA.

## 2022-03-03 NOTE — TELEPHONE ENCOUNTER
Caller: Alex Arteaga    Relationship: Emergency Contact    Best call back number: [phone]    What is the best time to reach you: ANYTIME     Who are you requesting to speak with (clinical staff, provider,  specific staff member): DAGO       What was the call regarding: PATIENTS  REQUESTING A CALL BACK   STATING HE REQUESTED MEDICATION FOR HIS WIFE YESTERDAY AND DID NOT HEAR BACK AND THERE IS NOTHING AT THE PHARMACY FOR HER     Do you require a callback: YES

## 2022-03-04 DIAGNOSIS — R39.9 UTI SYMPTOMS: Primary | ICD-10-CM

## 2022-03-04 RX ORDER — CIPROFLOXACIN 250 MG/1
250 TABLET, FILM COATED ORAL 2 TIMES DAILY
Qty: 6 TABLET | Refills: 0 | Status: SHIPPED | OUTPATIENT
Start: 2022-03-04 | End: 2022-03-07

## 2022-03-05 LAB
BACTERIA UR CULT: NO GROWTH
BACTERIA UR CULT: NORMAL

## 2022-03-07 DIAGNOSIS — N39.0 FREQUENT UTI: Primary | ICD-10-CM

## 2022-03-08 ENCOUNTER — TELEPHONE (OUTPATIENT)
Dept: INTERNAL MEDICINE | Facility: CLINIC | Age: 87
End: 2022-03-08

## 2022-03-08 NOTE — TELEPHONE ENCOUNTER
Advised  this was the medication she was to have picked up over the weekend to get her through until the weekend. Since getting the results from culture it is no longer needed

## 2022-03-08 NOTE — TELEPHONE ENCOUNTER
states he was informed her UA was clear and didn't need meds. He said Arnel called and told him that cipro was called in. He would like to verify this.

## 2022-03-09 ENCOUNTER — TELEPHONE (OUTPATIENT)
Dept: INTERNAL MEDICINE | Facility: CLINIC | Age: 87
End: 2022-03-09

## 2022-03-09 NOTE — TELEPHONE ENCOUNTER
Caller: Alex Arteaga    Relationship: Emergency Contact    Best call back number:[phone]    What is the best time to reach you: ANYTIME    Who are you requesting to speak with (clinical staff, provider,  specific staff member): CLINICAL    What was the call regarding: WANTING TO SEE ABOUT ANOTHER NEUROLOGY OFFICE FOR PATIENT TO ATTEND, THE CURRENT OFFICE SHE IS REFERRED TO IS BOOKED UP     Do you require a callback: YES

## 2022-03-10 ENCOUNTER — TELEPHONE (OUTPATIENT)
Dept: INTERNAL MEDICINE | Facility: CLINIC | Age: 87
End: 2022-03-10

## 2022-03-10 NOTE — TELEPHONE ENCOUNTER
Talked with , he is wanting to change to [facility], not neurology, as [facility] can't see her until April.  Told him we would call and see if they could see her any sooner.

## 2022-03-10 NOTE — TELEPHONE ENCOUNTER
Caller: Alex Arteaga    Relationship: Emergency Contact    Best call back number: [phone]    What specialty or service is being requested: NEUROLOGY    What is the provider, practice or medical service name: NEUROLOGY DEPARTMENT OF [facility], DR. GEOVANY NAGY     What is the office location: [address] [place] [address]    What is the office phone number: [phone]    Any additional details:  IS REQUESTING TO BE ADVISED ON STATUS OF REQUEST WHEN RECEIVED/ORDERED

## 2022-03-14 NOTE — TELEPHONE ENCOUNTER
Wendi REID talked with [facility] on Friday and they stated they would see patient this week, but she was left on an extended hold and no one came back to the phone with an appt.  I called today, talked with Tello, who said we needed to fax a referral/records and once received, she would call the patient and get her in.  Records were faxed to [phone].

## 2022-03-21 ENCOUNTER — OFFICE VISIT (OUTPATIENT)
Dept: UROLOGY | Age: 87
End: 2022-03-21
Payer: MEDICARE

## 2022-03-21 VITALS — BODY MASS INDEX: 17.48 KG/M2 | WEIGHT: 95 LBS | TEMPERATURE: 97.3 F | HEIGHT: 62 IN

## 2022-03-21 DIAGNOSIS — N39.0 RECURRENT UTI: Primary | ICD-10-CM

## 2022-03-21 PROCEDURE — G8484 FLU IMMUNIZE NO ADMIN: HCPCS | Performed by: NURSE PRACTITIONER

## 2022-03-21 PROCEDURE — 1090F PRES/ABSN URINE INCON ASSESS: CPT | Performed by: NURSE PRACTITIONER

## 2022-03-21 PROCEDURE — G8427 DOCREV CUR MEDS BY ELIG CLIN: HCPCS | Performed by: NURSE PRACTITIONER

## 2022-03-21 PROCEDURE — 99205 OFFICE O/P NEW HI 60 MIN: CPT | Performed by: NURSE PRACTITIONER

## 2022-03-21 PROCEDURE — 4040F PNEUMOC VAC/ADMIN/RCVD: CPT | Performed by: NURSE PRACTITIONER

## 2022-03-21 PROCEDURE — 1123F ACP DISCUSS/DSCN MKR DOCD: CPT | Performed by: NURSE PRACTITIONER

## 2022-03-21 PROCEDURE — G8419 CALC BMI OUT NRM PARAM NOF/U: HCPCS | Performed by: NURSE PRACTITIONER

## 2022-03-21 PROCEDURE — 1036F TOBACCO NON-USER: CPT | Performed by: NURSE PRACTITIONER

## 2022-03-21 RX ORDER — CONJUGATED ESTROGENS 0.62 MG/G
CREAM VAGINAL
Qty: 1 EACH | Refills: 3 | Status: SHIPPED | OUTPATIENT
Start: 2022-03-21 | End: 2022-09-27 | Stop reason: SDUPTHER

## 2022-03-21 NOTE — PROGRESS NOTES
Phillip Ulloa is a 80 y.o. female who presents today   Chief Complaint   Patient presents with    New Patient     recurrent UTIs- has dysuria currently    Other     takes Zofran and Cipro previously for UTI        Patient is 80-year-old female presents today with recurrent UTI. She was previously seen by Taisha Patten on 8/7/2017 with dysuria and bladder pressure she was placed on Premarin for recurrent UTI, dysuria, pelvic pressure, atrophic vaginitis. Patient does have a significant allergy list currently maintained on doxycycline. Most recent urine culture negative. Patient does have significantly hard of hearing therefore her  accompanies her today. She does have frequency, urgency, pelvic pain for the last several days. Denies any stress or urge incontinence. She does take Zofran or Phenergan with antibiotics to prevent nausea. Previously prescribed Premarin she has never taken before. Denies any history of female cancer.     Previous urine cultures  3/5/2022 no growth  2/17/2022 Pseudomonas aeruginosa  2/9/2022 Pseudomonas aeruginosa   8/24/2021 Enterococcus faecalis     Past Medical History:   Diagnosis Date    Anxiety 10/2/2019    Chest tightness or pressure 8/26/2013 8/26/2013  lexiscan negative for myocardial ischemia, EF 61%    Edema     Essential and other specified forms of tremor     Generalized anxiety disorder     HTN (hypertension)     Hyperlipidemia     Hypertension     Insomnia, unspecified     Neuropathy     both legs up to both hips    Other and unspecified ovarian cyst     Other left bundle branch block     Pure hypercholesterolemia     Restless legs syndrome (RLS)     Solitary cyst of breast     Spinal stenosis, lumbar region, without neurogenic claudication     Unspecified hypothyroidism     Urinary frequency        Past Surgical History:   Procedure Laterality Date    APPENDECTOMY      CARDIAC CATHETERIZATION  5/26/15  MDL    with aortic root injection. EF 60%    CATARACT REMOVAL      CHOLECYSTECTOMY      CYST INCISION AND DRAINAGE      drained liver cyst    HEMORRHOID SURGERY      HYSTERECTOMY      NERVE BLOCK LUMB FACET LEVEL 1 BILATERAL Bilateral 1/19/2016    LUMBAR FACET CATE L4-5  performed by Aristeo Hoang at [facility] OR       Current Outpatient Medications   Medication Sig Dispense Refill    conjugated estrogens (PREMARIN) 0.625 MG/GM vaginal cream Place small amountt on end of finger and apply to urethra three times per week 1 each 3    amLODIPine (NORVASC) 5 MG tablet Take 5 mg by mouth daily      vitamin C (ASCORBIC ACID) 500 MG tablet Take 500 mg by mouth daily      vitamin D (ERGOCALCIFEROL) 79439 units CAPS capsule Take 50,000 Units by mouth once a week      LORazepam (ATIVAN) 0.5 MG tablet Take 0.5 mg by mouth every 6 hours as needed for Anxiety.  levothyroxine (SYNTHROID) 25 MCG tablet Take 2 tablets by mouth Daily 30 tablet 3    omeprazole (PRILOSEC) 20 MG capsule Take 20 mg by mouth 2 times daily      bumetanide (BUMEX) 0.5 MG tablet   Take 0.5 mg by mouth daily       Magnesium Oxide (WHIPPLE PO) Take 2 tablets by mouth daily       candesartan (ATACAND) 32 MG tablet Take 32 mg by mouth daily      Psyllium (METAMUCIL PO) Take 2 tablets by mouth daily       metoprolol succinate (TOPROL XL) 25 MG extended release tablet Take 1 tablet by mouth nightly (Patient not taking: Reported on 7/21/2020) 30 tablet 5    docusate sodium (COLACE) 100 MG capsule Take 100 mg by mouth 2 times daily (Patient not taking: Reported on 3/21/2022)      Multiple Vitamins-Minerals (CENTRUM SILVER ADULT 50+) TABS Take 1 tablet by mouth daily (Patient not taking: Reported on 3/21/2022)       No current facility-administered medications for this visit.        Allergies   Allergen Reactions    Ampicillin     Bactrim [Sulfamethoxazole-Trimethoprim]     Cephalosporins     Ciprocinonide [Fluocinolone]     Codeine     Cymbalta [Duloxetine Hcl]  Doxycycline     Enablex [Darifenacin Hydrobromide Er]     Gabapentin     Hctz [Hydrochlorothiazide]     Keppra [Levetiracetam]     Levaquin [Levofloxacin In D5w]     Lyrica [Pregabalin]     Pyridium [Phenazopyridine Hcl]     Quinolones     Sulfa Antibiotics        Social History     Socioeconomic History    Marital status:      Spouse name: Not on file    Number of children: Not on file    Years of education: Not on file    Highest education level: Not on file   Occupational History    Not on file   Tobacco Use    Smoking status: Never Smoker    Smokeless tobacco: Never Used   Vaping Use    Vaping Use: Never used   Substance and Sexual Activity    Alcohol use: No    Drug use: No    Sexual activity: Not on file   Other Topics Concern    Not on file   Social History Narrative    Not on file     Social Determinants of Health     Financial Resource Strain:     Difficulty of Paying Living Expenses: Not on file   Food Insecurity:     Worried About Running Out of Food in the Last Year: Not on file    Vicky of Food in the Last Year: Not on file   Transportation Needs:     Lack of Transportation (Medical): Not on file    Lack of Transportation (Non-Medical):  Not on file   Physical Activity:     Days of Exercise per Week: Not on file    Minutes of Exercise per Session: Not on file   Stress:     Feeling of Stress : Not on file   Social Connections:     Frequency of Communication with Friends and Family: Not on file    Frequency of Social Gatherings with Friends and Family: Not on file    Attends Quaker Services: Not on file    Active Member of Clubs or Organizations: Not on file    Attends Club or Organization Meetings: Not on file    Marital Status: Not on file   Intimate Partner Violence:     Fear of Current or Ex-Partner: Not on file    Emotionally Abused: Not on file    Physically Abused: Not on file    Sexually Abused: Not on file   Housing Stability:     Unable to Pay for Housing in the Last Year: Not on file    Number of Places Lived in the Last Year: Not on file    Unstable Housing in the Last Year: Not on file       Family History   Problem Relation Age of Onset    Other Father         stroke, MI,  46       REVIEW OF SYSTEMS:  Review of Systems   Constitutional: Negative for chills and fever. HENT:        Hard of hearing   Gastrointestinal: Negative for abdominal distention, abdominal pain, nausea and vomiting. Genitourinary: Positive for frequency, pelvic pain and urgency. Negative for difficulty urinating, dysuria, flank pain and hematuria. Musculoskeletal: Negative for back pain and gait problem. Psychiatric/Behavioral: Negative for agitation and confusion. PHYSICAL EXAM:  Temp 97.3 °F (36.3 °C) (Temporal)   Ht 5' 2\" (1.575 m)   Wt 95 lb (43.1 kg)   LMP  (LMP Unknown)   BMI 17.38 kg/m²   Physical Exam  Vitals and nursing note reviewed. Constitutional:       General: She is not in acute distress. Appearance: Normal appearance. She is not ill-appearing. Pulmonary:      Effort: Pulmonary effort is normal. No respiratory distress. Abdominal:      General: There is no distension. Tenderness: There is no abdominal tenderness. There is no right CVA tenderness or left CVA tenderness. Neurological:      Mental Status: She is alert and oriented to person, place, and time. Mental status is at baseline. Motor: Weakness present.       Gait: Gait abnormal.   Psychiatric:         Mood and Affect: Mood normal.         Behavior: Behavior normal.         DATA:    Results for orders placed or performed in visit on 22   Culture, Urine    Specimen: Urine, clean catch   Result Value Ref Range    Urine Culture, Routine <50,000 CFU/ml (A)     Organism Pseudomonas aeruginosa (A)     Urine Culture, Routine Light growth  Sensitivity to follow       Organism Pseudomonas aeruginosa (A)     Urine Culture, Routine Light growth 2nd  Isolation in progress          1. Recurrent UTI  UA does not appear infected today although we will go ahead and send for urine culture to ensure no infection. We will go ahead and start her on Premarin as previously recommended for recurrent UTI and vaginal atrophy. We also discussed starting Myrbetriq for overactive bladder although at this time we will only start 1 medication at a time. She does have a long allergy list and is afraid to start any new medications. We will have her follow-up. - Culture, Urine  - conjugated estrogens (PREMARIN) 0.625 MG/GM vaginal cream; Place small amountt on end of finger and apply to urethra three times per week  Dispense: 1 each; Refill: 3      Orders Placed This Encounter   Procedures    Culture, Urine     Order Specific Question:   Specify (ex-cath, midstream, cysto, etc)? Answer:   clean catch        Return in about 3 months (around 6/21/2022). All information inputted into the note by the MA to include chief complaint, past medical history, past surgical history, medications, allergies, social and family history and review of systems has been reviewed and updated as needed by me. EMR Dragon/transcription disclaimer: Much of this documentt is electronic  transcription/translation of spoken language to printed text. The  electronic translation of spoken language may be erroneous, or at times,  nonsensical words or phrases may be inadvertently transcribed.  Although I  have reviewed the document for such errors, some may still exist.

## 2022-03-22 ENCOUNTER — TELEPHONE (OUTPATIENT)
Dept: UROLOGY | Age: 87
End: 2022-03-22

## 2022-03-22 NOTE — TELEPHONE ENCOUNTER
Spouse is calling stating the medication that was prescribed for pt is about 400.00 that insurance is not covering, would like something else to be called in its place please

## 2022-03-23 ASSESSMENT — ENCOUNTER SYMPTOMS
BACK PAIN: 0
ABDOMINAL DISTENTION: 0
ABDOMINAL PAIN: 0
NAUSEA: 0
VOMITING: 0

## 2022-03-25 DIAGNOSIS — N30.00 ACUTE CYSTITIS WITHOUT HEMATURIA: ICD-10-CM

## 2022-03-25 PROBLEM — N39.0 UTI (URINARY TRACT INFECTION): Status: ACTIVE | Noted: 2022-03-25

## 2022-03-25 LAB
ORGANISM: ABNORMAL
ORGANISM: ABNORMAL
URINE CULTURE, ROUTINE: ABNORMAL

## 2022-03-25 RX ORDER — ALBUTEROL SULFATE 90 UG/1
4 AEROSOL, METERED RESPIRATORY (INHALATION) PRN
Status: CANCELLED | OUTPATIENT
Start: 2022-03-25

## 2022-03-25 RX ORDER — SODIUM CHLORIDE 9 MG/ML
INJECTION, SOLUTION INTRAVENOUS ONCE
Status: CANCELLED
Start: 2022-03-28 | End: 2022-03-28

## 2022-03-25 RX ORDER — HEPARIN SODIUM (PORCINE) LOCK FLUSH IV SOLN 100 UNIT/ML 100 UNIT/ML
500 SOLUTION INTRAVENOUS PRN
Status: CANCELLED | OUTPATIENT
Start: 2022-03-25

## 2022-03-25 RX ORDER — ALBUTEROL SULFATE 90 UG/1
4 AEROSOL, METERED RESPIRATORY (INHALATION) PRN
Status: CANCELLED | OUTPATIENT
Start: 2022-03-28

## 2022-03-25 RX ORDER — ONDANSETRON 2 MG/ML
8 INJECTION INTRAMUSCULAR; INTRAVENOUS
Status: CANCELLED | OUTPATIENT
Start: 2022-03-28

## 2022-03-25 RX ORDER — EPINEPHRINE 1 MG/ML
0.3 INJECTION, SOLUTION, CONCENTRATE INTRAVENOUS PRN
Status: CANCELLED | OUTPATIENT
Start: 2022-03-25

## 2022-03-25 RX ORDER — ONDANSETRON 2 MG/ML
8 INJECTION INTRAMUSCULAR; INTRAVENOUS
Status: CANCELLED | OUTPATIENT
Start: 2022-03-25

## 2022-03-25 RX ORDER — SODIUM CHLORIDE 9 MG/ML
INJECTION, SOLUTION INTRAVENOUS CONTINUOUS
Status: CANCELLED | OUTPATIENT
Start: 2022-03-28

## 2022-03-25 RX ORDER — SODIUM CHLORIDE 9 MG/ML
INJECTION, SOLUTION INTRAVENOUS CONTINUOUS
Status: CANCELLED | OUTPATIENT
Start: 2022-03-25

## 2022-03-25 RX ORDER — EPINEPHRINE 1 MG/ML
0.3 INJECTION, SOLUTION, CONCENTRATE INTRAVENOUS PRN
Status: CANCELLED | OUTPATIENT
Start: 2022-03-28

## 2022-03-25 RX ORDER — ACETAMINOPHEN 325 MG/1
650 TABLET ORAL
Status: CANCELLED | OUTPATIENT
Start: 2022-03-25

## 2022-03-25 RX ORDER — SODIUM CHLORIDE 9 MG/ML
25 INJECTION, SOLUTION INTRAVENOUS PRN
Status: CANCELLED | OUTPATIENT
Start: 2022-03-25

## 2022-03-25 RX ORDER — SODIUM CHLORIDE 0.9 % (FLUSH) 0.9 %
5-40 SYRINGE (ML) INJECTION PRN
Status: CANCELLED | OUTPATIENT
Start: 2022-03-25

## 2022-03-25 RX ORDER — DIPHENHYDRAMINE HYDROCHLORIDE 50 MG/ML
50 INJECTION INTRAMUSCULAR; INTRAVENOUS
Status: CANCELLED | OUTPATIENT
Start: 2022-03-25

## 2022-03-25 RX ORDER — ACETAMINOPHEN 325 MG/1
650 TABLET ORAL
Status: CANCELLED | OUTPATIENT
Start: 2022-03-28

## 2022-03-25 RX ORDER — SODIUM CHLORIDE 0.9 % (FLUSH) 0.9 %
10 SYRINGE (ML) INJECTION PRN
Status: CANCELLED | OUTPATIENT
Start: 2022-03-28

## 2022-03-25 RX ORDER — DIPHENHYDRAMINE HYDROCHLORIDE 50 MG/ML
50 INJECTION INTRAMUSCULAR; INTRAVENOUS
Status: CANCELLED | OUTPATIENT
Start: 2022-03-28

## 2022-03-28 ENCOUNTER — HOSPITAL ENCOUNTER (OUTPATIENT)
Dept: INFUSION THERAPY | Age: 87
Setting detail: INFUSION SERIES
Discharge: HOME | End: 2022-03-28
Payer: MEDICARE

## 2022-03-28 VITALS
HEART RATE: 84 BPM | OXYGEN SATURATION: 99 % | SYSTOLIC BLOOD PRESSURE: 148 MMHG | TEMPERATURE: 98.9 F | DIASTOLIC BLOOD PRESSURE: 85 MMHG | RESPIRATION RATE: 17 BRPM

## 2022-03-28 DIAGNOSIS — N30.00 ACUTE CYSTITIS WITHOUT HEMATURIA: ICD-10-CM

## 2022-03-28 DIAGNOSIS — N30.00 ACUTE CYSTITIS WITHOUT HEMATURIA: Primary | ICD-10-CM

## 2022-03-28 PROCEDURE — 96374 THER/PROPH/DIAG INJ IV PUSH: CPT

## 2022-03-28 PROCEDURE — 6360000002 HC RX W HCPCS: Performed by: NURSE PRACTITIONER

## 2022-03-28 PROCEDURE — 2580000003 HC RX 258: Performed by: NURSE PRACTITIONER

## 2022-03-28 RX ORDER — DIPHENHYDRAMINE HYDROCHLORIDE 50 MG/ML
50 INJECTION INTRAMUSCULAR; INTRAVENOUS
Status: CANCELLED | OUTPATIENT
Start: 2022-03-29

## 2022-03-28 RX ORDER — SODIUM CHLORIDE 9 MG/ML
INJECTION, SOLUTION INTRAVENOUS ONCE
Status: CANCELLED
Start: 2022-03-29 | End: 2022-03-29

## 2022-03-28 RX ORDER — SODIUM CHLORIDE 0.9 % (FLUSH) 0.9 %
10 SYRINGE (ML) INJECTION PRN
Status: CANCELLED | OUTPATIENT
Start: 2022-03-29

## 2022-03-28 RX ORDER — ACETAMINOPHEN 325 MG/1
650 TABLET ORAL
Status: CANCELLED | OUTPATIENT
Start: 2022-03-29

## 2022-03-28 RX ORDER — EPINEPHRINE 1 MG/ML
0.3 INJECTION, SOLUTION, CONCENTRATE INTRAVENOUS PRN
Status: CANCELLED | OUTPATIENT
Start: 2022-03-29

## 2022-03-28 RX ORDER — ALBUTEROL SULFATE 90 UG/1
4 AEROSOL, METERED RESPIRATORY (INHALATION) PRN
Status: CANCELLED | OUTPATIENT
Start: 2022-03-29

## 2022-03-28 RX ORDER — SODIUM CHLORIDE 9 MG/ML
INJECTION, SOLUTION INTRAVENOUS ONCE
Status: COMPLETED | OUTPATIENT
Start: 2022-03-28 | End: 2022-03-28

## 2022-03-28 RX ORDER — SODIUM CHLORIDE 0.9 % (FLUSH) 0.9 %
10 SYRINGE (ML) INJECTION PRN
Status: DISCONTINUED | OUTPATIENT
Start: 2022-03-28 | End: 2022-03-30 | Stop reason: HOSPADM

## 2022-03-28 RX ORDER — SODIUM CHLORIDE 9 MG/ML
INJECTION, SOLUTION INTRAVENOUS CONTINUOUS
Status: CANCELLED | OUTPATIENT
Start: 2022-03-29

## 2022-03-28 RX ORDER — ONDANSETRON 2 MG/ML
8 INJECTION INTRAMUSCULAR; INTRAVENOUS
Status: CANCELLED | OUTPATIENT
Start: 2022-03-29

## 2022-03-28 RX ADMIN — SODIUM CHLORIDE: 9 INJECTION, SOLUTION INTRAVENOUS at 10:52

## 2022-03-28 RX ADMIN — SODIUM CHLORIDE, PRESERVATIVE FREE 1000 MG: 5 INJECTION INTRAVENOUS at 10:53

## 2022-03-29 ENCOUNTER — HOSPITAL ENCOUNTER (OUTPATIENT)
Dept: INFUSION THERAPY | Age: 87
Setting detail: INFUSION SERIES
Discharge: HOME | End: 2022-03-29
Payer: MEDICARE

## 2022-03-29 VITALS
RESPIRATION RATE: 17 BRPM | HEART RATE: 83 BPM | DIASTOLIC BLOOD PRESSURE: 68 MMHG | OXYGEN SATURATION: 98 % | TEMPERATURE: 97.2 F | SYSTOLIC BLOOD PRESSURE: 151 MMHG

## 2022-03-29 DIAGNOSIS — N30.00 ACUTE CYSTITIS WITHOUT HEMATURIA: Primary | ICD-10-CM

## 2022-03-29 PROCEDURE — 96374 THER/PROPH/DIAG INJ IV PUSH: CPT

## 2022-03-29 PROCEDURE — 6360000002 HC RX W HCPCS: Performed by: NURSE PRACTITIONER

## 2022-03-29 PROCEDURE — 2580000003 HC RX 258: Performed by: NURSE PRACTITIONER

## 2022-03-29 RX ORDER — SODIUM CHLORIDE 9 MG/ML
INJECTION, SOLUTION INTRAVENOUS CONTINUOUS
Status: CANCELLED | OUTPATIENT
Start: 2022-03-30

## 2022-03-29 RX ORDER — SODIUM CHLORIDE 9 MG/ML
INJECTION, SOLUTION INTRAVENOUS ONCE
Status: COMPLETED | OUTPATIENT
Start: 2022-03-29 | End: 2022-03-29

## 2022-03-29 RX ORDER — SODIUM CHLORIDE 9 MG/ML
INJECTION, SOLUTION INTRAVENOUS ONCE
Status: CANCELLED
Start: 2022-03-30 | End: 2022-03-30

## 2022-03-29 RX ORDER — ONDANSETRON 2 MG/ML
8 INJECTION INTRAMUSCULAR; INTRAVENOUS
Status: CANCELLED | OUTPATIENT
Start: 2022-03-30

## 2022-03-29 RX ORDER — DIPHENHYDRAMINE HYDROCHLORIDE 50 MG/ML
50 INJECTION INTRAMUSCULAR; INTRAVENOUS
Status: CANCELLED | OUTPATIENT
Start: 2022-03-30

## 2022-03-29 RX ORDER — ALBUTEROL SULFATE 90 UG/1
4 AEROSOL, METERED RESPIRATORY (INHALATION) PRN
Status: CANCELLED | OUTPATIENT
Start: 2022-03-30

## 2022-03-29 RX ORDER — ACETAMINOPHEN 325 MG/1
650 TABLET ORAL
Status: CANCELLED | OUTPATIENT
Start: 2022-03-30

## 2022-03-29 RX ORDER — SODIUM CHLORIDE 0.9 % (FLUSH) 0.9 %
10 SYRINGE (ML) INJECTION PRN
Status: DISCONTINUED | OUTPATIENT
Start: 2022-03-29 | End: 2022-03-31 | Stop reason: HOSPADM

## 2022-03-29 RX ORDER — EPINEPHRINE 1 MG/ML
0.3 INJECTION, SOLUTION, CONCENTRATE INTRAVENOUS PRN
Status: CANCELLED | OUTPATIENT
Start: 2022-03-30

## 2022-03-29 RX ORDER — SODIUM CHLORIDE 0.9 % (FLUSH) 0.9 %
10 SYRINGE (ML) INJECTION PRN
Status: CANCELLED | OUTPATIENT
Start: 2022-03-30

## 2022-03-29 RX ADMIN — SODIUM CHLORIDE: 9 INJECTION, SOLUTION INTRAVENOUS at 10:00

## 2022-03-29 RX ADMIN — SODIUM CHLORIDE, PRESERVATIVE FREE 1000 MG: 5 INJECTION INTRAVENOUS at 10:01

## 2022-03-30 ENCOUNTER — HOSPITAL ENCOUNTER (OUTPATIENT)
Dept: INFUSION THERAPY | Age: 87
Setting detail: INFUSION SERIES
Discharge: HOME | End: 2022-03-30
Payer: MEDICARE

## 2022-03-30 VITALS
SYSTOLIC BLOOD PRESSURE: 155 MMHG | DIASTOLIC BLOOD PRESSURE: 78 MMHG | HEART RATE: 83 BPM | RESPIRATION RATE: 17 BRPM | OXYGEN SATURATION: 100 % | TEMPERATURE: 98.2 F

## 2022-03-30 DIAGNOSIS — N30.00 ACUTE CYSTITIS WITHOUT HEMATURIA: Primary | ICD-10-CM

## 2022-03-30 PROCEDURE — 2580000003 HC RX 258: Performed by: NURSE PRACTITIONER

## 2022-03-30 PROCEDURE — 6360000002 HC RX W HCPCS: Performed by: NURSE PRACTITIONER

## 2022-03-30 PROCEDURE — 96374 THER/PROPH/DIAG INJ IV PUSH: CPT

## 2022-03-30 RX ORDER — EPINEPHRINE 1 MG/ML
0.3 INJECTION, SOLUTION, CONCENTRATE INTRAVENOUS PRN
Status: CANCELLED | OUTPATIENT
Start: 2022-03-31

## 2022-03-30 RX ORDER — SODIUM CHLORIDE 0.9 % (FLUSH) 0.9 %
10 SYRINGE (ML) INJECTION PRN
Status: CANCELLED | OUTPATIENT
Start: 2022-03-31

## 2022-03-30 RX ORDER — SODIUM CHLORIDE 9 MG/ML
INJECTION, SOLUTION INTRAVENOUS CONTINUOUS
Status: CANCELLED | OUTPATIENT
Start: 2022-03-31

## 2022-03-30 RX ORDER — ACETAMINOPHEN 325 MG/1
650 TABLET ORAL
Status: CANCELLED | OUTPATIENT
Start: 2022-03-31

## 2022-03-30 RX ORDER — SODIUM CHLORIDE 9 MG/ML
INJECTION, SOLUTION INTRAVENOUS ONCE
Status: COMPLETED | OUTPATIENT
Start: 2022-03-30 | End: 2022-03-30

## 2022-03-30 RX ORDER — SODIUM CHLORIDE 0.9 % (FLUSH) 0.9 %
10 SYRINGE (ML) INJECTION PRN
Status: DISCONTINUED | OUTPATIENT
Start: 2022-03-30 | End: 2022-04-01 | Stop reason: HOSPADM

## 2022-03-30 RX ORDER — DIPHENHYDRAMINE HYDROCHLORIDE 50 MG/ML
50 INJECTION INTRAMUSCULAR; INTRAVENOUS
Status: CANCELLED | OUTPATIENT
Start: 2022-03-31

## 2022-03-30 RX ORDER — SODIUM CHLORIDE 9 MG/ML
INJECTION, SOLUTION INTRAVENOUS ONCE
Status: CANCELLED
Start: 2022-03-31 | End: 2022-03-31

## 2022-03-30 RX ORDER — ALBUTEROL SULFATE 90 UG/1
4 AEROSOL, METERED RESPIRATORY (INHALATION) PRN
Status: CANCELLED | OUTPATIENT
Start: 2022-03-31

## 2022-03-30 RX ORDER — ONDANSETRON 2 MG/ML
8 INJECTION INTRAMUSCULAR; INTRAVENOUS
Status: CANCELLED | OUTPATIENT
Start: 2022-03-31

## 2022-03-30 RX ADMIN — SODIUM CHLORIDE: 9 INJECTION, SOLUTION INTRAVENOUS at 09:59

## 2022-03-30 RX ADMIN — SODIUM CHLORIDE, PRESERVATIVE FREE 10 ML: 5 INJECTION INTRAVENOUS at 10:14

## 2022-03-30 RX ADMIN — SODIUM CHLORIDE, PRESERVATIVE FREE 1000 MG: 5 INJECTION INTRAVENOUS at 10:04

## 2022-03-31 ENCOUNTER — HOSPITAL ENCOUNTER (OUTPATIENT)
Dept: INFUSION THERAPY | Age: 87
Setting detail: INFUSION SERIES
Discharge: HOME | End: 2022-03-31
Payer: MEDICARE

## 2022-03-31 VITALS
HEART RATE: 82 BPM | OXYGEN SATURATION: 99 % | DIASTOLIC BLOOD PRESSURE: 75 MMHG | RESPIRATION RATE: 18 BRPM | SYSTOLIC BLOOD PRESSURE: 135 MMHG | TEMPERATURE: 98.3 F

## 2022-03-31 DIAGNOSIS — N30.00 ACUTE CYSTITIS WITHOUT HEMATURIA: Primary | ICD-10-CM

## 2022-03-31 PROCEDURE — 2580000003 HC RX 258: Performed by: NURSE PRACTITIONER

## 2022-03-31 PROCEDURE — 6360000002 HC RX W HCPCS: Performed by: NURSE PRACTITIONER

## 2022-03-31 PROCEDURE — 96374 THER/PROPH/DIAG INJ IV PUSH: CPT

## 2022-03-31 RX ORDER — SODIUM CHLORIDE 0.9 % (FLUSH) 0.9 %
10 SYRINGE (ML) INJECTION PRN
Status: CANCELLED | OUTPATIENT
Start: 2022-04-01

## 2022-03-31 RX ORDER — SODIUM CHLORIDE 0.9 % (FLUSH) 0.9 %
10 SYRINGE (ML) INJECTION PRN
Status: DISCONTINUED | OUTPATIENT
Start: 2022-03-31 | End: 2022-04-02 | Stop reason: HOSPADM

## 2022-03-31 RX ORDER — ACETAMINOPHEN 325 MG/1
650 TABLET ORAL
Status: CANCELLED | OUTPATIENT
Start: 2022-04-01

## 2022-03-31 RX ORDER — SODIUM CHLORIDE 9 MG/ML
INJECTION, SOLUTION INTRAVENOUS CONTINUOUS
Status: CANCELLED | OUTPATIENT
Start: 2022-04-01

## 2022-03-31 RX ORDER — SODIUM CHLORIDE 9 MG/ML
INJECTION, SOLUTION INTRAVENOUS ONCE
Status: CANCELLED
Start: 2022-04-01 | End: 2022-04-01

## 2022-03-31 RX ORDER — ALBUTEROL SULFATE 90 UG/1
4 AEROSOL, METERED RESPIRATORY (INHALATION) PRN
Status: CANCELLED | OUTPATIENT
Start: 2022-04-01

## 2022-03-31 RX ORDER — DIPHENHYDRAMINE HYDROCHLORIDE 50 MG/ML
50 INJECTION INTRAMUSCULAR; INTRAVENOUS
Status: CANCELLED | OUTPATIENT
Start: 2022-04-01

## 2022-03-31 RX ORDER — ONDANSETRON 2 MG/ML
8 INJECTION INTRAMUSCULAR; INTRAVENOUS
Status: CANCELLED | OUTPATIENT
Start: 2022-04-01

## 2022-03-31 RX ORDER — SODIUM CHLORIDE 9 MG/ML
INJECTION, SOLUTION INTRAVENOUS ONCE
Status: COMPLETED | OUTPATIENT
Start: 2022-03-31 | End: 2022-03-31

## 2022-03-31 RX ORDER — EPINEPHRINE 1 MG/ML
0.3 INJECTION, SOLUTION, CONCENTRATE INTRAVENOUS PRN
Status: CANCELLED | OUTPATIENT
Start: 2022-04-01

## 2022-03-31 RX ADMIN — SODIUM CHLORIDE, PRESERVATIVE FREE 1000 MG: 5 INJECTION INTRAVENOUS at 10:15

## 2022-03-31 RX ADMIN — SODIUM CHLORIDE: 9 INJECTION, SOLUTION INTRAVENOUS at 10:15

## 2022-04-01 ENCOUNTER — HOSPITAL ENCOUNTER (OUTPATIENT)
Dept: INFUSION THERAPY | Age: 87
Setting detail: INFUSION SERIES
Discharge: HOME | End: 2022-04-01
Payer: MEDICARE

## 2022-04-01 VITALS
TEMPERATURE: 97.8 F | DIASTOLIC BLOOD PRESSURE: 79 MMHG | HEART RATE: 78 BPM | RESPIRATION RATE: 20 BRPM | OXYGEN SATURATION: 99 % | SYSTOLIC BLOOD PRESSURE: 139 MMHG

## 2022-04-01 DIAGNOSIS — N30.00 ACUTE CYSTITIS WITHOUT HEMATURIA: Primary | ICD-10-CM

## 2022-04-01 PROCEDURE — 6360000002 HC RX W HCPCS: Performed by: NURSE PRACTITIONER

## 2022-04-01 PROCEDURE — 96374 THER/PROPH/DIAG INJ IV PUSH: CPT

## 2022-04-01 PROCEDURE — 2580000003 HC RX 258: Performed by: NURSE PRACTITIONER

## 2022-04-01 RX ORDER — SODIUM CHLORIDE 9 MG/ML
INJECTION, SOLUTION INTRAVENOUS ONCE
Status: DISCONTINUED | OUTPATIENT
Start: 2022-04-01 | End: 2022-04-03 | Stop reason: HOSPADM

## 2022-04-01 RX ORDER — SODIUM CHLORIDE 9 MG/ML
INJECTION, SOLUTION INTRAVENOUS ONCE
Status: CANCELLED
Start: 2022-04-02 | End: 2022-04-02

## 2022-04-01 RX ORDER — DIPHENHYDRAMINE HYDROCHLORIDE 50 MG/ML
50 INJECTION INTRAMUSCULAR; INTRAVENOUS
Status: CANCELLED | OUTPATIENT
Start: 2022-04-02

## 2022-04-01 RX ORDER — EPINEPHRINE 1 MG/ML
0.3 INJECTION, SOLUTION, CONCENTRATE INTRAVENOUS PRN
Status: CANCELLED | OUTPATIENT
Start: 2022-04-02

## 2022-04-01 RX ORDER — ALBUTEROL SULFATE 90 UG/1
4 AEROSOL, METERED RESPIRATORY (INHALATION) PRN
Status: CANCELLED | OUTPATIENT
Start: 2022-04-02

## 2022-04-01 RX ORDER — SODIUM CHLORIDE 0.9 % (FLUSH) 0.9 %
10 SYRINGE (ML) INJECTION PRN
Status: CANCELLED | OUTPATIENT
Start: 2022-04-02

## 2022-04-01 RX ORDER — SODIUM CHLORIDE 9 MG/ML
INJECTION, SOLUTION INTRAVENOUS CONTINUOUS
Status: CANCELLED | OUTPATIENT
Start: 2022-04-02

## 2022-04-01 RX ORDER — ONDANSETRON 2 MG/ML
8 INJECTION INTRAMUSCULAR; INTRAVENOUS
Status: CANCELLED | OUTPATIENT
Start: 2022-04-02

## 2022-04-01 RX ORDER — ACETAMINOPHEN 325 MG/1
650 TABLET ORAL
Status: CANCELLED | OUTPATIENT
Start: 2022-04-02

## 2022-04-01 RX ADMIN — SODIUM CHLORIDE, PRESERVATIVE FREE 1000 MG: 5 INJECTION INTRAVENOUS at 09:56

## 2022-04-02 ENCOUNTER — HOSPITAL ENCOUNTER (OUTPATIENT)
Age: 87
Discharge: HOME | End: 2022-04-02
Attending: UROLOGY | Admitting: UROLOGY
Payer: MEDICARE

## 2022-04-02 VITALS
TEMPERATURE: 97.5 F | RESPIRATION RATE: 20 BRPM | DIASTOLIC BLOOD PRESSURE: 72 MMHG | SYSTOLIC BLOOD PRESSURE: 147 MMHG | OXYGEN SATURATION: 100 % | HEART RATE: 80 BPM

## 2022-04-02 DIAGNOSIS — N30.00 ACUTE CYSTITIS WITHOUT HEMATURIA: Primary | ICD-10-CM

## 2022-04-02 PROCEDURE — 96365 THER/PROPH/DIAG IV INF INIT: CPT

## 2022-04-02 PROCEDURE — 6360000002 HC RX W HCPCS: Performed by: NURSE PRACTITIONER

## 2022-04-02 PROCEDURE — 2580000003 HC RX 258: Performed by: NURSE PRACTITIONER

## 2022-04-02 RX ORDER — SODIUM CHLORIDE 0.9 % (FLUSH) 0.9 %
10 SYRINGE (ML) INJECTION PRN
Status: CANCELLED | OUTPATIENT
Start: 2022-04-03

## 2022-04-02 RX ORDER — ALBUTEROL SULFATE 90 UG/1
4 AEROSOL, METERED RESPIRATORY (INHALATION) PRN
Status: CANCELLED | OUTPATIENT
Start: 2022-04-03

## 2022-04-02 RX ORDER — SODIUM CHLORIDE 9 MG/ML
INJECTION, SOLUTION INTRAVENOUS ONCE
Status: COMPLETED | OUTPATIENT
Start: 2022-04-02 | End: 2022-04-02

## 2022-04-02 RX ORDER — EPINEPHRINE 1 MG/ML
0.3 INJECTION, SOLUTION, CONCENTRATE INTRAVENOUS PRN
Status: CANCELLED | OUTPATIENT
Start: 2022-04-03

## 2022-04-02 RX ORDER — SODIUM CHLORIDE 9 MG/ML
INJECTION, SOLUTION INTRAVENOUS ONCE
Status: CANCELLED
Start: 2022-04-03 | End: 2022-04-03

## 2022-04-02 RX ORDER — ONDANSETRON 2 MG/ML
8 INJECTION INTRAMUSCULAR; INTRAVENOUS
Status: CANCELLED | OUTPATIENT
Start: 2022-04-03

## 2022-04-02 RX ORDER — SODIUM CHLORIDE 0.9 % (FLUSH) 0.9 %
10 SYRINGE (ML) INJECTION PRN
Status: DISCONTINUED | OUTPATIENT
Start: 2022-04-02 | End: 2022-04-02 | Stop reason: HOSPADM

## 2022-04-02 RX ORDER — ACETAMINOPHEN 325 MG/1
650 TABLET ORAL
Status: CANCELLED | OUTPATIENT
Start: 2022-04-03

## 2022-04-02 RX ORDER — DIPHENHYDRAMINE HYDROCHLORIDE 50 MG/ML
50 INJECTION INTRAMUSCULAR; INTRAVENOUS
Status: CANCELLED | OUTPATIENT
Start: 2022-04-03

## 2022-04-02 RX ORDER — SODIUM CHLORIDE 9 MG/ML
INJECTION, SOLUTION INTRAVENOUS CONTINUOUS
Status: CANCELLED | OUTPATIENT
Start: 2022-04-03

## 2022-04-02 RX ADMIN — SODIUM CHLORIDE 1000 MG: 9 INJECTION, SOLUTION INTRAVENOUS at 11:26

## 2022-04-02 RX ADMIN — SODIUM CHLORIDE: 9 INJECTION, SOLUTION INTRAVENOUS at 11:25

## 2022-04-03 ENCOUNTER — HOSPITAL ENCOUNTER (OUTPATIENT)
Age: 87
Discharge: HOME | End: 2022-04-03
Attending: UROLOGY | Admitting: UROLOGY
Payer: MEDICARE

## 2022-04-03 VITALS
RESPIRATION RATE: 18 BRPM | SYSTOLIC BLOOD PRESSURE: 142 MMHG | OXYGEN SATURATION: 100 % | TEMPERATURE: 97 F | DIASTOLIC BLOOD PRESSURE: 57 MMHG | HEART RATE: 78 BPM

## 2022-04-03 DIAGNOSIS — N30.00 ACUTE CYSTITIS WITHOUT HEMATURIA: Primary | ICD-10-CM

## 2022-04-03 PROCEDURE — 2580000003 HC RX 258: Performed by: NURSE PRACTITIONER

## 2022-04-03 PROCEDURE — 6360000002 HC RX W HCPCS: Performed by: NURSE PRACTITIONER

## 2022-04-03 PROCEDURE — 96365 THER/PROPH/DIAG IV INF INIT: CPT

## 2022-04-03 RX ORDER — SODIUM CHLORIDE 9 MG/ML
INJECTION, SOLUTION INTRAVENOUS ONCE
Status: CANCELLED
Start: 2022-04-04 | End: 2022-04-04

## 2022-04-03 RX ORDER — SODIUM CHLORIDE 9 MG/ML
INJECTION, SOLUTION INTRAVENOUS CONTINUOUS
Status: CANCELLED | OUTPATIENT
Start: 2022-04-04

## 2022-04-03 RX ORDER — ALBUTEROL SULFATE 90 UG/1
4 AEROSOL, METERED RESPIRATORY (INHALATION) PRN
Status: CANCELLED | OUTPATIENT
Start: 2022-04-04

## 2022-04-03 RX ORDER — ONDANSETRON 2 MG/ML
8 INJECTION INTRAMUSCULAR; INTRAVENOUS
Status: CANCELLED | OUTPATIENT
Start: 2022-04-04

## 2022-04-03 RX ORDER — SODIUM CHLORIDE 0.9 % (FLUSH) 0.9 %
10 SYRINGE (ML) INJECTION PRN
Status: CANCELLED | OUTPATIENT
Start: 2022-04-04

## 2022-04-03 RX ORDER — DIPHENHYDRAMINE HYDROCHLORIDE 50 MG/ML
50 INJECTION INTRAMUSCULAR; INTRAVENOUS
Status: CANCELLED | OUTPATIENT
Start: 2022-04-04

## 2022-04-03 RX ORDER — SODIUM CHLORIDE 9 MG/ML
INJECTION, SOLUTION INTRAVENOUS ONCE
Status: COMPLETED | OUTPATIENT
Start: 2022-04-03 | End: 2022-04-03

## 2022-04-03 RX ORDER — SODIUM CHLORIDE 0.9 % (FLUSH) 0.9 %
10 SYRINGE (ML) INJECTION PRN
Status: DISCONTINUED | OUTPATIENT
Start: 2022-04-03 | End: 2022-04-03 | Stop reason: HOSPADM

## 2022-04-03 RX ORDER — EPINEPHRINE 1 MG/ML
0.3 INJECTION, SOLUTION, CONCENTRATE INTRAVENOUS PRN
Status: CANCELLED | OUTPATIENT
Start: 2022-04-04

## 2022-04-03 RX ORDER — ACETAMINOPHEN 325 MG/1
650 TABLET ORAL
Status: CANCELLED | OUTPATIENT
Start: 2022-04-04

## 2022-04-03 RX ADMIN — SODIUM CHLORIDE 1000 MG: 900 INJECTION INTRAVENOUS at 10:52

## 2022-04-03 RX ADMIN — SODIUM CHLORIDE: 9 INJECTION, SOLUTION INTRAVENOUS at 10:52

## 2022-04-05 ENCOUNTER — TELEPHONE (OUTPATIENT)
Dept: UROLOGY | Age: 87
End: 2022-04-05

## 2022-04-05 NOTE — TELEPHONE ENCOUNTER
Looks like she completed her antibiotics. Hannah Valerio for nurse visit cath urine. Very unlikely she still has a UTI but we can send for culture in case. Ensure she is on vaginal estrogen. Will refer to PCP or ER for dark stool. Likely some diarrhea associated with antibiotics- should not be dark.

## 2022-04-05 NOTE — TELEPHONE ENCOUNTER
Pts  called stating pt had been on antibiotic (7 day infusion series) Pt is experiencing painful urination, diarrhea, dark stool, upset/bulging stomach, and fatigue. Pts  would like a nurse or provider to call back with suggestions on what to do or how to treat.

## 2022-04-05 NOTE — TELEPHONE ENCOUNTER
PT IS SCHEDULED TO SEE NURSE FOR CATH URINE. PT SAID DIARRHEA DOESN'T SEEM TO BE AN ISSUE AT [facility].

## 2022-04-06 ENCOUNTER — NURSE ONLY (OUTPATIENT)
Dept: UROLOGY | Age: 87
End: 2022-04-06
Payer: MEDICARE

## 2022-04-06 DIAGNOSIS — N39.0 RECURRENT UTI: Primary | ICD-10-CM

## 2022-04-06 PROCEDURE — P9612 CATHETERIZE FOR URINE SPEC: HCPCS | Performed by: NURSE PRACTITIONER

## 2022-04-06 NOTE — PROGRESS NOTES
Patient complained of dysuria. After discussing with SIMONE Read was given verbal orders to obtain cath urine specimen. After obtaining consent from patient. Patient was then placed in the dorsal lithotomy position. Using sterile technique patient is carefully prepped with Betadine around the urethra. A pediatric catheterization kit is used which contains an approximate 5 [place] catheter. Urethral Catheter is introduced into the urinary bladder. Urine specimen is obtained and sent to the lab for culture and sensitivity. Patient tolerated procedure well. JERE Abel was in office at time of procedure.

## 2022-04-08 ENCOUNTER — TELEPHONE (OUTPATIENT)
Dept: UROLOGY | Age: 87
End: 2022-04-08

## 2022-04-08 LAB — URINE CULTURE, ROUTINE: NORMAL

## 2022-04-08 NOTE — TELEPHONE ENCOUNTER
Please call patient's  and let him know JERE Mcneil would like to start her on vaginal cream and he can come  a sample of premarin at the . She can try that before going to her PCP.

## 2022-04-08 NOTE — RESULT ENCOUNTER NOTE
Please let patient know urine culture was negative. If she still has symptoms these are likely coming from another source rather than urologic.

## 2022-04-11 ENCOUNTER — OFFICE VISIT (OUTPATIENT)
Dept: INTERNAL MEDICINE | Facility: CLINIC | Age: 87
End: 2022-04-11

## 2022-04-11 VITALS
DIASTOLIC BLOOD PRESSURE: 72 MMHG | WEIGHT: 95 LBS | HEIGHT: 62 IN | TEMPERATURE: 96.6 F | SYSTOLIC BLOOD PRESSURE: 158 MMHG | OXYGEN SATURATION: 97 % | BODY MASS INDEX: 17.48 KG/M2 | HEART RATE: 83 BPM

## 2022-04-11 DIAGNOSIS — R14.0 ABDOMINAL BLOATING: ICD-10-CM

## 2022-04-11 DIAGNOSIS — F41.9 ANXIETY: ICD-10-CM

## 2022-04-11 DIAGNOSIS — E06.3 HYPOTHYROIDISM DUE TO HASHIMOTO'S THYROIDITIS: ICD-10-CM

## 2022-04-11 DIAGNOSIS — E03.8 HYPOTHYROIDISM DUE TO HASHIMOTO'S THYROIDITIS: ICD-10-CM

## 2022-04-11 DIAGNOSIS — E55.9 VITAMIN D DEFICIENCY: ICD-10-CM

## 2022-04-11 DIAGNOSIS — G62.9 NEUROPATHY: ICD-10-CM

## 2022-04-11 DIAGNOSIS — R41.3 MEMORY DISORDER: ICD-10-CM

## 2022-04-11 DIAGNOSIS — R30.0 DYSURIA: ICD-10-CM

## 2022-04-11 DIAGNOSIS — I10 BENIGN HYPERTENSION: Primary | ICD-10-CM

## 2022-04-11 LAB
BILIRUB BLD-MCNC: NEGATIVE MG/DL
CLARITY, POC: CLEAR
COLOR UR: YELLOW
GLUCOSE UR STRIP-MCNC: NEGATIVE MG/DL
KETONES UR QL: NEGATIVE
LEUKOCYTE EST, POC: ABNORMAL
NITRITE UR-MCNC: NEGATIVE MG/ML
PH UR: 7.5 [PH] (ref 5–8)
PROT UR STRIP-MCNC: NEGATIVE MG/DL
RBC # UR STRIP: NEGATIVE /UL
SP GR UR: 1.02 (ref 1–1.03)
UROBILINOGEN UR QL: NORMAL

## 2022-04-11 PROCEDURE — 81003 URINALYSIS AUTO W/O SCOPE: CPT | Performed by: NURSE PRACTITIONER

## 2022-04-11 PROCEDURE — 99214 OFFICE O/P EST MOD 30 MIN: CPT | Performed by: NURSE PRACTITIONER

## 2022-04-11 NOTE — PROGRESS NOTES
Subjective   Meenu Arteaga is a 89 y.o. female.   Chief Complaint   Patient presents with   • Follow-up     Urology at [facility], having burning w/urination   • Peripheral Neuropathy     Bilateral legs neuropathy   • Constipation     Wants this evaluated       Meenu presents for 6 month follow up to discussed multiple things.  She has chronic Urinary tract infections and has since seen urology at [facility].   She was given Premarin cream for about 2 weeks now and states this is helping.  She states she is having some burning.  She states she is drinking water and decaf/unsweetened sugar.  U/A today showing trace leukocytes.      She states she is having some abdominal bloating and discomfort.  She states she is having bowel movements daily but has to take milk of magnesia tablets 2 1/2-3 a night to get bowels moving.  She state is she doesn't take this she isn't able to go.  She reports having a small anus and always having some difficulty passing stools.  Sometimes stools are formed and other times liquid.      She has neuropathy for years buts states she hasn't taken anything due to side effects with the medication.  Exercise helps some but states with her age she has trouble with her exercise.      Meenu and her  ask today about assisted suicide.   reports that patient talks about this seriously.  When questioning the patient she states she doesn't want to be in a nursing home and concerned that her memory seems to gradually get worse.  She states she doesn't like not being able to do things.  She denies a plan to hurt herself and states she doesn't thinks he would ever do this on her own.  She does have anxiety which resulted in a few ER visits a few years ago.  She was started on Ativan that she takes at bedtime and she states if she takes this at night, it holds over through the day keeping her anxiety controlled.      They are requesting referral to Gerontologist.          The following portions  of the patient's history were reviewed and updated as appropriate: allergies, current medications, past family history, past medical history, past social history, past surgical history and problem list.    Review of Systems   Constitutional: Negative for activity change, appetite change, fatigue, fever, unexpected weight gain and unexpected weight loss.   HENT: Negative for swollen glands, trouble swallowing and voice change.    Eyes: Negative for blurred vision and visual disturbance.   Respiratory: Negative for cough and shortness of breath.    Cardiovascular: Negative for chest pain, palpitations and leg swelling.   Gastrointestinal: Positive for abdominal distention, abdominal pain and constipation. Negative for diarrhea, nausea, vomiting and indigestion.   Endocrine: Negative for cold intolerance, heat intolerance, polydipsia and polyphagia.   Genitourinary: Positive for dysuria. Negative for frequency.   Musculoskeletal: Negative for arthralgias, back pain, joint swelling and neck pain.   Skin: Negative for color change, rash and skin lesions.   Neurological: Positive for memory problem. Negative for dizziness, weakness, headache and confusion.   Hematological: Does not bruise/bleed easily.   Psychiatric/Behavioral: Positive for depressed mood. Negative for agitation, hallucinations and suicidal ideas. The patient is nervous/anxious.        Objective   Past Medical History:   Diagnosis Date   • Arthritis    • GERD (gastroesophageal reflux disease)    • Hyperlipidemia    • Hypothyroidism    • Liver cyst    • Neuropathy    • Ovarian cyst       Past Surgical History:   Procedure Laterality Date   • ABDOMINAL HYSTERECTOMY     • APPENDECTOMY     • BREAST BIOPSY     • CHOLECYSTECTOMY     • COLONOSCOPY  03/05/2008    normal   • COLONOSCOPY  01/12/2012   • ENDOSCOPY  08/29/2013    normal   • ENDOSCOPY  01/23/2012    eus with stacy  normal endoscopic ultrascope of the pancreas.fortunately there was no mass seen   •  ENDOSCOPY N/A 8/3/2018    Procedure: ESOPHAGOGASTRODUODENOSCOPY WITH ANESTHESIA;  Surgeon: Obed Patrick DO;  Location: [facility] ENDOSCOPY;  Service: Gastroenterology   • HEMORRHOIDECTOMY     • SKIN CANCER EXCISION  11/27/2021    left arm and left leg    • UPPER GASTROINTESTINAL ENDOSCOPY  08/29/2013        Current Outpatient Medications:   •  amLODIPine (NORVASC) 5 MG tablet, TAKE ONE TABLET BY MOUTH DAILY, Disp: 90 tablet, Rfl: 3  •  Ascorbic Acid (VITAMIN C PO), Daily., Disp: , Rfl:   •  bumetanide (BUMEX) 0.5 MG tablet, TAKE ONE TABLET BY MOUTH EVERY MORNING, Disp: 30 tablet, Rfl: 11  •  candesartan (ATACAND) 32 MG tablet, TAKE ONE TABLET BY MOUTH DAILY, Disp: 90 tablet, Rfl: 1  •  doxycycline (VIBRAMYCIN) 100 MG capsule, Take 1 capsule by mouth 2 (Two) Times a Day., Disp: 6 capsule, Rfl: 0  •  levothyroxine (SYNTHROID, LEVOTHROID) 25 MCG tablet, TAKE ONE TABLET BY MOUTH DAILY, Disp: 90 tablet, Rfl: 3  •  LORazepam (ATIVAN) 0.5 MG tablet, TAKEK ONE-FOURTH TABLET BY MOUTH TWO TIMES A DAY DURING THE DAY AND ONE-HALF TABLET BY MOUTH EVERY NIGHT AT BEDTIME, Disp: 30 tablet, Rfl: 5  •  Multiple Vitamins-Minerals (CENTRUM SILVER ADULT 50+) tablet, Take 1 tablet by mouth daily, Disp: , Rfl:   •  omeprazole (priLOSEC) 20 MG capsule, TAKE ONE CAPSULE BY MOUTH TWICE A DAY, Disp: 180 capsule, Rfl: 3  •  ondansetron ODT (Zofran ODT) 4 MG disintegrating tablet, Place 1 tablet on the tongue Every 8 (Eight) Hours As Needed for Nausea or Vomiting., Disp: 30 tablet, Rfl: 0  •  promethazine (PHENERGAN) 12.5 MG tablet, Take 30 minutes before antibiotic medicines, Disp: 10 tablet, Rfl: 0  •  Psyllium (METAMUCIL FIBER PO), Take 1 package by mouth As Needed., Disp: , Rfl:   •  vitamin D (ERGOCALCIFEROL) 1.25 MG (73545 UT) capsule capsule, TAKE 1 CAPSULE BY MOUTH EVERY OTHER WEEK, Disp: 6 capsule, Rfl: 3      Vitals:    04/11/22 1050   BP: 158/72   Pulse: 83   Temp: 96.6 °F (35.9 °C)   SpO2: 97%         04/11/22  1050   Weight:  43.1 kg (95 lb)       Body mass index is 17.38 kg/m².    Physical Exam  HENT:      Right Ear: Tympanic membrane and external ear normal.      Left Ear: Tympanic membrane and external ear normal.      Nose: Nose normal.      Mouth/Throat:      Mouth: Mucous membranes are moist. No oral lesions.      Pharynx: Oropharynx is clear.   Eyes:      Conjunctiva/sclera: Conjunctivae normal.      Pupils: Pupils are equal, round, and reactive to light.   Neck:      Thyroid: No thyromegaly.   Cardiovascular:      Rate and Rhythm: Normal rate and regular rhythm.      Pulses: Normal pulses.           Radial pulses are 2+ on the right side and 2+ on the left side.      Heart sounds: Normal heart sounds.   Pulmonary:      Effort: Pulmonary effort is normal.      Breath sounds: Normal breath sounds.   Abdominal:      General: Abdomen is flat. Bowel sounds are increased. There is no distension.      Palpations: Abdomen is soft. There is no mass.      Tenderness: There is no guarding.      Hernia: No hernia is present.   Musculoskeletal:      Cervical back: Normal range of motion.      Right lower leg: No edema.      Left lower leg: No edema.   Lymphadenopathy:      Cervical: No cervical adenopathy.   Skin:     General: Skin is warm and dry.   Neurological:      Mental Status: She is alert and oriented to person, place, and time.      Gait: Gait normal.   Psychiatric:         Mood and Affect: Mood normal.         Speech: Speech normal.         Behavior: Behavior normal.         Thought Content: Thought content normal.               Assessment/Plan   Diagnoses and all orders for this visit:    1. Benign hypertension (Primary)  -     CBC & Differential  -     Basic Metabolic Panel    2. Hypothyroidism due to Hashimoto's thyroiditis    3. Dysuria  -     POC Urinalysis Dipstick, Multipro  -     Urine Culture - Urine, Urine, Clean Catch    4. Memory disorder  -     Vitamin D 25 hydroxy    5. Neuropathy    6. Anxiety    7. Abdominal  "bloating    8. Vitamin D deficiency  -     Vitamin D 25 hydroxy      BP is elevated in the office today.  With her age, will continue to monitor this at this time.  It looks like her BP ranges between 130s systolic to 150s systolic.  Will avoid trying to push it too low to avoid increasing risk for falls.   U/A is clear with trace leukocytes today.  Will send for culture.  Continue premarin cream and increase water intake.   She has had chronic, ongoing memory changes.    Anxiety is controlled with Ativan.  I have informed that assisted suicide is not an option.  She denies thoughts or plan of hurting herself.  She states the Ativan helps her mood through the day.  This is no a new thought or request as  states they talked with Dr. Curiel about this awhile ago as well.    She has tried gabapentin in the past for neuropathy.  This caused nausea as does many medications for her.  She is interested in trying this again.  I've explained she will need to see the doctor to discuss restarting this medication due to it being a controlled substance.  She states this is fine and will see Dr. Valle in July.    In regards to her abdominal boating, will see what her labs show.  May consider doing a repeat CT but she states she doesn't want \"a big fuss\" made about it.  I've encouraged increasing water intake to help her bowls move.  I don't feel anything abnormal on exam.  She is very thin, no hernias or masses noted.   Will check Vitamin D today.   I do not know of any gerontologist in the area.  I looked and see one in [place], [place] but they states this is too far.             "

## 2022-04-12 LAB
25(OH)D3+25(OH)D2 SERPL-MCNC: 84.9 NG/ML (ref 30–100)
BASOPHILS # BLD AUTO: 0.04 10*3/MM3 (ref 0–0.2)
BASOPHILS NFR BLD AUTO: 0.9 % (ref 0–1.5)
BUN SERPL-MCNC: 15 MG/DL (ref 8–23)
BUN/CREAT SERPL: 18.3 (ref 7–25)
CALCIUM SERPL-MCNC: 10.1 MG/DL (ref 8.2–9.6)
CHLORIDE SERPL-SCNC: 100 MMOL/L (ref 98–107)
CO2 SERPL-SCNC: 26 MMOL/L (ref 22–29)
CREAT SERPL-MCNC: 0.82 MG/DL (ref 0.57–1)
EGFRCR SERPLBLD CKD-EPI 2021: 66 ML/MIN/1.73
EOSINOPHIL # BLD AUTO: 0.02 10*3/MM3 (ref 0–0.4)
EOSINOPHIL NFR BLD AUTO: 0.4 % (ref 0.3–6.2)
ERYTHROCYTE [DISTWIDTH] IN BLOOD BY AUTOMATED COUNT: 12.8 % (ref 12.3–15.4)
GLUCOSE SERPL-MCNC: 99 MG/DL (ref 65–99)
HCT VFR BLD AUTO: 37.1 % (ref 34–46.6)
HGB BLD-MCNC: 12.5 G/DL (ref 12–15.9)
IMM GRANULOCYTES # BLD AUTO: 0.02 10*3/MM3 (ref 0–0.05)
IMM GRANULOCYTES NFR BLD AUTO: 0.4 % (ref 0–0.5)
LYMPHOCYTES # BLD AUTO: 0.7 10*3/MM3 (ref 0.7–3.1)
LYMPHOCYTES NFR BLD AUTO: 15.7 % (ref 19.6–45.3)
MCH RBC QN AUTO: 32 PG (ref 26.6–33)
MCHC RBC AUTO-ENTMCNC: 33.7 G/DL (ref 31.5–35.7)
MCV RBC AUTO: 94.9 FL (ref 79–97)
MONOCYTES # BLD AUTO: 0.58 10*3/MM3 (ref 0.1–0.9)
MONOCYTES NFR BLD AUTO: 13 % (ref 5–12)
NEUTROPHILS # BLD AUTO: 3.1 10*3/MM3 (ref 1.7–7)
NEUTROPHILS NFR BLD AUTO: 69.6 % (ref 42.7–76)
NRBC BLD AUTO-RTO: 0 /100 WBC (ref 0–0.2)
PLATELET # BLD AUTO: 241 10*3/MM3 (ref 140–450)
POTASSIUM SERPL-SCNC: 3.5 MMOL/L (ref 3.5–5.2)
RBC # BLD AUTO: 3.91 10*6/MM3 (ref 3.77–5.28)
SODIUM SERPL-SCNC: 139 MMOL/L (ref 136–145)
WBC # BLD AUTO: 4.46 10*3/MM3 (ref 3.4–10.8)

## 2022-04-13 LAB
BACTERIA UR CULT: NORMAL
BACTERIA UR CULT: NORMAL

## 2022-04-22 DIAGNOSIS — F41.9 ANXIETY: ICD-10-CM

## 2022-04-22 RX ORDER — LORAZEPAM 0.5 MG/1
TABLET ORAL
Qty: 30 TABLET | Refills: 5 | Status: SHIPPED | OUTPATIENT
Start: 2022-04-22 | End: 2022-09-21 | Stop reason: SDUPTHER

## 2022-05-16 RX ORDER — OMEPRAZOLE 20 MG/1
20 CAPSULE, DELAYED RELEASE ORAL 2 TIMES DAILY
Qty: 180 CAPSULE | Refills: 3 | Status: SHIPPED | OUTPATIENT
Start: 2022-05-16

## 2022-05-16 NOTE — TELEPHONE ENCOUNTER
Caller: Alex Arteaga    Relationship: Emergency Contact    Best call back number: [phone]    Requested Prescriptions:   Requested Prescriptions     Pending Prescriptions Disp Refills   • omeprazole (priLOSEC) 20 MG capsule 180 capsule 3     Sig: Take 1 capsule by mouth 2 (Two) Times a Day.        Pharmacy where request should be sent: [facility] [address] [place] [address] - [phone]  - [phone] FX       Does the patient have less than a 3 day supply:  [x] Yes  [] No    Dave Priest Rep   05/16/22 15:30 CDT

## 2022-06-20 NOTE — PROGRESS NOTES
Dani Dudley is a 80 y.o. female who presents today   Chief Complaint   Patient presents with    Follow-up     I am here today for my follow up for UTI       Patient is a 61-year-old female presents to clinic today for follow-up recurrent UTI. At last visit I placed her on Premarin also treated her for a UTI at that time on 3/21/2022 which revealed Pseudomonas. Patient does have a significant allergy list therefore needed IV antibiotics. She continued having some frequency and urgency therefore I did obtain a cath urine specimen post antibiotics which was negative on 4/6/2022. She denies any symptoms of frequency, urgency, pelvic pain, incontinence. Feels Premarin is working well for her. She does have some bloating and discomfort she feels in her bowels and once a follow-up with her primary care.     Previous urine cultures  4/6/2022 No growth   3/21/2022 Pseudomonas aeruginosa   3/5/2022          no growth  2/17/2022        Pseudomonas aeruginosa  2/9/2022          Pseudomonas aeruginosa   8/24/2021        Enterococcus faecalis     Past Medical History:   Diagnosis Date    Anxiety 10/2/2019    Chest tightness or pressure 8/26/2013 8/26/2013  lexiscan negative for myocardial ischemia, EF 61%    Edema     Essential and other specified forms of tremor     Generalized anxiety disorder     HTN (hypertension)     Hyperlipidemia     Hypertension     Insomnia, unspecified     Neuropathy     both legs up to both hips    Other and unspecified ovarian cyst     Other left bundle branch block     Pure hypercholesterolemia     Restless legs syndrome (RLS)     Solitary cyst of breast     Spinal stenosis, lumbar region, without neurogenic claudication     Unspecified hypothyroidism     Urinary frequency     UTI (urinary tract infection) 3/25/2022       Past Surgical History:   Procedure Laterality Date    APPENDECTOMY      CARDIAC CATHETERIZATION  5/26/15  MDL    with aortic root injection.  EF 60%  CATARACT REMOVAL      CHOLECYSTECTOMY      CYST INCISION AND DRAINAGE      drained liver cyst    HEMORRHOID SURGERY      HYSTERECTOMY (CERVIX STATUS UNKNOWN)      NERVE BLOCK LUMB FACET LEVEL 1 BILATERAL Bilateral 1/19/2016    LUMBAR FACET CATE L4-5  performed by Aristeo Hoang at [facility] OR       Current Outpatient Medications   Medication Sig Dispense Refill    conjugated estrogens (PREMARIN) 0.625 MG/GM vaginal cream Place small amountt on end of finger and apply to urethra three times per week 1 each 3    amLODIPine (NORVASC) 5 MG tablet Take 5 mg by mouth daily      vitamin C (ASCORBIC ACID) 500 MG tablet Take 500 mg by mouth daily      metoprolol succinate (TOPROL XL) 25 MG extended release tablet Take 1 tablet by mouth nightly 30 tablet 5    docusate sodium (COLACE) 100 MG capsule Take 100 mg by mouth 2 times daily       vitamin D (ERGOCALCIFEROL) 48434 units CAPS capsule Take 50,000 Units by mouth once a week      LORazepam (ATIVAN) 0.5 MG tablet Take 0.5 mg by mouth every 6 hours as needed for Anxiety.  levothyroxine (SYNTHROID) 25 MCG tablet Take 2 tablets by mouth Daily 30 tablet 3    omeprazole (PRILOSEC) 20 MG capsule Take 20 mg by mouth 2 times daily      Multiple Vitamins-Minerals (CENTRUM SILVER ADULT 50+) TABS Take 1 tablet by mouth daily       bumetanide (BUMEX) 0.5 MG tablet   Take 0.5 mg by mouth daily       Magnesium Oxide (WHIPPLE PO) Take 2 tablets by mouth daily       candesartan (ATACAND) 32 MG tablet Take 32 mg by mouth daily      Psyllium (METAMUCIL PO) Take 2 tablets by mouth daily        No current facility-administered medications for this visit.        Allergies   Allergen Reactions    Ampicillin     Bactrim [Sulfamethoxazole-Trimethoprim]     Cephalosporins     Ciprocinonide [Fluocinolone]     Codeine     Cymbalta [Duloxetine Hcl]     Doxycycline     Enablex [Darifenacin Hydrobromide Er]     Gabapentin     Hctz [Hydrochlorothiazide]     Keppra [Levetiracetam]     Levaquin [Levofloxacin In D5w]     Lyrica [Pregabalin]     Pyridium [Phenazopyridine Hcl]     Quinolones     Sulfa Antibiotics        Social History     Socioeconomic History    Marital status:      Spouse name: None    Number of children: None    Years of education: None    Highest education level: None   Occupational History    None   Tobacco Use    Smoking status: Never Smoker    Smokeless tobacco: Never Used   Vaping Use    Vaping Use: Never used   Substance and Sexual Activity    Alcohol use: No    Drug use: No    Sexual activity: None   Other Topics Concern    None   Social History Narrative    None     Social Determinants of Health     Financial Resource Strain:     Difficulty of Paying Living Expenses: Not on file   Food Insecurity:     Worried About Running Out of Food in the Last Year: Not on file    Vicky of Food in the Last Year: Not on file   Transportation Needs:     Lack of Transportation (Medical): Not on file    Lack of Transportation (Non-Medical):  Not on file   Physical Activity:     Days of Exercise per Week: Not on file    Minutes of Exercise per Session: Not on file   Stress:     Feeling of Stress : Not on file   Social Connections:     Frequency of Communication with Friends and Family: Not on file    Frequency of Social Gatherings with Friends and Family: Not on file    Attends [employer]ism Services: Not on file    Active Member of [address] [place] [address] or Organizations: Not on file    Attends Club or Organization Meetings: Not on file    Marital Status: Not on file   Intimate Partner Violence:     Fear of Current or Ex-Partner: Not on file    Emotionally Abused: Not on file    Physically Abused: Not on file    Sexually Abused: Not on file   Housing Stability:     Unable to Pay for Housing in the Last Year: Not on file    Number of [place] in the Last Year: Not on file    Unstable Housing in the Last Year: Not on file       Family History Problem Relation Age of Onset    Other Father         stroke, MI,  46       REVIEW OF SYSTEMS:  Review of Systems   Constitutional: Negative for chills and fever. HENT:        Hard of hearing   Gastrointestinal: Negative for abdominal distention, abdominal pain, nausea and vomiting. Genitourinary: Negative for difficulty urinating, dysuria, flank pain, frequency, hematuria and urgency. Musculoskeletal: Negative for back pain and gait problem. Psychiatric/Behavioral: Negative for agitation and confusion. PHYSICAL EXAM:  BP (!) 153/93   Temp 98.4 °F (36.9 °C) (Temporal)   Ht 5' 4\" (1.626 m)   Wt 94 lb 9.6 oz (42.9 kg)   LMP  (LMP Unknown)   BMI 16.24 kg/m²   Physical Exam  Vitals and nursing note reviewed. Constitutional:       General: She is not in acute distress. Appearance: Normal appearance. She is not ill-appearing. Pulmonary:      Effort: Pulmonary effort is normal. No respiratory distress. Abdominal:      General: There is no distension. Tenderness: There is no abdominal tenderness. There is no right CVA tenderness or left CVA tenderness. Neurological:      Mental Status: She is alert and oriented to person, place, and time. Mental status is at baseline. Psychiatric:         Mood and Affect: Mood normal.         Behavior: Behavior normal.       1. Recurrent UTI  History of recurrent UTI and lower urinary tract symptoms. Patient doing well on Premarin has not had an infection for at least 2 months. Denies any vaginal burning or suprapubic pain. At this time we will continue Premarin twice a week. 2 boxes of samples have been dispensed to the patient. We will have her follow-up 1 year. She does complain of some abdominal bloating as well as neuropathy. Encouraged her to see her PCP for further direction or possible referral.      No orders of the defined types were placed in this encounter.        Return in about 1 year (around 2023) for premarin 2 samples given . All information inputted into the note by the MA to include chief complaint, past medical history, past surgical history, medications, allergies, social and family history and review of systems has been reviewed and updated as needed by me. EMR Dragon/transcription disclaimer: Much of this documentt is electronic  transcription/translation of spoken language to printed text. The  electronic translation of spoken language may be erroneous, or at times,  nonsensical words or phrases may be inadvertently transcribed.  Although I  have reviewed the document for such errors, some may still exist.

## 2022-06-21 ENCOUNTER — OFFICE VISIT (OUTPATIENT)
Dept: UROLOGY | Age: 87
End: 2022-06-21
Payer: MEDICARE

## 2022-06-21 VITALS
BODY MASS INDEX: 16.15 KG/M2 | TEMPERATURE: 98.4 F | HEIGHT: 64 IN | SYSTOLIC BLOOD PRESSURE: 153 MMHG | WEIGHT: 94.6 LBS | DIASTOLIC BLOOD PRESSURE: 93 MMHG

## 2022-06-21 DIAGNOSIS — N39.0 RECURRENT UTI: Primary | ICD-10-CM

## 2022-06-21 PROCEDURE — 1090F PRES/ABSN URINE INCON ASSESS: CPT | Performed by: NURSE PRACTITIONER

## 2022-06-21 PROCEDURE — 99214 OFFICE O/P EST MOD 30 MIN: CPT | Performed by: NURSE PRACTITIONER

## 2022-06-21 PROCEDURE — G8419 CALC BMI OUT NRM PARAM NOF/U: HCPCS | Performed by: NURSE PRACTITIONER

## 2022-06-21 PROCEDURE — 1123F ACP DISCUSS/DSCN MKR DOCD: CPT | Performed by: NURSE PRACTITIONER

## 2022-06-21 PROCEDURE — G8427 DOCREV CUR MEDS BY ELIG CLIN: HCPCS | Performed by: NURSE PRACTITIONER

## 2022-06-21 PROCEDURE — 1036F TOBACCO NON-USER: CPT | Performed by: NURSE PRACTITIONER

## 2022-06-21 ASSESSMENT — ENCOUNTER SYMPTOMS
BACK PAIN: 0
VOMITING: 0
ABDOMINAL DISTENTION: 0
ABDOMINAL PAIN: 0
NAUSEA: 0

## 2022-06-22 ENCOUNTER — APPOINTMENT (OUTPATIENT)
Dept: CT IMAGING | Age: 87
End: 2022-06-22
Payer: MEDICARE

## 2022-06-22 ENCOUNTER — APPOINTMENT (OUTPATIENT)
Dept: GENERAL RADIOLOGY | Age: 87
End: 2022-06-22
Payer: MEDICARE

## 2022-06-22 ENCOUNTER — HOSPITAL ENCOUNTER (OUTPATIENT)
Age: 87
Setting detail: OBSERVATION
Discharge: HOME | End: 2022-06-23
Attending: HOSPITALIST | Admitting: HOSPITALIST
Payer: MEDICARE

## 2022-06-22 DIAGNOSIS — R55 SYNCOPE AND COLLAPSE: Primary | ICD-10-CM

## 2022-06-22 DIAGNOSIS — I21.4 NSTEMI (NON-ST ELEVATED MYOCARDIAL INFARCTION) (HCC): ICD-10-CM

## 2022-06-22 PROBLEM — R77.8 ELEVATED TROPONIN: Status: ACTIVE | Noted: 2022-06-22

## 2022-06-22 PROBLEM — R79.89 ELEVATED TROPONIN: Status: ACTIVE | Noted: 2022-06-22

## 2022-06-22 PROBLEM — R79.89 ELEVATED BRAIN NATRIURETIC PEPTIDE (BNP) LEVEL: Status: ACTIVE | Noted: 2022-06-22

## 2022-06-22 LAB
ALBUMIN SERPL-MCNC: 4.4 G/DL (ref 3.5–5.2)
ALP BLD-CCNC: 72 U/L (ref 35–104)
ALT SERPL-CCNC: 14 U/L (ref 5–33)
ANION GAP SERPL CALCULATED.3IONS-SCNC: 13 MMOL/L (ref 7–19)
AST SERPL-CCNC: 33 U/L (ref 5–32)
BACTERIA: NEGATIVE /HPF
BASOPHILS ABSOLUTE: 0 K/UL (ref 0–0.2)
BASOPHILS RELATIVE PERCENT: 0.5 % (ref 0–1)
BILIRUB SERPL-MCNC: 0.4 MG/DL (ref 0.2–1.2)
BILIRUBIN URINE: NEGATIVE
BLOOD, URINE: NEGATIVE
BUN BLDV-MCNC: 19 MG/DL (ref 8–23)
CALCIUM SERPL-MCNC: 9.8 MG/DL (ref 8.2–9.6)
CHLORIDE BLD-SCNC: 99 MMOL/L (ref 98–111)
CLARITY: ABNORMAL
CO2: 25 MMOL/L (ref 22–29)
COLOR: YELLOW
CREAT SERPL-MCNC: 0.9 MG/DL (ref 0.5–0.9)
CRYSTALS, UA: ABNORMAL /HPF
EOSINOPHILS ABSOLUTE: 0 K/UL (ref 0–0.6)
EOSINOPHILS RELATIVE PERCENT: 0.3 % (ref 0–5)
EPITHELIAL CELLS, UA: 4 /HPF (ref 0–5)
GFR AFRICAN AMERICAN: >59
GFR NON-AFRICAN AMERICAN: 58
GLUCOSE BLD-MCNC: 108 MG/DL (ref 74–109)
GLUCOSE URINE: NEGATIVE MG/DL
HBA1C MFR BLD: 5.5 % (ref 4–6)
HCT VFR BLD CALC: 41.3 % (ref 37–47)
HEMOGLOBIN: 13.5 G/DL (ref 12–16)
HYALINE CASTS: 6 /HPF (ref 0–8)
IMMATURE GRANULOCYTES #: 0 K/UL
KETONES, URINE: ABNORMAL MG/DL
LEUKOCYTE ESTERASE, URINE: ABNORMAL
LV EF: 58 %
LVEF MODALITY: NORMAL
LYMPHOCYTES ABSOLUTE: 0.8 K/UL (ref 1.1–4.5)
LYMPHOCYTES RELATIVE PERCENT: 10.8 % (ref 20–40)
MAGNESIUM: 2.3 MG/DL (ref 1.7–2.3)
MCH RBC QN AUTO: 32.3 PG (ref 27–31)
MCHC RBC AUTO-ENTMCNC: 32.7 G/DL (ref 33–37)
MCV RBC AUTO: 98.8 FL (ref 81–99)
MONOCYTES ABSOLUTE: 0.9 K/UL (ref 0–0.9)
MONOCYTES RELATIVE PERCENT: 11 % (ref 0–10)
NEUTROPHILS ABSOLUTE: 6 K/UL (ref 1.5–7.5)
NEUTROPHILS RELATIVE PERCENT: 77 % (ref 50–65)
NITRITE, URINE: NEGATIVE
PDW BLD-RTO: 13.4 % (ref 11.5–14.5)
PH UA: 7.5 (ref 5–8)
PLATELET # BLD: 254 K/UL (ref 130–400)
PMV BLD AUTO: 10.2 FL (ref 9.4–12.3)
POTASSIUM REFLEX MAGNESIUM: 3.5 MMOL/L (ref 3.5–5)
PRO-BNP: 2000 PG/ML (ref 0–1800)
PROTEIN UA: NEGATIVE MG/DL
RBC # BLD: 4.18 M/UL (ref 4.2–5.4)
RBC UA: 3 /HPF (ref 0–4)
SARS-COV-2, NAAT: NOT DETECTED
SODIUM BLD-SCNC: 137 MMOL/L (ref 136–145)
SPECIFIC GRAVITY UA: 1.01 (ref 1–1.03)
TOTAL PROTEIN: 6.9 G/DL (ref 6.6–8.7)
TROPONIN: 0.07 NG/ML (ref 0–0.03)
TROPONIN: 0.08 NG/ML (ref 0–0.03)
TROPONIN: 0.11 NG/ML (ref 0–0.03)
UROBILINOGEN, URINE: 0.2 E.U./DL
WBC # BLD: 7.8 K/UL (ref 4.8–10.8)
WBC UA: 9 /HPF (ref 0–5)

## 2022-06-22 PROCEDURE — 83036 HEMOGLOBIN GLYCOSYLATED A1C: CPT

## 2022-06-22 PROCEDURE — G0378 HOSPITAL OBSERVATION PER HR: HCPCS

## 2022-06-22 PROCEDURE — 83880 ASSAY OF NATRIURETIC PEPTIDE: CPT

## 2022-06-22 PROCEDURE — 87635 SARS-COV-2 COVID-19 AMP PRB: CPT

## 2022-06-22 PROCEDURE — 74177 CT ABD & PELVIS W/CONTRAST: CPT

## 2022-06-22 PROCEDURE — 83735 ASSAY OF MAGNESIUM: CPT

## 2022-06-22 PROCEDURE — 71045 X-RAY EXAM CHEST 1 VIEW: CPT | Performed by: RADIOLOGY

## 2022-06-22 PROCEDURE — 2580000003 HC RX 258: Performed by: NURSE PRACTITIONER

## 2022-06-22 PROCEDURE — 6370000000 HC RX 637 (ALT 250 FOR IP): Performed by: PHYSICIAN ASSISTANT

## 2022-06-22 PROCEDURE — 6370000000 HC RX 637 (ALT 250 FOR IP): Performed by: NURSE PRACTITIONER

## 2022-06-22 PROCEDURE — 96375 TX/PRO/DX INJ NEW DRUG ADDON: CPT

## 2022-06-22 PROCEDURE — 71045 X-RAY EXAM CHEST 1 VIEW: CPT

## 2022-06-22 PROCEDURE — 96372 THER/PROPH/DIAG INJ SC/IM: CPT

## 2022-06-22 PROCEDURE — 84484 ASSAY OF TROPONIN QUANT: CPT

## 2022-06-22 PROCEDURE — 74177 CT ABD & PELVIS W/CONTRAST: CPT | Performed by: RADIOLOGY

## 2022-06-22 PROCEDURE — 6360000004 HC RX CONTRAST MEDICATION: Performed by: PHYSICIAN ASSISTANT

## 2022-06-22 PROCEDURE — 6360000002 HC RX W HCPCS: Performed by: NURSE PRACTITIONER

## 2022-06-22 PROCEDURE — 80053 COMPREHEN METABOLIC PANEL: CPT

## 2022-06-22 PROCEDURE — 81001 URINALYSIS AUTO W/SCOPE: CPT

## 2022-06-22 PROCEDURE — 99285 EMERGENCY DEPT VISIT HI MDM: CPT

## 2022-06-22 PROCEDURE — 2500000003 HC RX 250 WO HCPCS: Performed by: NURSE PRACTITIONER

## 2022-06-22 PROCEDURE — 93306 TTE W/DOPPLER COMPLETE: CPT

## 2022-06-22 PROCEDURE — 85025 COMPLETE CBC W/AUTO DIFF WBC: CPT

## 2022-06-22 PROCEDURE — 93005 ELECTROCARDIOGRAM TRACING: CPT | Performed by: PHYSICIAN ASSISTANT

## 2022-06-22 PROCEDURE — 36415 COLL VENOUS BLD VENIPUNCTURE: CPT

## 2022-06-22 PROCEDURE — 87086 URINE CULTURE/COLONY COUNT: CPT

## 2022-06-22 RX ORDER — ASCORBIC ACID 500 MG
500 TABLET ORAL DAILY
Status: DISCONTINUED | OUTPATIENT
Start: 2022-06-22 | End: 2022-06-23 | Stop reason: HOSPADM

## 2022-06-22 RX ORDER — BUMETANIDE 0.25 MG/ML
0.5 INJECTION, SOLUTION INTRAMUSCULAR; INTRAVENOUS DAILY
Status: DISCONTINUED | OUTPATIENT
Start: 2022-06-22 | End: 2022-06-23

## 2022-06-22 RX ORDER — AMLODIPINE BESYLATE 5 MG/1
5 TABLET ORAL DAILY
Status: DISCONTINUED | OUTPATIENT
Start: 2022-06-22 | End: 2022-06-23 | Stop reason: HOSPADM

## 2022-06-22 RX ORDER — SODIUM CHLORIDE 0.9 % (FLUSH) 0.9 %
5-40 SYRINGE (ML) INJECTION PRN
Status: DISCONTINUED | OUTPATIENT
Start: 2022-06-22 | End: 2022-06-23 | Stop reason: HOSPADM

## 2022-06-22 RX ORDER — LEVOTHYROXINE SODIUM 0.03 MG/1
25 TABLET ORAL DAILY
Status: DISCONTINUED | OUTPATIENT
Start: 2022-06-22 | End: 2022-06-23 | Stop reason: HOSPADM

## 2022-06-22 RX ORDER — M-VIT,TX,IRON,MINS/CALC/FOLIC 27MG-0.4MG
1 TABLET ORAL DAILY
Status: DISCONTINUED | OUTPATIENT
Start: 2022-06-22 | End: 2022-06-23 | Stop reason: HOSPADM

## 2022-06-22 RX ORDER — ACETAMINOPHEN 325 MG/1
650 TABLET ORAL EVERY 6 HOURS PRN
Status: DISCONTINUED | OUTPATIENT
Start: 2022-06-22 | End: 2022-06-23 | Stop reason: HOSPADM

## 2022-06-22 RX ORDER — POLYETHYLENE GLYCOL 3350 17 G/17G
17 POWDER, FOR SOLUTION ORAL DAILY PRN
Status: DISCONTINUED | OUTPATIENT
Start: 2022-06-22 | End: 2022-06-23 | Stop reason: HOSPADM

## 2022-06-22 RX ORDER — SODIUM CHLORIDE 0.9 % (FLUSH) 0.9 %
5-40 SYRINGE (ML) INJECTION EVERY 12 HOURS SCHEDULED
Status: DISCONTINUED | OUTPATIENT
Start: 2022-06-22 | End: 2022-06-23 | Stop reason: HOSPADM

## 2022-06-22 RX ORDER — DOCUSATE SODIUM 100 MG/1
100 CAPSULE, LIQUID FILLED ORAL 2 TIMES DAILY
Status: DISCONTINUED | OUTPATIENT
Start: 2022-06-22 | End: 2022-06-23 | Stop reason: HOSPADM

## 2022-06-22 RX ORDER — ASPIRIN 81 MG/1
81 TABLET, CHEWABLE ORAL DAILY
Status: DISCONTINUED | OUTPATIENT
Start: 2022-06-22 | End: 2022-06-23 | Stop reason: HOSPADM

## 2022-06-22 RX ORDER — ENOXAPARIN SODIUM 100 MG/ML
1 INJECTION SUBCUTANEOUS DAILY
Status: DISCONTINUED | OUTPATIENT
Start: 2022-06-22 | End: 2022-06-23 | Stop reason: HOSPADM

## 2022-06-22 RX ORDER — ATORVASTATIN CALCIUM 40 MG/1
40 TABLET, FILM COATED ORAL NIGHTLY
Status: DISCONTINUED | OUTPATIENT
Start: 2022-06-22 | End: 2022-06-23 | Stop reason: HOSPADM

## 2022-06-22 RX ORDER — LORAZEPAM 0.5 MG/1
0.25 TABLET ORAL NIGHTLY
Status: DISCONTINUED | OUTPATIENT
Start: 2022-06-22 | End: 2022-06-22

## 2022-06-22 RX ORDER — ONDANSETRON 4 MG/1
4 TABLET, ORALLY DISINTEGRATING ORAL EVERY 8 HOURS PRN
Status: DISCONTINUED | OUTPATIENT
Start: 2022-06-22 | End: 2022-06-23 | Stop reason: HOSPADM

## 2022-06-22 RX ORDER — PANTOPRAZOLE SODIUM 40 MG/1
40 TABLET, DELAYED RELEASE ORAL
Status: DISCONTINUED | OUTPATIENT
Start: 2022-06-23 | End: 2022-06-23 | Stop reason: HOSPADM

## 2022-06-22 RX ORDER — LORAZEPAM 0.5 MG/1
0.5 TABLET ORAL NIGHTLY
Status: DISCONTINUED | OUTPATIENT
Start: 2022-06-22 | End: 2022-06-23 | Stop reason: HOSPADM

## 2022-06-22 RX ORDER — ACETAMINOPHEN 650 MG/1
650 SUPPOSITORY RECTAL EVERY 6 HOURS PRN
Status: DISCONTINUED | OUTPATIENT
Start: 2022-06-22 | End: 2022-06-23 | Stop reason: HOSPADM

## 2022-06-22 RX ORDER — SODIUM CHLORIDE 9 MG/ML
INJECTION, SOLUTION INTRAVENOUS PRN
Status: DISCONTINUED | OUTPATIENT
Start: 2022-06-22 | End: 2022-06-23 | Stop reason: HOSPADM

## 2022-06-22 RX ORDER — NITROGLYCERIN 0.4 MG/1
0.4 TABLET SUBLINGUAL EVERY 5 MIN PRN
Status: DISCONTINUED | OUTPATIENT
Start: 2022-06-22 | End: 2022-06-23 | Stop reason: HOSPADM

## 2022-06-22 RX ORDER — ASPIRIN 325 MG
325 TABLET ORAL ONCE
Status: COMPLETED | OUTPATIENT
Start: 2022-06-22 | End: 2022-06-22

## 2022-06-22 RX ORDER — ONDANSETRON 2 MG/ML
4 INJECTION INTRAMUSCULAR; INTRAVENOUS EVERY 6 HOURS PRN
Status: DISCONTINUED | OUTPATIENT
Start: 2022-06-22 | End: 2022-06-23 | Stop reason: HOSPADM

## 2022-06-22 RX ADMIN — SODIUM CHLORIDE, PRESERVATIVE FREE 10 ML: 5 INJECTION INTRAVENOUS at 19:47

## 2022-06-22 RX ADMIN — ATORVASTATIN CALCIUM 40 MG: 40 TABLET, FILM COATED ORAL at 19:46

## 2022-06-22 RX ADMIN — IOPAMIDOL 70 ML: 755 INJECTION, SOLUTION INTRAVENOUS at 11:39

## 2022-06-22 RX ADMIN — ASPIRIN 325 MG: 325 TABLET ORAL at 11:30

## 2022-06-22 RX ADMIN — AMLODIPINE BESYLATE 5 MG: 5 TABLET ORAL at 16:21

## 2022-06-22 RX ADMIN — OXYCODONE HYDROCHLORIDE AND ACETAMINOPHEN 500 MG: 500 TABLET ORAL at 16:21

## 2022-06-22 RX ADMIN — WATER 1000 MG: 1 INJECTION INTRAMUSCULAR; INTRAVENOUS; SUBCUTANEOUS at 16:24

## 2022-06-22 RX ADMIN — LORAZEPAM 0.5 MG: 0.5 TABLET ORAL at 19:46

## 2022-06-22 RX ADMIN — BUMETANIDE 0.5 MG: 0.25 INJECTION, SOLUTION INTRAMUSCULAR; INTRAVENOUS at 16:22

## 2022-06-22 RX ADMIN — ENOXAPARIN SODIUM 40 MG: 100 INJECTION SUBCUTANEOUS at 16:21

## 2022-06-22 RX ADMIN — SODIUM CHLORIDE, PRESERVATIVE FREE 10 ML: 5 INJECTION INTRAVENOUS at 16:25

## 2022-06-22 RX ADMIN — MULTIPLE VITAMINS W/ MINERALS TAB 1 TABLET: TAB at 16:21

## 2022-06-22 RX ADMIN — DOCUSATE SODIUM 100 MG: 100 CAPSULE, LIQUID FILLED ORAL at 19:46

## 2022-06-22 ASSESSMENT — PAIN DESCRIPTION - ORIENTATION: ORIENTATION: MID

## 2022-06-22 ASSESSMENT — ENCOUNTER SYMPTOMS
NAUSEA: 0
VOMITING: 0
SHORTNESS OF BREATH: 0
NAUSEA: 1
SINUS PAIN: 0
WHEEZING: 0
ABDOMINAL PAIN: 1
COUGH: 0
SORE THROAT: 0
DIARRHEA: 0
ABDOMINAL DISTENTION: 0
CONSTIPATION: 0
PHOTOPHOBIA: 0
ABDOMINAL PAIN: 0
TROUBLE SWALLOWING: 0
BLOOD IN STOOL: 0

## 2022-06-22 ASSESSMENT — PAIN SCALES - GENERAL
PAINLEVEL_OUTOF10: 5
PAINLEVEL_OUTOF10: 0

## 2022-06-22 ASSESSMENT — PAIN DESCRIPTION - DESCRIPTORS: DESCRIPTORS: BURNING

## 2022-06-22 ASSESSMENT — PAIN DESCRIPTION - LOCATION: LOCATION: ABDOMEN

## 2022-06-22 NOTE — H&P
[facility] - History & Physical      PCP: Radha Asencio    Date of Admission: 6/22/2022    Date of Service: 6/22/2022    Chief Complaint: Loss of consciousness    History Of Present Illness: The patient is a 80 y.o. female with past medical history of hypertension, hyperlipidemia, neuropathy, and skin cancer Who presented to [facility] ED complaining of loss of consciousness at home. Ms. Ryan Leger is hard of hearing and able to provide limited HPI, HPI obtained from  at bedside and chart review. Reported having syncopal episode after having a shower this morning. Denied having preceding symptoms of lightheadedness, dizziness, shortness of breath or chest pain. Patient stated she does not recall the event.  reported helping patient to the floor, no complaints of head trauma.  reported patient lost consciousness for about 5 minutes returned to baseline immediately after regaining consciousness. Denied seizure activity. Denied weakness, speech difficulty, or other complaints. Workup in ED revealed BNP 2000, Troponin 0.11, urinalysis indicated moderate leukocytes, WBC 9. Cxray indicated hyperinflation compatible with COPD, otherwise no acute pathology. CT abdomen unremarkable. Patient was admitted to hospital medicine for syncope and collapse, UTI, elevated BNP and elevated troponin with cardiology consultation.          Past Medical History:        Diagnosis Date    Anxiety 10/2/2019    Chest tightness or pressure 8/26/2013 8/26/2013  lexiscan negative for myocardial ischemia, EF 61%    Edema     Essential and other specified forms of tremor     Generalized anxiety disorder     HTN (hypertension)     Hyperlipidemia     Hypertension     Insomnia, unspecified     Neuropathy     both legs up to both hips    Other and unspecified ovarian cyst     Other left bundle branch block     Pure hypercholesterolemia     Restless legs syndrome (RLS)     Solitary cyst of breast     Spinal stenosis, lumbar region, without neurogenic claudication     Unspecified hypothyroidism     Urinary frequency     UTI (urinary tract infection) 3/25/2022       Past Surgical History:        Procedure Laterality Date    APPENDECTOMY      CARDIAC CATHETERIZATION  5/26/15  MDL    with aortic root injection. EF 60%    CATARACT REMOVAL      CHOLECYSTECTOMY      CYST INCISION AND DRAINAGE      drained liver cyst    HEMORRHOID SURGERY      HYSTERECTOMY (CERVIX STATUS UNKNOWN)      NERVE BLOCK LUMB FACET LEVEL 1 BILATERAL Bilateral 1/19/2016    LUMBAR FACET CATE L4-5  performed by Aristeo Hoang at [facility] Medications:  Prior to Admission medications    Medication Sig Start Date End Date Taking? Authorizing Provider   conjugated estrogens (PREMARIN) 0.625 MG/GM vaginal cream Place small amountt on end of finger and apply to urethra three times per week 3/21/22   JERE Main - CNP   amLODIPine (NORVASC) 5 MG tablet Take 5 mg by mouth daily    Historical Provider, MD   vitamin C (ASCORBIC ACID) 500 MG tablet Take 500 mg by mouth daily    Historical Provider, MD   docusate sodium (COLACE) 100 MG capsule Take 100 mg by mouth 2 times daily     Historical Provider, MD   LORazepam (ATIVAN) 0.5 MG tablet Take 0.5 mg by mouth.  1/4 tab bid and 1/2 tab at hs    Historical Provider, MD   levothyroxine (SYNTHROID) 25 MCG tablet Take 2 tablets by mouth Daily  Patient taking differently: Take 25 mcg by mouth Daily  3/22/16   JERE Westbrook NP   omeprazole (PRILOSEC) 20 MG capsule Take 20 mg by mouth 2 times daily    Historical Provider, MD   Multiple Vitamins-Minerals (CENTRUM SILVER ADULT 50+) TABS Take 1 tablet by mouth daily     Historical Provider, MD   bumetanide (BUMEX) 0.5 MG tablet   Take 0.5 mg by mouth daily  4/23/15   Historical Provider, MD   Magnesium Oxide (WHIPPLE PO) Take 2 tablets by mouth daily     Historical Provider, MD   candesartan (ATACAND) 32 MG tablet Take 32 mg by mouth daily    Historical Provider, MD   Psyllium (METAMUCIL PO) Take 2 tablets by mouth daily     Historical Provider, MD       Allergies:    Ampicillin, Bactrim [sulfamethoxazole-trimethoprim], Cephalosporins, Ciprocinonide [fluocinolone], Codeine, Cymbalta [duloxetine hcl], Doxycycline, Enablex [darifenacin hydrobromide er], Gabapentin, Hctz [hydrochlorothiazide], Keppra [levetiracetam], Levaquin [levofloxacin in d5w], Lyrica [pregabalin], Pyridium [phenazopyridine hcl], Quinolones, and Sulfa antibiotics    Social History:    The patient currently lives at home  Tobacco:   reports that she has never smoked. She has never used smokeless tobacco.  Alcohol:   reports no history of alcohol use. Illicit Drugs: denies    Family History:      Problem Relation Age of Onset    Other Father         stroke, MI,  46         Review of Systems   Constitutional: Negative for chills, diaphoresis, fatigue and fever. HENT: Negative for congestion, ear pain, sinus pain, sore throat and trouble swallowing. Eyes: Negative for visual disturbance. Respiratory: Negative for cough, shortness of breath and wheezing. Denies sob    Cardiovascular: Negative for chest pain, palpitations and leg swelling. Denies chest pain    Gastrointestinal: Negative for abdominal distention, abdominal pain, blood in stool, constipation, diarrhea, nausea and vomiting. Reported indigestion earlier    Endocrine: Negative for cold intolerance and heat intolerance. Genitourinary: Negative for difficulty urinating, flank pain, frequency and urgency. Musculoskeletal: Negative for arthralgias and myalgias. Neurological: Positive for syncope. Negative for dizziness, weakness, light-headedness, numbness and headaches. Hematological: Does not bruise/bleed easily. Psychiatric/Behavioral: Negative for agitation, confusion and dysphoric mood.         Physical Examination:  BP (!) 150/78   Pulse 73 Temp 97.3 °F (36.3 °C) (Temporal)   Resp 20   Ht 5' 3\" (1.6 m)   Wt 90 lb (40.8 kg)   LMP  (LMP Unknown)   SpO2 100%   BMI 15.94 kg/m²     Physical Exam  Constitutional:       General: She is not in acute distress. Appearance: Normal appearance. She is not toxic-appearing or diaphoretic. HENT:      Head: Normocephalic and atraumatic. Right Ear: External ear normal.      Left Ear: External ear normal.      Nose: Nose normal. No congestion or rhinorrhea. Mouth/Throat:      Mouth: Mucous membranes are moist.      Pharynx: Oropharynx is clear. Eyes:      General: No scleral icterus. Extraocular Movements: Extraocular movements intact. Conjunctiva/sclera: Conjunctivae normal.   Cardiovascular:      Rate and Rhythm: Normal rate and regular rhythm. Pulses: Normal pulses. Heart sounds: Normal heart sounds. No murmur heard. No friction rub. No gallop. Pulmonary:      Effort: Pulmonary effort is normal. No respiratory distress. Breath sounds: Normal breath sounds. No wheezing, rhonchi or rales. Abdominal:      General: Abdomen is flat. Bowel sounds are normal. There is no distension. Palpations: Abdomen is soft. Tenderness: There is no abdominal tenderness. Musculoskeletal:         General: No swelling. Normal range of motion. Cervical back: Normal range of motion and neck supple. Right lower leg: No edema. Left lower leg: No edema. Skin:     General: Skin is warm and dry. Coloration: Skin is not jaundiced. Findings: No erythema, lesion or rash. Neurological:      General: No focal deficit present. Mental Status: She is alert and oriented to person, place, and time. Mental status is at baseline. Cranial Nerves: No cranial nerve deficit. Sensory: No sensory deficit. Motor: No weakness. Psychiatric:         Mood and Affect: Mood normal.         Behavior: Behavior normal.         Thought Content:  Thought content normal.         Judgment: Judgment normal.          Diagnostic Data:  CBC:  Recent Labs     06/22/22  0956   WBC 7.8   HGB 13.5   HCT 41.3        BMP:  Recent Labs     06/22/22  0956      K 3.5   CL 99   CO2 25   BUN 19   CREATININE 0.9   CALCIUM 9.8*     Recent Labs     06/22/22  0956   AST 33*   ALT 14   BILITOT 0.4   ALKPHOS 72     Coag Panel: No results for input(s): INR, PROTIME, APTT in the last 72 hours. Cardiac Enzymes:   Recent Labs     06/22/22  0956   TROPONINI 0.11*     ABGs:  Lab Results   Component Value Date    PHART 7.640 09/14/2019    PO2ART 86.0 09/14/2019    RMC2LNV 22.0 09/14/2019     Urinalysis:  Lab Results   Component Value Date    NITRU Negative 06/22/2022    WBCUA 9 06/22/2022    BACTERIA NEGATIVE 06/22/2022    RBCUA 3 06/22/2022    RBCUA 3 11/12/2014    BLOODU Negative 06/22/2022    SPECGRAV 1.010 06/22/2022    GLUCOSEU Negative 06/22/2022     A1C:   Recent Labs     06/22/22  0956   LABA1C 5.5     ABG:No results for input(s): PHART, JBV1XCB, PO2ART, MPB9VGQ, BEART, HGBAE, V5BQWYRI, CARBOXHGBART in the last 72 hours. RAD:    CT ABDOMEN PELVIS W IV CONTRAST Additional Contrast? Oral    Result Date: 6/22/2022  NO PRIOR REPORT AVAILABLE Exam: CT OF THE ABDOMEN/PELVIS WITH IV AND ORAL CONTRAST Clinical data: Epigastric pain and tenderness, syncopal episode. Technique: Direct contiguous axial CT images were acquired through the abdomen and pelvis with intravenouscontrastusing soft tissue and bone algorithms. Oral contrast was administered. Reformatted/MPR images were performed. Radiation dose: CTDIvol =5.57 mGy, DLP =230.52 mGy x cm. Limitations: None. Prior studies: CT of the abdomen and pelvis dated 12/05/2020. Findings: Lung bases: Multiple atelectatic bands seen in bilateral lung bases. Liver:Unremarkable size andcontour. Normal density. Multiple hepatic cysts, largest measuring 2x1.6cm in segment VIII. No evidence of dilated ducts. Gallbladder fossa:Prior cholecystectomy. Spleen:Calcified granulomas seen. Pancreas:  Grossly unremarkable. Adrenal glands: Grossly unremarkable size, contour and density. Kidneys: In anatomic position. Grossly unremarkablerenal size, contour and density. No renal or ureteral calculi. No evidence of a renal mass or cyst. Perinephric space is unremarkable. Retroperitoneum: No enlarged retroperitoneal lymphadenopathy. The IVC appear unremarkable. Atherosclerotic calcific plaques in aorta, however no evidence of aneurysm. Peritoneal cavity: No evidence of free air or ascites. Gastrointestinal tract: Stomach is collapsed. No obstruction. Appendix: not visualized. Pelvis:Prior hysterectomy. A well-defined cystic lesion 5.8x3.8cm seen in the pelvic cavity on right side, in presacral space. Osseous structures: No acute or destructive bony process identified. Degenerative changes noted in the visualized spine. Grade II anterolisthesis of L4 on L5 seen. 1. No acute abdominal or pelvic visceral pathology. Appendix not well visualized. 2. A well-defined cyst seen in the pelvic cavity on right side, in presacral space-unchanged from prior scan, perhaps adnexal. 3. Multiple atelectatic bands seen in bilateral lung bases. 4. Other nonacute findings above. Recommendation: Follow up as clinically indicated. All CT scans at this facility utilize dose modulation, iterative reconstruction, and/or weight based dosing when appropriate to reduce radiation dose to as low as reasonably achievable. Electronically Signed by Nicole Packer MD at 22-Jun-2022 02:45:00 PM             XR CHEST PORTABLE    Result Date: 6/22/2022  NO PRIOR REPORT AVAILABLE Exam: X-RAY OF THE CHEST Clinical data: Abnormal lung sounds on auscultation, syncopal episode. Technique: Single view of the chest. Prior studies: CT scan of the chest dated 09/14/2019 images. Findings: Hyperinflation. The lungs are grossly clear; no evidence of acute infiltrate or pleural effusion.   Cardiac silhouette is within normal limits. No acute osseous abnormality is detected. Hyperinflation compatible with COPD. Otherwise no acute pathology. Recommendation: Follow up as clinically indicated. Electronically Signed by Abeba Corbett MD at 22-Jun-2022 12:48:59 PM               Assessment/Plan:  Principal Problem:    Syncope and collapse  Active Problems:    Elevated brain natriuretic peptide (BNP) level    Elevated troponin    Hyperlipidemia    HTN (hypertension)    UTI (urinary tract infection)  Resolved Problems:    * No resolved hospital problems. *       Principal Problem:    Syncope and collapse-    - Cardiology consultation and recommendations appreciated               - ECHO               - Neuro checks              - Orthostatic blood pressure and pulse daily               - PT/OT              - Fall precaution              - Bed alarm on              - Monitor on telemetry                    Active Problems:    Elevated brain natriuretic peptide (BNP) level/  Elevated troponin-    - Cardiology consultation recommendations appreciated              - ASA/ Lipitor    - ECHO               - IV Bumex    - SL Nitro PRN for   - Trend troponin    - FLP in am/ HgA1c 5.5               - Low-sodium diet              - Monitor I's and O's closely              - Daily weights   - Serial and PRN EKGs               - Monitor on telemetry  - NPO after midnight        Hyperlipidemia- on statin       HTN (hypertension)- resume home medications, monitor BP closely       UTI (urinary tract infection)-    - IV Rocephin    - RN verified abx allergies upset patient's stomach    - monitor for allergic reaction    - Follow urine culture   - Monitor I's and O's closely              DVT Prophylaxis:  Lovenox     Further Orders per Clinical course/attending.      Signed:  Electronically signed by JERE Griggs CNP on 6/22/22 at 2:34 PM CDT       EMR Dragon/Transcription disclaimer:   Much of this encounter note is an electronic transcription/translation of spoken language to printed text.  The electronic translation of spoken language may permit erroneous, or at times, nonsensical words or phrases to be inadvertently transcribed; although attempts have made to review the note for such errors, some may still exist.

## 2022-06-22 NOTE — ED PROVIDER NOTES
[facility]  eMERGENCY dEPARTMENT eNCOUnter      Pt Name: Sarah Jackson  MRN: [id number]  [place] 11/10/1926  Date of evaluation: 6/22/2022  Provider: Mary De Paz, [address] [place] [address]       Chief Complaint   Patient presents with    Loss of Consciousness     LOC this morning after taking a bath, denies head injury         HISTORY OF PRESENT ILLNESS   (Location/Symptom, Timing/Onset,Context/Setting, Quality, Duration, Modifying Factors, Severity)  Note limiting factors. Sarah Jackson is a 80 y.o. female with history including hyperlipidemia, hypertension, neuropathy, anxiety, and insomnia who presents to the emergency department with complaint of a syncopal episode that happened this morning. The patient had been in in the bath and as she was leaving the bathroom had a syncopal episode. She was caught by her  and helped to be lowered to the floor so no complaints of head trauma. The patient did not fully lose consciousness. According to the , this was only for a few moments and then she woke up. He denies any postictal period. The patient does not remember this encounter. She denies any associated weakness, speech difficulty, or other complaints at this time. She states she only feels very tired and kind of \"fuzzy\". She denies any headache or dizziness. She has no known sick contacts. HPI    NursingNotes were reviewed. REVIEW OF SYSTEMS    (2-9 systems for level 4, 10 or more for level 5)     Review of Systems   Constitutional: Positive for fatigue. Negative for chills and fever. Eyes: Negative for photophobia and visual disturbance. Respiratory: Negative for shortness of breath. Cardiovascular: Negative for chest pain. Gastrointestinal: Positive for abdominal pain and nausea. Negative for diarrhea and vomiting. Genitourinary: Negative for dysuria. Musculoskeletal: Negative for myalgias. Neurological: Positive for syncope.  Negative for dizziness, seizures, weakness and headaches. All other systems reviewed and are negative. PAST MEDICALHISTORY     Past Medical History:   Diagnosis Date    Anxiety 10/2/2019    Chest tightness or pressure 8/26/2013 8/26/2013  lexiscan negative for myocardial ischemia, EF 61%    Edema     Essential and other specified forms of tremor     Generalized anxiety disorder     HTN (hypertension)     Hyperlipidemia     Hypertension     Insomnia, unspecified     Neuropathy     both legs up to both hips    Other and unspecified ovarian cyst     Other left bundle branch block     Pure hypercholesterolemia     Restless legs syndrome (RLS)     Solitary cyst of breast     Spinal stenosis, lumbar region, without neurogenic claudication     Unspecified hypothyroidism     Urinary frequency     UTI (urinary tract infection) 3/25/2022         SURGICAL HISTORY       Past Surgical History:   Procedure Laterality Date    APPENDECTOMY      CARDIAC CATHETERIZATION  5/26/15  [facility]DL    with aortic root injection.  EF 60%    CATARACT REMOVAL      CHOLECYSTECTOMY      CYST INCISION AND DRAINAGE      drained liver cyst    HEMORRHOID SURGERY      HYSTERECTOMY (CERVIX STATUS UNKNOWN)      NERVE BLOCK LUMB FACET LEVEL 1 BILATERAL Bilateral 1/19/2016    LUMBAR FACET CATE L4-5  performed by Aristeo Hoang at [address]     Previous Medications    AMLODIPINE (NORVASC) 5 MG TABLET    Take 5 mg by mouth daily    BUMETANIDE (BUMEX) 0.5 MG TABLET      Take 0.5 mg by mouth daily     CANDESARTAN (ATACAND) 32 MG TABLET    Take 32 mg by mouth daily    CONJUGATED ESTROGENS (PREMARIN) 0.625 MG/GM VAGINAL CREAM    Place small amountt on end of finger and apply to urethra three times per week    DOCUSATE SODIUM (COLACE) 100 MG CAPSULE    Take 100 mg by mouth 2 times daily     LEVOTHYROXINE (SYNTHROID) 25 MCG TABLET    Take 2 tablets by mouth Daily    LORAZEPAM (ATIVAN) 0.5 MG TABLET    Take 0.5 mg by mouth every 6 hours as needed for Anxiety.     MAGNESIUM OXIDE (WHIPPLE PO)    Take 2 tablets by mouth daily     METOPROLOL SUCCINATE (TOPROL XL) 25 MG EXTENDED RELEASE TABLET    Take 1 tablet by mouth nightly    MULTIPLE VITAMINS-MINERALS (CENTRUM SILVER ADULT 50+) TABS    Take 1 tablet by mouth daily     OMEPRAZOLE (PRILOSEC) 20 MG CAPSULE    Take 20 mg by mouth 2 times daily    PSYLLIUM (METAMUCIL PO)    Take 2 tablets by mouth daily     VITAMIN C (ASCORBIC ACID) 500 MG TABLET    Take 500 mg by mouth daily    VITAMIN D (ERGOCALCIFEROL) 73301 UNITS CAPS CAPSULE    Take 50,000 Units by mouth once a week       ALLERGIES     Ampicillin, Bactrim [sulfamethoxazole-trimethoprim], Cephalosporins, Ciprocinonide [fluocinolone], Codeine, Cymbalta [duloxetine hcl], Doxycycline, Enablex [darifenacin hydrobromide er], Gabapentin, Hctz [hydrochlorothiazide], Keppra [levetiracetam], Levaquin [levofloxacin in d5w], Lyrica [pregabalin], Pyridium [phenazopyridine hcl], Quinolones, and Sulfa antibiotics    FAMILY HISTORY       Family History   Problem Relation Age of Onset    Other Father         stroke, MI,  46          SOCIAL HISTORY       Social History     Socioeconomic History    Marital status:      Spouse name: None    Number of children: None    Years of education: None    Highest education level: None   Occupational History    None   Tobacco Use    Smoking status: Never Smoker    Smokeless tobacco: Never Used   Vaping Use    Vaping Use: Never used   Substance and Sexual Activity    Alcohol use: No    Drug use: No    Sexual activity: None   Other Topics Concern    None   Social History Narrative    None     Social Determinants of Health     Financial Resource Strain:     Difficulty of Paying Living Expenses: Not on file   Food Insecurity:     Worried About Running Out of Food in the Last Year: Not on file    Vicky of Food in the Last Year: Not on file   Transportation Needs:     Lack of Transportation (Medical): Not on file    Lack of Transportation (Non-Medical): Not on file   Physical Activity:     Days of Exercise per Week: Not on file    Minutes of Exercise per Session: Not on file   Stress:     Feeling of Stress : Not on file   Social Connections:     Frequency of Communication with Friends and Family: Not on file    Frequency of Social Gatherings with Friends and Family: Not on file    Attends [employer] Services: Not on file    Active Member of [address] [place] [address] [employer] or Organizations: Not on file    Attends Club or Organization Meetings: Not on file    Marital Status: Not on file   Intimate Partner Violence:     Fear of Current or Ex-Partner: Not on file    Emotionally Abused: Not on file    Physically Abused: Not on file    Sexually Abused: Not on file   Housing Stability:     Unable to Pay for Housing in the Last Year: Not on file    Number of [place] in the Last Year: Not on file    Unstable Housing in the Last Year: Not on file       SCREENINGS    Ivory Coma Scale  Eye Opening: Spontaneous  Best Verbal Response: Oriented  Best Motor Response: Obeys commands  Ivory Coma Scale Score: 15        PHYSICAL EXAM    (up to 7 for level 4, 8 or more for level 5)     ED Triage Vitals [06/22/22 0959]   BP Temp Temp src Heart Rate Resp SpO2 Height Weight   136/72 98 °F (36.7 °C) -- 79 18 99 % -- --       Physical Exam  Vitals and nursing note reviewed. Constitutional:       General: She is not in acute distress. Appearance: Normal appearance. She is not ill-appearing, toxic-appearing or diaphoretic. HENT:      Head: Normocephalic and atraumatic. Eyes:      Extraocular Movements: Extraocular movements intact. Pupils: Pupils are equal, round, and reactive to light. Cardiovascular:      Rate and Rhythm: Normal rate and regular rhythm. Pulses: Normal pulses. Heart sounds: Normal heart sounds. Pulmonary:      Effort: Pulmonary effort is normal. No respiratory distress.       Breath sounds: Normal breath sounds. Chest:      Chest wall: No tenderness. Abdominal:      General: There is no distension. Palpations: Abdomen is soft. Tenderness: There is abdominal tenderness. There is no right CVA tenderness or left CVA tenderness. Comments: Epigastric tenderness   Musculoskeletal:      Cervical back: Normal range of motion and neck supple. No rigidity or tenderness. Right lower leg: No edema. Left lower leg: No edema. Lymphadenopathy:      Cervical: No cervical adenopathy. Skin:     General: Skin is warm and dry. Neurological:      General: No focal deficit present. Mental Status: She is alert and oriented to person, place, and time. Mental status is at baseline. Cranial Nerves: No cranial nerve deficit. Sensory: No sensory deficit. Motor: No weakness.       Coordination: Coordination normal.      Gait: Gait normal.   Psychiatric:         Mood and Affect: Mood normal.         Behavior: Behavior normal.         DIAGNOSTIC RESULTS     EKG: All EKG's areinterpreted by the Emergency Department Physician who either signs or Co-signs this chart in the absence of a cardiologist.    EKG interpreted by attending, normal sinus rhythm at a rate of 77, left bundle branch block which was present on previous EKG, , QTc 465    RADIOLOGY:  Non-plain film images such as CT, Ultrasound and MRI are read by the radiologist. Plain radiographic images are visualized and preliminarily interpreted bythe emergency physician with the below findings:    CT ABDOMEN PELVIS W IV CONTRAST Additional Contrast? Oral    (Results Pending)   XR CHEST PORTABLE    (Results Pending)       LABS:  Labs Reviewed   COMPREHENSIVE METABOLIC PANEL W/ REFLEX TO MG FOR LOW K - Abnormal; Notable for the following components:       Result Value    GFR Non- 58 (*)     Calcium 9.8 (*)     AST 33 (*)     All other components within normal limits   CBC WITH AUTO DIFFERENTIAL - Abnormal; Notable for the following components:    RBC 4.18 (*)     MCH 32.3 (*)     MCHC 32.7 (*)     Neutrophils % 77.0 (*)     Lymphocytes % 10.8 (*)     Monocytes % 11.0 (*)     Lymphocytes Absolute 0.8 (*)     All other components within normal limits   TROPONIN - Abnormal; Notable for the following components:    Troponin 0.11 (*)     All other components within normal limits    Narrative:     CALL  [place]  [facility] tel. ,  Chemistry results called to and read back by [address] [place] [address], 06/22/2022 10:39,  by Suzi Mayer - Abnormal; Notable for the following components:    Pro-BNP 2,000 (*)     All other components within normal limits   COVID-19, RAPID   MAGNESIUM   URINALYSIS WITH MICROSCOPIC   TROPONIN   TROPONIN   HEMOGLOBIN A1C       All other labs were within normal range or not returned as of this dictation. EMERGENCY DEPARTMENT COURSE and DIFFERENTIAL DIAGNOSIS/MDM:   Vitals:    Vitals:    06/22/22 0959   BP: 136/72   Pulse: 79   Resp: 18   Temp: 98 °F (36.7 °C)   SpO2: 99%       MDM  Patient is a 72-year-old female presents ER with a syncopal episode. On physical exam, she does not have any concern for stroke including unilateral weakness, speech deficit, or other concern. Stroke scale negative. She has a left bundle branch block on her EKG which was on previous exams. She has no STEMI or acute ischemia on her EKG today. Her troponin is elevated concerning for an NSTEMI. She was given a dose of aspirin in the ER. She has no previous troponins completed in the past so I am unsure of her baseline. CBC does not show leukocytosis or anemia. CMP is negative for electrode disturbance, HÉCTOR, or elevation of her LFTs. COVID-negative. BNP is elevated. Imaging is still pending at this time. Chest x-ray and CT of the abdomen and pelvis due to her epigastric tenderness. I spoke with hospitalist nurse practitioner, Catalina Collins. Patient is agreeable to plan.     CONSULTS:  IP CONSULT TO CARDIOLOGY  Diego Collet, Hospitalist APRN    FINAL IMPRESSION      1. Syncope and collapse    2.  NSTEMI (non-ST elevated myocardial infarction) [facility])          DISPOSITION/PLAN   DISPOSITION Admitted 06/22/2022 11:19:12 AM      (Please note that portions of this note were completed with a voice recognition program.  Efforts were made to edit thedictations but occasionally words are mis-transcribed.)    Ac Calvo (electronically signed)     Ac Calvo  06/22/22 1121

## 2022-06-22 NOTE — FLOWSHEET NOTE
06/22/22 1659   Encounter Summary   Encounter Overview/Reason  Initial Encounter; Advance Care Planning   Service Provided For: Patient and family together  (with  at bedside)   Referral/Consult From: Nurse   Support System Spouse   Complexity of Encounter Moderate   Begin Time 1415   End Time  1515   Total Time Calculated 60 min   Encounter    Type Initial Screen/Assessment   Spiritual/Emotional needs   Type Spiritual Distress   Advance Care Planning   Type Completed AD/ACP document(s)   Assessment/Intervention/Outcome   Assessment Anxious; Concerns with suffering; Hopeful   Intervention Active listening;Discussed belief system/Confucianist practices/roger; Explored/Affirmed feelings, thoughts, concerns;Prayer (assurance of)/[place]   Outcome Encouraged;Expressed feelings, needs, and concerns;Expressed Gratitude     Received consults from pt's nurse to assist with LW. Emotional and spiritual support throughout ACP conversation with Ms. Ryan Leger and her . Decision of pt being DNR was made. LW completed and copy provided for patient/family. Kept a copy in pt's chart and scanned into pt's EMR. Family appreciated the visit.   Electronically signed by Dion Callas on 6/22/2022 at 5:09 PM

## 2022-06-22 NOTE — PROGRESS NOTES
The patient is concerned about not getting her ativan. She takes 1/2 of a 0.5mg tab at night, and 1/4 of a 0.5 twice a day according to the med list.  Upon resuming her home medication as directed,  the patient's  informed us that she only takes 0.5mg nightly and none during the day. Medication ordered to reflect the patient's home schedule.

## 2022-06-22 NOTE — PROGRESS NOTES
4 Eyes Skin Assessment    Unknown [facility] is being assessed upon: Admission    I agree that Drew Berumen RN, along with Su Antunez RN have performed a thorough Head to Toe Skin Assessment on the patient. ALL assessment sites listed below have been assessed. Areas assessed by both nurses:     [x]   Head, Face, and Ears   [x]   Shoulders, Back, and Chest  [x]   Arms, Elbows, and Hands   [x]   Coccyx, Sacrum, and Ischium  [x]   Legs, Feet, and Heels    Does the Patient have Skin Breakdown?  No    [place] Prevention initiated: No  Wound Care Orders initiated: No    [facility] nurse consulted for Pressure Injury (Stage 3,4, Unstageable, DTI, NWPT, and Complex wounds) and New or Established Ostomies: No        Primary Nurse eSignature: Honey Wiseman RN on 6/22/2022 at 2:11 PM      Co-Signer eSignature: {Esignature:[id number]}

## 2022-06-23 ENCOUNTER — TELEPHONE (OUTPATIENT)
Dept: CARDIOLOGY CLINIC | Age: 87
End: 2022-06-23

## 2022-06-23 VITALS
WEIGHT: 93.8 LBS | RESPIRATION RATE: 14 BRPM | TEMPERATURE: 98.1 F | BODY MASS INDEX: 16.62 KG/M2 | HEIGHT: 63 IN | OXYGEN SATURATION: 98 % | SYSTOLIC BLOOD PRESSURE: 152 MMHG | HEART RATE: 81 BPM | DIASTOLIC BLOOD PRESSURE: 82 MMHG

## 2022-06-23 PROBLEM — Z51.5 PALLIATIVE CARE PATIENT: Status: ACTIVE | Noted: 2022-06-23

## 2022-06-23 LAB
ANION GAP SERPL CALCULATED.3IONS-SCNC: 13 MMOL/L (ref 7–19)
BASOPHILS ABSOLUTE: 0 K/UL (ref 0–0.2)
BASOPHILS RELATIVE PERCENT: 0.8 % (ref 0–1)
BUN BLDV-MCNC: 17 MG/DL (ref 8–23)
CALCIUM SERPL-MCNC: 9.1 MG/DL (ref 8.2–9.6)
CHLORIDE BLD-SCNC: 102 MMOL/L (ref 98–111)
CHOLESTEROL, FASTING: 184 MG/DL (ref 160–199)
CO2: 24 MMOL/L (ref 22–29)
CREAT SERPL-MCNC: 0.6 MG/DL (ref 0.5–0.9)
EOSINOPHILS ABSOLUTE: 0.1 K/UL (ref 0–0.6)
EOSINOPHILS RELATIVE PERCENT: 1.4 % (ref 0–5)
GFR AFRICAN AMERICAN: >59
GFR NON-AFRICAN AMERICAN: >60
GLUCOSE BLD-MCNC: 82 MG/DL (ref 74–109)
HCT VFR BLD CALC: 35.8 % (ref 37–47)
HDLC SERPL-MCNC: 75 MG/DL (ref 65–121)
HEMOGLOBIN: 11.8 G/DL (ref 12–16)
IMMATURE GRANULOCYTES #: 0 K/UL
LDL CHOLESTEROL CALCULATED: 99 MG/DL
LYMPHOCYTES ABSOLUTE: 1 K/UL (ref 1.1–4.5)
LYMPHOCYTES RELATIVE PERCENT: 19.1 % (ref 20–40)
MAGNESIUM: 2.1 MG/DL (ref 1.7–2.3)
MCH RBC QN AUTO: 31.9 PG (ref 27–31)
MCHC RBC AUTO-ENTMCNC: 33 G/DL (ref 33–37)
MCV RBC AUTO: 96.8 FL (ref 81–99)
MONOCYTES ABSOLUTE: 0.7 K/UL (ref 0–0.9)
MONOCYTES RELATIVE PERCENT: 13.3 % (ref 0–10)
NEUTROPHILS ABSOLUTE: 3.3 K/UL (ref 1.5–7.5)
NEUTROPHILS RELATIVE PERCENT: 65 % (ref 50–65)
PDW BLD-RTO: 13.3 % (ref 11.5–14.5)
PLATELET # BLD: 216 K/UL (ref 130–400)
PMV BLD AUTO: 10 FL (ref 9.4–12.3)
POTASSIUM REFLEX MAGNESIUM: 3 MMOL/L (ref 3.5–5)
RBC # BLD: 3.7 M/UL (ref 4.2–5.4)
SODIUM BLD-SCNC: 139 MMOL/L (ref 136–145)
TRIGLYCERIDE, FASTING: 51 MG/DL (ref 0–149)
WBC # BLD: 5 K/UL (ref 4.8–10.8)

## 2022-06-23 PROCEDURE — 6370000000 HC RX 637 (ALT 250 FOR IP): Performed by: NURSE PRACTITIONER

## 2022-06-23 PROCEDURE — 6360000002 HC RX W HCPCS: Performed by: NURSE PRACTITIONER

## 2022-06-23 PROCEDURE — 80061 LIPID PANEL: CPT

## 2022-06-23 PROCEDURE — 36415 COLL VENOUS BLD VENIPUNCTURE: CPT

## 2022-06-23 PROCEDURE — 85025 COMPLETE CBC W/AUTO DIFF WBC: CPT

## 2022-06-23 PROCEDURE — 96376 TX/PRO/DX INJ SAME DRUG ADON: CPT

## 2022-06-23 PROCEDURE — 96365 THER/PROPH/DIAG IV INF INIT: CPT

## 2022-06-23 PROCEDURE — 2500000003 HC RX 250 WO HCPCS: Performed by: NURSE PRACTITIONER

## 2022-06-23 PROCEDURE — 93005 ELECTROCARDIOGRAM TRACING: CPT | Performed by: INTERNAL MEDICINE

## 2022-06-23 PROCEDURE — 96372 THER/PROPH/DIAG INJ SC/IM: CPT

## 2022-06-23 PROCEDURE — G0378 HOSPITAL OBSERVATION PER HR: HCPCS

## 2022-06-23 PROCEDURE — 93246 EXT ECG>7D<15D RECORDING: CPT

## 2022-06-23 PROCEDURE — 96366 THER/PROPH/DIAG IV INF ADDON: CPT

## 2022-06-23 PROCEDURE — 80048 BASIC METABOLIC PNL TOTAL CA: CPT

## 2022-06-23 PROCEDURE — 99215 OFFICE O/P EST HI 40 MIN: CPT | Performed by: INTERNAL MEDICINE

## 2022-06-23 PROCEDURE — 83735 ASSAY OF MAGNESIUM: CPT

## 2022-06-23 PROCEDURE — 6360000002 HC RX W HCPCS: Performed by: INTERNAL MEDICINE

## 2022-06-23 RX ORDER — CEFDINIR 300 MG/1
300 CAPSULE ORAL 2 TIMES DAILY
Qty: 10 CAPSULE | Refills: 0 | Status: SHIPPED | OUTPATIENT
Start: 2022-06-23 | End: 2022-06-28

## 2022-06-23 RX ORDER — POTASSIUM CHLORIDE 7.45 MG/ML
10 INJECTION INTRAVENOUS PRN
Status: DISCONTINUED | OUTPATIENT
Start: 2022-06-23 | End: 2022-06-23 | Stop reason: HOSPADM

## 2022-06-23 RX ORDER — BUMETANIDE 0.5 MG/1
0.5 TABLET ORAL DAILY PRN
Qty: 30 TABLET | Refills: 3 | Status: SHIPPED | OUTPATIENT
Start: 2022-06-23

## 2022-06-23 RX ORDER — ASPIRIN 81 MG/1
81 TABLET, CHEWABLE ORAL DAILY
Qty: 30 TABLET | Refills: 3 | Status: SHIPPED | OUTPATIENT
Start: 2022-06-24

## 2022-06-23 RX ORDER — POTASSIUM CHLORIDE 20 MEQ/1
40 TABLET, EXTENDED RELEASE ORAL PRN
Status: DISCONTINUED | OUTPATIENT
Start: 2022-06-23 | End: 2022-06-23 | Stop reason: HOSPADM

## 2022-06-23 RX ORDER — BUMETANIDE 0.25 MG/ML
0.5 INJECTION, SOLUTION INTRAMUSCULAR; INTRAVENOUS DAILY PRN
Status: DISCONTINUED | OUTPATIENT
Start: 2022-06-23 | End: 2022-06-23 | Stop reason: HOSPADM

## 2022-06-23 RX ADMIN — LEVOTHYROXINE SODIUM 25 MCG: 25 TABLET ORAL at 05:56

## 2022-06-23 RX ADMIN — POTASSIUM CHLORIDE 10 MEQ: 7.46 INJECTION, SOLUTION INTRAVENOUS at 08:43

## 2022-06-23 RX ADMIN — MULTIPLE VITAMINS W/ MINERALS TAB 1 TABLET: TAB at 08:50

## 2022-06-23 RX ADMIN — DOCUSATE SODIUM 100 MG: 100 CAPSULE, LIQUID FILLED ORAL at 08:50

## 2022-06-23 RX ADMIN — AMLODIPINE BESYLATE 5 MG: 5 TABLET ORAL at 08:50

## 2022-06-23 RX ADMIN — POTASSIUM CHLORIDE 10 MEQ: 7.46 INJECTION, SOLUTION INTRAVENOUS at 06:42

## 2022-06-23 RX ADMIN — OXYCODONE HYDROCHLORIDE AND ACETAMINOPHEN 500 MG: 500 TABLET ORAL at 08:50

## 2022-06-23 RX ADMIN — PANTOPRAZOLE SODIUM 40 MG: 40 TABLET, DELAYED RELEASE ORAL at 05:56

## 2022-06-23 RX ADMIN — POTASSIUM CHLORIDE 10 MEQ: 7.46 INJECTION, SOLUTION INTRAVENOUS at 10:32

## 2022-06-23 RX ADMIN — ASPIRIN 81 MG 81 MG: 81 TABLET ORAL at 08:50

## 2022-06-23 RX ADMIN — POTASSIUM BICARBONATE 40 MEQ: 782 TABLET, EFFERVESCENT ORAL at 12:15

## 2022-06-23 RX ADMIN — ENOXAPARIN SODIUM 40 MG: 100 INJECTION SUBCUTANEOUS at 12:15

## 2022-06-23 RX ADMIN — BUMETANIDE 0.5 MG: 0.25 INJECTION, SOLUTION INTRAMUSCULAR; INTRAVENOUS at 07:15

## 2022-06-23 ASSESSMENT — ENCOUNTER SYMPTOMS
DIARRHEA: 0
VOMITING: 0
GASTROINTESTINAL NEGATIVE: 1
RESPIRATORY NEGATIVE: 1
SHORTNESS OF BREATH: 0
EYES NEGATIVE: 1
NAUSEA: 0

## 2022-06-23 NOTE — TELEPHONE ENCOUNTER
Milli from 5th floor called stating patient had 17 second SVT run rate in 140s. Now back in NSR HR 88. VIKTOR notified.

## 2022-06-23 NOTE — PROGRESS NOTES
I have contacted Dr. Anna Gomez regarding the patients 17 second episode of SVT along with a increase in HR into the 140's. He has stated that the patient is still clear to be discharge from his standpoint. I have notified ADENIKE Molina of what Dr. Anna Gomez has said.

## 2022-06-23 NOTE — DISCHARGE INSTR - DIET

## 2022-06-23 NOTE — DISCHARGE SUMMARY
Pollo Mcgregor  :  11/10/1926  MRN:  [id number]    Admit date:  2022  Discharge date:  2022    Discharging Physician:  Dr. Tanya Lao Directive: DNR    Consults: Omar Meza TO [address] [place] [address]     Primary Care Physician:  Jolie Sánchez    Discharge Diagnoses:  Principal Problem:    Syncope and collapse  Active Problems:    Elevated brain natriuretic peptide (BNP) level    Elevated troponin    Hyperlipidemia    HTN (hypertension)    UTI (urinary tract infection)  Resolved Problems:    * No resolved hospital problems. *      Portions of this note have been copied forward, however, changed to reflect the most current clinical status of this patient. Hospital Course: The patient is a 80-year-old female with past medical history of hypertension, hyperlipidemia, neuropathy, and skin cancer who presented to [facility] ED with complaints of loss of consciousness at home. HPI was obtained by  at bedside and via chart review.  reported patient having a syncopal episode after her shower on the morning of admission. Patient denied preceding symptoms of lightheadedness, dizziness, shortness of breath, or chest pain. Patient did not recall the event.  reported helping the patient to the floor and patient did not fall.  reported the patient had loss of consciousness for approximately 5 minutes and immediately returned to baseline after regaining consciousness. There was no evidence of seizure-like activity. Patient denied weakness, speech difficulty or other neurological complaints. ED work-up indicated BNP 2000, troponin 0.11, and UA with moderate leukocyte esterase. Chest x-ray indicated hyperinflation compatible with COPD otherwise no acute pathology. CT abdomen unremarkable. Patient was admitted to the hospitalist service for syncope and collapse. Cardiology was consulted.   Cardiology recommended Zio patch and educated on orthostasis. Patient monitored on telemetry and noted 17-second incident of SVT, patient asymptomatic, cardiology aware. Patient denies urinary symptoms but UA indicates urinary tract infection. Patient received Rocephin while inpatient and transition to JOSEPH ISACC at discharge. Patient is now medically stable to be discharged home. Significant Diagnostic Studies:   Echo Complete    Result Date: 6/22/2022  Transthoracic Echocardiography Report (TTE)  Demographics   Patient Name   [facility]  Date of Study            06/22/2022   MRN            [id number]         Gender                   Female   Date of Birth  11/10/1926     Room Number              Brandan Wells   Age            89 year(s)   Height:        63 inches      Referring Physician      Luis Fernando Champagne   Weight:        90 pounds      Sonographer              Nydia Wells   BSA:           1.38 m^2       Interpreting Physician   Kevin Cleveland MD   BMI:           15.94 kg/m^2  Procedure Type of Study   TTE procedure:ECHO NO CONTRAST WITH DOP/COLR. Study Location: Echo Lab Technical Quality: Adequate visualization Patient Status: Inpatient Rhythm: Within normal limits HR: 76 bpm BP: 150/78 mmHg Indications:Syncope and Elevated Troponin. Conclusions   Summary  Mitral valve leaflets are mildly thickened with preserved leaflet  mobility. Mild mitral regurgitation is present. Mildly thickened aortic valve leaflets with preserved leaflet mobility. Tricuspid valve is structurally normal.  Mild tricuspid regurgitation with estimated RVSP of 27 mm Hg. Normal left ventricular size with preserved LV function and an estimated  ejection fraction of approximately 55-60%. Mild concentric left ventricular hypertrophy. Impaired relaxation compatible with diastolic dysfunction.  ( reversed E/A  ratio)   Signature   ----------------------------------------------------------------  Electronically signed by Kevin Cleveland MD(Interpreting  physician) on 06/22/2022 05:46 PM ----------------------------------------------------------------   Findings   Mitral Valve  Mitral valve leaflets are mildly thickened with preserved leaflet  mobility. Mild mitral regurgitation is present. Aortic Valve  Mildly thickened aortic valve leaflets with preserved leaflet mobility. Tricuspid Valve  Tricuspid valve is structurally normal.  Mild tricuspid regurgitation with estimated RVSP of 27 mm Hg. Pulmonic Valve  The pulmonic valve was not well visualized . Left Atrium  Normal size left atrium. Left Ventricle  Normal left ventricular size with preserved LV function and an estimated  ejection fraction of approximately 55-60%. Mild concentric left ventricular hypertrophy. Impaired relaxation compatible with diastolic dysfunction. ( reversed E/A  ratio)   Right Atrium  Normal right atrial dimension with no evidence of thrombus or mass noted. Right Ventricle  Normal right ventricular size with preserved RV function. Pericardial Effusion  small pericardial effusion   Pleural Effusion  No evidence of pleural effusion. Allergies   - Ampicillin:Sensitivity - Allergy.   - Bactrim:Sensitivity - Allergy.   - Cephalosporins:Sensitivity - Allergy.   - Cipro:Sensitivity - Allergy.   - Codeine:Sensitivity - Allergy.   - Other allergy:Sensitivity - Allergy(CYMBALTA). - Doxycyline:Sensitivity - Allergy.   - Other allergy:Sensitivity - Allergy(ENABLEX). - Other allergy:Sensitivity - Allergy(GABAPENTIN). - Other allergy:Reaction - OtherSensitivity - Allergy(HCTZ). - Levaquin:Sensitivity - Allergy.   - Other allergy:Sensitivity - Allergy(LYRICA). - Other allergy:Sensitivity - Allergy(PYRIDIUM). - Other allergy:Sensitivity - Allergy(QUINOLONES). - Sulfa drugs:Sensitivity - Allergy. 2D Measurements and Calculations(cm)   LVIDd: 2.95 cm                      LVIDs: 2.16 cm  IVSd: 1.21 cm  LVPWd: 0.95 cm                      AO Root Dimension: 2.2 cm  Rt. Vent.  Dimension: 3.11 cm        LA Dimension: 2.6 cm  % Ejection Fraction: 55 %           LA Area: 9.27 cm^2  LA Volume: 20.3 ml                  LV Systolic dimension: 8.73DO  LA Volume Index: 15 ml/m^2          LV PW diastolic: 2.97HL  LV dimension: 2.95 cm               LVOT diameter: 2 cm                                      RA Systolic pressure: 3mmHg  LVEDV: 54.5 ml                      RV Diastolic dimension: 2.85TV  LVESV:25 ml                         LVEDVI: 39 ml/m^2  Cardic Output (CO): 5.66l/min       LVESVI: 18 ml/m^2  Ascending Aorta: 2.7 cm  Doppler Measurements and Calculations   AV Peak Velocity:129 cm/s               MV Peak E-Wave: 61.2 cm/s  AV Mean Velocity:101 cm/s               MV Peak A-Wave: 111 cm/s  AV Peak Gradient: 6.66 mmHg             MV E/A Ratio: 0.55 %  AV Mean Gradient: 4 mmHg                MV Mean velocity: 82.4cm/s  AV Area (Continuity):2.92 cm^2          MV Peak Gradient: 1.5 mmHg  AV VTI:25.5 cm/s                        MV Mean gradient: 3 mmHg  TR Velocity:228 cm/s  TR Gradient:20.79 mmHg  Estimated RAP:3 mmHg  RVSP:27 mmHg   MV E' septal velocity: 3.92cm/s  MV E' lateral velocity:5.22 cm/s      CT ABDOMEN PELVIS W IV CONTRAST Additional Contrast? Oral    Result Date: 6/22/2022  NO PRIOR REPORT AVAILABLE Exam: CT OF THE ABDOMEN/PELVIS WITH IV AND ORAL CONTRAST Clinical data: Epigastric pain and tenderness, syncopal episode. Technique: Direct contiguous axial CT images were acquired through the abdomen and pelvis with intravenouscontrastusing soft tissue and bone algorithms. Oral contrast was administered. Reformatted/MPR images were performed. Radiation dose: CTDIvol =5.57 mGy, DLP =230.52 mGy x cm. Limitations: None. Prior studies: CT of the abdomen and pelvis dated 12/05/2020. Findings: Lung bases: Multiple atelectatic bands seen in bilateral lung bases. Liver:Unremarkable size andcontour. Normal density. Multiple hepatic cysts, largest measuring 2x1.6cm in segment VIII. No evidence of dilated ducts. Gallbladder fossa:Prior cholecystectomy. Spleen:Calcified granulomas seen. Pancreas:  Grossly unremarkable. Adrenal glands: Grossly unremarkable size, contour and density. Kidneys: In anatomic position. Grossly unremarkablerenal size, contour and density. No renal or ureteral calculi. No evidence of a renal mass or cyst. Perinephric space is unremarkable. Retroperitoneum: No enlarged retroperitoneal lymphadenopathy. The IVC appear unremarkable. Atherosclerotic calcific plaques in aorta, however no evidence of aneurysm. Peritoneal cavity: No evidence of free air or ascites. Gastrointestinal tract: Stomach is collapsed. No obstruction. Appendix: not visualized. Pelvis:Prior hysterectomy. A well-defined cystic lesion 5.8x3.8cm seen in the pelvic cavity on right side, in presacral space. Osseous structures: No acute or destructive bony process identified. Degenerative changes noted in the visualized spine. Grade II anterolisthesis of L4 on L5 seen. 1. No acute abdominal or pelvic visceral pathology. Appendix not well visualized. 2. A well-defined cyst seen in the pelvic cavity on right side, in presacral space-unchanged from prior scan, perhaps adnexal. 3. Multiple atelectatic bands seen in bilateral lung bases. 4. Other nonacute findings above. Recommendation: Follow up as clinically indicated. All CT scans at this facility utilize dose modulation, iterative reconstruction, and/or weight based dosing when appropriate to reduce radiation dose to as low as reasonably achievable. Electronically Signed by Marcela Nguyen MD at 22-Jun-2022 02:45:00 PM             XR CHEST PORTABLE    Result Date: 6/22/2022  NO PRIOR REPORT AVAILABLE Exam: X-RAY OF THE CHEST Clinical data: Abnormal lung sounds on auscultation, syncopal episode. Technique: Single view of the chest. Prior studies: CT scan of the chest dated 09/14/2019 images. Findings: Hyperinflation.   The lungs are grossly clear; no evidence of acute infiltrate or pleural known as: BUMEX  Take 1 tablet by mouth daily as needed (weight gain greater than 3 pounds overnight or 5 pounds in 1 week)  What changed:   · when to take this  · reasons to take this     levothyroxine 25 MCG tablet  Commonly known as: SYNTHROID  Take 2 tablets by mouth Daily  What changed: how much to take        CONTINUE taking these medications    amLODIPine 5 MG tablet  Commonly known as: NORVASC     candesartan 32 MG tablet  Commonly known as: ATACAND     Centrum Silver Adult 50+ Tabs     docusate sodium 100 MG capsule  Commonly known as: COLACE     LORazepam 0.5 MG tablet  Commonly known as: ATIVAN     METAMUCIL PO     omeprazole 20 MG delayed release capsule  Commonly known as: PRILOSEC     WHIPPLE PO     Premarin 0.625 MG/GM vaginal cream  Generic drug: conjugated estrogens  Place small amountt on end of finger and apply to urethra three times per week     vitamin C 500 MG tablet  Commonly known as: ASCORBIC ACID           Where to Get Your Medications      These medications were sent to [facility] [address], [address] [place] [id number]    Phone: [phone]   · aspirin 81 MG chewable tablet  · bumetanide 0.5 MG tablet  · cefdinir 300 MG capsule         Discharge Instructions: Follow up with Franny Schulte in 3-5 days. Take medications as directed. Resume activity as tolerated. Diet: ADULT DIET; Regular     Disposition: Patient is Stableand will be discharged to Home. Time spent on discharge 31 minutes spent in assessing patient, reviewing medications, discussion with nursing, confirming safe discharge plan and preparation of discharge summary.     Signed:  JERE Zurita CNP, 6/23/2022 2:53 PM

## 2022-06-23 NOTE — CONSULTS
Palliative Care:   Initiate for support, goals of care, and ACP. 81 y/o lady who presented to ED after syncopal  episode at home. Cardiology consulted. Work up ongoing. Met with pt and her . She is [employer] but able to participate in history and discussion r/t life at home. She is independent in her own care.  helps as needed. She does not require any DME. They get Meals on Wheels. She and  tell me they expect pt may be discharged later today. Past Medical History:        Past Medical History:   Diagnosis Date    Anxiety 10/02/2019    Chest tightness or pressure 08/26/2013 8/26/2013  lexiscan negative for myocardial ischemia, EF 61%    Edema     Essential and other specified forms of tremor     Generalized anxiety disorder     HTN (hypertension)     Hyperlipidemia     Hypertension     Insomnia, unspecified     Neuropathy     both legs up to both hips    Other and unspecified ovarian cyst     Other left bundle branch block     Palliative care patient 06/23/2022    Pure hypercholesterolemia     Restless legs syndrome (RLS)     Solitary cyst of breast     Spinal stenosis, lumbar region, without neurogenic claudication     Unspecified hypothyroidism     Urinary frequency     UTI (urinary tract infection) 03/25/2022       Advance Directives:    DNR  ACP note completed     Pain/Other Symptoms:    Pt denies any pain or soa. Activity:   As tolerated          Psychological/Spiritual:   Pt's  attends [employer] and they have support from his [place] community. Patient/family discussion r/t goals:  Goal is to return home.           Electronically signed by Glory Harrison RN on 6/23/2022 at 10:24 AM

## 2022-06-23 NOTE — CONSULTS
[facility]  Cardiology Consult      Requesting MD:  Angela Thapa MD   Admit Status:         History obtained from:   [] Patient  [] Other (specify):     Patient:  Mouna Burleson  [id number]     Chief Complaint:   Chief Complaint   Patient presents with    Loss of Consciousness     LOC this morning after taking a bath, denies head injury         HPI: Ms. Ryan Leger is a 80 y.o. female with a history of cardiac catheterization 2015 unremarkable  Admitted 6/22/2022 presented to the emergency department she had taken a shower and after getting out of the shower was standing there brushing her hair etc. for about 10 minutes when she called out to her  and her  came in and caught her before she fell to the ground. No previous syncopal episodes. Denies chest pain or dyspnea. Patient does not recall the event but her  reported it for her. Patient denies palpitations no witnessed seizure activity. Troponins minimally elevated 0.08-0. 11.  proBNP 2000. Chest x-ray showed no evidence of congestive heart failure. Orthostatic Pressures indicated her pressure supine was 986 systolic and upon standing 899 systolic. Review of Systems:  Review of Systems   Constitutional: Negative. Negative for chills, fever and unexpected weight change. HENT: Negative. Eyes: Negative. Respiratory: Negative. Negative for shortness of breath. Cardiovascular: Negative. Negative for chest pain. Gastrointestinal: Negative. Negative for diarrhea, nausea and vomiting. Endocrine: Negative. Genitourinary: Negative. Musculoskeletal: Negative. Skin: Negative. Neurological: Negative. All other systems reviewed and are negative.       Cardiac Specific Data:  Specialty Problems        Cardiology Problems    HTN (hypertension)        Hyperlipidemia        Nonrheumatic tricuspid valve regurgitation        Vertebrobasilar artery syndrome        Nonrheumatic mitral valve regurgitation        Ventricular septal defect              Past Medical History:  Past Medical History:   Diagnosis Date    Anxiety 10/02/2019    Chest tightness or pressure 2013  lexiscan negative for myocardial ischemia, EF 61%    Edema     Essential and other specified forms of tremor     Generalized anxiety disorder     HTN (hypertension)     Hyperlipidemia     Hypertension     Insomnia, unspecified     Neuropathy     both legs up to both hips    Other and unspecified ovarian cyst     Other left bundle branch block     Palliative care patient 2022    Pure hypercholesterolemia     Restless legs syndrome (RLS)     Solitary cyst of breast     Spinal stenosis, lumbar region, without neurogenic claudication     Unspecified hypothyroidism     Urinary frequency     UTI (urinary tract infection) 2022        Past Surgical History:  Past Surgical History:   Procedure Laterality Date    APPENDECTOMY      CARDIAC CATHETERIZATION  5/26/15  MDL    with aortic root injection.  EF 60%    CATARACT REMOVAL      CHOLECYSTECTOMY      CYST INCISION AND DRAINAGE      drained liver cyst    HEMORRHOID SURGERY      HYSTERECTOMY (CERVIX STATUS UNKNOWN)      NERVE BLOCK LUMB FACET LEVEL 1 BILATERAL Bilateral 2016    LUMBAR FACET CATE L4-5  performed by Erika Carbajal at [facility] OR       Past Family History:  Family History   Problem Relation Age of Onset    Other Father         stroke, MI,  46       Past Social History:  Social History     Socioeconomic History    Marital status:      Spouse name: Not on file    Number of children: Not on file    Years of education: Not on file    Highest education level: Not on file   Occupational History    Not on file   Tobacco Use    Smoking status: Never Smoker    Smokeless tobacco: Never Used   Vaping Use    Vaping Use: Never used   Substance and Sexual Activity    Alcohol use: No    Drug use: No    Sexual activity: Not on file   Other Topics Concern    Not on file   Social History Narrative    Not on file     Social Determinants of Health     Financial Resource Strain:     Difficulty of Paying Living Expenses: Not on file   Food Insecurity:     Worried About Running Out of Food in the Last Year: Not on file    Vicky of Food in the Last Year: Not on file   Transportation Needs:     Lack of Transportation (Medical): Not on file    Lack of Transportation (Non-Medical): Not on file   Physical Activity:     Days of Exercise per Week: Not on file    Minutes of Exercise per Session: Not on file   Stress:     Feeling of Stress : Not on file   Social Connections:     Frequency of Communication with Friends and Family: Not on file    Frequency of Social Gatherings with Friends and Family: Not on file    Attends Caoda[employer] Services: Not on file    Active Member of [address] [place] [address] [employer] or Organizations: Not on file    Attends Club or Organization Meetings: Not on file    Marital Status: Not on file   Intimate Partner Violence:     Fear of Current or Ex-Partner: Not on file    Emotionally Abused: Not on file    Physically Abused: Not on file    Sexually Abused: Not on file   Housing Stability:     Unable to Pay for Housing in the Last Year: Not on file    Number of [place] in the Last Year: Not on file    Unstable Housing in the Last Year: Not on file       Allergies: Allergies   Allergen Reactions    Ampicillin     Bactrim [Sulfamethoxazole-Trimethoprim]     Cephalosporins     Ciprocinonide [Fluocinolone]     Codeine     Cymbalta [Duloxetine Hcl]     Doxycycline     Enablex [Darifenacin Hydrobromide Er]     Gabapentin     Hctz [Hydrochlorothiazide]     Keppra [Levetiracetam]     Levaquin [Levofloxacin In D5w]     Lyrica [Pregabalin]     Pyridium [Phenazopyridine Hcl]     Quinolones     Sulfa Antibiotics        Home Meds:  Prior to Admission medications    Medication Sig Start Date End Date Taking? Authorizing Provider   conjugated estrogens (PREMARIN) 0.625 MG/GM vaginal cream Place small amountt on end of finger and apply to urethra three times per week 3/21/22   JERE Stewart CNP   amLODIPine (NORVASC) 5 MG tablet Take 5 mg by mouth daily    Historical Provider, MD   vitamin C (ASCORBIC ACID) 500 MG tablet Take 500 mg by mouth daily    Historical Provider, MD   docusate sodium (COLACE) 100 MG capsule Take 100 mg by mouth 2 times daily     Historical Provider, MD   LORazepam (ATIVAN) 0.5 MG tablet Take 0.5 mg by mouth.  1/4 tab bid and 1/2 tab at hs    Historical Provider, MD   levothyroxine (SYNTHROID) 25 MCG tablet Take 2 tablets by mouth Daily  Patient taking differently: Take 25 mcg by mouth Daily  3/22/16   JERE Tavares NP   omeprazole (PRILOSEC) 20 MG capsule Take 20 mg by mouth 2 times daily    Historical Provider, MD   Multiple Vitamins-Minerals (CENTRUM SILVER ADULT 50+) TABS Take 1 tablet by mouth daily     Historical Provider, MD   bumetanide (BUMEX) 0.5 MG tablet   Take 0.5 mg by mouth daily  4/23/15   Historical Provider, MD   Magnesium Oxide (WHIPPLE PO) Take 2 tablets by mouth daily     Historical Provider, MD   candesartan (ATACAND) 32 MG tablet Take 32 mg by mouth daily    Historical Provider, MD   Psyllium (METAMUCIL PO) Take 2 tablets by mouth daily     Historical Provider, MD       Current Meds:   sodium chloride flush  5-40 mL IntraVENous 2 times per day    enoxaparin  1 mg/kg SubCUTAneous Daily    aspirin  81 mg Oral Daily    atorvastatin  40 mg Oral Nightly    amLODIPine  5 mg Oral Daily    docusate sodium  100 mg Oral BID    levothyroxine  25 mcg Oral Daily    therapeutic multivitamin-minerals  1 tablet Oral Daily    pantoprazole  40 mg Oral QAM AC    vitamin C  500 mg Oral Daily    cefTRIAXone (ROCEPHIN) IV  1,000 mg IntraVENous Q24H    LORazepam  0.5 mg Oral Nightly       Current Infused Meds:   sodium chloride         Physical Exam:  Vitals: 06/23/22 0825   BP: (!) 152/82   Pulse: 81   Resp: 14   Temp: 98.1 °F (36.7 °C)   SpO2:        Intake/Output Summary (Last 24 hours) at 6/23/2022 1247  Last data filed at 6/23/2022 8466  Gross per 24 hour   Intake 450 ml   Output --   Net 450 ml     Estimated body mass index is 16.62 kg/m² as calculated from the following:    Height as of this encounter: 5' 3\" (1.6 m). Weight as of this encounter: 93 lb 12.8 oz (42.5 kg). Physical Exam  Vitals reviewed. Constitutional:       General: She is not in acute distress. Appearance: Normal appearance. She is well-developed and normal weight. She is not ill-appearing, toxic-appearing or diaphoretic. HENT:      Head: Normocephalic and atraumatic. Nose: Nose normal.      Mouth/Throat:      Mouth: Mucous membranes are moist.      Pharynx: Oropharynx is clear. No oropharyngeal exudate. Eyes:      General: No scleral icterus. Extraocular Movements: Extraocular movements intact. Pupils: Pupils are equal, round, and reactive to light. Neck:      Vascular: No carotid bruit or JVD. Cardiovascular:      Rate and Rhythm: Normal rate and regular rhythm. Heart sounds: Normal heart sounds. No murmur heard. No friction rub. No gallop. Pulmonary:      Effort: Pulmonary effort is normal. No respiratory distress. Breath sounds: Normal breath sounds. No stridor. No wheezing, rhonchi or rales. Chest:      Chest wall: No tenderness. Abdominal:      General: Abdomen is flat. Bowel sounds are normal. There is no distension. Palpations: Abdomen is soft. There is no mass. Tenderness: There is no abdominal tenderness. There is no right CVA tenderness, left CVA tenderness, guarding or rebound. Hernia: No hernia is present. Musculoskeletal:         General: No swelling, tenderness, deformity or signs of injury. Cervical back: Normal range of motion and neck supple. No rigidity or tenderness. Right lower leg: No edema. Left lower leg: No edema. Lymphadenopathy:      Cervical: No cervical adenopathy. Skin:     General: Skin is warm and dry. Neurological:      General: No focal deficit present. Mental Status: She is alert and oriented to person, place, and time. Mental status is at baseline. Cranial Nerves: No cranial nerve deficit. Sensory: No sensory deficit. Motor: No weakness. Coordination: Coordination normal.   Psychiatric:         Mood and Affect: Mood normal.         Behavior: Behavior normal.         Thought Content: Thought content normal.         Judgment: Judgment normal.         Labs:  Recent Labs     06/22/22  0956 06/23/22 0312   WBC 7.8 5.0   HGB 13.5 11.8*    216       Recent Labs     06/22/22  0956 06/23/22 0312    139   K 3.5 3.0*   CL 99 102   CO2 25 24   BUN 19 17   CREATININE 0.9 0.6   LABGLOM 58* >60   MG 2.3 2.1   CALCIUM 9.8* 9.1       CK, CKMB, Troponin: @LABRCNT (CKTOTAL:3, CKMB:3, TROPONINI:3)@    Last 3 BNP:  No results for input(s): BNP in the last 72 hours. IMAGING:  Echo Complete    Result Date: 6/22/2022  Transthoracic Echocardiography Report (TTE)  Demographics   Patient Name   [facility]  Date of Study            06/22/2022   MRN            [id number]         Gender                   Female   Date of Birth  11/10/1926     Room Number              Velásquez Luiz   Age            89 year(s)   Height:        63 inches      Referring Physician      Brandon Beltran   Weight:        90 pounds      Sonographer              Nydia Wells   BSA:           1.38 m^2       Interpreting Physician   Phoebe Schulte MD   BMI:           15.94 kg/m^2  Procedure Type of Study   TTE procedure:ECHO NO CONTRAST WITH DOP/COLR. Study Location: Echo Lab Technical Quality: Adequate visualization Patient Status: Inpatient Rhythm: Within normal limits HR: 76 bpm BP: 150/78 mmHg Indications:Syncope and Elevated Troponin.   Conclusions   Summary  Mitral valve leaflets are mildly thickened with preserved leaflet  mobility. Mild mitral regurgitation is present. Mildly thickened aortic valve leaflets with preserved leaflet mobility. Tricuspid valve is structurally normal.  Mild tricuspid regurgitation with estimated RVSP of 27 mm Hg. Normal left ventricular size with preserved LV function and an estimated  ejection fraction of approximately 55-60%. Mild concentric left ventricular hypertrophy. Impaired relaxation compatible with diastolic dysfunction. ( reversed E/A  ratio)   Signature   ----------------------------------------------------------------  Electronically signed by Apurva Moran MD(Interpreting  physician) on 06/22/2022 05:46 PM  ----------------------------------------------------------------   Findings   Mitral Valve  Mitral valve leaflets are mildly thickened with preserved leaflet  mobility. Mild mitral regurgitation is present. Aortic Valve  Mildly thickened aortic valve leaflets with preserved leaflet mobility. Tricuspid Valve  Tricuspid valve is structurally normal.  Mild tricuspid regurgitation with estimated RVSP of 27 mm Hg. Pulmonic Valve  The pulmonic valve was not well visualized . Left Atrium  Normal size left atrium. Left Ventricle  Normal left ventricular size with preserved LV function and an estimated  ejection fraction of approximately 55-60%. Mild concentric left ventricular hypertrophy. Impaired relaxation compatible with diastolic dysfunction. ( reversed E/A  ratio)   Right Atrium  Normal right atrial dimension with no evidence of thrombus or mass noted. Right Ventricle  Normal right ventricular size with preserved RV function. Pericardial Effusion  small pericardial effusion   Pleural Effusion  No evidence of pleural effusion.   Allergies   - Ampicillin:Sensitivity - Allergy.   - Bactrim:Sensitivity - Allergy.   - Cephalosporins:Sensitivity - Allergy.   - Cipro:Sensitivity - Allergy.   - Codeine:Sensitivity - Allergy.   - Other allergy:Sensitivity - Allergy(CYMBALTA). - Doxycyline:Sensitivity - Allergy.   - Other allergy:Sensitivity - Allergy(ENABLEX). - Other allergy:Sensitivity - Allergy(GABAPENTIN). - Other allergy:Reaction - OtherSensitivity - Allergy(HCTZ). - Levaquin:Sensitivity - Allergy.   - Other allergy:Sensitivity - Allergy(LYRICA). - Other allergy:Sensitivity - Allergy(PYRIDIUM). - Other allergy:Sensitivity - Allergy(QUINOLONES). - Sulfa drugs:Sensitivity - Allergy. 2D Measurements and Calculations(cm)   LVIDd: 2.95 cm                      LVIDs: 2.16 cm  IVSd: 1.21 cm  LVPWd: 0.95 cm                      AO Root Dimension: 2.2 cm  Rt. Vent.  Dimension: 3.11 cm        LA Dimension: 2.6 cm  % Ejection Fraction: 55 %           LA Area: 9.27 cm^2  LA Volume: 20.3 ml                  LV Systolic dimension: 8.25XH  LA Volume Index: 15 ml/m^2          LV PW diastolic: 0.42FT  LV dimension: 2.95 cm               LVOT diameter: 2 cm                                      RA Systolic pressure: 3mmHg  LVEDV: 54.5 ml                      RV Diastolic dimension: 9.04VL  LVESV:25 ml                         LVEDVI: 39 ml/m^2  Cardic Output (CO): 5.66l/min       LVESVI: 18 ml/m^2  Ascending Aorta: 2.7 cm  Doppler Measurements and Calculations   AV Peak Velocity:129 cm/s               MV Peak E-Wave: 61.2 cm/s  AV Mean Velocity:101 cm/s               MV Peak A-Wave: 111 cm/s  AV Peak Gradient: 6.66 mmHg             MV E/A Ratio: 0.55 %  AV Mean Gradient: 4 mmHg                MV Mean velocity: 82.4cm/s  AV Area (Continuity):2.92 cm^2          MV Peak Gradient: 1.5 mmHg  AV VTI:25.5 cm/s                        MV Mean gradient: 3 mmHg  TR Velocity:228 cm/s  TR Gradient:20.79 mmHg  Estimated RAP:3 mmHg  RVSP:27 mmHg   MV E' septal velocity: 3.92cm/s  MV E' lateral velocity:5.22 cm/s      CT ABDOMEN PELVIS W IV CONTRAST Additional Contrast? Oral    Result Date: 6/22/2022  NO PRIOR REPORT AVAILABLE Exam: CT OF THE ABDOMEN/PELVIS WITH IV AND ORAL CONTRAST Clinical data: Epigastric pain and tenderness, syncopal episode. Technique: Direct contiguous axial CT images were acquired through the abdomen and pelvis with intravenouscontrastusing soft tissue and bone algorithms. Oral contrast was administered. Reformatted/MPR images were performed. Radiation dose: CTDIvol =5.57 mGy, DLP =230.52 mGy x cm. Limitations: None. Prior studies: CT of the abdomen and pelvis dated 12/05/2020. Findings: Lung bases: Multiple atelectatic bands seen in bilateral lung bases. Liver:Unremarkable size andcontour. Normal density. Multiple hepatic cysts, largest measuring 2x1.6cm in segment VIII. No evidence of dilated ducts. Gallbladder fossa:Prior cholecystectomy. Spleen:Calcified granulomas seen. Pancreas:  Grossly unremarkable. Adrenal glands: Grossly unremarkable size, contour and density. Kidneys: In anatomic position. Grossly unremarkablerenal size, contour and density. No renal or ureteral calculi. No evidence of a renal mass or cyst. Perinephric space is unremarkable. Retroperitoneum: No enlarged retroperitoneal lymphadenopathy. The IVC appear unremarkable. Atherosclerotic calcific plaques in aorta, however no evidence of aneurysm. Peritoneal cavity: No evidence of free air or ascites. Gastrointestinal tract: Stomach is collapsed. No obstruction. Appendix: not visualized. Pelvis:Prior hysterectomy. A well-defined cystic lesion 5.8x3.8cm seen in the pelvic cavity on right side, in presacral space. Osseous structures: No acute or destructive bony process identified. Degenerative changes noted in the visualized spine. Grade II anterolisthesis of L4 on L5 seen. 1. No acute abdominal or pelvic visceral pathology. Appendix not well visualized. 2. A well-defined cyst seen in the pelvic cavity on right side, in presacral space-unchanged from prior scan, perhaps adnexal. 3. Multiple atelectatic bands seen in bilateral lung bases. 4. Other nonacute findings above. Recommendation: Follow up as clinically indicated. All CT scans at this facility utilize dose modulation, iterative reconstruction, and/or weight based dosing when appropriate to reduce radiation dose to as low as reasonably achievable. Electronically Signed by Geovani Tinoco MD at 22-Jun-2022 02:45:00 PM             XR CHEST PORTABLE    Result Date: 6/22/2022  NO PRIOR REPORT AVAILABLE Exam: X-RAY OF THE CHEST Clinical data: Abnormal lung sounds on auscultation, syncopal episode. Technique: Single view of the chest. Prior studies: CT scan of the chest dated 09/14/2019 images. Findings: Hyperinflation. The lungs are grossly clear; no evidence of acute infiltrate or pleural effusion. Cardiac silhouette is within normal limits. No acute osseous abnormality is detected. Hyperinflation compatible with COPD. Otherwise no acute pathology. Recommendation: Follow up as clinically indicated. Electronically Signed by Geovani Tinoco MD at 22-Jun-2022 12:48:59 PM               Assessment:  1. Syncopal or near syncopal episode yesterday after taking a shower and standing for approximately 10 minutes  2. Orthostatic hypotension  3. Minimally elevated troponin level 0.07-0.11 suspect type II leak no reported chest pain doubt non-STEMI  4. proBNP 2000 no reported shortness of breath chest x-ray does not reflect any evidence of congestive heart failure  5. Hyperlipidemia  6. Hypertension  7. Urinary tract infection  8. Tremor  9. Ataxia  10. Lumbar spinal stenosis  11. Vertebrobasilar artery syndrome  12. Mitral valve regurgitation  13. Possible ventricular septal defect  14. Restless leg syndrome  15. Hypothyroidism  16. Anxiety disorder  17. Left bundle branch block  18. Neuropathy  19. CT abdomen pelvis yesterday no acute findings well-defined cyst pelvic cavity on the right side multiple atelectatic bands bilateral lung bases   20.  CTA pulmonary 12/5/2020 no evidence of pulmonary embolism basilar atelectasis borderline cardiomegaly  21. MRI of the brain 3/21/2016 no acute abnormality chronic white matter ischemic changes quite stable since previous study 11/14/2014  22. Carotid to flex examination 3/21/2016 less than 50% stenosis internal carotid arteries bilaterally vertebral flow antegrade bilaterally  23. Echocardiogram yesterday mild MR RVSP 27 mild TR normal LV systolic function EF 55 to 60% mild concentric LVH grade 1 diastolic dysfunction  24. Cardiac catheterization 5/26/2015 normal left ventricular function normal coronary angiograms      Recommendations:  1. Recommend giving Bumex only on a as needed basis for obvious edema etc. this is likely contributing to her orthostasis. 2. Hypokalemia likewise Bumex also contributing to this as well and should improve when not taking daily  3. Suggested that she avoid prolonged standing and after taking a hot shower should be seated when combing and brushing her hair etc.  4. Zio patch  5.  May discharge from a cardiac standpoint we will arrange follow-up in the office

## 2022-06-23 NOTE — PROGRESS NOTES
Comprehensive Nutrition Assessment    Type and Reason for Visit:  Initial,Positive Nutrition Screen    Nutrition Recommendations/Plan:   1. Continue current diet at this time and monitor for additional nutrition intervention needs. Malnutrition Assessment:  Malnutrition Status: At risk for malnutrition (Comment) (06/23/22 1310)    Context:  Acute Illness     Findings of the 6 clinical characteristics of malnutrition:  Energy Intake:  Mild decrease in energy intake (Comment)  Weight Loss:  No significant weight loss     Body Fat Loss:  Unable to assess     Muscle Mass Loss:  Unable to assess    Fluid Accumulation:  No significant fluid accumulation     Strength:  Not Performed    Nutrition Assessment:    +NS for wt loss and poor po intake PTA. Pt at risk for nutrition compromise AEB reported poor po intake PTA, no po intakes available since diet advance this AM. Aware possble d/c today. Pt and spouse recieving MOW at home. Wt hx stable. Continue current diet and will monitor while pt remains admitted. Nutrition Related Findings:      Wound Type: None       Current Nutrition Intake & Therapies:    Average Meal Intake: Unable to assess  Average Supplements Intake: None Ordered  ADULT DIET; Regular    Anthropometric Measures:  Height: 5' 3\" (160 cm)  Ideal Body Weight (IBW): 115 lbs (52 kg)    Admission Body Weight: 93 lb 11.1 oz (42.5 kg)  Current Body Weight: 93 lb 12.8 oz (42.5 kg),   IBW.  Weight Source: Bed Scale  Current BMI (kg/m2): 16.6  Usual Body Weight: 95 lb (43.1 kg) (3/21/2022)  % Weight Change (Calculated): -1.3                    BMI Categories: Underweight (BMI less than 22) age over 72    Estimated Daily Nutrient Needs:  Energy Requirements Based On: Kcal/kg  Weight Used for Energy Requirements: Current (30-35 kcals/kg)  Energy (kcal/day): 9450-1263 kcals/day  Weight Used for Protein Requirements: Current (1.2-1.5 g/kg)  Protein (g/day): 51-64 g/pro/day  Method Used for Fluid Requirements: 1 ml/kcal  Fluid (ml/day): 9056-9841 mL/fluid/day    Nutrition Diagnosis:   No nutrition diagnosis at this time     Nutrition Interventions:   Food and/or Nutrient Delivery: Continue Current Diet  Nutrition Education/Counseling: Education not indicated  Coordination of Nutrition Care: Continue to monitor while inpatient       Goals:     Goals: PO intake 75% or greater,by next RD assessment       Nutrition Monitoring and Evaluation:   Behavioral-Environmental Outcomes: None Identified  Food/Nutrient Intake Outcomes: Food and Nutrient Intake  Physical Signs/Symptoms Outcomes: Biochemical Data,Nutrition Focused Physical Findings,Weight    Discharge Planning:    No discharge needs at this time     Meredith Rivero MS, RD, LD  Contact: [phone]

## 2022-06-23 NOTE — CARE COORDINATION
Language/Speech         []   Aphasia         []   Dysarthria         []   Swallow         Ivory Coma Scale  Eye Opening: Spontaneous  Best Verbal Response: Oriented  Best Motor Response: Obeys commands  Ivory Coma Scale Score: 15    Patient Deficit Notes: Additional CM/SW Notes: Frankey Railing and/or her family were provided with choice of provider:  [x]   Yes   []   No      Diana Crews RN  [facility]     06/23/22 1129   Service Assessment   Patient Orientation Alert and Oriented;Person;Place;Situation   Cognition Alert   History Provided By Patient;Spouse   Accompanied By/Relationship Spouse   Support Systems Spouse/Significant Other   PCP Verified by CM Yes   Prior Functional Level Independent in ADLs/IADLs   Current Functional Level Independent in ADLs/IADLs   Can patient return to prior living arrangement Yes   Ability to make needs known: Good   Family able to assist with home care needs: Yes   Social/Functional History   Lives With Spouse   Type of [address] [place] [address] One level   Home Access Stairs to enter with rails   Entrance Stairs - Number of Steps 8 - steps were \"designed to be handicap. Risers are only 3 inches\"   Bathroom Shower/Tub Walk-in shower   Bathroom Toilet Standard   ADL Assistance Independent   Ambulation Assistance Independent   Transfer Assistance Independent   Active  No   Patient's  Info Patient's spouse provides transportation   Mode of Transportation Car   Discharge Planning   Type of Residence [employer]   Living Arrangements Spouse/Significant Other   Potential Assistance Needed N/A   DME Ordered? No   Potential Assistance Purchasing Medications No   Type of Home Care Services None   Patient expects to be discharged to: House   One/Two Story Residence One story   History of falls?  1

## 2022-06-24 ENCOUNTER — TELEPHONE (OUTPATIENT)
Dept: INTERNAL MEDICINE | Facility: CLINIC | Age: 87
End: 2022-06-24

## 2022-06-24 ENCOUNTER — TELEPHONE (OUTPATIENT)
Dept: CARDIOTHORACIC SURGERY | Age: 87
End: 2022-06-24

## 2022-06-24 LAB — URINE CULTURE, ROUTINE: NORMAL

## 2022-06-24 NOTE — TELEPHONE ENCOUNTER
Caller: Alex Arteaga    Relationship: Emergency Contact    Best call back number: [phone]    What medication are you requesting: CIPROFLOXACIN AND ANDROSTATIN    What are your current symptoms: DIAGNOSED WITH UTI IN HOSPITAL     How long have you been experiencing symptoms: COUPLE OF DAYS     Have you had these symptoms before:    [x] Yes  [] No    Have you been treated for these symptoms before:   [x] Yes  [] No    If a prescription is needed, what is your preferred pharmacy and phone number: [facility] [address] [place] [address] 60 - [phone] [facility] [phone]

## 2022-06-24 NOTE — TELEPHONE ENCOUNTER
Pt  called stating she was in [facility] [place] and got released last night. They said she had a UTI and ordered a med. The pharmacy would not give it to her cause it makes her sick. He wants Omeprazole & ondameatron. Please advise

## 2022-06-24 NOTE — TELEPHONE ENCOUNTER
Pt  calling about pt being in the ED yesterday and having a UTI. A medication was called in for her but pharmacy saying it has already been tried. .. explained to pt he needs to contact his PCP to manage this. UTIs are not something cardiology deals with routinely. He stated he has already left them a message also. Instructed him to wait for there return call.

## 2022-06-25 LAB
EKG P AXIS: -27 DEGREES
EKG P AXIS: 63 DEGREES
EKG P-R INTERVAL: 170 MS
EKG P-R INTERVAL: 172 MS
EKG Q-T INTERVAL: 394 MS
EKG Q-T INTERVAL: 420 MS
EKG QRS DURATION: 126 MS
EKG QRS DURATION: 126 MS
EKG QTC CALCULATION (BAZETT): 423 MS
EKG QTC CALCULATION (BAZETT): 455 MS
EKG T AXIS: -33 DEGREES
EKG T AXIS: 114 DEGREES

## 2022-06-25 PROCEDURE — 93010 ELECTROCARDIOGRAM REPORT: CPT | Performed by: INTERNAL MEDICINE

## 2022-06-26 DIAGNOSIS — N30.01 ACUTE CYSTITIS WITH HEMATURIA: ICD-10-CM

## 2022-06-26 RX ORDER — ONDANSETRON 4 MG/1
4 TABLET, ORALLY DISINTEGRATING ORAL EVERY 8 HOURS PRN
Qty: 30 TABLET | Refills: 6 | Status: SHIPPED | OUTPATIENT
Start: 2022-06-26 | End: 2022-09-21 | Stop reason: SDUPTHER

## 2022-06-27 ENCOUNTER — TELEPHONE (OUTPATIENT)
Dept: INTERNAL MEDICINE | Facility: CLINIC | Age: 87
End: 2022-06-27

## 2022-06-27 RX ORDER — CIPROFLOXACIN 500 MG/1
500 TABLET, FILM COATED ORAL DAILY
Qty: 3 TABLET | Refills: 0 | Status: SHIPPED | OUTPATIENT
Start: 2022-06-27 | End: 2022-06-30

## 2022-06-27 NOTE — TELEPHONE ENCOUNTER
Alex Arteaga called and stated that they would like a callback from PCP or nurse regarding symtpoms they are experiencing right now.    They would like an antibiotic to be called in for treatment of these symptoms.    Symptoms include: Painful Urination / Requesting Ciphrolaxin and Ondesatratan    Preferred Pharmacy: [facility] [address] [place] [address] AT  60 - [phone]  - [phone] [facility]     Please advise if this can be done with a callback to [phone]     Thank you,  Bin Barber, PCT

## 2022-06-29 ENCOUNTER — OFFICE VISIT (OUTPATIENT)
Dept: INTERNAL MEDICINE | Facility: CLINIC | Age: 87
End: 2022-06-29

## 2022-06-29 VITALS
WEIGHT: 94.2 LBS | HEART RATE: 70 BPM | TEMPERATURE: 97.7 F | HEIGHT: 62 IN | DIASTOLIC BLOOD PRESSURE: 76 MMHG | SYSTOLIC BLOOD PRESSURE: 142 MMHG | OXYGEN SATURATION: 98 % | BODY MASS INDEX: 17.34 KG/M2

## 2022-06-29 DIAGNOSIS — I95.1 ORTHOSTASIS: Primary | ICD-10-CM

## 2022-06-29 DIAGNOSIS — R42 VERTIGO: ICD-10-CM

## 2022-06-29 PROCEDURE — 99214 OFFICE O/P EST MOD 30 MIN: CPT | Performed by: NURSE PRACTITIONER

## 2022-06-29 RX ORDER — SENNA PLUS 8.6 MG/1
1 TABLET ORAL DAILY
Qty: 30 TABLET | Refills: 1 | Status: SHIPPED | OUTPATIENT
Start: 2022-06-29 | End: 2023-03-29

## 2022-06-29 RX ORDER — PSEUDOEPHEDRINE HCL 30 MG
100 TABLET ORAL
COMMUNITY
End: 2023-03-29

## 2022-06-29 RX ORDER — CONJUGATED ESTROGENS 0.62 MG/G
CREAM VAGINAL
COMMUNITY
Start: 2022-03-21 | End: 2022-10-12 | Stop reason: CLARIF

## 2022-06-29 RX ORDER — LORAZEPAM 0.5 MG/1
0.5 TABLET ORAL
COMMUNITY
End: 2022-06-29 | Stop reason: SDUPTHER

## 2022-06-29 RX ORDER — SIMETHICONE 80 MG
80 TABLET,CHEWABLE ORAL EVERY 6 HOURS PRN
Start: 2022-06-29

## 2022-06-29 RX ORDER — ASPIRIN 81 MG/1
1 TABLET, CHEWABLE ORAL DAILY
COMMUNITY
Start: 2022-06-24

## 2022-06-29 NOTE — PROGRESS NOTES
"        Subjective     Chief Complaint:  Hospital follow-up    HPI:  Ms. Arteaga is here today to follow-up from recent hospitalization at [facility].  She presented to the emergency department on 6/22/2022 as her  states she had been showering and subsequently \"passed out\".  Her  caught her from falling and he felt that she lost consciousness for 5 minutes at least.  During her hospitalization she did have an echocardiogram which showed diastolic dysfunction and mild mitral valve regurgitation.  She was evaluated by cardiology.  Reported that she did have orthostasis, but I am unable to assess to what degree or how severe this was.  She did have apparently a 17-second episode of SVT prior to discharge and was cleared for discharge from a cardiology standpoint with a Zio patch.  Cardiology also advised for her to continue Bumex only on an as-needed basis as they thought this may help improve her orthostasis.  She still complains of dizziness today, has some difficulty describing this.  It does sound as though this is worse with position changes.  This is not worse with turning her head in certain directions.    The patient did have complaints of pain with urination and her urinalysis at [facility] was positive for leukocytes and white blood cells.  Her urine culture showed less than 10,000 colony-forming units of mixed meseret, likely contaminant.  She was prescribed Omnicef at time of discharge from [facility] but apparently she has a history of vomiting with this and she did call our office for an alternative antibiotic.  She was prescribed ciprofloxacin for 3 days and has 1 day left of this.  Her urinary symptoms are much improved.    She does complain of bloating and constipation.  She has to take milk of magnesia frequently.  She denies any nausea or vomiting.    Patient's PMR from outside medical facility reviewed and noted.    Past Medical History:   Past Medical History:   Diagnosis Date   • Arthritis "    • GERD (gastroesophageal reflux disease)    • Hyperlipidemia    • Hypothyroidism    • Liver cyst    • Neuropathy    • Ovarian cyst      Past Surgical History:  Past Surgical History:   Procedure Laterality Date   • ABDOMINAL HYSTERECTOMY     • APPENDECTOMY     • BREAST BIOPSY     • CHOLECYSTECTOMY     • COLONOSCOPY  03/05/2008    normal   • COLONOSCOPY  01/12/2012   • ENDOSCOPY  08/29/2013    normal   • ENDOSCOPY  01/23/2012    eus with stacy  normal endoscopic ultrascope of the pancreas.fortunately there was no mass seen   • ENDOSCOPY N/A 8/3/2018    Procedure: ESOPHAGOGASTRODUODENOSCOPY WITH ANESTHESIA;  Surgeon: Obed Patrick DO;  Location: [facility] ENDOSCOPY;  Service: Gastroenterology   • HEMORRHOIDECTOMY     • SKIN CANCER EXCISION  11/27/2021    left arm and left leg    • UPPER GASTROINTESTINAL ENDOSCOPY  08/29/2013     Social History:  reports that she has never smoked. She has never used smokeless tobacco. She reports that she does not drink alcohol and does not use drugs.    Family History: family history includes Bipolar disorder in her daughter.      Allergies:  Allergies   Allergen Reactions   • Doxycycline Hyclate GI Intolerance   • Ampicillin Nausea Only   • Cephalosporins Nausea Only   • Darifenacin Hydrobromide Er Nausea Only   • Duloxetine Hcl Nausea Only   • Fluocinolone Nausea Only   • Gabapentin Nausea Only   • Hydrochlorothiazide Nausea Only   • Levetiracetam Nausea Only   • Levofloxacin Nausea Only   • Phenazopyridine Hcl Nausea Only   • Pregabalin Nausea Only   • Quinolones Nausea Only   • Sulfa Antibiotics Nausea Only   • Sulfamethoxazole-Trimethoprim Nausea Only   • Macrobid [Nitrofurantoin] GI Intolerance   • Codeine Nausea Only and Rash   • Doxycycline Nausea Only and Rash     Medications:  Prior to Admission medications    Medication Sig Start Date End Date Taking? Authorizing Provider   amLODIPine (NORVASC) 5 MG tablet TAKE ONE TABLET BY MOUTH DAILY 11/1/21  Yes Andrea Curiel,  MD   Ascorbic Acid (VITAMIN C PO) Daily.   Yes Su Davey MD   ciprofloxacin (Cipro) 500 MG tablet Take 1 tablet by mouth Daily for 3 days. 6/27/22 6/30/22 Yes Estefania Madden APRN   conjugated estrogens (Premarin) 0.625 MG/GM vaginal cream Place small amountt on end of finger and apply to urethra three times per week 3/21/22  Yes Su Davey MD   docusate sodium 100 MG capsule Take 100 mg by mouth.   Yes Su Davey MD   levothyroxine (SYNTHROID, LEVOTHROID) 25 MCG tablet TAKE ONE TABLET BY MOUTH DAILY 8/17/21  Yes Andrea Curiel MD   LORazepam (ATIVAN) 0.5 MG tablet TAKEK ONE-FOURTH TABLET BY MOUTH TWO TIMES A DAY DURING THE DAY AND ONE-HALF TABLET BY MOUTH EVERY NIGHT AT BEDTIME 4/22/22  Yes Raquel Valle DO   omeprazole (priLOSEC) 20 MG capsule Take 1 capsule by mouth 2 (Two) Times a Day. 5/16/22  Yes Raquel Valle DO   ondansetron ODT (Zofran ODT) 4 MG disintegrating tablet Place 1 tablet on the tongue Every 8 (Eight) Hours As Needed for Nausea or Vomiting. 6/26/22  Yes Raquel Valle DO   promethazine (PHENERGAN) 12.5 MG tablet Take 30 minutes before antibiotic medicines 2/17/22  Yes Lashae Patrick APRN   Psyllium (METAMUCIL FIBER PO) Take 1 package by mouth As Needed.   Yes Su Davey MD   vitamin D (ERGOCALCIFEROL) 1.25 MG (43546 UT) capsule capsule TAKE 1 CAPSULE BY MOUTH EVERY OTHER WEEK 2/11/22  Yes Lashae Patrick APRN   aspirin 81 MG chewable tablet Chew 1 tablet Daily. 6/24/22   Su Davey MD   candesartan (ATACAND) 32 MG tablet TAKE ONE TABLET BY MOUTH DAILY 1/24/22   Andrea Curiel MD   doxycycline (VIBRAMYCIN) 100 MG capsule Take 1 capsule by mouth 2 (Two) Times a Day. 2/22/22   Lashae Patrick APRN   LORazepam (ATIVAN) 0.5 MG tablet Take 0.5 mg by mouth.    Petar MD Su   Multiple Vitamins-Minerals (CENTRUM SILVER ADULT 50+) tablet Take 1 tablet by mouth daily     "Provider, MD pete Blue (Senokot) 8.6 MG tablet Take 1 tablet by mouth Daily. 6/29/22   Estefania Madden APRN   simethicone (Gas-X) 80 MG chewable tablet Chew 1 tablet Every 6 (Six) Hours As Needed for Flatulence (Bloating). 6/29/22   Estefania Madden APRN   bumetanide (BUMEX) 0.5 MG tablet TAKE ONE TABLET BY MOUTH EVERY MORNING 8/2/21 6/29/22  Andrea Curiel MD       Objective     Vital Signs: /76 (BP Location: Left arm, Patient Position: Sitting, Cuff Size: Adult)   Pulse 70   Temp 97.7 °F (36.5 °C) (Temporal)   Ht 157.5 cm (62\")   Wt 42.7 kg (94 lb 3.2 oz)   LMP  (LMP Unknown)   SpO2 98%   BMI 17.23 kg/m²   Physical Exam  Vitals and nursing note reviewed.   Constitutional:       General: She is not in acute distress.     Appearance: She is underweight. She is not ill-appearing or toxic-appearing.   HENT:      Head: Normocephalic and atraumatic.      Right Ear: There is impacted cerumen.      Left Ear: Tympanic membrane normal.      Mouth/Throat:      Mouth: Mucous membranes are moist.      Pharynx: Oropharynx is clear.   Cardiovascular:      Rate and Rhythm: Normal rate and regular rhythm.      Pulses: Normal pulses.      Heart sounds: Normal heart sounds.   Pulmonary:      Effort: Pulmonary effort is normal.      Breath sounds: No wheezing, rhonchi or rales.   Abdominal:      General: Bowel sounds are normal. There is no distension.      Palpations: Abdomen is soft.      Tenderness: There is no abdominal tenderness.   Musculoskeletal:         General: No swelling or tenderness. Normal range of motion.      Cervical back: Normal range of motion and neck supple. No tenderness.   Skin:     General: Skin is warm and dry.      Findings: No erythema or rash.   Neurological:      General: No focal deficit present.      Mental Status: She is alert and oriented to person, place, and time.   Psychiatric:         Mood and Affect: Mood normal.         Behavior: Behavior normal.         Thought " Content: Thought content normal.         Judgment: Judgment normal.       Results Reviewed:  Reviewed discharge summary and hospital records from recent hospitalization at [facility]    Assessment / Plan     Assessment/Plan:  Diagnoses and all orders for this visit:    1. Orthostasis (Primary)  -     Compression Stockings  -     Cancel: Ambulatory Referral to Home Health  -     Ambulatory Referral to Home Health    2. Vertigo  -     Cancel: Ambulatory Referral to Home Health  -     Ambulatory Referral to Home Health    Other orders  -     simethicone (Gas-X) 80 MG chewable tablet; Chew 1 tablet Every 6 (Six) Hours As Needed for Flatulence (Bloating).  -     senna (Senokot) 8.6 MG tablet; Take 1 tablet by mouth Daily.  Dispense: 30 tablet; Refill: 1       Patient here for hospital follow-up as detailed above.  Seems as though she is having difficulty with dizziness and near syncope.  She apparently has some degree of orthostasis, but this was not detailed in her discharge summary as to what degree.  She did have a 17-second episode of SVT and is currently wearing a Zio patch and will follow up with cardiology.  She was advised to change Bumex to as needed dosing only as this may help with orthostasis.  Her blood pressure is well controlled in the office today.  I will prescribe compression stockings for her to wear throughout the day to see if this helps.  Would also like to have physical therapy assess her for possible vestibular therapy, home health has been ordered.    Regarding her complaints of bloating, have advised her to hold fiber supplement as this may be contributory and to take Gas-X as needed.  Advised her to use over-the-counter Senokot.    If patient's dizziness has not improved with physical therapy and use of compression stockings, and medication adjustments as outlined above, she may benefit from trial of meclizine.  This can be discussed at her upcoming appointment with Dr. Valle in July.    Has  appointment with Dr. Borden in July, unless patient needs to be seen sooner or acute issues arise.      I have discussed the patient results/orders and and plan/recommendation with them at today's visit.      Estefania Madden, APRN   06/29/2022

## 2022-07-07 ENCOUNTER — TELEPHONE (OUTPATIENT)
Dept: INTERNAL MEDICINE | Facility: CLINIC | Age: 87
End: 2022-07-07

## 2022-07-07 NOTE — TELEPHONE ENCOUNTER
Would I need to place an order for a   to assist with that? And just to clarify does she need a nurse or not if her  is going to take over medications?

## 2022-07-07 NOTE — TELEPHONE ENCOUNTER
ERIKA FROM [facility] CALLED IN  STATING SHE SEEN PATIENT TODAY   PATIENT WAS REFERRED FOR VERTIGO   PATIENT DOES NEED NURSE BECAUSE SHE IS NOT TAKING HER MEDICATIONS CORRECTLY  ERIKA GOT PATIENT A MEDICATION PLANNER  AND ALL MEDS ARE IN THE HOME    WILL BE TAKING OVER AND OVER SEEING PATIENTS MEDICATIONS FROM HERE ON OUT   ALSO PATIENT IS REQUESTING A  TO GET AN EMS DNR DRAWN UP     GOOD CALL BACK FOR FURTHER QUESTIONS   [phone]

## 2022-07-08 NOTE — TELEPHONE ENCOUNTER
After next week  will take care of medication, a  is going out Monday to work on paper work with them

## 2022-07-12 PROCEDURE — 93248 EXT ECG>7D<15D REV&INTERPJ: CPT | Performed by: INTERNAL MEDICINE

## 2022-07-12 NOTE — PROCEDURES
[facility] / Holter Monitor Report      Molly Gipson    : 11/10/1926      Test Date: 2022    Analysis Date: 7/3/2022    Date Interpreted: 2022        1. Nearly 8 days and 21 hours of rhythm are processed. 2.  The underlying rhythm is normal sinus rhythm at a mean heart rate of 74 bpm, with a range of 55 to 130 bpm.    3.  First-degree AV block and bundle branch block/IVCD was present. No other significant heart block or pauses. 4.  17 runs of supraventricular tachycardia occurred. The run with the fastest interval lasted 4 beats at a maximum rate of 179 bpm.  The longest run lasted 41 seconds and an average rate 153 bpm.    5.  Isolated supraventricular ectopics were rare (less than 1%) with rare couplets and rare triplets. Isolated ventricular ectopics were rare (less than 1% with no couplets or triplets. 6.  No atrial fibrillation or ventricular tachycardia noted.         Electronically signed by Rosa Maria Saunders MD on 22

## 2022-07-13 ENCOUNTER — TELEPHONE (OUTPATIENT)
Dept: INTERNAL MEDICINE | Facility: CLINIC | Age: 87
End: 2022-07-13

## 2022-07-13 NOTE — TELEPHONE ENCOUNTER
geeta stated pt isn't taking all her amlodipine cause it's making her sick to stomach. Requesting Nausea meds. Also nursing is seeing 1x a week for 1 week. She can be reached at T# [phone]

## 2022-07-13 NOTE — TELEPHONE ENCOUNTER
Faiza with [facility] called asking to speak with Wendi about DNR. She is in the home now and wants to get a verbal while she is there. T# [phone]

## 2022-07-14 NOTE — TELEPHONE ENCOUNTER
Roxanna informed of all instructions from Radha, voicing understanding, and will contact patient/.

## 2022-07-20 ENCOUNTER — OFFICE VISIT (OUTPATIENT)
Dept: INTERNAL MEDICINE | Facility: CLINIC | Age: 87
End: 2022-07-20

## 2022-07-20 VITALS
BODY MASS INDEX: 17.19 KG/M2 | WEIGHT: 93.4 LBS | SYSTOLIC BLOOD PRESSURE: 152 MMHG | HEIGHT: 62 IN | OXYGEN SATURATION: 97 % | HEART RATE: 81 BPM | DIASTOLIC BLOOD PRESSURE: 84 MMHG | TEMPERATURE: 96.8 F

## 2022-07-20 DIAGNOSIS — Z00.00 ENCOUNTER FOR SUBSEQUENT ANNUAL WELLNESS VISIT (AWV) IN MEDICARE PATIENT: Primary | ICD-10-CM

## 2022-07-20 DIAGNOSIS — R41.3 MEMORY LOSS: ICD-10-CM

## 2022-07-20 DIAGNOSIS — R39.9 UTI SYMPTOMS: ICD-10-CM

## 2022-07-20 DIAGNOSIS — E78.00 HIGH CHOLESTEROL: ICD-10-CM

## 2022-07-20 DIAGNOSIS — M81.0 OSTEOPOROSIS, UNSPECIFIED OSTEOPOROSIS TYPE, UNSPECIFIED PATHOLOGICAL FRACTURE PRESENCE: ICD-10-CM

## 2022-07-20 DIAGNOSIS — Z79.899 ENCOUNTER FOR LONG-TERM CURRENT USE OF MEDICATION: ICD-10-CM

## 2022-07-20 DIAGNOSIS — E55.9 VITAMIN D DEFICIENCY: ICD-10-CM

## 2022-07-20 DIAGNOSIS — I10 BENIGN HYPERTENSION: ICD-10-CM

## 2022-07-20 PROCEDURE — 1170F FXNL STATUS ASSESSED: CPT | Performed by: FAMILY MEDICINE

## 2022-07-20 PROCEDURE — 81003 URINALYSIS AUTO W/O SCOPE: CPT | Performed by: FAMILY MEDICINE

## 2022-07-20 PROCEDURE — G0439 PPPS, SUBSEQ VISIT: HCPCS | Performed by: FAMILY MEDICINE

## 2022-07-20 PROCEDURE — 99214 OFFICE O/P EST MOD 30 MIN: CPT | Performed by: FAMILY MEDICINE

## 2022-07-20 PROCEDURE — 1126F AMNT PAIN NOTED NONE PRSNT: CPT | Performed by: FAMILY MEDICINE

## 2022-07-20 PROCEDURE — 1159F MED LIST DOCD IN RCRD: CPT | Performed by: FAMILY MEDICINE

## 2022-07-20 RX ORDER — DONEPEZIL HYDROCHLORIDE 5 MG/1
5 TABLET, FILM COATED ORAL NIGHTLY
Qty: 30 TABLET | Refills: 6 | Status: SHIPPED | OUTPATIENT
Start: 2022-07-20 | End: 2022-09-21

## 2022-07-20 NOTE — PROGRESS NOTES
The ABCs of the Annual Wellness Visit  Subsequent Medicare Wellness Visit    Chief Complaint   Patient presents with   • Medicare Wellness-subsequent   • Urinary Tract Infection     UTI symptoms: frequency,       Subjective    History of Present Illness:  Meenu Arteaga is a 89 y.o. female who presents for a Subsequent Medicare Wellness Visit.    She is accompanied by an adult male who helps provide the history.     Per the adult male, the patient's hearing has worsened since last year as has her memory. She has been in [facility] in the past year. She does have a living will as well as a DNR. He states that the patient has been eating meals but her weight is dwindling. She has tried boost in the past but it did not satisfy her. She states her appetite has changed drastically and she has periods where eating makes her feel terrible. She does not take anything for her memory. She is interested in medication for this however. She states that her stomach has been swollen for the past 2-3 years and she takes milk of magnesia for difficulty using the restroom daily. She also has neuropathy, but states she does not take any medication for this as they make her sick. She is fully vaccinated for COVID-19 and is receiving a booster in 09/2022.     Patient has had some issues with frequency and burning with urination.  She has had no fever.    They do not routinely check her blood pressure.    She does have some supervision.    Reflux is fairly well controlled with the omeprazole.    She is tired a lot of the time.  She continues to take her Synthroid routinely.    The following portions of the patient's history were reviewed and   updated as appropriate: allergies, current medications, past family history, past medical history, past social history, past surgical history and problem list.    Compared to one year ago, the patient feels her physical   health is worse.    Compared to one year ago, the patient feels her  mental   health is worse.    Recent Hospitalizations:  She was admitted within the past 365 days at [facility].       Current Medical Providers:  Patient Care Team:  Raquel Valle DO as PCP - General (Family Medicine)  Levy Tovar APRN (Gastroenterology)    Outpatient Medications Prior to Visit   Medication Sig Dispense Refill   • amLODIPine (NORVASC) 5 MG tablet TAKE ONE TABLET BY MOUTH DAILY 90 tablet 3   • Ascorbic Acid (VITAMIN C PO) Daily.     • aspirin 81 MG chewable tablet Chew 1 tablet Daily.     • candesartan (ATACAND) 32 MG tablet TAKE ONE TABLET BY MOUTH DAILY 90 tablet 1   • conjugated estrogens (Premarin) 0.625 MG/GM vaginal cream Place small amountt on end of finger and apply to urethra three times per week     • docusate sodium 100 MG capsule Take 100 mg by mouth.     • doxycycline (VIBRAMYCIN) 100 MG capsule Take 1 capsule by mouth 2 (Two) Times a Day. 6 capsule 0   • levothyroxine (SYNTHROID, LEVOTHROID) 25 MCG tablet TAKE ONE TABLET BY MOUTH DAILY 90 tablet 3   • LORazepam (ATIVAN) 0.5 MG tablet TAKEK ONE-FOURTH TABLET BY MOUTH TWO TIMES A DAY DURING THE DAY AND ONE-HALF TABLET BY MOUTH EVERY NIGHT AT BEDTIME 30 tablet 5   • Multiple Vitamins-Minerals (CENTRUM SILVER ADULT 50+) tablet Take 1 tablet by mouth daily     • omeprazole (priLOSEC) 20 MG capsule Take 1 capsule by mouth 2 (Two) Times a Day. 180 capsule 3   • ondansetron ODT (Zofran ODT) 4 MG disintegrating tablet Place 1 tablet on the tongue Every 8 (Eight) Hours As Needed for Nausea or Vomiting. 30 tablet 6   • promethazine (PHENERGAN) 12.5 MG tablet Take 30 minutes before antibiotic medicines 10 tablet 0   • Psyllium (METAMUCIL FIBER PO) Take 1 package by mouth As Needed.     • senna (Senokot) 8.6 MG tablet Take 1 tablet by mouth Daily. 30 tablet 1   • simethicone (Gas-X) 80 MG chewable tablet Chew 1 tablet Every 6 (Six) Hours As Needed for Flatulence (Bloating).     • vitamin D (ERGOCALCIFEROL) 1.25 MG (18631 UT)  capsule capsule TAKE 1 CAPSULE BY MOUTH EVERY OTHER WEEK 6 capsule 3     No facility-administered medications prior to visit.       No opioid medication identified on active medication list. I have reviewed chart for other potential  high risk medication/s and harmful drug interactions in the elderly.          Aspirin is on active medication list. Aspirin use is indicated based on review of current medical condition/s. Pros and cons of this therapy have been discussed today. Benefits of this medication outweigh potential harm.  Patient has been encouraged to continue taking this medication.  .      Patient Active Problem List   Diagnosis   • Abnormal CT of the abdomen   • Age-related osteoporosis without current pathological fracture   • Anxiety   • Aortic valve disease   • Benign hypertension   • Complete left bundle branch block   • Sacral pain   • High cholesterol   • Hypothyroidism due to Hashimoto's thyroiditis   • Lumbar spinal stenosis   • Osteoporosis   • BPPV (benign paroxysmal positional vertigo), bilateral   • Carotid occlusion, bilateral   • Cervical radiculopathy   • Chronic pain   • Collagen vascular disease (HCC)   • Constipation   • Degenerative disc disease, lumbar   • Dysphonia of organic tremor   • Female stress incontinence   • Fibrocystic breast   • Generalized anxiety disorder   • Hearing loss   • Heart murmur   • Hepatomegaly   • IBS (irritable bowel syndrome)   • Insomnia   • Spondylolisthesis, lumbar region   • Neutropenia (HCC)   • Severe tricuspid regurgitation   • Ovarian cyst   • Panic attack   • Raynaud's phenomenon (secondary)   • Wound of left leg, sequela   • Ventricular septal defect   • Vitamin D deficiency   • Senile osteoporosis   • Other disorders of coccyx   • Anxiety disorder due to known physiological condition   • Burning with urination   • Peripheral polyneuropathy   • Epigastric pain   • Bilateral impacted cerumen   • Abdominal distension   • Cyst of right ovary   •  "Nonhealing nonsurgical wound   • Disequilibrium   • Nodule of skin of right lower extremity   • Stasis edema of both lower extremities   • Squamous cell carcinoma of skin of right lower extremity   • Benign essential tremor   • UTI symptoms   • Acute cystitis without hematuria   • Memory loss     Advance Care Planning  Advance Directive is not on file.  ACP discussion was declined by the patient. Patient has an advance directive (not in EMR), copy requested.  Patient is DNR          Objective    Vitals:    07/20/22 0959   BP: 152/84   BP Location: Left arm   Patient Position: Sitting   Cuff Size: Adult   Pulse: 81   Temp: 96.8 °F (36 °C)   TempSrc: Temporal   SpO2: 97%   Weight: 42.4 kg (93 lb 6.4 oz)   Height: 157.5 cm (62\")   PainSc: 0-No pain     Estimated body mass index is 17.08 kg/m² as calculated from the following:    Height as of this encounter: 157.5 cm (62\").    Weight as of this encounter: 42.4 kg (93 lb 6.4 oz).    BMI is below normal parameters (malnutrition). Recommendations: Boost or Ensure with ice cream      Does the patient have evidence of cognitive impairment? Yes    Physical Exam  Vitals and nursing note reviewed.   Constitutional:       General: She is not in acute distress.     Appearance: Normal appearance. She is well-developed. She is not ill-appearing, toxic-appearing or diaphoretic.   HENT:      Head: Normocephalic and atraumatic.      Right Ear: External ear normal.      Left Ear: External ear normal.   Eyes:      Conjunctiva/sclera: Conjunctivae normal.      Pupils: Pupils are equal, round, and reactive to light.   Neck:      Thyroid: No thyromegaly.      Vascular: No carotid bruit or JVD.   Cardiovascular:      Rate and Rhythm: Normal rate and regular rhythm.      Pulses: Normal pulses.      Heart sounds: Normal heart sounds. No murmur heard.  Pulmonary:      Effort: Pulmonary effort is normal. No respiratory distress.      Breath sounds: Normal breath sounds.   Abdominal:      " General: Bowel sounds are normal.      Palpations: Abdomen is soft. There is no mass.      Tenderness: There is no abdominal tenderness.   Musculoskeletal:         General: No swelling. Normal range of motion.      Cervical back: Normal range of motion and neck supple.   Lymphadenopathy:      Cervical: No cervical adenopathy.   Skin:     General: Skin is warm and dry.      Findings: No lesion or rash.   Neurological:      Mental Status: She is alert and oriented to person, place, and time.      Cranial Nerves: No cranial nerve deficit.      Sensory: No sensory deficit.      Motor: No weakness.      Coordination: Coordination normal.      Gait: Gait normal.      Deep Tendon Reflexes: Reflexes are normal and symmetric.   Psychiatric:         Mood and Affect: Mood normal.         Behavior: Behavior normal.         Thought Content: Thought content normal.         Judgment: Judgment normal.       Lab Results   Component Value Date    TRIG 51 06/23/2022    HDL 75 06/23/2022    LDL 99 06/23/2022    HGBA1C 5.5 06/22/2022            HEALTH RISK ASSESSMENT    Smoking Status:  Social History     Tobacco Use   Smoking Status Never Smoker   Smokeless Tobacco Never Used     Alcohol Consumption:  Social History     Substance and Sexual Activity   Alcohol Use No     Fall Risk Screen:    STEADI Fall Risk Assessment was completed, and patient is at LOW risk for falls.Assessment completed on:1/17/2022    Depression Screening:  PHQ-2/PHQ-9 Depression Screening 7/20/2022   Retired PHQ-9 Total Score -   Retired Total Score -   Little Interest or Pleasure in Doing Things 0-->not at all   Feeling Down, Depressed or Hopeless 0-->not at all   PHQ-9: Brief Depression Severity Measure Score 0       Health Habits and Functional and Cognitive Screening:  Functional & Cognitive Status 7/20/2022   Do you have difficulty preparing food and eating? -   Do you have difficulty bathing yourself, getting dressed or grooming yourself? -   Do you have  difficulty using the toilet? -   Do you have difficulty moving around from place to place? -   Do you have trouble with steps or getting out of a bed or a chair? -   Current Diet -   Dental Exam -   Eye Exam -   Exercise (times per week) -   Current Exercises Include -   Do you need help using the phone?  -   Are you deaf or do you have serious difficulty hearing?  Yes   Do you need help with transportation? -   Do you need help shopping? -   Do you need help preparing meals?  -   Do you need help with housework?  -   Do you need help with laundry? -   Do you need help taking your medications? -   Do you need help managing money? -   Do you ever drive or ride in a car without wearing a seat belt? -   Have you felt unusual stress, anger or loneliness in the last month? -   Who do you live with? -   If you need help, do you have trouble finding someone available to you? -   Have you been bothered in the last four weeks by sexual problems? -   Do you have difficulty concentrating, remembering or making decisions? -       Age-appropriate Screening Schedule:  Refer to the list below for future screening recommendations based on patient's age, sex and/or medical conditions. Orders for these recommended tests are listed in the plan section. The patient has been provided with a written plan.    Health Maintenance   Topic Date Due   • ZOSTER VACCINE (1 of 2) Never done   • DXA SCAN  07/20/2023 (Originally 8/19/2021)   • INFLUENZA VACCINE  10/01/2022   • LIPID PANEL  06/23/2023   • TDAP/TD VACCINES (2 - Tdap) 10/07/2030              Assessment & Plan   CMS Preventative Services Quick Reference  Risk Factors Identified During Encounter  Dementia/Memory   Fall Risk-High or Moderate  underweight  The above risks/problems have been discussed with the patient.  Follow up actions/plans if indicated are seen below in the Assessment/Plan Section.  Pertinent information has been shared with the patient in the After Visit  Summary.    Diagnoses and all orders for this visit:    1. Encounter for subsequent annual wellness visit (AWV) in Medicare patient (Primary)    2. Benign hypertension  -     Comprehensive Metabolic Panel  -     Uric Acid    3. High cholesterol  -     TSH  -     Lipid Panel    4. Vitamin D deficiency  -     Vitamin D 25 Hydroxy    5. Osteoporosis, unspecified osteoporosis type, unspecified pathological fracture presence  -     Vitamin D 25 Hydroxy    6. Encounter for long-term current use of medication  -     CBC & Differential  -     Compliance Drug Analysis, Ur - Urine, Clean Catch    7. UTI symptoms  -     POC Urinalysis Dipstick, Multipro  -     Urine Culture - Urine, Urine, Clean Catch    8. Memory loss              Aricept 5 mg daily    Other orders  -     donepezil (Aricept) 5 MG tablet; Take 1 tablet by mouth Every Night.  Dispense: 30 tablet; Refill: 6              Plan: The patient is going to continue medications as she currently takes them. We are going to add some Aricept 5 mg to see if that helps with her memory. We are going to do lab work to include a CMP, a CBC, vitamin D, TSH, and a CMP and CBC.  Monitor blood pressure, goal less than 130/80.  We discussed the flu vaccine and I recommended them to get that approximately a month after they have gotten their COVID-19 booster, so toward 10/01/2022. They will follow back in 6 months unless they have a problem.        Transcribed from ambient dictation for Raquel Valle DO by Jeison Gamble.  07/20/22   19:01 CDT    Patient verbalized consent to the visit recording.              Follow Up:   Return in about 6 months (around 1/20/2023).     An After Visit Summary and PPPS were made available to the patient.

## 2022-07-21 LAB
25(OH)D3+25(OH)D2 SERPL-MCNC: 49.9 NG/ML (ref 30–100)
ALBUMIN SERPL-MCNC: 4.6 G/DL (ref 3.5–4.6)
ALBUMIN/GLOB SERPL: 2 {RATIO} (ref 1.2–2.2)
ALP SERPL-CCNC: 65 IU/L (ref 44–121)
ALT SERPL-CCNC: 13 IU/L (ref 0–32)
AST SERPL-CCNC: 30 IU/L (ref 0–40)
BASOPHILS # BLD AUTO: 0 X10E3/UL (ref 0–0.2)
BASOPHILS NFR BLD AUTO: 1 %
BILIRUB SERPL-MCNC: 0.4 MG/DL (ref 0–1.2)
BUN SERPL-MCNC: 13 MG/DL (ref 10–36)
BUN/CREAT SERPL: 19 (ref 12–28)
CALCIUM SERPL-MCNC: 9.7 MG/DL (ref 8.7–10.3)
CHLORIDE SERPL-SCNC: 96 MMOL/L (ref 96–106)
CHOLEST SERPL-MCNC: 219 MG/DL (ref 100–199)
CO2 SERPL-SCNC: 23 MMOL/L (ref 20–29)
CREAT SERPL-MCNC: 0.69 MG/DL (ref 0.57–1)
EGFRCR SERPLBLD CKD-EPI 2021: 80 ML/MIN/1.73
EOSINOPHIL # BLD AUTO: 0 X10E3/UL (ref 0–0.4)
EOSINOPHIL NFR BLD AUTO: 1 %
ERYTHROCYTE [DISTWIDTH] IN BLOOD BY AUTOMATED COUNT: 12.5 % (ref 11.7–15.4)
GLOBULIN SER CALC-MCNC: 2.3 G/DL (ref 1.5–4.5)
GLUCOSE SERPL-MCNC: 111 MG/DL (ref 65–99)
HCT VFR BLD AUTO: 37.6 % (ref 34–46.6)
HDLC SERPL-MCNC: 92 MG/DL
HGB BLD-MCNC: 12.3 G/DL (ref 11.1–15.9)
IMM GRANULOCYTES # BLD AUTO: 0 X10E3/UL (ref 0–0.1)
IMM GRANULOCYTES NFR BLD AUTO: 0 %
LDLC SERPL CALC-MCNC: 114 MG/DL (ref 0–99)
LYMPHOCYTES # BLD AUTO: 0.7 X10E3/UL (ref 0.7–3.1)
LYMPHOCYTES NFR BLD AUTO: 17 %
MCH RBC QN AUTO: 31.5 PG (ref 26.6–33)
MCHC RBC AUTO-ENTMCNC: 32.7 G/DL (ref 31.5–35.7)
MCV RBC AUTO: 96 FL (ref 79–97)
MONOCYTES # BLD AUTO: 0.5 X10E3/UL (ref 0.1–0.9)
MONOCYTES NFR BLD AUTO: 12 %
NEUTROPHILS # BLD AUTO: 2.7 X10E3/UL (ref 1.4–7)
NEUTROPHILS NFR BLD AUTO: 69 %
PLATELET # BLD AUTO: 235 X10E3/UL (ref 150–450)
POTASSIUM SERPL-SCNC: 3.8 MMOL/L (ref 3.5–5.2)
PROT SERPL-MCNC: 6.9 G/DL (ref 6–8.5)
RBC # BLD AUTO: 3.9 X10E6/UL (ref 3.77–5.28)
SODIUM SERPL-SCNC: 135 MMOL/L (ref 134–144)
TRIGL SERPL-MCNC: 77 MG/DL (ref 0–149)
TSH SERPL DL<=0.005 MIU/L-ACNC: 2.66 UIU/ML (ref 0.45–4.5)
URATE SERPL-MCNC: 2.8 MG/DL (ref 3.1–7.9)
VLDLC SERPL CALC-MCNC: 13 MG/DL (ref 5–40)
WBC # BLD AUTO: 3.9 X10E3/UL (ref 3.4–10.8)

## 2022-07-22 PROBLEM — R79.89 ELEVATED TROPONIN: Status: RESOLVED | Noted: 2022-06-22 | Resolved: 2022-07-22

## 2022-07-22 PROBLEM — R77.8 ELEVATED TROPONIN: Status: RESOLVED | Noted: 2022-06-22 | Resolved: 2022-07-22

## 2022-07-22 LAB
BACTERIA UR CULT: NORMAL
BACTERIA UR CULT: NORMAL

## 2022-07-25 RX ORDER — CANDESARTAN 32 MG/1
32 TABLET ORAL DAILY
Qty: 90 TABLET | Refills: 3 | Status: SHIPPED | OUTPATIENT
Start: 2022-07-25

## 2022-07-28 ENCOUNTER — OFFICE VISIT (OUTPATIENT)
Dept: CARDIOLOGY CLINIC | Age: 87
End: 2022-07-28
Payer: MEDICARE

## 2022-07-28 VITALS
BODY MASS INDEX: 16.44 KG/M2 | HEIGHT: 63 IN | OXYGEN SATURATION: 97 % | SYSTOLIC BLOOD PRESSURE: 112 MMHG | HEART RATE: 89 BPM | WEIGHT: 92.8 LBS | DIASTOLIC BLOOD PRESSURE: 68 MMHG

## 2022-07-28 DIAGNOSIS — E78.5 DYSLIPIDEMIA: ICD-10-CM

## 2022-07-28 DIAGNOSIS — I10 ESSENTIAL HYPERTENSION: Primary | ICD-10-CM

## 2022-07-28 LAB — DRUGS UR: NORMAL

## 2022-07-28 PROCEDURE — 1123F ACP DISCUSS/DSCN MKR DOCD: CPT | Performed by: NURSE PRACTITIONER

## 2022-07-28 PROCEDURE — 99214 OFFICE O/P EST MOD 30 MIN: CPT | Performed by: NURSE PRACTITIONER

## 2022-07-28 PROCEDURE — G8419 CALC BMI OUT NRM PARAM NOF/U: HCPCS | Performed by: NURSE PRACTITIONER

## 2022-07-28 PROCEDURE — 1036F TOBACCO NON-USER: CPT | Performed by: NURSE PRACTITIONER

## 2022-07-28 PROCEDURE — G8427 DOCREV CUR MEDS BY ELIG CLIN: HCPCS | Performed by: NURSE PRACTITIONER

## 2022-07-28 PROCEDURE — 1090F PRES/ABSN URINE INCON ASSESS: CPT | Performed by: NURSE PRACTITIONER

## 2022-07-28 ASSESSMENT — ENCOUNTER SYMPTOMS
WHEEZING: 0
SORE THROAT: 0
COUGH: 0
CHEST TIGHTNESS: 0
SHORTNESS OF BREATH: 0

## 2022-07-28 NOTE — PROGRESS NOTES
[id number] [facility] Cardiology   Established Patient Office Visit   [address] [facility]  [phone]        OFFICE VISIT:  2022    Andra Friend - : 11/10/1926    Reason For Visit:  Jaron Morales is a 80 y.o. female who is here for Follow-Up from Hospital    1. Essential hypertension    2. Dyslipidemia      Patient with a history of hypertension, very small ventricular septal defect seen on echocardiogram in 2019, dyslipidemia and palpitations. She was last seen in our office 2 years ago by Dr. Aminah Mc      Patient had a hospitalization on 2022 for oral complaints of loss of consciousness at home.  had reported he helped the patient to the floor she did not fall.  felt patient had been out for about 5 minutes. There was no seizure activity. Work-up in the emergency department proBNP 2000, troponin 0.11, and urinalysis showed moderate leukocyte Estrace. Chest x-ray showed COPD otherwise no acute pathology. CT of the abdomen was unremarkable. Cardiology was consulted and recommended Zio patch at discharge. Telemetry did note a 17-second incident of SVT during her hospitalization and patient was asymptomatic with this. She was treated with antibiotics for her urinary tract infection. ZIO Patch showed:  Test Date: 2022     Analysis Date: 7/3/2022     Date Interpreted: 2022          1. Nearly 8 days and 21 hours of rhythm are processed. 2.  The underlying rhythm is normal sinus rhythm at a mean heart rate of 74 bpm, with a range of 55 to 130 bpm.     3.  First-degree AV block and bundle branch block/IVCD was present. No other significant heart block or pauses. 4.  17 runs of supraventricular tachycardia occurred.   The run with the fastest interval lasted 4 beats at a maximum rate of 179 bpm.  The longest run lasted 41 seconds and an average rate 153 bpm.     5.  Isolated supraventricular ectopics were rare (less than 1%) with rare couplets and rare triplets. Isolated ventricular ectopics were rare (less than 1% with no couplets or triplets. 6.  No atrial fibrillation or ventricular tachycardia noted. Electronically signed by Martínez Mcqueen MD on 7/12/22    2D echo on 6/22/2022 showed:   Summary   Mitral valve leaflets are mildly thickened with preserved leaflet   mobility. Mild mitral regurgitation is present. Mildly thickened aortic valve leaflets with preserved leaflet mobility. Tricuspid valve is structurally normal.   Mild tricuspid regurgitation with estimated RVSP of 27 mm Hg. Normal left ventricular size with preserved LV function and an estimated   ejection fraction of approximately 55-60%. Mild concentric left ventricular hypertrophy. Impaired relaxation compatible with diastolic dysfunction. ( reversed E/A ratio)      Signature      ----------------------------------------------------------------   Electronically signed by Allison Area MD(Interpreting   physician) on 06/22/2022 05:46 PM   ----------------------------------------------------------------           Patient presents to clinic today for follow-up of those results. Patient is hard of hearing she is accompanied by her . They both states she has had no more problems since discharge from the hospital.  It appears every syncopal episode was associated with her taking a hot shower. They recommended she not do that anymore. Since she has been taking a cooler shower she has had no further syncopal episodes. She denies any complaints today.       Ke Anderson is a 80 y.o. female with the following history as recorded in [employer]:    Patient Active Problem List    Diagnosis Date Noted    Palliative care patient 06/23/2022    Elevated brain natriuretic peptide (BNP) level 06/22/2022    Acute cystitis without hematuria 03/28/2022    UTI (urinary tract infection) 03/25/2022    Ventricular septal defect 11/08/2019    Anxiety 10/02/2019    Nonrheumatic mitral valve regurgitation 10/02/2019    Syncope and collapse 09/23/2019    Vertebrobasilar artery syndrome     Ataxia     Tremor     Palpitations     Lumbar spinal stenosis     Nonrheumatic tricuspid valve regurgitation     Lumbar facet arthropathy 01/19/2016    Shortness of breath 05/20/2015    Dizziness 05/20/2015    Abnormal echocardiogram 05/20/2015    Hyperlipidemia     HTN (hypertension)     Chest tightness or pressure 08/26/2013     Current Outpatient Medications   Medication Sig Dispense Refill    aspirin 81 MG chewable tablet Take 1 tablet by mouth daily 30 tablet 3    bumetanide (BUMEX) 0.5 MG tablet Take 1 tablet by mouth daily as needed (weight gain greater than 3 pounds overnight or 5 pounds in 1 week) 30 tablet 3    conjugated estrogens (PREMARIN) 0.625 MG/GM vaginal cream Place small amountt on end of finger and apply to urethra three times per week 1 each 3    vitamin C (ASCORBIC ACID) 500 MG tablet Take 500 mg by mouth daily      docusate sodium (COLACE) 100 MG capsule Take 100 mg by mouth 2 times daily       LORazepam (ATIVAN) 0.5 MG tablet Take 0.5 mg by mouth. 1/4 tab bid and 1/2 tab at hs      levothyroxine (SYNTHROID) 25 MCG tablet Take 2 tablets by mouth Daily (Patient taking differently: Take 25 mcg by mouth in the morning.) 30 tablet 3    omeprazole (PRILOSEC) 20 MG capsule Take 20 mg by mouth 2 times daily      Multiple Vitamins-Minerals (CENTRUM SILVER ADULT 50+) TABS Take 1 tablet by mouth daily       Magnesium Oxide (WHIPPLE PO) Take 2 tablets by mouth daily       candesartan (ATACAND) 32 MG tablet Take 32 mg by mouth daily      Psyllium (METAMUCIL PO) Take 2 tablets by mouth daily        No current facility-administered medications for this visit.      Allergies: Ampicillin, Bactrim [sulfamethoxazole-trimethoprim], Cephalosporins, Ciprocinonide [fluocinolone], Codeine, Cymbalta [duloxetine hcl], Doxycycline, Enablex [darifenacin hydrobromide er], Gabapentin, Hctz [hydrochlorothiazide], Keppra [levetiracetam], Levaquin [levofloxacin in d5w], Lyrica [pregabalin], Pyridium [phenazopyridine hcl], Quinolones, and Sulfa antibiotics  Past Medical History:   Diagnosis Date    Anxiety 10/02/2019    Chest tightness or pressure 2013  lexiscan negative for myocardial ischemia, EF 61%    Edema     Essential and other specified forms of tremor     Generalized anxiety disorder     HTN (hypertension)     Hyperlipidemia     Hypertension     Insomnia, unspecified     Neuropathy     both legs up to both hips    Other and unspecified ovarian cyst     Other left bundle branch block     Palliative care patient 2022    Pure hypercholesterolemia     Restless legs syndrome (RLS)     Solitary cyst of breast     Spinal stenosis, lumbar region, without neurogenic claudication     Unspecified hypothyroidism     Urinary frequency     UTI (urinary tract infection) 2022     Past Surgical History:   Procedure Laterality Date    APPENDECTOMY      CARDIAC CATHETERIZATION  5/26/15  MDL    with aortic root injection. EF 60%    CATARACT REMOVAL      CHOLECYSTECTOMY      CYST INCISION AND DRAINAGE      drained liver cyst    HEMORRHOID SURGERY      HYSTERECTOMY (CERVIX STATUS UNKNOWN)      NERVE BLOCK LUMB FACET LEVEL 1 BILATERAL Bilateral 2016    LUMBAR FACET CATE L4-5  performed by Aristeo Hoang at [facility] OR     Family History   Problem Relation Age of Onset    Other Father         stroke, MI,  46     Social History     Tobacco Use    Smoking status: Never    Smokeless tobacco: Never   Substance Use Topics    Alcohol use: No          Review of Systems:    Review of Systems   Constitutional:  Negative for activity change, chills, diaphoresis, fatigue and fever. HENT:  Negative for congestion and sore throat. Respiratory:  Negative for cough, chest tightness, shortness of breath and wheezing. Cardiovascular:  Negative for chest pain, palpitations and leg swelling.    Neurological: well-controlled blood pressures. No Continue current medications: Yes                                     No orders of the defined types were placed in this encounter. No orders of the defined types were placed in this encounter. Discussed with patient and spouse. Return in about 6 months (around 1/28/2023) for Phonr visit with me . I greatly appreciate the opportunity to meet Lakisha Alvarez and your confidence in allowing me to participate in her cardiovascular care. JERE Romero NP  7/28/2022 12:54 PM CDT                    This dictation was generated by voice recognition computer software. Although all attempts are made to edit dictation for accuracy, there may be errors in the transcription that are not intended.

## 2022-08-10 ENCOUNTER — TELEPHONE (OUTPATIENT)
Dept: INTERNAL MEDICINE | Facility: CLINIC | Age: 87
End: 2022-08-10

## 2022-08-10 RX ORDER — LEVOTHYROXINE SODIUM 0.03 MG/1
25 TABLET ORAL DAILY
Qty: 90 TABLET | Refills: 3 | Status: SHIPPED | OUTPATIENT
Start: 2022-08-10

## 2022-08-10 NOTE — TELEPHONE ENCOUNTER
Caller: MARCY VILLARREAL    Relationship: EMERGENCY CONTACT     Best call back number: [phone]    Requested Prescriptions:    90 DAY REQUESTED    levothyroxine (SYNTHROID, LEVOTHROID) 25 MCG tablet         Pharmacy where request should be sent:    [facility] [address] [place] [address] - [phone]  - [phone]   [phone]  Additional details provided by patient: STATES THE PHARMACY WOULD NOT REFILL     Does the patient have less than a 3 day supply:  [x] Yes  [] No    Dave Robbins Rep   08/10/22 10:51 CDT

## 2022-09-21 ENCOUNTER — OFFICE VISIT (OUTPATIENT)
Dept: INTERNAL MEDICINE | Facility: CLINIC | Age: 87
End: 2022-09-21

## 2022-09-21 VITALS
DIASTOLIC BLOOD PRESSURE: 100 MMHG | TEMPERATURE: 97.1 F | HEIGHT: 62 IN | SYSTOLIC BLOOD PRESSURE: 160 MMHG | WEIGHT: 91 LBS | OXYGEN SATURATION: 97 % | HEART RATE: 68 BPM | BODY MASS INDEX: 16.75 KG/M2

## 2022-09-21 DIAGNOSIS — G62.9 NEUROPATHY: Primary | ICD-10-CM

## 2022-09-21 DIAGNOSIS — N30.01 ACUTE CYSTITIS WITH HEMATURIA: ICD-10-CM

## 2022-09-21 DIAGNOSIS — F03.90 DEMENTIA WITHOUT BEHAVIORAL DISTURBANCE, UNSPECIFIED DEMENTIA TYPE: ICD-10-CM

## 2022-09-21 DIAGNOSIS — I10 BENIGN HYPERTENSION: ICD-10-CM

## 2022-09-21 DIAGNOSIS — F41.9 ANXIETY: ICD-10-CM

## 2022-09-21 DIAGNOSIS — G25.0 BENIGN ESSENTIAL TREMOR: ICD-10-CM

## 2022-09-21 PROCEDURE — 99214 OFFICE O/P EST MOD 30 MIN: CPT | Performed by: FAMILY MEDICINE

## 2022-09-21 RX ORDER — RIVASTIGMINE 4.6 MG/24H
1 PATCH, EXTENDED RELEASE TRANSDERMAL DAILY
Qty: 30 PATCH | Refills: 3 | Status: SHIPPED | OUTPATIENT
Start: 2022-09-21 | End: 2022-10-24 | Stop reason: SINTOL

## 2022-09-21 RX ORDER — ONDANSETRON 4 MG/1
4 TABLET, ORALLY DISINTEGRATING ORAL EVERY 8 HOURS PRN
Qty: 30 TABLET | Refills: 6 | Status: SHIPPED | OUTPATIENT
Start: 2022-09-21 | End: 2023-03-07

## 2022-09-21 RX ORDER — LORAZEPAM 0.5 MG/1
TABLET ORAL
Qty: 30 TABLET | Refills: 5 | Status: SHIPPED | OUTPATIENT
Start: 2022-09-21 | End: 2023-03-29

## 2022-09-21 RX ORDER — GABAPENTIN 100 MG/1
100 CAPSULE ORAL 3 TIMES DAILY
Qty: 30 CAPSULE | Refills: 1 | Status: SHIPPED | OUTPATIENT
Start: 2022-09-21 | End: 2023-03-29

## 2022-09-21 NOTE — PROGRESS NOTES
Subjective     Chief Complaint   Patient presents with   • Peripheral Neuropathy   • Shaking     hands   • Urinary Tract Infection     Pt has some pain and is not urinating often; started taking bumetanide .5mg that she had an old script of and seems to help    • abdominal issue       History of Present Illness  The male accompanying the patient reports that the patients blood pressure is checked at home and is often approximately 135/80 mmHg. The male accompanying the patient reports that the patient's energy levels typically fall before lunch time every day.    The patient reports that she is eating; however, not as much as she had in the past. The patient requests to have her abdomen looked at due to her abdomen being swollen and enlarged. The patient reports that her abdomen is sore. The patient reports that she is having normal bowel movements. The patient reports that she drinks supplement drinks. The male accompanying the patient notes that the patient was taking Zofran before she was being treated for a bladder infection to prevent nausea, and the patient confirms that her Zofran helped with nausea.     The patient reports that her neuropathy is very bothersome. The patient notes that she was given pills to treat her neuropathy when she was first diagnosed with it. The patient confirms that she takes a pill to boost her memory function. The male accompanying the patient decides that they would prefer to try the capsule form of gabapentin first as the patient's method of intake. The male accompanying the patient reports that the patient needs her prescription of lorazepam renewed.     The patient reports that her lower bilateral extremity pain is chronic and present during all times of each day. The male accompanying the patient reports that the patient has tremors. The patient reports that her tremors are occasionally severe.    Patient's PMR from outside medical facility reviewed and  noted.    Review of Systems   Gastrointestinal: Positive for abdominal pain.        Abdomen edema.        Otherwise complete ROS reviewed and negative except as mentioned in the HPI.    Past Medical History:   Past Medical History:   Diagnosis Date   • Arthritis    • GERD (gastroesophageal reflux disease)    • Hyperlipidemia    • Hypothyroidism    • Liver cyst    • Neuropathy    • Ovarian cyst      Past Surgical History:  Past Surgical History:   Procedure Laterality Date   • ABDOMINAL HYSTERECTOMY     • APPENDECTOMY     • BREAST BIOPSY     • CHOLECYSTECTOMY     • COLONOSCOPY  03/05/2008    normal   • COLONOSCOPY  01/12/2012   • ENDOSCOPY  08/29/2013    normal   • ENDOSCOPY  01/23/2012    eus with stacy  normal endoscopic ultrascope of the pancreas.fortunately there was no mass seen   • ENDOSCOPY N/A 8/3/2018    Procedure: ESOPHAGOGASTRODUODENOSCOPY WITH ANESTHESIA;  Surgeon: Obed Patrick DO;  Location: [facility] ENDOSCOPY;  Service: Gastroenterology   • HEMORRHOIDECTOMY     • SKIN CANCER EXCISION  11/27/2021    left arm and left leg    • UPPER GASTROINTESTINAL ENDOSCOPY  08/29/2013     Social History:  reports that she has never smoked. She has never used smokeless tobacco. She reports that she does not drink alcohol and does not use drugs.    Family History: family history includes Bipolar disorder in her daughter.      Allergies:  Allergies   Allergen Reactions   • Doxycycline Hyclate GI Intolerance   • Ampicillin Nausea Only   • Cephalosporins Nausea Only   • Darifenacin Hydrobromide Er Nausea Only   • Duloxetine Hcl Nausea Only   • Fluocinolone Nausea Only   • Gabapentin Nausea Only   • Hydrochlorothiazide Nausea Only   • Levetiracetam Nausea Only   • Levofloxacin Nausea Only   • Phenazopyridine Hcl Nausea Only   • Pregabalin Nausea Only   • Quinolones Nausea Only   • Sulfa Antibiotics Nausea Only   • Sulfamethoxazole-Trimethoprim Nausea Only   • Macrobid [Nitrofurantoin] GI Intolerance   • Codeine Nausea  Only and Rash   • Doxycycline Nausea Only and Rash     Medications:  Prior to Admission medications    Medication Sig Start Date End Date Taking? Authorizing Provider   amLODIPine (NORVASC) 5 MG tablet TAKE ONE TABLET BY MOUTH DAILY 11/1/21  Yes Andrea Curiel MD   Ascorbic Acid (VITAMIN C PO) Daily.   Yes Su Davey MD   aspirin 81 MG chewable tablet Chew 1 tablet Daily. 6/24/22  Yes Su Davey MD   candesartan (ATACAND) 32 MG tablet Take 1 tablet by mouth Daily. 7/25/22  Yes Raquel Valle DO   conjugated estrogens (Premarin) 0.625 MG/GM vaginal cream Place small amountt on end of finger and apply to urethra three times per week 3/21/22  Yes Su Davey MD   docusate sodium 100 MG capsule Take 100 mg by mouth.   Yes Su Davey MD   donepezil (Aricept) 5 MG tablet Take 1 tablet by mouth Every Night. 7/20/22  Yes Raquel Valle DO   doxycycline (VIBRAMYCIN) 100 MG capsule Take 1 capsule by mouth 2 (Two) Times a Day. 2/22/22  Yes Lashae Patrick APRN   levothyroxine (SYNTHROID, LEVOTHROID) 25 MCG tablet Take 1 tablet by mouth Daily. 8/10/22  Yes Raquel Valle DO   LORazepam (ATIVAN) 0.5 MG tablet TAKEK ONE-FOURTH TABLET BY MOUTH TWO TIMES A DAY DURING THE DAY AND ONE-HALF TABLET BY MOUTH EVERY NIGHT AT BEDTIME 4/22/22  Yes Raquel Valle DO   Multiple Vitamins-Minerals (CENTRUM SILVER ADULT 50+) tablet Take 1 tablet by mouth daily   Yes Su Davey MD   omeprazole (priLOSEC) 20 MG capsule Take 1 capsule by mouth 2 (Two) Times a Day. 5/16/22  Yes Raquel Valle DO   ondansetron ODT (Zofran ODT) 4 MG disintegrating tablet Place 1 tablet on the tongue Every 8 (Eight) Hours As Needed for Nausea or Vomiting. 6/26/22  Yes Raquel Valle DO   Psyllium (METAMUCIL FIBER PO) Take 1 package by mouth As Needed.   Yes Su Davey MD   senna (Senokot) 8.6 MG tablet Take 1 tablet by mouth Daily. 6/29/22  Yes Maryanne,  "CHAIM Marc   simethicone (Gas-X) 80 MG chewable tablet Chew 1 tablet Every 6 (Six) Hours As Needed for Flatulence (Bloating). 6/29/22  Yes EvaristoEstefania delgadillo APRN   vitamin D (ERGOCALCIFEROL) 1.25 MG (82736 UT) capsule capsule TAKE 1 CAPSULE BY MOUTH EVERY OTHER WEEK 2/11/22  Yes Lashae Patrick APRN   promethazine (PHENERGAN) 12.5 MG tablet Take 30 minutes before antibiotic medicines 2/17/22 9/21/22  Lashae Patrick APRN       Objective     Vital Signs: /100 (BP Location: Left arm, Patient Position: Sitting, Cuff Size: Adult)   Pulse 68   Temp 97.1 °F (36.2 °C) (Temporal)   Ht 157.5 cm (62\")   Wt 41.3 kg (91 lb)   LMP  (LMP Unknown)   SpO2 97%   Breastfeeding No   BMI 16.64 kg/m²   Physical Exam  Vitals and nursing note reviewed.   Constitutional:       Appearance: Normal appearance. She is well-developed.   HENT:      Head: Normocephalic and atraumatic.      Right Ear: External ear normal.      Left Ear: External ear normal.      Nose: Nose normal.      Mouth/Throat:      Mouth: Mucous membranes are moist.   Eyes:      Extraocular Movements: Extraocular movements intact.      Conjunctiva/sclera: Conjunctivae normal.      Pupils: Pupils are equal, round, and reactive to light.   Neck:      Thyroid: No thyromegaly.      Vascular: No JVD.      Trachea: No tracheal deviation.   Cardiovascular:      Rate and Rhythm: Normal rate and regular rhythm.      Pulses: Normal pulses.      Heart sounds: Normal heart sounds. No murmur heard.    No friction rub. No gallop.   Pulmonary:      Effort: Pulmonary effort is normal.      Breath sounds: Normal breath sounds.   Abdominal:      General: Bowel sounds are normal. There is no distension.      Palpations: Abdomen is soft.      Tenderness: There is no abdominal tenderness.   Musculoskeletal:         General: Normal range of motion.      Cervical back: Normal range of motion and neck supple.   Lymphadenopathy:      Cervical: No " cervical adenopathy.   Skin:     General: Skin is warm and dry.      Capillary Refill: Capillary refill takes less than 2 seconds.   Neurological:      Mental Status: She is alert and oriented to person, place, and time.      Cranial Nerves: No cranial nerve deficit.      Coordination: Coordination normal.   Psychiatric:         Mood and Affect: Mood normal.         Behavior: Behavior normal.         BMI is below normal parameters (malnutrition). Recommendations: Drink 1 to 2 cans of Ensure or boost per day      Results Reviewed:  Glucose   Date Value Ref Range Status   07/20/2022 111 (H) 65 - 99 mg/dL Final   09/07/2019 103 74 - 109 mg/dL Final     BUN   Date Value Ref Range Status   07/20/2022 13 10 - 36 mg/dL Final   09/07/2019 16 8 - 23 mg/dL Final     Creatinine   Date Value Ref Range Status   07/20/2022 0.69 0.57 - 1.00 mg/dL Final   09/07/2019 0.5 0.5 - 0.9 mg/dL Final     Sodium   Date Value Ref Range Status   07/20/2022 135 134 - 144 mmol/L Final   09/07/2019 138 136 - 145 mmol/L Final     Potassium   Date Value Ref Range Status   07/20/2022 3.8 3.5 - 5.2 mmol/L Final   09/14/2019 2.8  Final   09/07/2019 4.0 3.5 - 5.0 mmol/L Final     Chloride   Date Value Ref Range Status   07/20/2022 96 96 - 106 mmol/L Final   09/07/2019 101 98 - 111 mmol/L Final     CO2   Date Value Ref Range Status   09/07/2019 23 22 - 29 mmol/L Final     Total CO2   Date Value Ref Range Status   07/20/2022 23 20 - 29 mmol/L Final     Calcium   Date Value Ref Range Status   07/20/2022 9.7 8.7 - 10.3 mg/dL Final   09/07/2019 9.8 (H) 8.2 - 9.6 mg/dL Final     ALT (SGPT)   Date Value Ref Range Status   07/20/2022 13 0 - 32 IU/L Final   09/07/2019 15 5 - 33 U/L Final     AST (SGOT)   Date Value Ref Range Status   07/20/2022 30 0 - 40 IU/L Final   09/07/2019 31 5 - 32 U/L Final     WBC   Date Value Ref Range Status   07/20/2022 3.9 3.4 - 10.8 x10E3/uL Final   06/23/2022 5.0 4.8 - 10.8 K/uL Final     Hematocrit   Date Value Ref Range Status    07/20/2022 37.6 34.0 - 46.6 % Final   06/23/2022 35.8 (L) 37.0 - 47.0 % Final     Platelets   Date Value Ref Range Status   07/20/2022 235 150 - 450 x10E3/uL Final   06/23/2022 216 130 - 400 K/uL Final     Triglycerides   Date Value Ref Range Status   07/20/2022 77 0 - 149 mg/dL Final   06/23/2022 51 0 - 149 mg/dL Final     HDL Cholesterol   Date Value Ref Range Status   07/20/2022 92 >39 mg/dL Final   06/23/2022 75 65 - 121 mg/dL Final     Comment:     VALUES>60 MG/DL ARE ASSOCIATED WITH A DECREASED RISK OF  ATHEROSCLEROTIC CARDIOVASCULAR DISEASE     LDL Cholesterol    Date Value Ref Range Status   06/23/2022 99 <100 mg/dL Final     Comment:     <100 MG/DL=OPITIMAL    100-129 MG/DL=DESIRABLE    130-159 MG/DL BORDERLINE=INCREASED RISK OF ATHEROSCLEROTIC  CARDIOVASCULAR DISEASE    > OR = 160 MG/DL=ASSOCIATED WITH AN INCREASE RISK OF  ATHEROSCLEROTIC CARDIOVASCULAR DISEASE     LDL Chol Calc (NIH)   Date Value Ref Range Status   07/20/2022 114 (H) 0 - 99 mg/dL Final     Hemoglobin A1C   Date Value Ref Range Status   06/22/2022 5.5 4.0 - 6.0 % Final     Comment:     HbA1c levels >6% are an indication of hyperglycemia during the preceding 2  to 3 months or longer.    HbA1c levels may reach 20% or higher in poorly controlled diabetes.  Therapeutic action is suggested at levels above 8%.    Diabetes patients with HbA1c levels below 7% meet the goal of the American  Diabetes Association.    HbA1c levels below the established reference range may indicate recent  episodes of hypoglycemia, the presence of Hb variants, or shortened lifetime  of erythrocytes.          Assessment / Plan     Assessment/Plan:    1.  Anxiety.    2.  Acute cystitis hematuria.    3.  Neuropathy    4.  Dementia without behavioral disturbance    5.  Benign essential tremor    6.  Hypertension      The patient will continue current medications. We encouraged her to drink at least two supplemental drinks a day such as boost or Ensure. For her  neuropathy, we are going to try retry the Neurontin. She did have some issues with nausea last time. I have asked her to take it on a full stomach and instruct her to take Zofran prior to taking Neurontin. We'll see how she tolerates that. If she tolerates it well then she can continue that and we can start the Exelon patch in about 2 weeks after the Neurontin started. They will follow back in 3 months unless there is a problem, if there is a problem, they will call.        Return in about 3 months (around 12/21/2022). unless patient needs to be seen sooner or acute issues arise.    Transcribed from ambient dictation for Raquel Valle DO by Belkis Whitman  09/21/22   14:52 CDT    Patient verbalized consent to the visit recording.      I have discussed the patient results/orders and and plan/recommendation with them at today's visit.      Raquel Valle DO   09/21/2022

## 2022-09-27 ENCOUNTER — TELEPHONE (OUTPATIENT)
Dept: UROLOGY | Age: 87
End: 2022-09-27

## 2022-09-27 DIAGNOSIS — N39.0 RECURRENT UTI: ICD-10-CM

## 2022-09-27 RX ORDER — CONJUGATED ESTROGENS 0.62 MG/G
CREAM VAGINAL
Qty: 30 G | Refills: 3 | Status: SHIPPED | OUTPATIENT
Start: 2022-09-27

## 2022-10-12 ENCOUNTER — TELEPHONE (OUTPATIENT)
Dept: UROLOGY | Age: 87
End: 2022-10-12

## 2022-10-12 NOTE — TELEPHONE ENCOUNTER
Pt  called requesting abt a pa that is suppose to  be done on pt premarin cream. Please advise on where you are on this whoever is working on this and call pt back.  Thank you

## 2022-10-12 NOTE — TELEPHONE ENCOUNTER
A PA is not needed for that. I think he possibly meant something in place of Premarin because he mentioned cost to me when he picked up samples. Please let them know I will have Lily Courser send in Rx for a compounded cream from [facility], it may be cheaper. If not they will need to discuss with provider at next follow up.

## 2022-10-16 ENCOUNTER — OFFICE VISIT (OUTPATIENT)
Age: 87
End: 2022-10-16
Payer: MEDICARE

## 2022-10-16 VITALS
SYSTOLIC BLOOD PRESSURE: 160 MMHG | RESPIRATION RATE: 18 BRPM | DIASTOLIC BLOOD PRESSURE: 80 MMHG | BODY MASS INDEX: 16.3 KG/M2 | OXYGEN SATURATION: 97 % | WEIGHT: 92 LBS | HEART RATE: 83 BPM | TEMPERATURE: 97.1 F

## 2022-10-16 DIAGNOSIS — R51.9 ACUTE NONINTRACTABLE HEADACHE, UNSPECIFIED HEADACHE TYPE: Primary | ICD-10-CM

## 2022-10-16 PROCEDURE — 1123F ACP DISCUSS/DSCN MKR DOCD: CPT | Performed by: NURSE PRACTITIONER

## 2022-10-16 PROCEDURE — 1090F PRES/ABSN URINE INCON ASSESS: CPT | Performed by: NURSE PRACTITIONER

## 2022-10-16 PROCEDURE — G8427 DOCREV CUR MEDS BY ELIG CLIN: HCPCS | Performed by: NURSE PRACTITIONER

## 2022-10-16 PROCEDURE — 1036F TOBACCO NON-USER: CPT | Performed by: NURSE PRACTITIONER

## 2022-10-16 PROCEDURE — 99212 OFFICE O/P EST SF 10 MIN: CPT | Performed by: NURSE PRACTITIONER

## 2022-10-16 PROCEDURE — G8484 FLU IMMUNIZE NO ADMIN: HCPCS | Performed by: NURSE PRACTITIONER

## 2022-10-16 PROCEDURE — G8419 CALC BMI OUT NRM PARAM NOF/U: HCPCS | Performed by: NURSE PRACTITIONER

## 2022-10-16 ASSESSMENT — ENCOUNTER SYMPTOMS
ALLERGIC/IMMUNOLOGIC NEGATIVE: 1
GASTROINTESTINAL NEGATIVE: 1
RESPIRATORY NEGATIVE: 1
EYES NEGATIVE: 1

## 2022-10-16 NOTE — PATIENT INSTRUCTIONS
I have recommended for you to go directly to the emergency department for evaluation of your headache.

## 2022-10-16 NOTE — PROGRESS NOTES
Nghia Salmon (:  11/10/1926) is a 80 y.o. female,Established patient, here for evaluation of the following chief complaint(s):  Headache    Patient is a 49-year-old female who presents today with her  complaining of a headache that began early this morning above her right eye and spread to the left side of her forehead also. She states that she has really had headaches throughout her life, and this is a headache that she has never experienced before. She rates her pain 8 out of 10. Her eyes are also watery and burning. It is a stabbing-like pain. I discussed with patient and her  that she should go to the emergency room immediately for evaluation. Patient states she has a lot of anxiety and is unable to go to the emergency department at this time as she has waited in our office. Explained to patient that due to her description of her headache, her age, she has hypertension and her blood pressure is elevated today she is at high risk for stroke and other life threatening events. I discussed possible complications such as death, paralysis, difficulty speaking or eating, and explained there could be many more or worse events that happen. Patient states she will go be evaluated in the morning possibly by her PCP or the ER. Patient agrees that if she is to get worse throughout tonight she will go to the emergency department. I again strongly encouraged patient to go directly to the emergency department. She verbalized understanding and her  also verbalized understanding that she was recommended to go and this could be life-threatening. ASSESSMENT/PLAN:  1. Acute nonintractable headache, unspecified headache type     No orders of the defined types were placed in this encounter. Return if symptoms worsen or fail to improve. Subjective   SUBJECTIVE/OBJECTIVE:  Headache    Review of Systems   Constitutional: Negative. HENT: Negative. Eyes: Negative. Respiratory: Negative. Cardiovascular: Negative. Gastrointestinal: Negative. Endocrine: Negative. Genitourinary: Negative. Musculoskeletal: Negative. Skin: Negative. Allergic/Immunologic: Negative. Neurological:  Positive for headaches. Hematological: Negative. Psychiatric/Behavioral: Negative. Objective   Physical Exam  Neurological:      Mental Status: She is alert and oriented to person, place, and time. Motor: Tremor present. Comments: Patient had great strength during neuro exam.  Patient able to follow my finger in different directions during exam.  Patient has facial symmetry. She is able to smile and speak normally. Patient Instructions     I have recommended for you to go directly to the emergency department for evaluation of your headache. An electronic signature was used to authenticate this note. --Hansel Vidales, APRN - CNP     EMR Dragon/translation disclaimer: Much of this encounter note is an electronic transcription/translation of spoken language to printed text. The electronic translation of spoken language may be erroneous, or at times, nonsensical words or phrases may be inadvertently transcribed.   Although I have reviewed the note for such errors, some may still exist.

## 2022-10-17 ENCOUNTER — APPOINTMENT (OUTPATIENT)
Dept: CT IMAGING | Age: 87
End: 2022-10-17
Payer: MEDICARE

## 2022-10-17 ENCOUNTER — HOSPITAL ENCOUNTER (EMERGENCY)
Age: 87
Discharge: HOME | End: 2022-10-17
Payer: MEDICARE

## 2022-10-17 VITALS
DIASTOLIC BLOOD PRESSURE: 69 MMHG | RESPIRATION RATE: 16 BRPM | SYSTOLIC BLOOD PRESSURE: 173 MMHG | OXYGEN SATURATION: 97 % | TEMPERATURE: 98.2 F | HEART RATE: 81 BPM

## 2022-10-17 DIAGNOSIS — R51.9 ACUTE NONINTRACTABLE HEADACHE, UNSPECIFIED HEADACHE TYPE: Primary | ICD-10-CM

## 2022-10-17 PROCEDURE — 6370000000 HC RX 637 (ALT 250 FOR IP): Performed by: NURSE PRACTITIONER

## 2022-10-17 PROCEDURE — 99284 EMERGENCY DEPT VISIT MOD MDM: CPT

## 2022-10-17 PROCEDURE — 70450 CT HEAD/BRAIN W/O DYE: CPT | Performed by: RADIOLOGY

## 2022-10-17 PROCEDURE — 70450 CT HEAD/BRAIN W/O DYE: CPT

## 2022-10-17 RX ORDER — ACETAMINOPHEN 325 MG/1
650 TABLET ORAL ONCE
Status: COMPLETED | OUTPATIENT
Start: 2022-10-17 | End: 2022-10-17

## 2022-10-17 RX ADMIN — ACETAMINOPHEN 650 MG: 325 TABLET ORAL at 11:21

## 2022-10-17 ASSESSMENT — PAIN SCALES - GENERAL: PAINLEVEL_OUTOF10: 5

## 2022-10-17 NOTE — ED PROVIDER NOTES
[facility]  eMERGENCY dEPARTMENT eNCOUnter      Pt Name: Ezekiel Bragg  MRN: [id number]  [place] 11/10/1926  Date of evaluation: 10/17/2022  Provider: Blaze Gardner, [address]       Chief Complaint   Patient presents with    Headache     Pt presents to ed with c/o frontal headache that started three days ago, no other complaints at this time. Typically does not have headache         HISTORY OF PRESENT ILLNESS   (Location/Symptom, Timing/Onset,Context/Setting, Quality, Duration, Modifying Factors, Severity)  Note limiting factors. Ezekiel Bragg is a 80 y.o. female who presents to the emergency department with a frontal headache that she has had 3 days. Patient denies any change in her vision. She denies any weakness. She not had any problems with her speech. She has not had any change in mental status. No sore throat or coughing. No URI symptoms. she is here with family. Patient lives independently in the community. she tells me she has been taking Tylenol and has not helped. Last night she took Aleve and it did resolve the headache. The history is provided by the patient. Headache  Associated symptoms: no fever and no weakness      NursingNotes were reviewed. REVIEW OF SYSTEMS    (2-9 systems for level 4, 10 or more for level 5)     Review of Systems   Constitutional:  Negative for fever. Neurological:  Positive for headaches. Negative for syncope, speech difficulty, weakness and light-headedness. Except as noted above the remainder of the review of systems was reviewed and negative.        PAST MEDICAL HISTORY     Past Medical History:   Diagnosis Date    Anxiety 10/02/2019    Chest tightness or pressure 08/26/2013 8/26/2013  lexiscan negative for myocardial ischemia, EF 61%    Edema     Essential and other specified forms of tremor     Generalized anxiety disorder     HTN (hypertension)     Hyperlipidemia     Hypertension     Insomnia, unspecified     Neuropathy both legs up to both hips    Other and unspecified ovarian cyst     Other left bundle branch block     Palliative care patient 06/23/2022    Pure hypercholesterolemia     Restless legs syndrome (RLS)     Solitary cyst of breast     Spinal stenosis, lumbar region, without neurogenic claudication     Unspecified hypothyroidism     Urinary frequency     UTI (urinary tract infection) 03/25/2022         SURGICALHISTORY       Past Surgical History:   Procedure Laterality Date    APPENDECTOMY      CARDIAC CATHETERIZATION  5/26/15  MDL    with aortic root injection. EF 60%    CATARACT REMOVAL      CHOLECYSTECTOMY      CYST INCISION AND DRAINAGE      drained liver cyst    HEMORRHOID SURGERY      HYSTERECTOMY (CERVIX STATUS UNKNOWN)      NERVE BLOCK LUMB FACET LEVEL 1 BILATERAL Bilateral 1/19/2016    LUMBAR FACET CATE L4-5  performed by Sonia Prieto at [address]       Discharge Medication List as of 10/17/2022 12:17 PM        CONTINUE these medications which have NOT CHANGED    Details   estrogens conjugated (PREMARIN) 0.625 MG/GM CREA vaginal cream Place small amountt on end of finger and apply to urethra three times per week, Disp-30 g, R-3, Normal      aspirin 81 MG chewable tablet Take 1 tablet by mouth daily, Disp-30 tablet, R-3Normal      bumetanide (BUMEX) 0.5 MG tablet Take 1 tablet by mouth daily as needed (weight gain greater than 3 pounds overnight or 5 pounds in 1 week), Disp-30 tablet, R-3Normal      vitamin C (ASCORBIC ACID) 500 MG tablet Take 500 mg by mouth dailyHistorical Med      docusate sodium (COLACE) 100 MG capsule Take 100 mg by mouth 2 times daily Historical Med      LORazepam (ATIVAN) 0.5 MG tablet Take 0.5 mg by mouth.  1/4 tab bid and 1/2 tab at hsHistorical Med      levothyroxine (SYNTHROID) 25 MCG tablet Take 2 tablets by mouth Daily, Disp-30 tablet, R-3      omeprazole (PRILOSEC) 20 MG capsule Take 20 mg by mouth 2 times daily      Multiple Vitamins-Minerals (CENTRUM SILVER ADULT 50+) TABS Take 1 tablet by mouth daily Historical Med      Magnesium Oxide (WHIPPLE PO) Take 2 tablets by mouth daily       candesartan (ATACAND) 32 MG tablet Take 32 mg by mouth daily      Psyllium (METAMUCIL PO) Take 2 tablets by mouth daily              ALLERGIES     Ampicillin, Bactrim [sulfamethoxazole-trimethoprim], Cephalosporins, Ciprocinonide [fluocinolone], Codeine, Cymbalta [duloxetine hcl], Doxycycline, Enablex [darifenacin hydrobromide er], Gabapentin, Hctz [hydrochlorothiazide], Keppra [levetiracetam], Levaquin [levofloxacin in d5w], Lyrica [pregabalin], Pyridium [phenazopyridine hcl], Quinolones, and Sulfa antibiotics    FAMILY HISTORY       Family History   Problem Relation Age of Onset    Other Father         stroke, MI,  46          SOCIAL HISTORY       Social History     Socioeconomic History    Marital status:      Spouse name: None    Number of children: None    Years of education: None    Highest education level: None   Tobacco Use    Smoking status: Never    Smokeless tobacco: Never   Vaping Use    Vaping Use: Never used   Substance and Sexual Activity    Alcohol use: No    Drug use: No       SCREENINGS    [place] Coma Scale  Eye Opening: Spontaneous  Best Verbal Response: Oriented  Best Motor Response: Obeys commands  [place] Coma Scale Score: 15 @FLOW([id number])@      PHYSICAL EXAM    (up to 7 for level 4, 8 or more for level 5)     ED Triage Vitals [10/17/22 1000]   BP Temp Temp Source Heart Rate Resp SpO2 Height Weight   (!) 162/82 98.9 °F (37.2 °C) Oral 100 18 95 % -- --       Physical Exam  Vitals and nursing note reviewed. Constitutional:       Appearance: Normal appearance. She is well-developed and underweight. Comments: Well groomed   HENT:      Head: Normocephalic and atraumatic. Eyes:      General: No scleral icterus. Right eye: No discharge. Left eye: No discharge. Pupils: Pupils are equal, round, and reactive to light. Cardiovascular:      Rate and Rhythm: Normal rate. Pulmonary:      Effort: No respiratory distress. Musculoskeletal:      Cervical back: Normal range of motion and neck supple. Neurological:      General: No focal deficit present. Mental Status: She is alert and oriented to person, place, and time. Cranial Nerves: Cranial nerves 2-12 are intact. Sensory: Sensation is intact. Motor: Motor function is intact. Coordination: Coordination is intact. Psychiatric:         Behavior: Behavior normal.       DIAGNOSTIC RESULTS     EKG: All EKG's are interpreted by the Emergency Department Physician who either signs or Co-signsthis chart in the absence of a cardiologist.        RADIOLOGY:   [place] such as CT, Ultrasound and MRI are read by the radiologist. Plain radiographic images are visualized and preliminarily interpreted by the emergency physician with the below findings:      Interpretation per the Radiologist below, if available at the time of this note:    CT HEAD WO CONTRAST   Final Result   1. No acute intracranial abnormality. Recommendation: Follow up as clinically indicated. All CT scans at this facility utilize dose modulation, iterative reconstruction, and/or weight based dosing when appropriate to reduce radiation dose to as low as reasonably achievable. Electronically Signed by Rockney Severin MD at 17-Oct-2022 12:47:54 PM                     ED BEDSIDEULTRASOUND:   Performed by ED Physician -none    LABS:  Labs Reviewed - No data to display    All other labs were within normal range or not returned as of this dictation.     EMERGENCY DEPARTMENT COURSE and DIFFERENTIALDIAGNOSIS/MDM:   Vitals:    Vitals:    10/17/22 1000 10/17/22 1121 10/17/22 1200 10/17/22 1213   BP: (!) 162/82 (!) 173/72 (!) 151/70 (!) 173/69   Pulse: 100 80 75 81   Resp: 18 16 16 16   Temp: 98.9 °F (37.2 °C) 98.1 °F (36.7 °C)  98.2 °F (36.8 °C)   TempSrc: Oral      SpO2: 95% 94% 98% 97% MDM  Recommended pt to take her blood pressure med at home      CONSULTS:  None    PROCEDURES:  Unless otherwise noted below, none     Procedures    FINAL IMPRESSION      1.  Acute nonintractable headache, unspecified headache type        DISPOSITION/PLAN   DISPOSITION Decision To Discharge 10/17/2022 12:15:14 PM      PATIENT REFERRED TO:  [address] [place] [address] [id number]          DISCHARGE MEDICATIONS:  Discharge Medication List as of 10/17/2022 12:17 PM             (Please note that portions of this note were completed with a voice recognitionprogram.  Efforts were made to edit the dictations but occasionally words are mis-transcribed.)    JERE Martin (electronically signed)          JERE Martin  10/17/22 1921

## 2022-10-17 NOTE — ED NOTES
Patient states having a headache for 3 days unrelieved with naproxen. Denies any falls. Denies any dizziness or blurred vision. Pain level 5.       Shaista Rosen RN  10/17/22 1027

## 2022-10-24 ENCOUNTER — OFFICE VISIT (OUTPATIENT)
Dept: INTERNAL MEDICINE | Facility: CLINIC | Age: 87
End: 2022-10-24

## 2022-10-24 ENCOUNTER — HOSPITAL ENCOUNTER (OUTPATIENT)
Dept: ULTRASOUND IMAGING | Facility: HOSPITAL | Age: 87
Discharge: HOME | End: 2022-10-24

## 2022-10-24 VITALS
DIASTOLIC BLOOD PRESSURE: 80 MMHG | HEART RATE: 91 BPM | TEMPERATURE: 97.3 F | BODY MASS INDEX: 16.89 KG/M2 | OXYGEN SATURATION: 97 % | SYSTOLIC BLOOD PRESSURE: 130 MMHG | WEIGHT: 91.8 LBS | HEIGHT: 62 IN

## 2022-10-24 DIAGNOSIS — R59.0 CERVICAL ADENOPATHY: ICD-10-CM

## 2022-10-24 DIAGNOSIS — E03.8 HYPOTHYROIDISM DUE TO HASHIMOTO'S THYROIDITIS: ICD-10-CM

## 2022-10-24 DIAGNOSIS — G44.52 NEW DAILY PERSISTENT HEADACHE: Primary | ICD-10-CM

## 2022-10-24 DIAGNOSIS — E06.3 HYPOTHYROIDISM DUE TO HASHIMOTO'S THYROIDITIS: ICD-10-CM

## 2022-10-24 DIAGNOSIS — R35.0 URINE FREQUENCY: ICD-10-CM

## 2022-10-24 LAB
BILIRUB BLD-MCNC: NEGATIVE MG/DL
CLARITY, POC: ABNORMAL
COLOR UR: YELLOW
GLUCOSE UR STRIP-MCNC: NEGATIVE MG/DL
KETONES UR QL: NEGATIVE
LEUKOCYTE EST, POC: ABNORMAL
NITRITE UR-MCNC: NEGATIVE MG/ML
PH UR: 7 [PH] (ref 5–8)
PROT UR STRIP-MCNC: NEGATIVE MG/DL
RBC # UR STRIP: ABNORMAL /UL
SP GR UR: 1.02 (ref 1–1.03)
UROBILINOGEN UR QL: ABNORMAL

## 2022-10-24 PROCEDURE — 81003 URINALYSIS AUTO W/O SCOPE: CPT

## 2022-10-24 PROCEDURE — 76536 US EXAM OF HEAD AND NECK: CPT

## 2022-10-24 PROCEDURE — 99214 OFFICE O/P EST MOD 30 MIN: CPT

## 2022-10-24 RX ORDER — BUMETANIDE 0.5 MG/1
0.5 TABLET ORAL DAILY PRN
COMMUNITY
Start: 2022-09-26 | End: 2023-03-07 | Stop reason: SDUPTHER

## 2022-10-24 NOTE — PROGRESS NOTES
Subjective   Meenu Arteaga is a 89 y.o. female.   Chief Complaint   Patient presents with   • Hospital Follow Up Visit     H/a, 10/17/22, started right above her eyes, still having neck pain & h/a, has improved some over the last few days, was recommended to take Tylenol this does not help, has also tried aleve but again no improvement, has been going on x 2 weeks       History of Present Illness   Ms. Arteaga presents here today for a follow-up after recent visit to the ER with new onset headache.  She went to the ED on October 17 after having a frontal headache for about 3 days, she states that the pain was severe a 7-8 out of 10.  Reports that she had a CT of her head done that was negative, recommendations were to take Tylenol and she was sent home.  She denies any hypertension, dizziness, vision changes although she admits that her ears feel funny and she also has noted that her eyes have been burning a lot.  She states that headache initiated over her right eyebrow and then kind of traveled across the front of her head and is also present in the posterior aspect of her scalp.  She denies any fevers night sweats or chills.  She denies any nausea or any abdominal pain, states that she typically does take milk of magnesia tablets to go to the bathroom.  After palpating her cervical nodes she notes that there is some tenderness on the right side and it does appear to be slightly swollen compared to the left.  She reports that she typically has issues voiding and less she takes her Bumex which she takes as needed at a dose of 0.5 mg daily.  She has not noted any pelvic discomfort or increased frequency or dysuria.  There are no labs to evaluate from recent visit.  She does not feel like there has been any significant changes in her environment, no new stressors.  She feels that her water intake is adequate.  The following portions of the patient's history were reviewed and updated as appropriate: allergies, current  medications, past family history, past medical history, past social history, past surgical history and problem list.    Review of Systems    Objective   Past Medical History:   Diagnosis Date   • Arthritis    • GERD (gastroesophageal reflux disease)    • Hyperlipidemia    • Hypothyroidism    • Liver cyst    • Neuropathy    • Ovarian cyst       Past Surgical History:   Procedure Laterality Date   • ABDOMINAL HYSTERECTOMY     • APPENDECTOMY     • BREAST BIOPSY     • CHOLECYSTECTOMY     • COLONOSCOPY  03/05/2008    normal   • COLONOSCOPY  01/12/2012   • ENDOSCOPY  08/29/2013    normal   • ENDOSCOPY  01/23/2012    eus with stacy  normal endoscopic ultrascope of the pancreas.fortunately there was no mass seen   • ENDOSCOPY N/A 8/3/2018    Procedure: ESOPHAGOGASTRODUODENOSCOPY WITH ANESTHESIA;  Surgeon: Obed Patrick DO;  Location: [facility] ENDOSCOPY;  Service: Gastroenterology   • HEMORRHOIDECTOMY     • SKIN CANCER EXCISION  11/27/2021    left arm and left leg    • UPPER GASTROINTESTINAL ENDOSCOPY  08/29/2013        Current Outpatient Medications:   •  amLODIPine (NORVASC) 5 MG tablet, TAKE ONE TABLET BY MOUTH DAILY, Disp: 90 tablet, Rfl: 3  •  Ascorbic Acid (VITAMIN C PO), Daily., Disp: , Rfl:   •  aspirin 81 MG chewable tablet, Chew 1 tablet Daily., Disp: , Rfl:   •  candesartan (ATACAND) 32 MG tablet, Take 1 tablet by mouth Daily., Disp: 90 tablet, Rfl: 3  •  docusate sodium 100 MG capsule, Take 100 mg by mouth., Disp: , Rfl:   •  doxycycline (VIBRAMYCIN) 100 MG capsule, Take 1 capsule by mouth 2 (Two) Times a Day., Disp: 6 capsule, Rfl: 0  •  gabapentin (NEURONTIN) 100 MG capsule, Take 1 capsule by mouth 3 (Three) Times a Day., Disp: 30 capsule, Rfl: 1  •  levothyroxine (SYNTHROID, LEVOTHROID) 25 MCG tablet, Take 1 tablet by mouth Daily., Disp: 90 tablet, Rfl: 3  •  LORazepam (ATIVAN) 0.5 MG tablet, TAKEK ONE-FOURTH TABLET BY MOUTH TWO TIMES A DAY DURING THE DAY AND ONE-HALF TABLET BY MOUTH EVERY NIGHT AT BEDTIME,  "Disp: 30 tablet, Rfl: 5  •  Multiple Vitamins-Minerals (CENTRUM SILVER ADULT 50+) tablet, Take 1 tablet by mouth daily, Disp: , Rfl:   •  omeprazole (priLOSEC) 20 MG capsule, Take 1 capsule by mouth 2 (Two) Times a Day., Disp: 180 capsule, Rfl: 3  •  ondansetron ODT (Zofran ODT) 4 MG disintegrating tablet, Place 1 tablet on the tongue Every 8 (Eight) Hours As Needed for Nausea or Vomiting., Disp: 30 tablet, Rfl: 6  •  Psyllium (METAMUCIL FIBER PO), Take 1 package by mouth As Needed., Disp: , Rfl:   •  senna (Senokot) 8.6 MG tablet, Take 1 tablet by mouth Daily., Disp: 30 tablet, Rfl: 1  •  simethicone (Gas-X) 80 MG chewable tablet, Chew 1 tablet Every 6 (Six) Hours As Needed for Flatulence (Bloating)., Disp: , Rfl:   •  vitamin D (ERGOCALCIFEROL) 1.25 MG (14317 UT) capsule capsule, TAKE 1 CAPSULE BY MOUTH EVERY OTHER WEEK, Disp: 6 capsule, Rfl: 3  •  bumetanide (BUMEX) 0.5 MG tablet, Take 1 tablet by mouth Daily As Needed., Disp: , Rfl:       /80 (BP Location: Left arm, Patient Position: Sitting, Cuff Size: Adult)   Pulse 91   Temp 97.3 °F (36.3 °C) (Temporal)   Ht 157.5 cm (62\")   Wt 41.6 kg (91 lb 12.8 oz)   LMP  (LMP Unknown)   SpO2 97%   BMI 16.79 kg/m²      Body mass index is 16.79 kg/m².         Physical Exam  Vitals and nursing note reviewed.   Constitutional:       Appearance: Normal appearance.   HENT:      Head: Normocephalic and atraumatic.      Right Ear: There is impacted cerumen.      Left Ear: Tympanic membrane, ear canal and external ear normal. There is no impacted cerumen.      Nose: Nose normal.      Mouth/Throat:      Mouth: Mucous membranes are moist.      Pharynx: Oropharynx is clear. No oropharyngeal exudate or posterior oropharyngeal erythema.   Eyes:      General:         Right eye: No discharge.         Left eye: No discharge.      Extraocular Movements: Extraocular movements intact.      Conjunctiva/sclera: Conjunctivae normal.      Pupils: Pupils are equal, round, and " reactive to light.   Cardiovascular:      Rate and Rhythm: Normal rate and regular rhythm.      Pulses: Normal pulses.      Heart sounds: Normal heart sounds.   Pulmonary:      Effort: Pulmonary effort is normal.      Breath sounds: Normal breath sounds.   Abdominal:      General: Bowel sounds are normal.      Palpations: Abdomen is soft.   Musculoskeletal:      Cervical back: Normal range of motion and neck supple.   Lymphadenopathy:      Cervical: Cervical adenopathy present.      Right cervical: Superficial cervical adenopathy present.   Skin:     General: Skin is warm and dry.      Capillary Refill: Capillary refill takes less than 2 seconds.   Neurological:      General: No focal deficit present.      Mental Status: She is alert and oriented to person, place, and time. Mental status is at baseline.   Psychiatric:         Mood and Affect: Mood normal.         Behavior: Behavior normal.         Thought Content: Thought content normal.         Judgment: Judgment normal.               Assessment & Plan   Diagnoses and all orders for this visit:    1. New daily persistent headache (Primary)  -     Basic metabolic panel  -     CBC w AUTO Differential    2. Hypothyroidism due to Hashimoto's thyroiditis  -     TSH Rfx On Abnormal To Free T4    3. Cervical adenopathy  -     US Head Neck Soft Tissue; Future    4. Urine frequency  -     Urine Culture - Urine, Urine, Clean Catch  -     POC Urinalysis Dipstick, Multipro               Plan of care reviewed with Ms. Arteaga and her   We will go ahead and assess some labs to search for possible etiology for these new onset headaches of which she has noted are significantly better the last couple days.  Of note also the Tylenol did nothing to help relieve the pain when it was at its most severe.  Advised that she may have had bacterial sinusitis given the tenderness of the lymph nodes on the right side and how the headache originated by her right eye, right ear does have  cerumen impacted, meant to have this removed however this did not get done today.  She is very hesitant to trial antibiotic therapy as she states that usually gives her a lot of GI distress mostly nausea, she would rather continue to monitor her symptoms as she is improving and will be quick to call if symptoms increase in intensity or severity or if she experiences any new symptoms.  Urine today did present as cloudy with trace blood and leukocytes, we will send for culture.  Ultrasound of soft neck tissue was unremarkable.  We will communicate lab results as they become available.

## 2022-10-25 LAB
BASOPHILS # BLD AUTO: 0.04 10*3/MM3 (ref 0–0.2)
BASOPHILS NFR BLD AUTO: 0.7 % (ref 0–1.5)
BUN SERPL-MCNC: 15 MG/DL (ref 8–23)
BUN/CREAT SERPL: 21.7 (ref 7–25)
CALCIUM SERPL-MCNC: 10 MG/DL (ref 8.2–9.6)
CHLORIDE SERPL-SCNC: 95 MMOL/L (ref 98–107)
CO2 SERPL-SCNC: 27.3 MMOL/L (ref 22–29)
CREAT SERPL-MCNC: 0.69 MG/DL (ref 0.57–1)
EGFRCR SERPLBLD CKD-EPI 2021: 80 ML/MIN/1.73
EOSINOPHIL # BLD AUTO: 0.27 10*3/MM3 (ref 0–0.4)
EOSINOPHIL NFR BLD AUTO: 4.5 % (ref 0.3–6.2)
ERYTHROCYTE [DISTWIDTH] IN BLOOD BY AUTOMATED COUNT: 12.3 % (ref 12.3–15.4)
GLUCOSE SERPL-MCNC: 106 MG/DL (ref 65–99)
HCT VFR BLD AUTO: 37.7 % (ref 34–46.6)
HGB BLD-MCNC: 12.6 G/DL (ref 12–15.9)
IMM GRANULOCYTES # BLD AUTO: 0.02 10*3/MM3 (ref 0–0.05)
IMM GRANULOCYTES NFR BLD AUTO: 0.3 % (ref 0–0.5)
LYMPHOCYTES # BLD AUTO: 0.65 10*3/MM3 (ref 0.7–3.1)
LYMPHOCYTES NFR BLD AUTO: 10.8 % (ref 19.6–45.3)
MCH RBC QN AUTO: 32.2 PG (ref 26.6–33)
MCHC RBC AUTO-ENTMCNC: 33.4 G/DL (ref 31.5–35.7)
MCV RBC AUTO: 96.4 FL (ref 79–97)
MONOCYTES # BLD AUTO: 0.85 10*3/MM3 (ref 0.1–0.9)
MONOCYTES NFR BLD AUTO: 14.1 % (ref 5–12)
NEUTROPHILS # BLD AUTO: 4.2 10*3/MM3 (ref 1.7–7)
NEUTROPHILS NFR BLD AUTO: 69.6 % (ref 42.7–76)
NRBC BLD AUTO-RTO: 0 /100 WBC (ref 0–0.2)
PLATELET # BLD AUTO: 299 10*3/MM3 (ref 140–450)
POTASSIUM SERPL-SCNC: 3.9 MMOL/L (ref 3.5–5.2)
RBC # BLD AUTO: 3.91 10*6/MM3 (ref 3.77–5.28)
SODIUM SERPL-SCNC: 137 MMOL/L (ref 136–145)
TSH SERPL DL<=0.005 MIU/L-ACNC: 3.77 UIU/ML (ref 0.27–4.2)
WBC # BLD AUTO: 6.03 10*3/MM3 (ref 3.4–10.8)

## 2022-10-25 NOTE — PROGRESS NOTES
Labs are stable, no obvious indicator of potential cause of headache, we are still waiting on urine culture results.  Please reach out if headache severity increases or there are any new symptoms.

## 2022-10-26 LAB
BACTERIA UR CULT: NORMAL
BACTERIA UR CULT: NORMAL

## 2022-11-15 ENCOUNTER — HOSPITAL ENCOUNTER (OUTPATIENT)
Dept: CT IMAGING | Facility: HOSPITAL | Age: 87
Discharge: HOME | End: 2022-11-15

## 2022-11-15 ENCOUNTER — OFFICE VISIT (OUTPATIENT)
Dept: INTERNAL MEDICINE | Facility: CLINIC | Age: 87
End: 2022-11-15

## 2022-11-15 ENCOUNTER — HOSPITAL ENCOUNTER (OUTPATIENT)
Dept: ULTRASOUND IMAGING | Facility: HOSPITAL | Age: 87
Discharge: HOME | End: 2022-11-15

## 2022-11-15 VITALS
HEART RATE: 94 BPM | HEIGHT: 62 IN | DIASTOLIC BLOOD PRESSURE: 74 MMHG | TEMPERATURE: 96.9 F | BODY MASS INDEX: 16.56 KG/M2 | OXYGEN SATURATION: 98 % | SYSTOLIC BLOOD PRESSURE: 164 MMHG | WEIGHT: 90 LBS

## 2022-11-15 DIAGNOSIS — R47.89 WORD FINDING DIFFICULTY: Primary | ICD-10-CM

## 2022-11-15 DIAGNOSIS — R30.0 DYSURIA: ICD-10-CM

## 2022-11-15 DIAGNOSIS — R47.89 WORD FINDING DIFFICULTY: ICD-10-CM

## 2022-11-15 DIAGNOSIS — I10 BENIGN HYPERTENSION: ICD-10-CM

## 2022-11-15 LAB
BILIRUB BLD-MCNC: NEGATIVE MG/DL
CLARITY, POC: ABNORMAL
COLOR UR: ABNORMAL
GLUCOSE UR STRIP-MCNC: NEGATIVE MG/DL
KETONES UR QL: NEGATIVE
LEUKOCYTE EST, POC: ABNORMAL
NITRITE UR-MCNC: NEGATIVE MG/ML
PH UR: 7 [PH] (ref 5–8)
PROT UR STRIP-MCNC: NEGATIVE MG/DL
RBC # UR STRIP: ABNORMAL /UL
SP GR UR: 1.01 (ref 1–1.03)
UROBILINOGEN UR QL: ABNORMAL

## 2022-11-15 PROCEDURE — 99214 OFFICE O/P EST MOD 30 MIN: CPT | Performed by: NURSE PRACTITIONER

## 2022-11-15 PROCEDURE — 70450 CT HEAD/BRAIN W/O DYE: CPT

## 2022-11-15 PROCEDURE — 93880 EXTRACRANIAL BILAT STUDY: CPT

## 2022-11-15 PROCEDURE — 81003 URINALYSIS AUTO W/O SCOPE: CPT | Performed by: NURSE PRACTITIONER

## 2022-11-15 RX ORDER — CONJUGATED ESTROGENS 0.62 MG/G
CREAM VAGINAL 2 TIMES WEEKLY
COMMUNITY
Start: 2022-09-27

## 2022-11-15 RX ORDER — AMLODIPINE BESYLATE 5 MG/1
5 TABLET ORAL DAILY
Qty: 60 TABLET | Refills: 1 | Status: SHIPPED | OUTPATIENT
Start: 2022-11-15 | End: 2022-11-15

## 2022-11-15 RX ORDER — AMLODIPINE BESYLATE 5 MG/1
5 TABLET ORAL 2 TIMES DAILY
Qty: 60 TABLET | Refills: 1
Start: 2022-11-15 | End: 2023-03-29

## 2022-11-16 NOTE — PROGRESS NOTES
Subjective     Chief Complaint:  Word finding difficulty.  High blood pressure.    HPI:  The patient presents to the office today with her .  She and her  state that approximately 1 week ago she had a 30-minute episode where she could only make sounds and could not form words.  The patient states that she was aware of what was happening.  She felt as though she knew what she wanted to say but could not get the appropriate words out.  She felt in her usual state of health both before and after this episode.  Her  states that last night she had a similar episode although this only lasted about 15 minutes.  Her  did not check her blood pressure during the first episode but during the second episode he noted her blood pressure was 179/70.  Her blood pressure in the office today is 164/74.  She denies any chest pain or shortness of breath.  She did not notice any numbness or tingling during either episode.  Her  noted no facial droop or unilateral weakness.  The patient complains of a dull headache today, states that her headache is better than when she had a headache during an office visit in October.  She denies any vision changes.  Patient denies any previous history of TIA or stroke.    The patient complains of occasional discomfort in her bladder.  She states most nights she is up half of the night urinating.  She denies any fever or chills.  She denies any back or flank pain.  She denies any changes in the smell or color of her urine.      Patient's PMR from outside medical facility reviewed and noted.    Past Medical History:   Past Medical History:   Diagnosis Date   • Arthritis    • GERD (gastroesophageal reflux disease)    • Hyperlipidemia    • Hypothyroidism    • Liver cyst    • Neuropathy    • Ovarian cyst      Past Surgical History:  Past Surgical History:   Procedure Laterality Date   • ABDOMINAL HYSTERECTOMY     • APPENDECTOMY     • BREAST BIOPSY     •  CHOLECYSTECTOMY     • COLONOSCOPY  03/05/2008    normal   • COLONOSCOPY  01/12/2012   • ENDOSCOPY  08/29/2013    normal   • ENDOSCOPY  01/23/2012    eus with stacy  normal endoscopic ultrascope of the pancreas.fortunately there was no mass seen   • ENDOSCOPY N/A 8/3/2018    Procedure: ESOPHAGOGASTRODUODENOSCOPY WITH ANESTHESIA;  Surgeon: Obed Patrick DO;  Location: [facility] ENDOSCOPY;  Service: Gastroenterology   • HEMORRHOIDECTOMY     • SKIN CANCER EXCISION  11/27/2021    left arm and left leg    • UPPER GASTROINTESTINAL ENDOSCOPY  08/29/2013     Social History:  reports that she has never smoked. She has never used smokeless tobacco. She reports that she does not drink alcohol and does not use drugs.    Family History: family history includes Bipolar disorder in her daughter.      Allergies:  Allergies   Allergen Reactions   • Doxycycline Hyclate GI Intolerance   • Ampicillin Nausea Only   • Cephalosporins Nausea Only   • Darifenacin Hydrobromide Er Nausea Only   • Duloxetine Hcl Nausea Only   • Fluocinolone Nausea Only   • Gabapentin Nausea Only   • Hydrochlorothiazide Nausea Only   • Levetiracetam Nausea Only   • Levofloxacin Nausea Only   • Phenazopyridine Hcl Nausea Only   • Pregabalin Nausea Only   • Quinolones Nausea Only   • Sulfa Antibiotics Nausea Only   • Sulfamethoxazole-Trimethoprim Nausea Only   • Macrobid [Nitrofurantoin] GI Intolerance   • Codeine Nausea Only and Rash   • Doxycycline Nausea Only and Rash     Medications:  Prior to Admission medications    Medication Sig Start Date End Date Taking? Authorizing Provider   amLODIPine (NORVASC) 5 MG tablet Take 1 tablet by mouth Daily. 11/15/22  Yes Estefania Madden APRN   Ascorbic Acid (VITAMIN C PO) Daily.   Yes Provider, Historical, MD   aspirin 81 MG chewable tablet Chew 1 tablet Daily. 6/24/22  Yes Provider, Historical, MD   bumetanide (BUMEX) 0.5 MG tablet Take 1 tablet by mouth Daily As Needed. 9/26/22  Yes Provider, MD Su    candesartan (ATACAND) 32 MG tablet Take 1 tablet by mouth Daily. 7/25/22  Yes Raquel Valle DO   docusate sodium 100 MG capsule Take 100 mg by mouth.   Yes Su Davey MD   Estrogens Conjugated (Premarin) 0.625 MG/GM vaginal cream 2 (Two) Times a Week. 9/27/22  Yes Su Davey MD   gabapentin (NEURONTIN) 100 MG capsule Take 1 capsule by mouth 3 (Three) Times a Day. 9/21/22  Yes Raquel Valle DO   levothyroxine (SYNTHROID, LEVOTHROID) 25 MCG tablet Take 1 tablet by mouth Daily. 8/10/22  Yes Raquel Valle DO   LORazepam (ATIVAN) 0.5 MG tablet TAKEK ONE-FOURTH TABLET BY MOUTH TWO TIMES A DAY DURING THE DAY AND ONE-HALF TABLET BY MOUTH EVERY NIGHT AT BEDTIME 9/21/22  Yes Raquel Valle DO   Multiple Vitamins-Minerals (CENTRUM SILVER ADULT 50+) tablet Take 1 tablet by mouth daily   Yes Su Davey MD   omeprazole (priLOSEC) 20 MG capsule Take 1 capsule by mouth 2 (Two) Times a Day. 5/16/22  Yes Raquel Valle DO   ondansetron ODT (Zofran ODT) 4 MG disintegrating tablet Place 1 tablet on the tongue Every 8 (Eight) Hours As Needed for Nausea or Vomiting. 9/21/22  Yes Raquel Valle DO   Psyllium (METAMUCIL FIBER PO) Take 1 package by mouth As Needed.   Yes Su Davey MD   senna (Senokot) 8.6 MG tablet Take 1 tablet by mouth Daily. 6/29/22  Yes Estefania Madden APRN   simethicone (Gas-X) 80 MG chewable tablet Chew 1 tablet Every 6 (Six) Hours As Needed for Flatulence (Bloating). 6/29/22  Yes Estefania Madden APRN   vitamin D (ERGOCALCIFEROL) 1.25 MG (56533 UT) capsule capsule TAKE 1 CAPSULE BY MOUTH EVERY OTHER WEEK 2/11/22  Yes Lashae Patrick APRN   amLODIPine (NORVASC) 5 MG tablet TAKE ONE TABLET BY MOUTH DAILY 11/1/21 11/15/22 Yes Andrea Curiel MD   doxycycline (VIBRAMYCIN) 100 MG capsule Take 1 capsule by mouth 2 (Two) Times a Day. 2/22/22 11/15/22  Lashae Patrick APRN       Objective     Vital Signs:  "/74 (BP Location: Left arm, Patient Position: Sitting, Cuff Size: Adult)   Pulse 94   Temp 96.9 °F (36.1 °C) (Temporal)   Ht 157.5 cm (62\")   Wt 40.8 kg (90 lb)   LMP  (LMP Unknown)   SpO2 98%   BMI 16.46 kg/m²   Physical Exam  Vitals and nursing note reviewed.   Constitutional:       General: She is not in acute distress.     Appearance: She is not ill-appearing or toxic-appearing.   HENT:      Head: Normocephalic and atraumatic.      Ears:      Comments: [place]     Mouth/Throat:      Mouth: Mucous membranes are moist.      Pharynx: Oropharynx is clear.   Cardiovascular:      Rate and Rhythm: Normal rate and regular rhythm.      Pulses: Normal pulses.      Heart sounds: Murmur heard.   Pulmonary:      Effort: Pulmonary effort is normal.      Breath sounds: No wheezing, rhonchi or rales.   Abdominal:      General: Bowel sounds are normal. There is no distension.      Palpations: Abdomen is soft.      Tenderness: There is no abdominal tenderness.   Musculoskeletal:         General: No swelling or tenderness. Normal range of motion.      Cervical back: Normal range of motion and neck supple. No tenderness.   Skin:     General: Skin is warm and dry.      Findings: No erythema or rash.   Neurological:      General: No focal deficit present.      Mental Status: She is alert and oriented to person, place, and time.   Psychiatric:         Mood and Affect: Mood normal.         Behavior: Behavior normal.         Thought Content: Thought content normal.         Judgment: Judgment normal.       Results Reviewed:  CT of the head shows no acute changes.  Carotid ultrasound shows no stenosis bilaterally.  Urinalysis in the office today does show trace blood and small leukocytes.    Assessment / Plan     Assessment/Plan:  Diagnoses and all orders for this visit:    1. Word finding difficulty (Primary)  -     CT Head Without Contrast; Future  -     US Carotid Bilateral; Future    2. Dysuria  -     POC Urinalysis Dipstick, " Multipro  -     Urine Culture - Urine, Urine, Clean Catch    3. Benign hypertension    Other orders  -     Discontinue: amLODIPine (NORVASC) 5 MG tablet; Take 1 tablet by mouth Daily.  Dispense: 60 tablet; Refill: 1  -     amLODIPine (NORVASC) 5 MG tablet; Take 1 tablet by mouth 2 (Two) Times a Day.  Dispense: 60 tablet; Refill: 1    Patient presents to the office today with 2 separate episodes of word finding difficulty in the setting of poorly controlled blood pressure.  She will continue candesartan at current dosage.  Will increase amlodipine from 5 mg daily to 5 mg twice daily.  I have provided the patient with a blood pressure log and I have asked she and her  to monitor this at least twice daily at home for the next 2 weeks.    A CT of the head was performed which did not show any acute findings.  A bilateral carotid ultrasound showed no stenosis bilaterally.  I discussed these findings with the patient and her .  We discussed pursuing MRI of the brain to rule out CVA, although with the patient's anxiety feel that she may not tolerate MRI very well and her  is in agreement with this.  We will continue aspirin for further stroke prevention.  Her LDL cholesterol when last assessed was over 100, so we could consider statin therapy at her next appointment.  However, she may not do well with this medication given her age and poorly controlled neuropathy.  We will discuss at her next office visit.  Discussed with patient's  that should her symptoms recur or worsen she should either call the office or go to the emergency department.    Urinalysis in the office today is consistent with previous with trace blood and small leukocytes.  We will send for culture, would hold on any antibiotic therapy at this time.    Return in about 2 weeks (around 11/29/2022) for Recheck. unless patient needs to be seen sooner or acute issues arise.    I have discussed the patient results/orders and and  plan/recommendation with them at today's visit.      Estefania Madden, APRN   11/15/2022

## 2022-11-18 LAB
BACTERIA UR CULT: NORMAL
BACTERIA UR CULT: NORMAL

## 2022-11-21 NOTE — PROGRESS NOTES
Urine culture shows mixed meseret, unable to isolate any particular bacteria so would not treat for infection at this time.

## 2022-11-29 ENCOUNTER — OFFICE VISIT (OUTPATIENT)
Dept: INTERNAL MEDICINE | Facility: CLINIC | Age: 87
End: 2022-11-29

## 2022-11-29 VITALS
DIASTOLIC BLOOD PRESSURE: 78 MMHG | HEIGHT: 62 IN | TEMPERATURE: 97.1 F | HEART RATE: 90 BPM | OXYGEN SATURATION: 98 % | BODY MASS INDEX: 16.67 KG/M2 | WEIGHT: 90.6 LBS | SYSTOLIC BLOOD PRESSURE: 146 MMHG

## 2022-11-29 DIAGNOSIS — E78.00 HIGH CHOLESTEROL: Primary | ICD-10-CM

## 2022-11-29 DIAGNOSIS — I10 BENIGN HYPERTENSION: ICD-10-CM

## 2022-11-29 PROCEDURE — 99214 OFFICE O/P EST MOD 30 MIN: CPT | Performed by: NURSE PRACTITIONER

## 2022-11-29 NOTE — PROGRESS NOTES
"        Subjective     Chief Complaint:  Hypertension    HPI:  The patient presents to the office today for a 2-week follow-up.  She was last seen on 11/15 with complaints of 2 instances in which she had word finding difficulty.  Her  corroborates that she had an episode lasting 30 minutes and another episode lasting 15 minutes.  Her blood pressure was not checked during the first episode but during the second episode her blood pressure was noted to be elevated at 179/70.  Her blood pressure was elevated in the office at 164/74 during her last visit.  At that office visit, I had instructed her to increase amlodipine to 5 mg twice daily, from daily dosing.  Her  now tells me that she had not been taking amlodipine at all prior to her last office visit so they tried daily dosing and apparently the patient could not tolerate this.  She tells me that she \"just did not feel good\" on this medication, although this did not particularly drop her blood pressure too low.  She and her  have been keeping track of her blood pressure over the last 2 weeks and this has been much more normal with systolic blood pressures consistently in the 130s and 140s.  The patient has had no chest pain or shortness of breath.  She has had no further episodes of word finding difficulty.  She denies any headaches.  She denies any numbness or tingling.  No facial droop or unilateral weakness noted.    Patient's PMR from outside medical facility reviewed and noted.    Past Medical History:   Past Medical History:   Diagnosis Date   • Arthritis    • GERD (gastroesophageal reflux disease)    • Hyperlipidemia    • Hypothyroidism    • Liver cyst    • Neuropathy    • Ovarian cyst      Past Surgical History:  Past Surgical History:   Procedure Laterality Date   • ABDOMINAL HYSTERECTOMY     • APPENDECTOMY     • BREAST BIOPSY     • CHOLECYSTECTOMY     • COLONOSCOPY  03/05/2008    normal   • COLONOSCOPY  01/12/2012   • ENDOSCOPY  " 08/29/2013    normal   • ENDOSCOPY  01/23/2012    eus with stacy  normal endoscopic ultrascope of the pancreas.fortunately there was no mass seen   • ENDOSCOPY N/A 8/3/2018    Procedure: ESOPHAGOGASTRODUODENOSCOPY WITH ANESTHESIA;  Surgeon: Obed Patrick DO;  Location: [facility] ENDOSCOPY;  Service: Gastroenterology   • HEMORRHOIDECTOMY     • SKIN CANCER EXCISION  11/27/2021    left arm and left leg    • UPPER GASTROINTESTINAL ENDOSCOPY  08/29/2013     Social History:  reports that she has never smoked. She has never used smokeless tobacco. She reports that she does not drink alcohol and does not use drugs.    Family History: family history includes Bipolar disorder in her daughter.      Allergies:  Allergies   Allergen Reactions   • Doxycycline Hyclate GI Intolerance   • Ampicillin Nausea Only   • Cephalosporins Nausea Only   • Darifenacin Hydrobromide Er Nausea Only   • Duloxetine Hcl Nausea Only   • Fluocinolone Nausea Only   • Gabapentin Nausea Only   • Hydrochlorothiazide Nausea Only   • Levetiracetam Nausea Only   • Levofloxacin Nausea Only   • Phenazopyridine Hcl Nausea Only   • Pregabalin Nausea Only   • Quinolones Nausea Only   • Sulfa Antibiotics Nausea Only   • Sulfamethoxazole-Trimethoprim Nausea Only   • Macrobid [Nitrofurantoin] GI Intolerance   • Codeine Nausea Only and Rash   • Doxycycline Nausea Only and Rash     Medications:  Prior to Admission medications    Medication Sig Start Date End Date Taking? Authorizing Provider   Ascorbic Acid (VITAMIN C PO) Daily.   Yes ProviderSu MD   aspirin 81 MG chewable tablet Chew 1 tablet Daily. 6/24/22  Yes Su Davey MD   bumetanide (BUMEX) 0.5 MG tablet Take 1 tablet by mouth Daily As Needed. 9/26/22  Yes Su Davey MD   candesartan (ATACAND) 32 MG tablet Take 1 tablet by mouth Daily. 7/25/22  Yes Raquel Valle DO   docusate sodium 100 MG capsule Take 100 mg by mouth.   Yes Provider, Historical, MD   Estrogens  "Conjugated (Premarin) 0.625 MG/GM vaginal cream 2 (Two) Times a Week. 9/27/22  Yes Su Davey MD   gabapentin (NEURONTIN) 100 MG capsule Take 1 capsule by mouth 3 (Three) Times a Day. 9/21/22  Yes Raquel Valle DO   levothyroxine (SYNTHROID, LEVOTHROID) 25 MCG tablet Take 1 tablet by mouth Daily. 8/10/22  Yes Raquel Valle DO   LORazepam (ATIVAN) 0.5 MG tablet TAKEK ONE-FOURTH TABLET BY MOUTH TWO TIMES A DAY DURING THE DAY AND ONE-HALF TABLET BY MOUTH EVERY NIGHT AT BEDTIME 9/21/22  Yes Raquel Valle DO   Multiple Vitamins-Minerals (CENTRUM SILVER ADULT 50+) tablet Take 1 tablet by mouth daily   Yes ProviderSu MD   omeprazole (priLOSEC) 20 MG capsule Take 1 capsule by mouth 2 (Two) Times a Day. 5/16/22  Yes Raquel Valle DO   ondansetron ODT (Zofran ODT) 4 MG disintegrating tablet Place 1 tablet on the tongue Every 8 (Eight) Hours As Needed for Nausea or Vomiting. 9/21/22  Yes Raquel Valle DO   Psyllium (METAMUCIL FIBER PO) Take 1 package by mouth As Needed.   Yes ProviderSu MD   senna (Senokot) 8.6 MG tablet Take 1 tablet by mouth Daily. 6/29/22  Yes Estefania Madden APRN   simethicone (Gas-X) 80 MG chewable tablet Chew 1 tablet Every 6 (Six) Hours As Needed for Flatulence (Bloating). 6/29/22  Yes Estefania Madden APRN   vitamin D (ERGOCALCIFEROL) 1.25 MG (75597 UT) capsule capsule TAKE 1 CAPSULE BY MOUTH EVERY OTHER WEEK 2/11/22  Yes Lashae Patrick APRN   amLODIPine (NORVASC) 5 MG tablet Take 1 tablet by mouth 2 (Two) Times a Day. 11/15/22   Estefania Madden APRN       Objective     Vital Signs: /78 (BP Location: Left arm, Patient Position: Sitting, Cuff Size: Adult)   Pulse 90   Temp 97.1 °F (36.2 °C) (Temporal)   Ht 157.5 cm (62\")   Wt 41.1 kg (90 lb 9.6 oz)   LMP  (LMP Unknown)   SpO2 98%   BMI 16.57 kg/m²   Physical Exam  Vitals and nursing note reviewed.   Constitutional:       General: She is not in acute " distress.     Appearance: She is not ill-appearing or toxic-appearing.   HENT:      Head: Normocephalic and atraumatic.      Ears:      Comments: [place]     Mouth/Throat:      Mouth: Mucous membranes are moist.      Pharynx: Oropharynx is clear.   Cardiovascular:      Rate and Rhythm: Normal rate and regular rhythm.      Pulses: Normal pulses.      Heart sounds: Murmur heard.   Pulmonary:      Effort: Pulmonary effort is normal.      Breath sounds: No wheezing, rhonchi or rales.   Abdominal:      General: Bowel sounds are normal. There is no distension.      Palpations: Abdomen is soft.      Tenderness: There is no abdominal tenderness.   Musculoskeletal:         General: No swelling or tenderness. Normal range of motion.      Cervical back: Normal range of motion and neck supple. No tenderness.   Skin:     General: Skin is warm and dry.      Findings: No erythema or rash.   Neurological:      General: No focal deficit present.      Mental Status: She is alert and oriented to person, place, and time.   Psychiatric:         Mood and Affect: Mood normal.         Behavior: Behavior normal.         Thought Content: Thought content normal.         Judgment: Judgment normal.       Results Reviewed:  Reviewed patient's previous office visit note with me from 11/15/2022.    Assessment / Plan     Assessment/Plan:  Diagnoses and all orders for this visit:    1. High cholesterol (Primary)  -     AST  -     ALT    2. Benign hypertension       The patient presents to the office today for a 2-week follow-up regarding 2 episodes of word finding difficulty in the setting of elevated blood pressures.  She was instructed to increase her amlodipine during her last office visit, however there was some confusion as she apparently was not taking amlodipine prior to her last office visit.  She and her  did try daily dosing of this medication however the patient states the amlodipine made her feel unwell and she has not taken this any  further.  Her blood pressure seems to be reasonably well controlled and much better than previous, so we will continue on candesartan monotherapy for now.    A CT of the head was performed during her last office visit which did not show any acute findings.  Bilateral carotid ultrasound showed no stenosis.  We discussed possibly pursuing MRI to rule out CVA, although as management would not likely change much and the patient's anxiety would likely be exacerbated by claustrophobia, I feel the risk outweigh the benefits at this time and the patient and her  both agree with this plan.  I believe the patient's episodes were likely secondary to the elevated blood pressure but could also have been transient ischemic attacks.  For stroke prevention, we will continue aspirin and I would like to add low-dose cholesterol medication as well.  Her last lipid panel was assessed in July with LDL of 114.  We will reassess AST and ALT today and if these are normal, we will plan to start low-dose Lipitor.  Patient and her  are in agreement with this plan.    Return for Keep appointment in January. unless patient needs to be seen sooner or acute issues arise.    I have discussed the patient results/orders and and plan/recommendation with them at today's visit.      Estefania Madden, APRN   11/29/2022

## 2022-11-30 DIAGNOSIS — E78.00 HIGH CHOLESTEROL: Primary | ICD-10-CM

## 2022-11-30 LAB
ALT SERPL-CCNC: 15 U/L (ref 1–33)
AST SERPL-CCNC: 35 U/L (ref 1–32)

## 2022-11-30 RX ORDER — ATORVASTATIN CALCIUM 10 MG/1
10 TABLET, FILM COATED ORAL DAILY
Qty: 90 TABLET | Refills: 1 | Status: SHIPPED | OUTPATIENT
Start: 2022-11-30 | End: 2023-03-29

## 2022-11-30 NOTE — PROGRESS NOTES
Patient does have a very mild elevation of one of her liver enzymes, however I still think that she would be fine to start cholesterol medication as we discussed at her appointment yesterday.  I have placed her on low-dose Lipitor which has been sent to her pharmacy.  I did place orders for lipid panel and liver function testing to be performed at her next appointment with Dr. Miles in January.

## 2022-12-09 ENCOUNTER — HOSPITAL ENCOUNTER (OUTPATIENT)
Dept: CT IMAGING | Facility: HOSPITAL | Age: 87
Discharge: HOME | End: 2022-12-09
Admitting: INTERNAL MEDICINE

## 2022-12-09 ENCOUNTER — OFFICE VISIT (OUTPATIENT)
Dept: INTERNAL MEDICINE | Facility: CLINIC | Age: 87
End: 2022-12-09

## 2022-12-09 VITALS
HEIGHT: 62 IN | WEIGHT: 89 LBS | SYSTOLIC BLOOD PRESSURE: 136 MMHG | TEMPERATURE: 97.1 F | HEART RATE: 97 BPM | BODY MASS INDEX: 16.38 KG/M2 | DIASTOLIC BLOOD PRESSURE: 78 MMHG | OXYGEN SATURATION: 98 %

## 2022-12-09 DIAGNOSIS — S22.31XA CLOSED FRACTURE OF ONE RIB OF RIGHT SIDE, INITIAL ENCOUNTER: ICD-10-CM
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
## 2022-12-09 DIAGNOSIS — R10.31 ACUTE ABDOMINAL PAIN IN RIGHT LOWER QUADRANT: ICD-10-CM

## 2022-12-09 DIAGNOSIS — R10.31 ACUTE ABDOMINAL PAIN IN RIGHT LOWER QUADRANT: Primary | ICD-10-CM

## 2022-12-09 DIAGNOSIS — N94.9 ADNEXAL CYST: ICD-10-CM

## 2022-12-09 DIAGNOSIS — R14.0 ABDOMINAL DISTENSION: ICD-10-CM

## 2022-12-09 PROCEDURE — 74176 CT ABD & PELVIS W/O CONTRAST: CPT

## 2022-12-09 PROCEDURE — 99213 OFFICE O/P EST LOW 20 MIN: CPT | Performed by: INTERNAL MEDICINE

## 2022-12-09 NOTE — PROGRESS NOTES
"      Chief Complaint  Fall (About 2 weeks ago/Hurting under right breast to right hip)    Subjective        Meenu Arteaga presents to [facility]    HPI  Patient had a fall approximately 2 weeks ago and shortly after resting.  She fell on her right ribs and her right side on a dresser.  She has since continued to have significant pain.  She is only taken Tylenol once.  She has not taken any other medications to aid in her pain and discomfort.  Patient has not had any cough, shortness of breath, fever, chills to make me think that she has pneumonia.    Review of Systems   Constitutional: Positive for activity change and fatigue. Negative for fever.   Respiratory: Negative for cough and shortness of breath.    Cardiovascular: Negative for palpitations.   Gastrointestinal: Positive for abdominal distention and abdominal pain. Negative for constipation, diarrhea and nausea.   Musculoskeletal: Positive for arthralgias and myalgias.       Objective   Vital Signs:  /78 (BP Location: Left arm, Patient Position: Sitting, Cuff Size: Adult)   Pulse 97   Temp 97.1 °F (36.2 °C) (Temporal)   Ht 157.5 cm (62\")   Wt 40.4 kg (89 lb)   SpO2 98%   BMI 16.28 kg/m²   Estimated body mass index is 16.28 kg/m² as calculated from the following:    Height as of this encounter: 157.5 cm (62\").    Weight as of this encounter: 40.4 kg (89 lb).      Physical Exam  Vitals reviewed.   Constitutional:       Comments: Hard of hearing   HENT:      Mouth/Throat:      Pharynx: Oropharynx is clear.   Eyes:      General: No scleral icterus.     Conjunctiva/sclera: Conjunctivae normal.   Cardiovascular:      Rate and Rhythm: Normal rate and regular rhythm.      Heart sounds: Murmur heard.   Pulmonary:      Effort: Pulmonary effort is normal.      Breath sounds: Normal breath sounds.      Comments: Mild tenderness right lower ribs  Abdominal:      General: Bowel sounds are normal. There is distension.      " Palpations: Abdomen is soft.      Tenderness: There is abdominal tenderness. There is guarding.   Skin:     General: Skin is warm and dry.      Coloration: Skin is pale.   Neurological:      Mental Status: She is alert.   Psychiatric:         Mood and Affect: Mood normal.         Behavior: Behavior normal.                      Assessment and Plan   Diagnoses and all orders for this visit:    1. Acute abdominal pain in right lower quadrant (Primary)  Assessment & Plan:  Initially thought that the patient was describing right rib pain.  However when I had the patient lay on the table, standing in her right upper and right lower quadrant, the patient had significant guarding and about jumped off the table.  We will get a CT scan of the abdomen pelvis to make sure there is no acute injury, she exhibits repeatable right-sided significant tenderness to palpation both light and deep palpation.  CT scan will also help assess the lower ribs    Orders:  -     CT Abdomen Pelvis Without Contrast; Future    2. Fall, initial encounter  Assessment & Plan:  Patient fell while getting up in the middle the night to go to the restroom.  Encouraged safe habits, avoid clutter on the floor that could be trip hazard.      3. Abdominal distension    4. Adnexal cyst  Assessment & Plan:  Based on previous CT scans.  We will get a CT scan of the abdomen pelvis, this will help us further evaluate this.      5. Suspected right-sided rib fracture  Comments:  CT scan will evaluate the lower ribs.  No treatment indicated for rib fracture.  Encouraged Tylenol and Lidoderm patch.  Deep breathing.    Tylenol 500 mg every 6 hours PRN   CT scan of abdomen and pelvis given acute pain in the abdomen      Result Review :                      Follow Up   No follow-ups on file.  Patient was given instructions and counseling regarding her condition or for health maintenance advice. Please see specific information pulled into the AVS if appropriate.       CHRISTOPHE  Mane Miles MD, [facility], FACP      Electronically signed by Scotty Miles MD, 12/09/22, 1:45 PM CST.

## 2022-12-10 NOTE — ASSESSMENT & PLAN NOTE
Patient fell while getting up in the middle the night to go to the restroom.  Encouraged safe habits, avoid clutter on the floor that could be trip hazard.

## 2022-12-10 NOTE — ASSESSMENT & PLAN NOTE
Based on previous CT scans.  We will get a CT scan of the abdomen pelvis, this will help us further evaluate this.

## 2022-12-10 NOTE — ASSESSMENT & PLAN NOTE
Initially thought that the patient was describing right rib pain.  However when I had the patient lay on the table, standing in her right upper and right lower quadrant, the patient had significant guarding and about jumped off the table.  We will get a CT scan of the abdomen pelvis to make sure there is no acute injury, she exhibits repeatable right-sided significant tenderness to palpation both light and deep palpation.  CT scan will also help assess the lower ribs

## 2022-12-19 ENCOUNTER — APPOINTMENT (OUTPATIENT)
Dept: CT IMAGING | Age: 87
DRG: 069 | End: 2022-12-19
Payer: MEDICARE

## 2022-12-19 ENCOUNTER — HOSPITAL ENCOUNTER (INPATIENT)
Age: 87
LOS: 1 days | Discharge: HOME | DRG: 069 | End: 2022-12-20
Attending: EMERGENCY MEDICINE | Admitting: INTERNAL MEDICINE
Payer: MEDICARE

## 2022-12-19 ENCOUNTER — APPOINTMENT (OUTPATIENT)
Dept: GENERAL RADIOLOGY | Age: 87
DRG: 069 | End: 2022-12-19
Payer: MEDICARE

## 2022-12-19 DIAGNOSIS — I63.9 CEREBROVASCULAR ACCIDENT (CVA), UNSPECIFIED MECHANISM (HCC): Primary | ICD-10-CM

## 2022-12-19 DIAGNOSIS — R47.01 APHASIA: ICD-10-CM

## 2022-12-19 LAB
ALBUMIN SERPL-MCNC: 4.4 G/DL (ref 3.5–5.2)
ALP BLD-CCNC: 100 U/L (ref 35–104)
ALT SERPL-CCNC: 17 U/L (ref 5–33)
ANION GAP SERPL CALCULATED.3IONS-SCNC: 13 MMOL/L (ref 7–19)
AST SERPL-CCNC: 38 U/L (ref 5–32)
BASOPHILS ABSOLUTE: 0 K/UL (ref 0–0.2)
BASOPHILS RELATIVE PERCENT: 0.6 % (ref 0–1)
BILIRUB SERPL-MCNC: 0.4 MG/DL (ref 0.2–1.2)
BUN BLDV-MCNC: 15 MG/DL (ref 8–23)
CALCIUM SERPL-MCNC: 9.9 MG/DL (ref 8.2–9.6)
CHLORIDE BLD-SCNC: 98 MMOL/L (ref 98–111)
CO2: 24 MMOL/L (ref 22–29)
CREAT SERPL-MCNC: 0.6 MG/DL (ref 0.5–0.9)
EKG P AXIS: 87 DEGREES
EKG P-R INTERVAL: 190 MS
EKG Q-T INTERVAL: 382 MS
EKG QRS DURATION: 120 MS
EKG QTC CALCULATION (BAZETT): 441 MS
EKG T AXIS: 104 DEGREES
EOSINOPHILS ABSOLUTE: 0 K/UL (ref 0–0.6)
EOSINOPHILS RELATIVE PERCENT: 0.6 % (ref 0–5)
GFR SERPL CREATININE-BSD FRML MDRD: >60 ML/MIN/{1.73_M2}
GLUCOSE BLD-MCNC: 92 MG/DL
GLUCOSE BLD-MCNC: 92 MG/DL (ref 70–99)
GLUCOSE BLD-MCNC: 95 MG/DL (ref 74–109)
HCT VFR BLD CALC: 41.7 % (ref 37–47)
HEMOGLOBIN: 13.8 G/DL (ref 12–16)
IMMATURE GRANULOCYTES #: 0 K/UL
INR BLD: 0.98 (ref 0.88–1.18)
LYMPHOCYTES ABSOLUTE: 1.2 K/UL (ref 1.1–4.5)
LYMPHOCYTES RELATIVE PERCENT: 19 % (ref 20–40)
MCH RBC QN AUTO: 32.8 PG (ref 27–31)
MCHC RBC AUTO-ENTMCNC: 33.1 G/DL (ref 33–37)
MCV RBC AUTO: 99 FL (ref 81–99)
MONOCYTES ABSOLUTE: 0.7 K/UL (ref 0–0.9)
MONOCYTES RELATIVE PERCENT: 11.3 % (ref 0–10)
NEUTROPHILS ABSOLUTE: 4.2 K/UL (ref 1.5–7.5)
NEUTROPHILS RELATIVE PERCENT: 68.2 % (ref 50–65)
PDW BLD-RTO: 14.1 % (ref 11.5–14.5)
PERFORMED ON: NORMAL
PLATELET # BLD: 259 K/UL (ref 130–400)
PMV BLD AUTO: 9.7 FL (ref 9.4–12.3)
POTASSIUM REFLEX MAGNESIUM: 4 MMOL/L (ref 3.5–5)
PROTHROMBIN TIME: 12.9 SEC (ref 12–14.6)
RBC # BLD: 4.21 M/UL (ref 4.2–5.4)
SODIUM BLD-SCNC: 135 MMOL/L (ref 136–145)
TOTAL PROTEIN: 7.3 G/DL (ref 6.6–8.7)
TROPONIN: <0.01 NG/ML (ref 0–0.03)
WBC # BLD: 6.2 K/UL (ref 4.8–10.8)

## 2022-12-19 PROCEDURE — 93005 ELECTROCARDIOGRAM TRACING: CPT | Performed by: EMERGENCY MEDICINE

## 2022-12-19 PROCEDURE — 70450 CT HEAD/BRAIN W/O DYE: CPT | Performed by: RADIOLOGY

## 2022-12-19 PROCEDURE — 71045 X-RAY EXAM CHEST 1 VIEW: CPT

## 2022-12-19 PROCEDURE — 70450 CT HEAD/BRAIN W/O DYE: CPT

## 2022-12-19 PROCEDURE — 6370000000 HC RX 637 (ALT 250 FOR IP): Performed by: NURSE PRACTITIONER

## 2022-12-19 PROCEDURE — 1210000000 HC MED SURG R&B

## 2022-12-19 PROCEDURE — 6360000004 HC RX CONTRAST MEDICATION: Performed by: EMERGENCY MEDICINE

## 2022-12-19 PROCEDURE — 70498 CT ANGIOGRAPHY NECK: CPT | Performed by: RADIOLOGY

## 2022-12-19 PROCEDURE — 70498 CT ANGIOGRAPHY NECK: CPT

## 2022-12-19 PROCEDURE — 99285 EMERGENCY DEPT VISIT HI MDM: CPT

## 2022-12-19 PROCEDURE — 85025 COMPLETE CBC W/AUTO DIFF WBC: CPT

## 2022-12-19 PROCEDURE — 36415 COLL VENOUS BLD VENIPUNCTURE: CPT

## 2022-12-19 PROCEDURE — 6370000000 HC RX 637 (ALT 250 FOR IP): Performed by: EMERGENCY MEDICINE

## 2022-12-19 PROCEDURE — 85610 PROTHROMBIN TIME: CPT

## 2022-12-19 PROCEDURE — 70496 CT ANGIOGRAPHY HEAD: CPT | Performed by: RADIOLOGY

## 2022-12-19 PROCEDURE — 6360000002 HC RX W HCPCS: Performed by: NURSE PRACTITIONER

## 2022-12-19 PROCEDURE — 80053 COMPREHEN METABOLIC PANEL: CPT

## 2022-12-19 PROCEDURE — 84484 ASSAY OF TROPONIN QUANT: CPT

## 2022-12-19 PROCEDURE — 93010 ELECTROCARDIOGRAM REPORT: CPT | Performed by: INTERNAL MEDICINE

## 2022-12-19 PROCEDURE — 82947 ASSAY GLUCOSE BLOOD QUANT: CPT

## 2022-12-19 RX ORDER — DOCUSATE SODIUM 100 MG/1
100 CAPSULE, LIQUID FILLED ORAL 2 TIMES DAILY
Status: DISCONTINUED | OUTPATIENT
Start: 2022-12-19 | End: 2022-12-20 | Stop reason: HOSPADM

## 2022-12-19 RX ORDER — ONDANSETRON 2 MG/ML
4 INJECTION INTRAMUSCULAR; INTRAVENOUS EVERY 6 HOURS PRN
Status: DISCONTINUED | OUTPATIENT
Start: 2022-12-19 | End: 2022-12-20 | Stop reason: HOSPADM

## 2022-12-19 RX ORDER — ASPIRIN 81 MG/1
81 TABLET, CHEWABLE ORAL ONCE
Status: COMPLETED | OUTPATIENT
Start: 2022-12-19 | End: 2022-12-19

## 2022-12-19 RX ORDER — ASPIRIN 300 MG/1
300 SUPPOSITORY RECTAL DAILY
Status: DISCONTINUED | OUTPATIENT
Start: 2022-12-19 | End: 2022-12-19

## 2022-12-19 RX ORDER — ASPIRIN 81 MG/1
81 TABLET ORAL DAILY
Status: DISCONTINUED | OUTPATIENT
Start: 2022-12-19 | End: 2022-12-19

## 2022-12-19 RX ORDER — POLYETHYLENE GLYCOL 3350 17 G/17G
17 POWDER, FOR SOLUTION ORAL DAILY PRN
Status: DISCONTINUED | OUTPATIENT
Start: 2022-12-19 | End: 2022-12-20 | Stop reason: HOSPADM

## 2022-12-19 RX ORDER — ASPIRIN 81 MG/1
81 TABLET, CHEWABLE ORAL DAILY
Status: DISCONTINUED | OUTPATIENT
Start: 2022-12-20 | End: 2022-12-20 | Stop reason: HOSPADM

## 2022-12-19 RX ORDER — LEVOTHYROXINE SODIUM 0.03 MG/1
25 TABLET ORAL DAILY
Status: DISCONTINUED | OUTPATIENT
Start: 2022-12-19 | End: 2022-12-20 | Stop reason: HOSPADM

## 2022-12-19 RX ORDER — ENOXAPARIN SODIUM 100 MG/ML
30 INJECTION SUBCUTANEOUS DAILY
Status: DISCONTINUED | OUTPATIENT
Start: 2022-12-19 | End: 2022-12-20 | Stop reason: HOSPADM

## 2022-12-19 RX ORDER — PANTOPRAZOLE SODIUM 40 MG/1
40 TABLET, DELAYED RELEASE ORAL
Status: DISCONTINUED | OUTPATIENT
Start: 2022-12-20 | End: 2022-12-20 | Stop reason: HOSPADM

## 2022-12-19 RX ORDER — ATORVASTATIN CALCIUM 80 MG/1
80 TABLET, FILM COATED ORAL NIGHTLY
Status: DISCONTINUED | OUTPATIENT
Start: 2022-12-19 | End: 2022-12-20 | Stop reason: HOSPADM

## 2022-12-19 RX ORDER — ONDANSETRON 4 MG/1
4 TABLET, ORALLY DISINTEGRATING ORAL EVERY 8 HOURS PRN
Status: DISCONTINUED | OUTPATIENT
Start: 2022-12-19 | End: 2022-12-20 | Stop reason: HOSPADM

## 2022-12-19 RX ADMIN — ENOXAPARIN SODIUM 30 MG: 100 INJECTION SUBCUTANEOUS at 18:11

## 2022-12-19 RX ADMIN — ATORVASTATIN CALCIUM 80 MG: 80 TABLET, FILM COATED ORAL at 20:25

## 2022-12-19 RX ADMIN — ASPIRIN 81 MG 81 MG: 81 TABLET ORAL at 13:50

## 2022-12-19 RX ADMIN — IOPAMIDOL 70 ML: 755 INJECTION, SOLUTION INTRAVENOUS at 12:51

## 2022-12-19 ASSESSMENT — ENCOUNTER SYMPTOMS
RHINORRHEA: 0
NAUSEA: 0
SORE THROAT: 0
SHORTNESS OF BREATH: 0
SINUS PRESSURE: 0
DIARRHEA: 0
ABDOMINAL PAIN: 0
VOMITING: 0

## 2022-12-19 NOTE — H&P
Ricardo Lora - History & Physical      PCP: No primary care provider on file. Date of Admission: 12/19/2022    Date of Service: 12/19/2022    Chief Complaint:  Speech problem    History Of Present Illness: The patient is a 80 y.o. female who presented to [facility] ED for evaluation of speech difficulty. Pt presents with her  who relates that patient developed difficulty speaking last night around 9pm. Pt has history of HTN, hyperlipidemia, hypothyroidism, anxiety and neuropathy. He describes patient with expressive aphasia. He related that symptoms last around 30 minutes and had improved returning today around noon. She has had problems with confusion. She has had no problems with lateralizing numbness/weakness per . She has had no fevers or recent illness. In ED, neurology was consulted. CT head- Nonspecific but presumed microvascular changes in the periventricular white matter. MRI may be helpful in excluding occult acute infarction if clinically warranted. CTA head/neck-No significant intracranial arterial abnormalities. Sodium 135, potassium 4.0, creatinine 0.6/bun 95, troponin less than 0.01, inr 0.98, wbc 6k, hgb 14, platelets 345F. Pt is admitted to hospitalist service in consultation with neurology.      Past Medical History:        Diagnosis Date    Anxiety 10/02/2019    Chest tightness or pressure 08/26/2013 8/26/2013  lexiscan negative for myocardial ischemia, EF 61%    Edema     Essential and other specified forms of tremor     Generalized anxiety disorder     HTN (hypertension)     Hyperlipidemia     Hypertension     Insomnia, unspecified     Neuropathy     both legs up to both hips    Other and unspecified ovarian cyst     Other left bundle branch block     Palliative care patient 06/23/2022    Pure hypercholesterolemia     Restless legs syndrome (RLS)     Solitary cyst of breast     Spinal stenosis, lumbar region, without neurogenic claudication Unspecified hypothyroidism     Urinary frequency     UTI (urinary tract infection) 03/25/2022       Past Surgical History:        Procedure Laterality Date    APPENDECTOMY      CARDIAC CATHETERIZATION  5/26/15  MDL    with aortic root injection. EF 60%    CATARACT REMOVAL      CHOLECYSTECTOMY      CYST INCISION AND DRAINAGE      drained liver cyst    HEMORRHOID SURGERY      HYSTERECTOMY (CERVIX STATUS UNKNOWN)      NERVE BLOCK LUMB FACET LEVEL 1 BILATERAL Bilateral 1/19/2016    LUMBAR FACET CATE L4-5  performed by Aristeo Hoang at [place] [address] Medications:  Prior to Admission medications    Medication Sig Start Date End Date Taking? Authorizing Provider   estrogens conjugated (PREMARIN) 0.625 MG/GM CREA vaginal cream Place small amountt on end of finger and apply to urethra three times per week 9/27/22   Elizabeth Leonard, APRN - CNP   aspirin 81 MG chewable tablet Take 1 tablet by mouth daily 6/24/22   JERE Gardiner - CNP   bumetanide (BUMEX) 0.5 MG tablet Take 1 tablet by mouth daily as needed (weight gain greater than 3 pounds overnight or 5 pounds in 1 week) 6/23/22   JERE Gardiner - CNP   vitamin C (ASCORBIC ACID) 500 MG tablet Take 500 mg by mouth daily    Historical Provider, MD   docusate sodium (COLACE) 100 MG capsule Take 100 mg by mouth 2 times daily     Historical Provider, MD   LORazepam (ATIVAN) 0.5 MG tablet Take 0.5 mg by mouth.  1/4 tab bid and 1/2 tab at hs    Historical Provider, MD   levothyroxine (SYNTHROID) 25 MCG tablet Take 2 tablets by mouth Daily  Patient taking differently: Take 25 mcg by mouth Daily 3/22/16   JERE Moncada - NP   omeprazole (PRILOSEC) 20 MG capsule Take 20 mg by mouth 2 times daily    Historical Provider, MD   Multiple Vitamins-Minerals (CENTRUM SILVER ADULT 50+) TABS Take 1 tablet by mouth daily     Historical Provider, MD   Magnesium Oxide (WHIPPLE PO) Take 2 tablets by mouth daily     Historical Provider, MD zapataartan (ATACAND) 32 MG tablet Take 32 mg by mouth daily    Historical Provider, MD   Psyllium (METAMUCIL PO) Take 2 tablets by mouth daily     Historical Provider, MD       Allergies:    Ampicillin, Bactrim [sulfamethoxazole-trimethoprim], Cephalosporins, Ciprocinonide [fluocinolone], Codeine, Cymbalta [duloxetine hcl], Doxycycline, Enablex [darifenacin hydrobromide er], Gabapentin, Hctz [hydrochlorothiazide], Keppra [levetiracetam], Levaquin [levofloxacin in d5w], Lyrica [pregabalin], Pyridium [phenazopyridine hcl], Quinolones, and Sulfa antibiotics    Social History:    The patient currently lives at home  Tobacco:   reports that she has never smoked. She has never used smokeless tobacco.  Alcohol:   reports no history of alcohol use. Illicit Drugs: denies    Family History:      Problem Relation Age of Onset    Other Father         stroke, MI,  46       Review of Systems:   Review of Systems   Unable to perform ROS: Mental status change      14 point review of systems is negative except as specifically addressed above. Physical Examination:  /76   Pulse 78   Temp 97.9 °F (36.6 °C) (Oral)   Resp 18   Wt 92 lb (41.7 kg)   LMP  (LMP Unknown)   SpO2 95%   BMI 16.30 kg/m²   Physical Exam  Vitals reviewed. Constitutional:       Comments: Hard of hearing, confused 80yr old female   HENT:      Right Ear: External ear normal.      Left Ear: External ear normal.      Mouth/Throat:      Pharynx: Oropharynx is clear. Eyes:      Conjunctiva/sclera: Conjunctivae normal.   Cardiovascular:      Rate and Rhythm: Normal rate and regular rhythm. Pulmonary:      Effort: Pulmonary effort is normal.      Breath sounds: Normal breath sounds. Abdominal:      Tenderness: There is no abdominal tenderness. Musculoskeletal:      Right lower leg: No edema. Left lower leg: No edema. Skin:     General: Skin is warm and dry. Neurological:      Mental Status: She is alert.       Comments: Disoriented to time and place        Diagnostic Data:  CBC:  Recent Labs     12/19/22  1235   WBC 6.2   HGB 13.8   HCT 41.7        BMP:  Recent Labs     12/19/22  1235   *   K 4.0   CL 98   CO2 24   BUN 15   CREATININE 0.6   CALCIUM 9.9*     Recent Labs     12/19/22  1235   AST 38*   ALT 17   BILITOT 0.4   ALKPHOS 100     Coag Panel:   Recent Labs     12/19/22  1235   INR 0.98   PROTIME 12.9     Cardiac Enzymes:   Recent Labs     12/19/22  1235   TROPONINI <0.01       CT HEAD WO CONTRAST    Result Date: 12/19/2022  NO PRIOR REPORT AVAILABLE <Addendum Signed by Chacorta Jerez MD at 19-Dec-2022 03:00:39 PM Findings were communicated to Aleksandra Ervin RN on 12-19-22 at 2:47 pm who confirms having received the report and confirms she will ensure the referring Dr. Lisbeth Herrera is aware of the stroke findings. No further action required by the reading radiologist. If the referring clinician has any further questions, please call [employer] service [phone], option 1. Stroke Documentation Completed. ANM pt Illene Means [employer] Addendum End> Exam: CT OF THE BRAIN WITHOUT CONTRAST Clinical data: Stroke symptoms. Aphasia last night, went away now returned around noon. Technique: Contiguous axial images are obtained from the skull base to vertex without intravenous contrast.  Reformatted/MPR images were performed. Radiation dose: CTDIvol = 94.08 mGy, DLP = 1384 mGy x cm. Prior studies: CTA of the brain and neck done on same day. CT scan of the brain dated 10/17/2022. Findings:  Patchy confluent low-attenuation in the periventricular white matter is nonspecific but likely on a microvascular basis. Atrophic changes. No evidence of acute cortical infarction, hemorrhage, mass or mass effect. No hydrocephalus or abnormal extra-axial fluid collections are present. The posterior fossa is unremarkable. The skull base and calvarium are intact. The included portions of the paranasal sinuses and mastoid air cells are clear.       1.  Nonspecific but presumed microvascular changes in the periventricular white matter. MRI may be helpful in excluding occult acute infarction if clinically warranted. 2.  Atrophic changes. 3.  No acute hemorrhage or extra-axial collection. Recommendation: Follow up as clinically indicated. All CT scans at this facility utilize dose modulation, iterative reconstruction, and/or weight based dosing when appropriate to reduce radiation dose to as low as reasonably achievable. Amended by Floyd Hernández MD at 19-Dec-2022 03:00:39 PM EST Dictated and Electronically Signed by Floyd Hernández MD at 19-Dec-2022 01:59:18 PM             XR CHEST PORTABLE    Result Date: 12/19/2022  NO PRIOR REPORT AVAILABLE Exam: X-RAY OF [facility] Clinical data:Code stroke. Technique:Single view of the chest. Prior studies: Radiograph of the chest dated 06/22/2022. CTA chest dated 12/05/2020 report. Findings: The trachea is midline. The aorta is calcified and tortuous. The heart is enlarged. The lungs are hyperinflated with flattening of the hemidiaphragms. There is no focal consolidation or effusion identified. There are slightly increased interstitial markings within lower lobes. Cardiomegaly with hyperinflated lungs and slightly increased interstitial markings. Dictated and Electronically Signed by Alfred Weathers MD at 19-Dec-2022 02:35:18 PM             CTA HEAD NECK W CONTRAST    Result Date: 12/19/2022  NO PRIOR REPORT AVAILABLE CTA BRAIN AND NECK Exam: CTA OF THEINTRACRANIAL ARTERIES (COW) Clinical data:Stroke symptoms, aphasia last night, resolve now returned. Technique: Axial CT angiography of the intracranial arteries within the brain was performed with administration of intravenous contrast. Coronal, sagittal, and 3D volume reconstructions of theintracranial arteries were performed. Reformatted/3D-MPR images were performed. Radiation dose: CTDIvol = 94.08 mGy, DLP = 1384 mGy x cm. Prior studies: CT scan of the brain done on same day. Findings: No definite abnormality seen on 3D reformatted images. Anterior cerebral arteries demonstrate enhance normally. Anterior communicating artery is opacified. Middle Cerebral arteries are unremarkable. Posterior communicating arteries are opacified. Posterior cerebral arteries are intact. Vertebral arteries are visualized. Basilar artery is unremarkable. Intracranial internal carotid arteries demonstrate normal enhancement. No evidence of aneurysm (greater than 4 mm) orarteriovenous lesion. Osseous structures: No acute or destructive bony process identified. No significant intracranial arterial abnormalities. Recommendation: Follow up as clinically indicated. All CT scans at this facility utilize dose modulation, iterative reconstruction, and/or weight based dosing when appropriate to reduce radiation dose to as low as reasonably achievable. Exam: CTA OF THE CAROTID ARTERIES Clinical data:Stroke symptoms, aphasia last night, resolve now returned. Technique: Axial CT angiography of the carotid arteries within the neck was performed with the administration of intravenous contrast for evaluation of the carotid bifurcation. Oral contrast was not utilized. Coronal, sagittal, and 3D volume reconstructions of the carotid arteries were performed. Reformatted/3D-MPR images were performed. NASCET Criteria was used in evaluating the study. Radiation dose: CTDIvol = 94.08 mGy, DLP = 1384 mGy x cm. Prior studies: CT scan of the brain done on same day. Findings: Calcified plaque involving the aortic arch and both carotid bulbs. Both carotid bulbs CCA, Carotid Bulb, ICA, and origin of the ECA are well opacified. Vertebral arteries are well opacified. Jugular veins are well opacified Included lung apices are grossly unremarkable. Osseous structures: No acute or destructive bony process identified. Small thyroid nodules. IMPRESSION: No significant vascular abnormalities. Other nonacute findings above.  Recommendation: Follow up as clinically indicated. All CT scans at this facility utilize dose modulation, iterative reconstruction, and/or weight based dosing when appropriate to reduce radiation dose to as low as reasonably achievable. Dictated and Electronically Signed by Donavon Dsouza MD at 19-Dec-2022 02:18:12 PM               Assessment/Plan:  Principal Problem:    Ischemic stroke [facility])  Resolved Problems:    * No resolved hospital problems. *     Principal Problem:    Ischemic stroke ([address]  -mri brain   -echo with bubble study  -asa/statin  -neuro checks  -elevate hob  -neuro consult  -aggressive pt/ot/slp  -hba1c/flp/tsh  -lmwh for dvt ppx   -telemetry    -hold antihypertensive meds    allow for permissive htn  Resolved Problems:    * No resolved hospital problems.  *  Signed:  JERE Gerardo - CNP, 12/19/2022 3:09 PM

## 2022-12-19 NOTE — ED NOTES
12:34 p.m. Dr. Jc Barton called Code Stroke     12:34 p.m. CT notified     12:34 p.m. Code Stroke announced over PA     12:35 p.m. Notified Dr. Author Yan (Neurology)    12:35 p.m.   Notified Stroke Coordinator      Tera Syed  12/19/22 [id number]

## 2022-12-19 NOTE — ED PROVIDER NOTES
[facility]  eMERGENCY dEPARTMENT eNCOUnter      Pt Name: Florina Cody  MRN: [id number]  [place] 11/10/1926  Date of evaluation: 12/19/2022  Provider: Pili Randolph MD    CHIEF COMPLAINT       Chief Complaint   Patient presents with    Aphasia     Started around 9 pm last night, got a little better and then got worse around noon today    Numbness         HISTORY OF PRESENT ILLNESS   (Location/Symptom, Timing/Onset,Context/Setting, Quality, Duration, Modifying Factors, Severity)  Note limiting factors. HPI  Florina Cody is a 80 y.o. female; with history of anxiety; tremors; hypertension; hyperlipidemia; insomnia; neuropathy in both legs up till hips;/leg syndrome; hyper cholesteremia; left bundle branch block; lumbar spinal stenosis without claudication; hypothyroidism; who presents to the emergency department who presents with episodes of having difficulty with word finding since 9 PM last night gradually improved and then got worse around noon as well. She is not on blood thinners. No difficulty ambulating. She is a high functioning 80year-old.  reports that she had trouble with word finding last night at 9 PM lasted about 30 minutes. Did not have other known deficits. No focal weakness. No slurred speech. Was back to baseline when at noon today similar symptoms developed. Both times they noted that her blood pressure was very elevated. She reports taking her blood pressure medication today. NursingNotes were reviewed. REVIEW OF SYSTEMS    (2-9 systems for level 4, 10 or more for level 5)     Review of Systems   Constitutional:  Negative for chills, diaphoresis, fatigue and fever. HENT:  Negative for rhinorrhea, sinus pressure and sore throat. Eyes:  Negative for visual disturbance. Respiratory:  Negative for shortness of breath. Cardiovascular:  Negative for chest pain. Gastrointestinal:  Negative for abdominal pain, diarrhea, nausea and vomiting.    Genitourinary: Negative for difficulty urinating and dysuria. Musculoskeletal:  Negative for arthralgias and myalgias. Skin:  Negative for rash. Neurological:  Positive for speech difficulty. Negative for weakness. Psychiatric/Behavioral:  Negative for confusion. All other systems reviewed and are negative. PAST MEDICALHISTORY     Past Medical History:   Diagnosis Date    Anxiety 10/02/2019    Chest tightness or pressure 08/26/2013 8/26/2013  lexiscan negative for myocardial ischemia, EF 61%    Edema     Essential and other specified forms of tremor     Generalized anxiety disorder     HTN (hypertension)     Hyperlipidemia     Hypertension     Insomnia, unspecified     Neuropathy     both legs up to both hips    Other and unspecified ovarian cyst     Other left bundle branch block     Palliative care patient 06/23/2022    Pure hypercholesterolemia     Restless legs syndrome (RLS)     Solitary cyst of breast     Spinal stenosis, lumbar region, without neurogenic claudication     Unspecified hypothyroidism     Urinary frequency     UTI (urinary tract infection) 03/25/2022         SURGICAL HISTORY       Past Surgical History:   Procedure Laterality Date    APPENDECTOMY      CARDIAC CATHETERIZATION  5/26/15  MDL    with aortic root injection.  EF 60%    CATARACT REMOVAL      CHOLECYSTECTOMY      CYST INCISION AND DRAINAGE      drained liver cyst    HEMORRHOID SURGERY      HYSTERECTOMY (CERVIX STATUS UNKNOWN)      NERVE BLOCK LUMB FACET LEVEL 1 BILATERAL Bilateral 1/19/2016    LUMBAR FACET CATE L4-5  performed by Dolores Quintero at [address]     Discharge Medication List as of 12/20/2022 11:26 AM        CONTINUE these medications which have NOT CHANGED    Details   estrogens conjugated (PREMARIN) 0.625 MG/GM CREA vaginal cream Place small amountt on end of finger and apply to urethra three times per week, Disp-30 g, R-3, Normal      aspirin 81 MG chewable tablet Take 1 tablet by mouth daily, Disp-30 tablet, R-3Normal      bumetanide (BUMEX) 0.5 MG tablet Take 1 tablet by mouth daily as needed (weight gain greater than 3 pounds overnight or 5 pounds in 1 week), Disp-30 tablet, R-3Normal      vitamin C (ASCORBIC ACID) 500 MG tablet Take 500 mg by mouth dailyHistorical Med      docusate sodium (COLACE) 100 MG capsule Take 100 mg by mouth 2 times daily Historical Med      LORazepam (ATIVAN) 0.5 MG tablet Take 0.5 mg by mouth nightly. Historical Med      levothyroxine (SYNTHROID) 25 MCG tablet Take 2 tablets by mouth Daily, Disp-30 tablet, R-3      omeprazole (PRILOSEC) 20 MG capsule Take 20 mg by mouth 2 times daily      Multiple Vitamins-Minerals (CENTRUM SILVER ADULT 50+) TABS Take 1 tablet by mouth daily Historical Med      Magnesium Oxide (WHIPPLE PO) Take 2 tablets by mouth daily       candesartan (ATACAND) 32 MG tablet Take 32 mg by mouth daily      Psyllium (METAMUCIL PO) Take 2 tablets by mouth daily              ALLERGIES     Ampicillin, Bactrim [sulfamethoxazole-trimethoprim], Cephalosporins, Ciprocinonide [fluocinolone], Codeine, Cymbalta [duloxetine hcl], Doxycycline, Enablex [darifenacin hydrobromide er], Gabapentin, Hctz [hydrochlorothiazide], Keppra [levetiracetam], Levaquin [levofloxacin in d5w], Lyrica [pregabalin], Pyridium [phenazopyridine hcl], Quinolones, and Sulfa antibiotics    FAMILY HISTORY       Family History   Problem Relation Age of Onset    Other Father         stroke, MI,  46          SOCIAL HISTORY       Social History     Socioeconomic History    Marital status:      Spouse name: None    Number of children: None    Years of education: None    Highest education level: None   Tobacco Use    Smoking status: Never    Smokeless tobacco: Never   Vaping Use    Vaping Use: Never used   Substance and Sexual Activity    Alcohol use: No    Drug use: No       SCREENINGS   NIH Stroke Scale  Interval: Hand-off/Transfer  Level of Consciousness (1a): Alert  LOC Questions (1b): Answers both correctly  LOC Commands (1c): Performs both tasks correctly  Best Gaze (2): Normal  Visual (3): No visual loss  Facial Palsy (4): Normal symmetrical movement  Motor Arm, Left (5a): No drift  Motor Arm, Right (5b): No drift  Motor Leg, Left (6a): No drift  Motor Leg, Right (6b): No drift  Limb Ataxia (7): Absent  Sensory (8): Normal  Best Language (9): No aphasia  Dysarthria (10): Normal  Extinction and Inattention (11): No abnormality  Total: 0Glasgow Coma Scale  Eye Opening: Spontaneous  Best Verbal Response: Oriented  Best Motor Response: Obeys commands  Ivory Coma Scale Score: 15        PHYSICAL EXAM    (up to 7 for level 4, 8 or more for level 5)     ED Triage Vitals [12/19/22 1233]   BP Temp Temp Source Heart Rate Resp SpO2 Height Weight   (!) 205/88 97.9 °F (36.6 °C) Oral 84 18 95 % -- 92 lb (41.7 kg)       Physical Exam  Vitals and nursing note reviewed. Constitutional:       Appearance: She is well-developed. HENT:      Head: Normocephalic and atraumatic. Nose: Nose normal.      Mouth/Throat:      Mouth: Mucous membranes are moist.   Eyes:      General:         Right eye: No discharge. Left eye: No discharge. Conjunctiva/sclera: Conjunctivae normal.      Pupils: Pupils are equal, round, and reactive to light. Cardiovascular:      Rate and Rhythm: Normal rate and regular rhythm. Heart sounds: Normal heart sounds. Pulmonary:      Effort: Pulmonary effort is normal. No respiratory distress. Breath sounds: Normal breath sounds. No wheezing or rales. Abdominal:      General: Bowel sounds are normal.      Palpations: Abdomen is soft. There is no mass. Tenderness: There is no abdominal tenderness. There is no guarding or rebound. Musculoskeletal:         General: No tenderness. Normal range of motion. Cervical back: Normal range of motion and neck supple. Skin:     General: Skin is warm and dry.       Capillary Refill: Capillary refill takes less than 2 seconds. Neurological:      Mental Status: She is alert and oriented to person, place, and time. Cranial Nerves: No cranial nerve deficit. Sensory: No sensory deficit. Psychiatric:         Behavior: Behavior normal.         Thought Content: Thought content normal.         Judgment: Judgment normal.       NIH Stroke Scale  Interval: Hand-off/Transfer  Level of Consciousness (1a): Alert  LOC Questions (1b): Answers both correctly  LOC Commands (1c): Performs both tasks correctly  Best Gaze (2): Normal  Visual (3): No visual loss  Facial Palsy (4): Normal symmetrical movement  Motor Arm, Left (5a): No drift  Motor Arm, Right (5b): No drift  Motor Leg, Left (6a): No drift  Motor Leg, Right (6b): No drift  Limb Ataxia (7): Absent  Sensory (8): Normal  Best Language (9): No aphasia  Dysarthria (10): Normal  Extinction and Inattention (11): No abnormality  Total: 0   DIAGNOSTIC RESULTS     EKG: All EKG's areinterpreted by the Emergency Department Physician who either signs or Co-signs this chart in the absence of a cardiologist.    EKG: sinus rhythm with a left bundle branch block rate of 94    RADIOLOGY:  Non-plain film images such as CT, Ultrasound and MRI are read by the radiologist. Plain radiographic images are visualized and preliminarily interpreted bythe emergency physician with the below findings:          MRA HEAD WO CONTRAST   Final Result   1. Unremarkable MRA intracranial.   2. No focal, flow limiting stenosis is detected. RECOMMENDATION:  Follow-up as clinically warranted. Dictated and Electronically Signed by Merlin Briones MD at 20-Dec-2022 11:01:14 AM               MRI BRAIN WO CONTRAST   Final Result   1. Moderate nonspecific but presumed microvascular changes in the supratentorial white matter. 2.  Atrophic changes. 3.  No acute infarction, acute hemorrhage or extra-axial collection. Recommendation: Follow up as clinically indicated.     Dictated and Electronically Signed by Jimmy Crocker MD at 20-Dec-2022 10:24:25 AM               XR CHEST PORTABLE   Final Result     Cardiomegaly with hyperinflated lungs and slightly increased interstitial markings. Dictated and Electronically Signed by Macy Del Toro MD at 19-Dec-2022 02:35:18 PM               CTA HEAD NECK W CONTRAST   Final Result   No significant intracranial arterial abnormalities. Recommendation:   Follow up as clinically indicated. All CT scans at this facility utilize dose modulation, iterative reconstruction, and/or weight based dosing when appropriate to reduce radiation dose to as low as reasonably achievable. Exam: CTA OF THE CAROTID ARTERIES   Clinical data:Stroke symptoms, aphasia last night, resolve now returned. Technique: Axial CT angiography of the carotid arteries within the neck was performed with the administration of intravenous contrast for evaluation of the carotid bifurcation. Oral contrast was not utilized. Coronal, sagittal, and 3D volume    reconstructions of the carotid arteries were performed. Reformatted/3D-MPR images were performed. NASCET Criteria was used in evaluating the study. Radiation dose: CTDIvol = 94.08 mGy, DLP = 1384 mGy x cm. Prior studies: CT scan of the brain done on same day. Findings:   Calcified plaque involving the aortic arch and both carotid bulbs. Both carotid bulbs CCA, Carotid Bulb, ICA, and origin of the ECA are well opacified. Vertebral arteries are well opacified. Jugular veins are well opacified   Included lung apices are grossly unremarkable. Osseous structures: No acute or destructive bony process identified. Small thyroid nodules. IMPRESSION: No significant vascular abnormalities. Other nonacute findings above. Recommendation:   Follow up as clinically indicated.    All CT scans at this facility utilize dose modulation, iterative reconstruction, and/or weight based dosing when appropriate to reduce radiation dose to as low as reasonably achievable. Dictated and Electronically Signed by Meagan Sanchez MD at 19-Dec-2022 02:18:12 PM               CT HEAD WO CONTRAST   Final Result       1. Nonspecific but presumed microvascular changes in the periventricular white matter. MRI may be helpful in excluding occult acute infarction if clinically warranted. 2.  Atrophic changes. 3.  No acute hemorrhage or extra-axial collection. Recommendation: Follow up as clinically indicated. All CT scans at this facility utilize dose modulation, iterative reconstruction, and/or weight based dosing when appropriate to reduce radiation dose to as low as reasonably achievable. Amended by Meagan Sanchez MD at 19-Dec-2022 03:00:39 PM EST   Dictated and Electronically Signed by Meagan Sanchez MD at 19-Dec-2022 01:59:18 PM                       LABS:  Labs Reviewed   CBC WITH AUTO DIFFERENTIAL - Abnormal; Notable for the following components:       Result Value    MCH 32.8 (*)     Neutrophils % 68.2 (*)     Lymphocytes % 19.0 (*)     Monocytes % 11.3 (*)     All other components within normal limits   COMPREHENSIVE METABOLIC PANEL W/ REFLEX TO MG FOR LOW K - Abnormal; Notable for the following components:    Sodium 135 (*)     Calcium 9.9 (*)     AST 38 (*)     All other components within normal limits   CBC - Abnormal; Notable for the following components:    RBC 3.78 (*)     Hematocrit 36.1 (*)     MCH 32.3 (*)     All other components within normal limits   TSH - Abnormal; Notable for the following components:    TSH 6.440 (*)     All other components within normal limits   POCT GLUCOSE - Normal   TROPONIN   PROTIME-INR   HEMOGLOBIN A1C   LIPID PANEL   POCT GLUCOSE       All other labs were within normal range or not returned as of this dictation.     EMERGENCY DEPARTMENT COURSE and DIFFERENTIAL DIAGNOSIS/MDM:   Vitals:    Vitals:    12/20/22 0406 12/20/22 0709 12/20/22 1123 12/20/22 1135   BP: 136/66 (!) 164/73  (!) 167/73   Pulse: 76 74  69   Resp: 16 16  16   Temp: 97.3 °F (36.3 °C) 98.6 °F (37 °C)  98.4 °F (36.9 °C)   TempSrc: Temporal Temporal  Temporal   SpO2: 99% 97% 99% 99%   Weight:       Height:           MDM    I spoke with neurology on-call Dr. Bry Rico that the symptoms began 12 hours ago so she is outside the tPA window in addition her blood pressure makes her not a candidate for tPA as well. He recommended a baby aspirin and hospital admission. Reassessment      CONSULTS:  IP CONSULT TO PHARMACY  PHARMACY TO CHANGE BASE FLUIDS  IP CONSULT TO NEUROLOGY  IP CONSULT TO SPIRITUAL SERVICES  PALLIATIVE CARE EVAL    PROCEDURES:  Unless otherwise noted below, none     Procedures    FINAL IMPRESSION      1.  Cerebrovascular accident (CVA), unspecified mechanism ([facility] [address]    2. Aphasia          DISPOSITION/PLAN   DISPOSITION Admitted 12/19/2022 03:08:40 PM      PATIENT REFERRED TO:  Peter Gamez MD  [address] [facility] [phone]    Call  As needed    Suhas Shell    Follow up on 12/27/2022  post hospital visit @ [address]:  Discharge Medication List as of 12/20/2022 11:26 AM        START taking these medications    Details   atorvastatin (LIPITOR) 20 MG tablet Take 1 tablet by mouth nightly, Disp-30 tablet, R-0Normal                (Please note that portions of this note were completed with a voice recognition program.  Efforts were made to edit thedictations but occasionally words are mis-transcribed.)    Roxanne Nur MD (electronically signed)  Attending Emergency Physician          Roxanne Nru MD  12/21/22 [id number]

## 2022-12-20 ENCOUNTER — APPOINTMENT (OUTPATIENT)
Dept: MRI IMAGING | Age: 87
DRG: 069 | End: 2022-12-20
Payer: MEDICARE

## 2022-12-20 VITALS
BODY MASS INDEX: 19.52 KG/M2 | RESPIRATION RATE: 16 BRPM | OXYGEN SATURATION: 99 % | WEIGHT: 106.06 LBS | HEART RATE: 69 BPM | SYSTOLIC BLOOD PRESSURE: 167 MMHG | HEIGHT: 62 IN | TEMPERATURE: 98.4 F | DIASTOLIC BLOOD PRESSURE: 73 MMHG

## 2022-12-20 PROBLEM — R47.01 APHASIA: Status: ACTIVE | Noted: 2022-12-20

## 2022-12-20 PROBLEM — G62.9 NEUROPATHY: Status: ACTIVE | Noted: 2022-12-20

## 2022-12-20 PROBLEM — G45.9 TIA (TRANSIENT ISCHEMIC ATTACK): Status: ACTIVE | Noted: 2022-12-20

## 2022-12-20 PROBLEM — E03.9 ACQUIRED HYPOTHYROIDISM: Status: ACTIVE | Noted: 2022-12-20

## 2022-12-20 LAB
CHOLESTEROL, TOTAL: 197 MG/DL (ref 160–199)
HBA1C MFR BLD: 5.6 % (ref 4–6)
HCT VFR BLD CALC: 36.1 % (ref 37–47)
HDLC SERPL-MCNC: 86 MG/DL (ref 65–121)
HEMOGLOBIN: 12.2 G/DL (ref 12–16)
LDL CHOLESTEROL CALCULATED: 98 MG/DL
MCH RBC QN AUTO: 32.3 PG (ref 27–31)
MCHC RBC AUTO-ENTMCNC: 33.8 G/DL (ref 33–37)
MCV RBC AUTO: 95.5 FL (ref 81–99)
PDW BLD-RTO: 13.6 % (ref 11.5–14.5)
PLATELET # BLD: 228 K/UL (ref 130–400)
PMV BLD AUTO: 9.8 FL (ref 9.4–12.3)
RBC # BLD: 3.78 M/UL (ref 4.2–5.4)
TRIGL SERPL-MCNC: 65 MG/DL (ref 0–149)
TSH SERPL DL<=0.05 MIU/L-ACNC: 6.44 UIU/ML (ref 0.27–4.2)
WBC # BLD: 5.2 K/UL (ref 4.8–10.8)

## 2022-12-20 PROCEDURE — 36415 COLL VENOUS BLD VENIPUNCTURE: CPT

## 2022-12-20 PROCEDURE — 70551 MRI BRAIN STEM W/O DYE: CPT | Performed by: RADIOLOGY

## 2022-12-20 PROCEDURE — 70544 MR ANGIOGRAPHY HEAD W/O DYE: CPT

## 2022-12-20 PROCEDURE — 70551 MRI BRAIN STEM W/O DYE: CPT

## 2022-12-20 PROCEDURE — 99223 1ST HOSP IP/OBS HIGH 75: CPT | Performed by: PSYCHIATRY & NEUROLOGY

## 2022-12-20 PROCEDURE — 6360000002 HC RX W HCPCS: Performed by: NURSE PRACTITIONER

## 2022-12-20 PROCEDURE — 97161 PT EVAL LOW COMPLEX 20 MIN: CPT

## 2022-12-20 PROCEDURE — 80061 LIPID PANEL: CPT

## 2022-12-20 PROCEDURE — 85027 COMPLETE CBC AUTOMATED: CPT

## 2022-12-20 PROCEDURE — 6370000000 HC RX 637 (ALT 250 FOR IP): Performed by: NURSE PRACTITIONER

## 2022-12-20 PROCEDURE — 83036 HEMOGLOBIN GLYCOSYLATED A1C: CPT

## 2022-12-20 PROCEDURE — 84443 ASSAY THYROID STIM HORMONE: CPT

## 2022-12-20 PROCEDURE — 97116 GAIT TRAINING THERAPY: CPT

## 2022-12-20 PROCEDURE — 92523 SPEECH SOUND LANG COMPREHEN: CPT

## 2022-12-20 PROCEDURE — 97165 OT EVAL LOW COMPLEX 30 MIN: CPT

## 2022-12-20 PROCEDURE — 97535 SELF CARE MNGMENT TRAINING: CPT

## 2022-12-20 PROCEDURE — 94760 N-INVAS EAR/PLS OXIMETRY 1: CPT

## 2022-12-20 PROCEDURE — 92610 EVALUATE SWALLOWING FUNCTION: CPT

## 2022-12-20 PROCEDURE — 93306 TTE W/DOPPLER COMPLETE: CPT

## 2022-12-20 RX ORDER — ATORVASTATIN CALCIUM 20 MG/1
20 TABLET, FILM COATED ORAL NIGHTLY
Qty: 30 TABLET | Refills: 0 | Status: SHIPPED | OUTPATIENT
Start: 2022-12-20 | End: 2023-01-19

## 2022-12-20 RX ADMIN — PANTOPRAZOLE SODIUM 40 MG: 40 TABLET, DELAYED RELEASE ORAL at 09:09

## 2022-12-20 RX ADMIN — ASPIRIN 81 MG: 81 TABLET, CHEWABLE ORAL at 09:09

## 2022-12-20 RX ADMIN — ENOXAPARIN SODIUM 30 MG: 100 INJECTION SUBCUTANEOUS at 09:09

## 2022-12-20 RX ADMIN — DOCUSATE SODIUM 100 MG: 100 CAPSULE, LIQUID FILLED ORAL at 09:09

## 2022-12-20 NOTE — DISCHARGE INSTR - DIET
Good nutrition is important when healing from an illness, injury, or surgery. Follow any nutrition recommendations given to you during your hospital stay. If you were given an oral nutrition supplement while in the hospital, continue to take this supplement at home. You can take it with meals, in-between meals, and/or before bedtime. These supplements can be purchased at most local grocery stores, pharmacies, and chain [employer]stores. If you have any questions about your diet or nutrition, call the hospital and ask for the dietitian. ADULT DIET;  Regular; GI [place] (GERD/Peptic Ulcer)

## 2022-12-20 NOTE — DISCHARGE SUMMARY
[place], [place], [address]  DEPARTMENT OF HOSPITALIST MEDICINE    DISCHARGE SUMMARY:        Abner Garnica  :  11/10/1926  MRN:  [id number]    Admit date:  2022  Discharge date:  2022    Admitting Physician:  Paz Trejo MD    Advance Directive: Prior    Consults Made:   IP CONSULT TO PHARMACY  PHARMACY TO CHANGE BASE FLUIDS  IP CONSULT TO NEUROLOGY  IP CONSULT TO One [employer] EVAL      Primary Care Physician:  No primary care provider on file. Discharge Diagnoses:  Principal Problem:    TIA (transient ischemic attack)  Active Problems:    Ischemic stroke (HCC)    Aphasia    Neuropathy    Acquired hypothyroidism    Hyperlipidemia    HTN (hypertension)    Tremor  Resolved Problems:    * No resolved hospital problems. *          Pertinent Labs:  CBC with DIFF:  Recent Labs     22  1235 22  0231   WBC 6.2 5.2   RBC 4.21 3.78*   HGB 13.8 12.2   HCT 41.7 36.1*   MCV 99.0 95.5   MCH 32.8* 32.3*   MCHC 33.1 33.8   RDW 14.1 13.6    228   MPV 9.7 9.8   NEUTOPHILPCT 68.2*  --    LYMPHOPCT 19.0*  --    MONOPCT 11.3*  --    EOSRELPCT 0.6  --    BASOPCT 0.6  --    NEUTROABS 4.2  --    LYMPHSABS 1.2  --    MONOSABS 0.70  --    EOSABS 0.00  --    BASOSABS 0.00  --        CMP/BMP:  Recent Labs     22  1231 22  1235   NA  --  135*   K  --  4.0   CL  --  98   CO2  --  24   ANIONGAP  --  13   GLUCOSE 92 95   BUN  --  15   CREATININE  --  0.6   LABGLOM  --  >60   CALCIUM  --  9.9*   PROT  --  7.3   LABALBU  --  4.4   BILITOT  --  0.4   ALKPHOS  --  100   ALT  --  17   AST  --  38*         CRP:  No results for input(s): CRP in the last 72 hours. Sed Rate:  No results for input(s): SEDRATE in the last 72 hours.       HgBA1c:  No components found for: HGBA1C  FLP:    Lab Results   Component Value Date/Time    TRIG 65 2022 02:31 AM    HDL 86 2022 02:31 AM    LDLCALC 98 2022 02:31 AM    LDLDIRECT 120 2014 04:45 PM TSH:    Lab Results   Component Value Date/Time    TSH 6.440 12/20/2022 02:31 AM     Troponin T:   Recent Labs     12/19/22  1235   TROPONINI <0.01     Pro-BNP: No results for input(s): BNP in the last 72 hours. INR:   Recent Labs     12/19/22  1235   INR 0.98     ABGs:   Lab Results   Component Value Date/Time    PHART 7.640 09/14/2019 01:11 PM    PO2ART 86.0 09/14/2019 01:11 PM    OKN9FJF 22.0 09/14/2019 01:11 PM     UA:No results for input(s): NITRITE, COLORU, PHUR, LABCAST, WBCUA, RBCUA, MUCUS, TRICHOMONAS, YEAST, BACTERIA, CLARITYU, SPECGRAV, LEUKOCYTESUR, UROBILINOGEN, BILIRUBINUR, BLOODU, GLUCOSEU, AMORPHOUS in the last 72 hours. Invalid input(s): Duana Spurling      Culture Results:    No results for input(s): CXSURG in the last 720 hours. Blood Culture Recent: No results for input(s): BC in the last 720 hours. Cultures:   No results for input(s): CULTURE in the last 72 hours. No results for input(s): BC, Dennison Amanda in the last 72 hours. No results for input(s): CXSURG in the last 72 hours. No results for input(s): MG, PHOS in the last 72 hours. Recent Labs     12/19/22  1235   AST 38*   ALT 17   BILITOT 0.4   ALKPHOS 100           Significant Diagnostic Studies:   CT HEAD WO CONTRAST    Result Date: 12/19/2022  NO PRIOR REPORT AVAILABLE <Addendum Signed by Whitley Cassidy MD at 19-Dec-2022 03:00:39 PM Findings were communicated to Ehsan Pozo RN on 12-19-22 at 2:47 pm who confirms having received the report and confirms she will ensure the referring Dr. Yusef Armstrong is aware of the stroke findings. No further action required by the reading radiologist. If the referring clinician has any further questions, please call customer service [phone], option 1. Stroke Documentation Completed. AN pt [facility] Addendum End> Exam: CT OF THE BRAIN WITHOUT CONTRAST Clinical data: Stroke symptoms. Aphasia last night, went away now returned around noon.  Technique: Contiguous axial images are obtained from the skull base to vertex without intravenous contrast.  Reformatted/MPR images were performed. Radiation dose: CTDIvol = 94.08 mGy, DLP = 1384 mGy x cm. Prior studies: CTA of the brain and neck done on same day. CT scan of the brain dated 10/17/2022. Findings:  Patchy confluent low-attenuation in the periventricular white matter is nonspecific but likely on a microvascular basis. Atrophic changes. No evidence of acute cortical infarction, hemorrhage, mass or mass effect. No hydrocephalus or abnormal extra-axial fluid collections are present. The posterior fossa is unremarkable. The skull base and calvarium are intact. The included portions of the paranasal sinuses and mastoid air cells are clear. 1.  Nonspecific but presumed microvascular changes in the periventricular white matter. MRI may be helpful in excluding occult acute infarction if clinically warranted. 2.  Atrophic changes. 3.  No acute hemorrhage or extra-axial collection. Recommendation: Follow up as clinically indicated. All CT scans at this facility utilize dose modulation, iterative reconstruction, and/or weight based dosing when appropriate to reduce radiation dose to as low as reasonably achievable. Amended by Janie Valdivia MD at 19-Dec-2022 03:00:39 PM EST Dictated and Electronically Signed by Janie Valdivia MD at 19-Dec-2022 01:59:18 PM             MRA HEAD WO CONTRAST    Result Date: 12/20/2022  EXAM: MRA OF THE BRAIN CLINICAL DATA: Stroke. TECHNIQUE: 3-D time of flight imaging of the intracranial circulation. Maximum intensity projection reconstructed imaging. PRIOR STUDIES:  MRI of the brain dated 12/20/2022. CT scan of brain dated 12/19/22. FINDINGS: The anterior and middle cerebral arteries demonstrate normal flow related signal. The anterior communicating artery is intact. Bilateral posterior communicating arteries are unremarkable. The posterior cerebral arteries are intact.  Vertebral arteries demonstrate normal flow related signal, with a normal variant slightly dominant left vertebral artery. Basilar artery is unremarkable. Bilateral intracranial internal carotid arteries demonstrate normal flow related signal. No aneurysm (greater than 4 mm), arteriovenous lesion or hemodynamically significant stenosis is identified. Minor motion artifact     1. Unremarkable MRA intracranial. 2. No focal, flow limiting stenosis is detected. RECOMMENDATION:  Follow-up as clinically warranted. Dictated and Electronically Signed by Sabrina Alonzo MD at 20-Dec-2022 11:01:14 AM             XR CHEST PORTABLE    Result Date: 12/19/2022  NO PRIOR REPORT AVAILABLE Exam: X-RAY OF [facility] Clinical data:Code stroke. Technique:Single view of the chest. Prior studies: Radiograph of the chest dated 06/22/2022. CTA chest dated 12/05/2020 report. Findings: The trachea is midline. The aorta is calcified and tortuous. The heart is enlarged. The lungs are hyperinflated with flattening of the hemidiaphragms. There is no focal consolidation or effusion identified. There are slightly increased interstitial markings within lower lobes. Cardiomegaly with hyperinflated lungs and slightly increased interstitial markings. Dictated and Electronically Signed by Nasir Crain MD at 19-Dec-2022 02:35:18 PM             CTA HEAD NECK W CONTRAST    Result Date: 12/19/2022  NO PRIOR REPORT AVAILABLE CTA BRAIN AND NECK Exam: CTA OF THEINTRACRANIAL ARTERIES (COW) Clinical data:Stroke symptoms, aphasia last night, resolve now returned. Technique: Axial CT angiography of the intracranial arteries within the brain was performed with administration of intravenous contrast. Coronal, sagittal, and 3D volume reconstructions of theintracranial arteries were performed. Reformatted/3D-MPR images were performed. Radiation dose: CTDIvol = 94.08 mGy, DLP = 1384 mGy x cm. Prior studies: CT scan of the brain done on same day.  Findings: No definite abnormality seen on 3D reformatted images. Anterior cerebral arteries demonstrate enhance normally. Anterior communicating artery is opacified. Middle Cerebral arteries are unremarkable. Posterior communicating arteries are opacified. Posterior cerebral arteries are intact. Vertebral arteries are visualized. Basilar artery is unremarkable. Intracranial internal carotid arteries demonstrate normal enhancement. No evidence of aneurysm (greater than 4 mm) orarteriovenous lesion. Osseous structures: No acute or destructive bony process identified. No significant intracranial arterial abnormalities. Recommendation: Follow up as clinically indicated. All CT scans at this facility utilize dose modulation, iterative reconstruction, and/or weight based dosing when appropriate to reduce radiation dose to as low as reasonably achievable. Exam: CTA OF THE CAROTID ARTERIES Clinical data:Stroke symptoms, aphasia last night, resolve now returned. Technique: Axial CT angiography of the carotid arteries within the neck was performed with the administration of intravenous contrast for evaluation of the carotid bifurcation. Oral contrast was not utilized. Coronal, sagittal, and 3D volume reconstructions of the carotid arteries were performed. Reformatted/3D-MPR images were performed. NASCET Criteria was used in evaluating the study. Radiation dose: CTDIvol = 94.08 mGy, DLP = 1384 mGy x cm. Prior studies: CT scan of the brain done on same day. Findings: Calcified plaque involving the aortic arch and both carotid bulbs. Both carotid bulbs CCA, Carotid Bulb, ICA, and origin of the ECA are well opacified. Vertebral arteries are well opacified. Jugular veins are well opacified Included lung apices are grossly unremarkable. Osseous structures: No acute or destructive bony process identified. Small thyroid nodules. IMPRESSION: No significant vascular abnormalities. Other nonacute findings above. Recommendation: Follow up as clinically indicated.  All CT scans at this facility utilize dose modulation, iterative reconstruction, and/or weight based dosing when appropriate to reduce radiation dose to as low as reasonably achievable. Dictated and Electronically Signed by Veronique Taylor MD at 19-Dec-2022 02:18:12 PM             MRI BRAIN WO CONTRAST    Result Date: 12/20/2022  NO PRIOR REPORT AVAILABLE Exam: MRI OF THE BRAIN WITHOUTINTRAVENOUS CONTRAST Clinical data:Expressive aphasia/confusion. Technique: Multiplanar multi-sequence MRI of the brain without intravenous gadolinium. Diffusion-weighted imaging is submitted. Prior studies: CTA of the brain dated 12/19/2022. CT of the brain dated 12/19/2022. Findings: Patchy confluent T2 and flair hyperintensities in the supratentorial white matter are nonspecific but likely on a microvascular basis. Atrophic changes. No evidence of acute cortical infarction, hemorrhage, mass or mass effect is seen. The ventricular system is normal in size, shape, and contour. No hydrocephalus or abnormal extra-axial fluid collections are present. The marrow signal within the skull base and calvarium is intact. The paranasal sinuses and mastoid air cells are clear. DWI/ADC: No evidence of restricted diffusion. 1.  Moderate nonspecific but presumed microvascular changes in the supratentorial white matter. 2.  Atrophic changes. 3.  No acute infarction, acute hemorrhage or extra-axial collection. Recommendation: Follow up as clinically indicated. Dictated and Electronically Signed by Veronique Taylor MD at 20-Dec-2022 10:24:25 AM             2D echocardiogram (12/19/2022): Findings      Mitral Valve   Mildly thickened mitral valve with normal leaflet mobility. No evidence of   mitral valve stenosis, mild mitral regurgitation. Aortic Valve   Mildly thickened aortic valve leaflets with preserved leaflet mobility. Tricuspid Valve   Mildly thickened tricuspid valve with normal leaflet mobility.    Moderate anteriorly directed tricuspid regurgitation with estimated RVSP   of 42 mm Hg. Pulmonic Valve   The pulmonic valve was not well visualized . Left Atrium   Normal size left atrium. Normal intact intra-atrial septum was noted with no evidence of   significant intra-atrial communications by color flow Doppler or by   agitated saline study. Left Ventricle   Normal left ventricular size with preserved LV function and an estimated   ejection fraction of approximately 60-65%. Mild concentric left ventricular hypertrophy. No regional wall motion abnormalities. Impaired relaxation compatible with diastolic dysfunction. ( reversed E/A   ratio)   No evidence of left ventricular mass or thrombus noted. No regional wall motion abnormalities. Right Atrium   Mildly dilated right atrial dimension with no evidence of thrombus or mass   noted. Right Ventricle   Borderline mildly dilated right ventricular size with preserved RV   function. Right ventricular systolic pressure of 42 mm Hg consistent with mild to   moderate pulmonary hypertension. Pericardial Effusion   Trace likely physiological pericardial effusion around the right atrium. Pleural Effusion   No evidence of pleural effusion. Miscellaneous   IVC normal.      Hospital Course: As per Initial admission HPI 12/19/2022, quoted below;  Ms Ryan Leger, a \"80 y.o. female who presented to [facility] ED for evaluation of speech difficulty. Pt presents with her  who relates that patient developed difficulty speaking last night around 9pm. Pt has history of HTN, hyperlipidemia, hypothyroidism, anxiety and neuropathy. He describes patient with expressive aphasia. He related that symptoms last around 30 minutes and had improved returning today around noon. She has had problems with confusion. She has had no problems with lateralizing numbness/weakness per . She has had no fevers or recent illness. In ED, neurology was consulted.  CT head- Nonspecific but presumed microvascular changes in the periventricular white matter. MRI may be helpful in excluding occult acute infarction if clinically warranted. CTA head/neck-No significant intracranial arterial abnormalities. Sodium 135, potassium 4.0, creatinine 0.6/bun 95, troponin less than 0.01, inr 0.98, wbc 6k, hgb 14, platelets 435R. Pt is admitted to hospitalist service in consultation with neurology. \"         Aphasia - TIA -   TTE, with agitated saline bubble study: Findings as noted above  MRI Brain W / WO Con (12/20/2022): Moderate nonspecific but presumed microvascular changes in the supratentorial white matter. Atrophic changes. No acute infarction, acute hemorrhage or extra-axial collection  MRA Head WO Con (12/20/2022): Unremarkable MRA intracranial.. No focal, flow limiting stenosis is detected  CTA Head / Neck W Con (12/19/2022): IMPRESSION: No significant vascular abnormalities. Other nonacute findings above.  - PT/OT   -Neuro check Q4 hours. Neurology consulted on admission  Okay for discharge from neurology standpoint        Continued management of other chronic medical conditions -              Physical Exam:  Vital Signs: BP (!) 167/73   Pulse 69   Temp 98.4 °F (36.9 °C) (Temporal)   Resp 16   Ht 5' 2\" (1.575 m)   Wt 106 lb 1 oz (48.1 kg)   LMP  (LMP Unknown)   SpO2 99%   BMI 19.40 kg/m²   Physical Exam  Vitals and nursing note reviewed. Constitutional:       General: She is not in acute distress. Appearance: Normal appearance. She is not ill-appearing, toxic-appearing or diaphoretic. HENT:      Head: Normocephalic and atraumatic. Right Ear: External ear normal.      Left Ear: External ear normal.      Nose: Nose normal. No congestion or rhinorrhea. Mouth/Throat:      Mouth: Mucous membranes are moist.      Pharynx: Oropharynx is clear. No oropharyngeal exudate or posterior oropharyngeal erythema. Eyes:      General: No scleral icterus. Right eye: No discharge.          Left eye: No discharge. Extraocular Movements: Extraocular movements intact. Conjunctiva/sclera: Conjunctivae normal.      Pupils: Pupils are equal, round, and reactive to light. Cardiovascular:      Rate and Rhythm: Normal rate and regular rhythm. Pulses: Normal pulses. Heart sounds: Normal heart sounds. No murmur heard. No friction rub. No gallop. Pulmonary:      Effort: Pulmonary effort is normal. No respiratory distress. Breath sounds: Normal breath sounds. No stridor. No wheezing, rhonchi or rales. Chest:      Chest wall: No tenderness. Abdominal:      General: Bowel sounds are normal. There is no distension. Palpations: Abdomen is soft. Tenderness: There is no abdominal tenderness. There is no guarding or rebound. Musculoskeletal:         General: No swelling, tenderness, deformity or signs of injury. Normal range of motion. Cervical back: Normal range of motion and neck supple. No rigidity. No muscular tenderness. Right lower leg: No edema. Left lower leg: No edema. Skin:     General: Skin is warm and dry. Capillary Refill: Capillary refill takes less than 2 seconds. Coloration: Skin is not jaundiced or pale. Findings: No bruising, erythema, lesion or rash. Neurological:      General: No focal deficit present. Mental Status: She is alert and oriented to person, place, and time. Cranial Nerves: No cranial nerve deficit. Sensory: No sensory deficit. Motor: No weakness. Coordination: Coordination normal.   Psychiatric:         Mood and Affect: Mood normal.         Behavior: Behavior normal.         Thought Content:  Thought content normal.         Judgment: Judgment normal.         Discharge Medications:        Medication List        START taking these medications      atorvastatin 20 MG tablet  Commonly known as: LIPITOR  Take 1 tablet by mouth nightly            CONTINUE taking these medications      aspirin 81 MG chewable tablet  Take 1 tablet by mouth daily     bumetanide 0.5 MG tablet  Commonly known as: BUMEX  Take 1 tablet by mouth daily as needed (weight gain greater than 3 pounds overnight or 5 pounds in 1 week)     candesartan 32 MG tablet  Commonly known as: ATACAND     Centrum Silver Adult 50+ Tabs     docusate sodium 100 MG capsule  Commonly known as: COLACE     LORazepam 0.5 MG tablet  Commonly known as: ATIVAN     METAMUCIL PO     omeprazole 20 MG delayed release capsule  Commonly known as: PRILOSEC     WHIPPLE PO     Premarin 0.625 MG/GM Crea vaginal cream  Generic drug: estrogens conjugated  Place small amountt on end of finger and apply to urethra three times per week     vitamin C 500 MG tablet  Commonly known as: ASCORBIC ACID            ASK your doctor about these medications      levothyroxine 25 MCG tablet  Commonly known as: SYNTHROID  Take 2 tablets by mouth Daily               Where to Get Your Medications        [facility] [address] [facility] [address]      Phone: [phone]   atorvastatin 20 MG tablet           Discharge Instructions: Follow up with primary care provider in 7 days. Take medications as directed. Resume activity as tolerated.       Recommended Follow Up:  Gilda De La Vega MD  [address] [facility] [phone]    Call  As needed    Analisa Echevarria    Follow up on 12/27/2022  post hospital visit @ [phone]      Followup Appointments Scheduled at Time of Discharge:  Future Appointments   Date Time Provider Chidi Duenas   1/30/2023 10:00 AM JERE Simpson NP [facility] [place]   6/21/2023 10:15 AM JERE Quach CNP [facility] [place]          Diet: No diet orders on file        DISCHARGE STATUS:    Condition on Discharge: stable    Disposition: Patient is medically stable and will be discharged Home      Extended Emergency Contact Information  Primary Emergency Contact: Ashu Melvin  Address: [address]           [place], [address] Phone: [phone]  Mobile Phone: [phone]  Relation: Spouse  Secondary Emergency Contact: [facility] [address] [facility], [address] Phone: [phone]  Relation: Brother/Sister         Time Spent on discharge is   34 mins  in the examination, evaluation, counseling and review of medications and discharge plan. Electronically signed by   William Espana MD, MPH, MD,   Internal Medicine Hospitalist   12/20/2022 4:35 PM      Thank you No primary care provider on file. for the opportunity to be involved in this patient's care. If you have any questions or concerns please feel free to contact me at [phone]        EMR Dragon/Transcription disclaimer:   Much of this encounter note is an electronic transcription/translation of spoken language to printed text.  The electronic translation of spoken language may permit erroneous, or at times, nonsensical words or phrases to be inadvertently transcribed; although attempts have made to review the note for such errors, some may still exist.

## 2022-12-20 NOTE — CONSULTS
[id number] [facility] Neurology Consult        Patient:   Roseann Sinha  MR#:    [id number]  Account Number:                   [id number]      Room:    [address] [place] [address]   YOB: 1926  Date of Progress Note: 12/20/2022  Time of Note                           7:45 AM  Attending Physician:  Corazon Villa MD  Consulting Physician:   ROSIE Sims: Difficulties with speech      HISTORY OF PRESENT ILLNESS:   This 80 y.o. female with past medical history significant for hypertension, hyperlipidemia, and neuropathy is seen for episodes of difficulty speaking. The patient had an episode the night prior to admission where she had difficulty speaking lasting at least 30 minutes. Her speech was jumbled and nonsensical as per the . There is no facial droop, weakness or numbness. He denies any clear confusion. On the day of admission around noon she had another episode lasted about 30 minutes. She had another previous episode about a month ago apparently. Currently she denies any complaints. She denies diplopia, dysarthria, dysphagia or visual change. She has chronic hearing issues. She does have complaints of neuropathic symptoms in her feet. She does not take a baby aspirin routinely. Her CT of the head along with CTA of the head and neck were unremarkable. She is eager to go home. REVIEW OF SYSTEMS:  Constitutional - No fever or chills. No diaphoresis or significant fatigue. HENT -  No tinnitus or significant hearing loss. Eyes - no sudden vision change or eye pain  Respiratory - no significant shortness of breath or cough  Cardiovascular - no chest pain No palpitations or significant leg swelling  Gastrointestinal - no abdominal swelling or pain. Genitourinary - No difficulty urinating, dysuria  Musculoskeletal - no back pain or myalgia. Skin - no color change or rash  Neurologic - No seizures. No lateralizing weakness.   Hematologic - no easy bruising or excessive bleeding. Psychiatric - no severe anxiety or nervousness. All other review of systems are negative. Past Medical History:      Diagnosis Date    Anxiety 10/02/2019    Chest tightness or pressure 08/26/2013 8/26/2013  lexiscan negative for myocardial ischemia, EF 61%    Edema     Essential and other specified forms of tremor     Generalized anxiety disorder     HTN (hypertension)     Hyperlipidemia     Hypertension     Insomnia, unspecified     Neuropathy     both legs up to both hips    Other and unspecified ovarian cyst     Other left bundle branch block     Palliative care patient 06/23/2022    Pure hypercholesterolemia     Restless legs syndrome (RLS)     Solitary cyst of breast     Spinal stenosis, lumbar region, without neurogenic claudication     Unspecified hypothyroidism     Urinary frequency     UTI (urinary tract infection) 03/25/2022       Past Surgical History:      Procedure Laterality Date    APPENDECTOMY      CARDIAC CATHETERIZATION  5/26/15  MDL    with aortic root injection.  EF 60%    CATARACT REMOVAL      CHOLECYSTECTOMY      CYST INCISION AND DRAINAGE      drained liver cyst    HEMORRHOID SURGERY      HYSTERECTOMY (CERVIX STATUS UNKNOWN)      NERVE BLOCK LUMB FACET LEVEL 1 BILATERAL Bilateral 1/19/2016    LUMBAR FACET CATE L4-5  performed by Aristeo Hoang at [facility]       Medications in Hospital:      Current Facility-Administered Medications:     ondansetron (ZOFRAN-ODT) disintegrating tablet 4 mg, 4 mg, Oral, Q8H PRN **OR** ondansetron (ZOFRAN) injection 4 mg, 4 mg, IntraVENous, Q6H PRN, Phoebe Leventhal, APRN - CNP    polyethylene glycol (GLYCOLAX) packet 17 g, 17 g, Oral, Daily PRN, Phoebe Leventhal, APRN - CNP    enoxaparin Sodium (LOVENOX) injection 30 mg, 30 mg, SubCUTAneous, Daily, Phoebe Leventhal, APRN - CNP, 30 mg at 12/19/22 1811    atorvastatin (LIPITOR) tablet 80 mg, 80 mg, Oral, Nightly, Phoebe Leventhal, APRN - CNP, 80 mg at 12/19/22 2025    aspirin chewable tablet 81 mg, 81 mg, Oral, Daily, JERE Flores - CNP    docusate sodium (COLACE) capsule 100 mg, 100 mg, Oral, BID, JERE Flores - CNP    levothyroxine (SYNTHROID) tablet 25 mcg, 25 mcg, Oral, Daily, JERE Flores - CNP    pantoprazole (PROTONIX) tablet 40 mg, 40 mg, Oral, QAM AC, JERE Flores - CNP    Allergies:  Ampicillin, Bactrim [sulfamethoxazole-trimethoprim], Cephalosporins, Ciprocinonide [fluocinolone], Codeine, Cymbalta [duloxetine hcl], Doxycycline, Enablex [darifenacin hydrobromide er], Gabapentin, Hctz [hydrochlorothiazide], Keppra [levetiracetam], Levaquin [levofloxacin in d5w], Lyrica [pregabalin], Pyridium [phenazopyridine hcl], Quinolones, and Sulfa antibiotics    Social History:   TOBACCO:   reports that she has never smoked. She has never used smokeless tobacco.  ETOH:   reports no history of alcohol use. Family History:       Problem Relation Age of Onset    Other Father         stroke, MI,  46           Physical Exam:    Vitals: BP (!) 164/73   Pulse 74   Temp 98.6 °F (37 °C) (Temporal)   Resp 16   Ht 5' 2\" (1.575 m)   Wt 106 lb 1 oz (48.1 kg)   LMP  (LMP Unknown)   SpO2 97%   BMI 19.40 kg/m²     Constitutional - well developed, well nourished. Eyes - conjunctiva normal.  Pupils not tested  Ear, nose, throat -hearing  intact to finger rub No scars, masses, or lesions over external nose or ears, no atrophy of tongue  Neck-symmetric, no masses noted, no jugular vein distension  Respiration- chest wall appears symmetric, good expansion,   normal effort without use of accessory muscles  Musculoskeletal - no significant wasting of muscles noted, no bony deformities  Extremities-no clubbing, cyanosis or edema  Skin - warm, dry, and intact. No rash, erythema, or pallor.   Psychiatric - mood, affect, and behavior appear normal.      Neurological exam  Awake, alert, fluent oriented x 3 appropriate affect  Attention and concentration appear appropriate  Recent and remote memory appears unremarkable  Speech normal without dysarthria  No clear issues with language of fund of knowledge    Cranial Nerve Exam   CN II- Visual fields grossly unremarkable  CN III, IV,VI-EOMI, No nystagmus, conjugate eye movements, no ptosis  CN V-sensation intact to LT over face  CN VII-no facial assymetry  CN VIII-Hearing  intact to finger rub  CN IX and X- Palate not tested  CN XI-not test shoulder shrug  CN XII-Tongue midline with no fasciculations or fibrillations    Motor Exam  V/V throughout upper and lower extremities bilaterally, no cogwheeling, normal tone    Sensory Exam  Sensation intact to light touch and temperature upper and lower extremities bilaterally    Reflexes   Not tested    Tremors- no tremors in hands or head noted    Gait  Not tested    Coordination  Finger to nose-unremarkable        CBC:   Recent Labs     12/19/22  1235 12/20/22  0231   WBC 6.2 5.2   HGB 13.8 12.2    228       BMP:    Recent Labs     12/19/22  1231 12/19/22  1235   NA  --  135*   K  --  4.0   CL  --  98   CO2  --  24   BUN  --  15   CREATININE  --  0.6   GLUCOSE 92 95       Hepatic:   Recent Labs     12/19/22  1235   AST 38*   ALT 17   BILITOT 0.4   ALKPHOS 100       Lipids:   Recent Labs     12/20/22  0231   CHOL 197   HDL 86       INR:   Recent Labs     12/19/22  1235   INR 0.98     Impression       1. Nonspecific but presumed microvascular changes in the periventricular white matter. MRI may be helpful in excluding occult acute infarction if clinically warranted. 2.  Atrophic changes. 3.  No acute hemorrhage or extra-axial collection. Recommendation: Follow up as clinically indicated. All CT scans at this facility utilize dose modulation, iterative reconstruction, and/or weight based dosing when appropriate to reduce radiation dose to as low as reasonably achievable.    Amended by Jimmy Crocker MD at 19-Dec-2022 03:00:39 PM EST   Dictated and Electronically Signed by Jimmy Crcoker MD at 19-Dec-2022 01:59:18 PM         Narrative   NO PRIOR REPORT AVAILABLE   CTA BRAIN AND NECK   Exam: CTA OF THEINTRACRANIAL ARTERIES (COW)   Clinical data:Stroke symptoms, aphasia last night, resolve now returned. Technique: Axial CT angiography of the intracranial arteries within the brain was performed with administration of intravenous contrast. Coronal, sagittal, and 3D volume reconstructions of theintracranial arteries were performed. Reformatted/3D-MPR    images were performed. Radiation dose: CTDIvol = 94.08 mGy, DLP = 1384 mGy x cm. Prior studies: CT scan of the brain done on same day. Findings:   No definite abnormality seen on 3D reformatted images. Anterior cerebral arteries demonstrate enhance normally. Anterior communicating artery is opacified. Middle Cerebral arteries are unremarkable. Posterior communicating arteries are opacified. Posterior cerebral arteries are intact. Vertebral arteries are visualized. Basilar artery is unremarkable. Intracranial internal carotid arteries demonstrate normal enhancement. No evidence of aneurysm (greater than 4 mm) orarteriovenous lesion. Osseous structures: No acute or destructive bony process identified. Impression   No significant intracranial arterial abnormalities. Recommendation:   Follow up as clinically indicated. All CT scans at this facility utilize dose modulation, iterative reconstruction, and/or weight based dosing when appropriate to reduce radiation dose to as low as reasonably achievable. Exam: CTA OF THE CAROTID ARTERIES   Clinical data:Stroke symptoms, aphasia last night, resolve now returned. Technique: Axial CT angiography of the carotid arteries within the neck was performed with the administration of intravenous contrast for evaluation of the carotid bifurcation. Oral contrast was not utilized. Coronal, sagittal, and 3D volume    reconstructions of the carotid arteries were performed. Reformatted/3D-MPR images were performed. NASCET Criteria was used in evaluating the study. Radiation dose: CTDIvol = 94.08 mGy, DLP = 1384 mGy x cm. Prior studies: CT scan of the brain done on same day. Findings:   Calcified plaque involving the aortic arch and both carotid bulbs. Both carotid bulbs CCA, Carotid Bulb, ICA, and origin of the ECA are well opacified. Vertebral arteries are well opacified. Jugular veins are well opacified   Included lung apices are grossly unremarkable. Osseous structures: No acute or destructive bony process identified. Small thyroid nodules. IMPRESSION: No significant vascular abnormalities. Other nonacute findings above. Recommendation:   Follow up as clinically indicated. All CT scans at this facility utilize dose modulation, iterative reconstruction, and/or weight based dosing when appropriate to reduce radiation dose to as low as reasonably achievable.      Dictated and Electronically Signed by Lo Rizo MD at 19-Dec-2022 02:18:12 PM           Assessment and Plan     TIA-transient episodes of aphasia    Exam  Awake, alert, oriented, fluent  Able to name, repeat, follow commands, appropriate  Nonfocal    CT of the head/CTA of the head and neck-unremarkable  Previous a day Holter monitor relatively unremarkable    Plan  MRI of the brain/2D echo      On aspirin/statin  LDL 98  Hemoglobin A1c 5.6    GI-bowel regimen/PPI  DVT prophylaxis-Lovenox  Hypothyroidism-Synthroid  Hypertension-monitor    If MRI is unremarkable okay to DC from neuro standpoint and follow-up as needed    PT/OT/speech    Supportive care    Please feel free to call with any questions   [phone] (cell phone)    Dr Elizabeth Shell certified in Neurology  Board Certified in Clinical Neurophysiology  Fellowship Trained in Kelsie Lopez Neurophysiology    EMR Dragon/transcription disclaimer:Significant part of this  encounter note is electronic transcription/translation of spoken language to printed text. The electronic translation of spoken language may be erroneous, or at times, nonsensical words or phrases may be inadvertently transcribed.  Although I have reviewed the note for such errors, some may still exist.

## 2022-12-20 NOTE — PROGRESS NOTES
STROKE ADMISSION    Date of Note:  12/19/2022  Time of Note:  6:06 PM    Patient Name:  Faith Silva  MRN:  [id number]  YOB: 1926  Age:      80 y.o. Gender:      female    Room:  [address] [place] [address]   Adm. Date: 12/19/2022  Adm. Status:   Allergies: Ampicillin, Bactrim [sulfamethoxazole-trimethoprim], Cephalosporins, Ciprocinonide [fluocinolone], Codeine, Cymbalta [duloxetine hcl], Doxycycline, Enablex [darifenacin hydrobromide er], Gabapentin, Hctz [hydrochlorothiazide], Keppra [levetiracetam], Levaquin [levofloxacin in d5w], Lyrica [pregabalin], Pyridium [phenazopyridine hcl], Quinolones, and Sulfa antibiotics  Code Status: Full Code    Adm. Provider: Joey Lock MD  Neuro. Provider: Dr. Cici Mcwilliams    Bedside report and handoff assessment completed with ER RN. Presentation(s)    ED Chief Complaint  Chief Complaint   Patient presents with    Aphasia     Started around 9 pm last night, got a little better and then got worse around noon today    Numbness       ED Impression  1. Cerebrovascular accident (CVA), unspecified mechanism ([facility] [address]    2. Aphasia             Admission Impression  Principal Problem:    Ischemic stroke ([facility] [address]  Resolved Problems:    * No resolved hospital problems. *                  Last Known Well Date & Time         Current NIHSS:  NIH Stroke Scale  Interval: Hand-off/Transfer  Level of Consciousness (1a): Alert  LOC Questions (1b): Answers both correctly  LOC Commands (1c): Performs both tasks correctly  Best Gaze (2): Normal  Visual (3): No visual loss  Facial Palsy (4): Normal symmetrical movement  Motor Arm, Left (5a): No drift  Motor Arm, Right (5b): No drift  Motor Leg, Left (6a): No drift  Motor Leg, Right (6b):  No drift  Limb Ataxia (7): Absent  Sensory (8): Normal  Best Language (9): No aphasia  Dysarthria (10): Normal  Extinction and Inattention (11): No abnormality  Total: 0    Current GCS:  Ivory Coma Scale  Eye Opening: Spontaneous  Best Verbal Response: Oriented  Best Motor Response: Obeys commands  [place] Coma Scale Score: 15      Education & Care Plan    [x]    Education Assessment Completed      Patient preferred method of education:   []    Visual   [x]    Written   [x]    Verbal   [x]    Demonstration   []    All of the above    [x]    Individualized Stroke/TIA Education template added, including patient specific risk factors:       Hypertension and High Cholesterol    [x]    Stroke education initiated with patient and/or caregiver. [x]    Stroke booklet given and reviewed completely and efficiently with patient and/or caregiver. All questions and concerns addressed. Will continue to educate appropriately.     [x]    Individualized Stroke/TIA Care Plan added      [place] Swallow Screen (YSS)    [x]    Bedside swallow screen completed using the YSS Protocol, and documented prior to any PO meds, food, or drink:     []    Completed in ED    []    Passed    []    Failed and awaiting SLP eval     [x]    Completed upon admission to this unit    [x]    Passed    []    Failed and awaiting SLP eval     []    Patient strict NPO status for procedure/testing      [x]  NIHSS on admission         Swallow Screening  Is the patient unable to remain alert for testing?: No  Is the patient on a modified diet (thickened liquids) due to pre-existing dysphagia?: No  Is there presence of existing enteral tube feeding via the stomach or nose?: No  Is there presence of head-of-bed restrictions (less than 30 degrees)?: No  Is there presence of tracheotomy tube?: No  Is the patient ordered nothing-by-mouth status?: No  3 oz Water Swallow Screen: Pass    VTE Prophylaxis  (ASA, Plavix and tPA are not VTE prophylaxis)      SCDs   []    On   []    Off   [x]    Refused    []    Contraindicated see MD orders      Medication(s)   [x]    Lovenox   []    Eliquis   []    Heparin   []    Coumadin/Warfarin   []    Other:    []    Contraindicated see MD orders      [x]    I attest as the admitting nurse that I have completed a thorough stroke assessment and admission on this patient. All checked assessment areas listed above have been addressed and completed.     Nurse eSignature:  Meagan Suazo RN  Date:   12/19/2022   Time:   6:06 PM

## 2022-12-20 NOTE — DISCHARGE INSTRUCTIONS
Keep follow up appointments  Take medications as directed   Seek immediate medical assistance for recurrent or worsening symptoms

## 2022-12-20 NOTE — CARE COORDINATION
CM met w/pt, granddtr, and spouse at the bedside. Pt/spouse are independent at baseline. Couple have  once a week, otherwise manage household chores. Couple receive MOW. At this time, no additionally home care needs. 12/20/22 1204   Service Assessment   Patient Orientation Alert and Oriented  (pt is hard of hearing, granddtr at the bedside and participating in assessment)   Cognition Alert   History Provided By Patient; Child/Family   Primary Caregiver Self   Support Systems Spouse/Significant Other   PCP Verified by CM Yes  ([facility])   Prior Functional Level Independent in ADLs/IADLs   Current Functional Level Independent in ADLs/IADLs   Can patient return to prior living arrangement Yes   Ability to make needs known: Good   Family able to assist with home care needs: Yes   Would you like for me to discuss the discharge plan with any other family members/significant others, and if so, who? Yes   Financial Resources   (none at this time)   CM/SW Referral   (none at this time)   Social/Functional History   Lives With Spouse   Type of [address]   ADL Assistance Independent   Ambulation Assistance Independent   Transfer Assistance Independent   Discharge Planning   Living Arrangements Spouse/Significant Other   Current Services Prior To Admission None   Potential Assistance Needed N/A   DME Ordered? No   Type of Home Care Services None   Patient expects to be discharged to: [address] Discharge   Transition of Care Consult (CM Consult) N/A   Services At/After Discharge None   Confirm Follow Up Transport Family   Dr Gregory Green  Patient Contact Information:    [address] [facility] [phone] (home)   Telephone Information:   Mobile [phone]     Above information verified?     []   Yes  [x]   No      Emergency Contacts:    Extended Emergency Contact Information  Primary Emergency Contact: Ashu Melvin  Address: [address]           [place], [address] [place] of [address] [facility] Phone: [phone]  Mobile Phone: [phone]  Relation: Spouse  Secondary Emergency Contact: [facility] - [address], [address] [place] of [address] [facility] Phone: [phone]  Relation: Brother/Sister      Have you been vaccinated for COVID-19 (SARS-CoV-2)? [x]   Yes  []   No                   If so, when? Which :         []   Pfizer-BioNTech  [x]   Moderna x 3  []   Brena Glass  []   Other:         Pharmacy:    [facility] [address]  [address] [place] [address]  Phone: [phone] Fax: [phone]          Patient Deficits:    []   Yes   [x]   No    If yes:    []   Confusion/Memory  []   Visual  []   Motor/Sensory         []   Right arm         []   Right leg         []   Left arm         []   Left leg  []   Language/Speech         []   Aphasia         []   Dysarthria         []   Swallow    NIH Stroke Scale  Interval: Hand-off/Transfer  Level of Consciousness (1a): Alert  LOC Questions (1b): Answers both correctly  LOC Commands (1c): Performs both tasks correctly  Best Gaze (2): Normal  Visual (3): No visual loss  Facial Palsy (4): Normal symmetrical movement  Motor Arm, Left (5a): No drift  Motor Arm, Right (5b): No drift  Motor Leg, Left (6a): No drift  Motor Leg, Right (6b):  No drift  Limb Ataxia (7): Absent  Sensory (8): Normal  Best Language (9): No aphasia  Dysarthria (10): Normal  Extinction and Inattention (11): No abnormality  Total: 0    Ivory Coma Scale  Eye Opening: Spontaneous  Best Verbal Response: Oriented  Best Motor Response: Obeys commands  I[place]ory Coma Scale Score: 15    Electronically signed by Hilaria David RN on 12/20/2022 at 12:10 PM

## 2022-12-20 NOTE — PROGRESS NOTES
Physical Therapy  Facility/Department: [facility] SURG SERVICES  Physical Therapy Initial Assessment    Name: Yamilka Chandra  : 11/10/1926  MRN: [id number]  Date of Service: 2022    Discharge Recommendations:  24 hour supervision or assist, Continue to assess pending progress          Patient Diagnosis(es): The primary encounter diagnosis was Cerebrovascular accident (CVA), unspecified mechanism ([address] A diagnosis of Aphasia was also pertinent to this visit. 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 Past Surgical History:  has a past surgical history that includes Hemorrhoid surgery; Cataract removal; Cholecystectomy; Hysterectomy; Appendectomy; Cardiac catheterization (5/26/15  MDL); Nerve Block Lumb Facet Level 1 Bilateral (Bilateral, 2016); and Breast cyst incision and drainage. Assessment   Body Structures, Functions, Activity Limitations Requiring Skilled Therapeutic Intervention: Decreased functional mobility ; Decreased ADL status; Decreased strength;Decreased safe awareness;Decreased balance  Assessment: Pt ABLE TO AMB TO BR THEN UP TO RECLINER WITH ASSIST. WILL PROGRESS AS TOLERATED.   Requires PT Follow-Up: Yes  Activity Tolerance  Activity Tolerance: Patient tolerated treatment well     Plan   Physcial Therapy Plan  General Plan: 5-7 times per week  Current Treatment Recommendations: Strengthening, Balance training, Functional mobility training, Transfer training, Gait training, Patient/Caregiver education & training, Equipment evaluation, education, & procurement  Safety Devices  Type of Devices: Call light within reach (FAMILY PRESENT WHILE Pt IN [facility])     Restrictions  Restrictions/Precautions  Restrictions/Precautions: Fall Risk     Subjective   Pain: DENIES  General  Patient assessed for rehabilitation services?: Yes  Diagnosis: CVA  Subjective  Subjective: Pt CONFUSED OVERALL BUT VERY PLEASANT AND COOPERATIVE         Social/Functional History  Social/Functional History  Lives With: Spouse  Type of Home: House  Home Equipment: Cane  ADL Assistance: Independent  Ambulation Assistance: Independent  Transfer Assistance: Independent  Vision/Hearing       Cognition   Orientation  Overall Orientation Status: Within Functional Limits  Cognition  Overall Cognitive Status: Exceptions  Following Commands: Follows one step commands with repetition  Attention Span: Difficulty attending to directions  Safety Judgement: Decreased awareness of need for safety     Objective   Heart Rate: 74  Heart Rate Source: Monitor  BP: (!) 164/73  BP Location: Right upper arm  Patient Position: Supine  MAP (Calculated): 103  Resp: 16  SpO2: 97 %  O2 Device: None (Room air)     Observation/Palpation  Posture: Fair  Gross Assessment  AROM: Within functional limits  Strength: Generally decreased, functional                    Bed mobility  Supine to Sit: Supervision  Transfers  Sit to Stand: Minimal Assistance  Stand to Sit: Minimal Assistance  Bed to Chair: Minimal assistance  Ambulation  Device: Hand-Held Assist  Assistance: Minimal assistance  Quality of Gait: UNSTEADY OVERALL  Gait Deviations: Staggers; Slow Veronika;Decreased step length  Distance: 10' X 2  Comments: COULD BENEFIT FROM RW     Balance  Sitting - Dynamic: Good  Standing - Dynamic: Fair           OutComes Score                                                  AM-PAC Score             Tinneti Score       Goals  Short Term Goals  Time Frame for Short Term Goals: 14 DAYS  Short Term Goal 1: BED MOB INDEPENDENT  Short Term Goal 2: TRANSFERS SUPERVISION  Short Term Goal 3: ' AAD SUPERVISION       Education  Patient Education  Education Given To: Patient; Family  Education Provided: Plan of Care;Role of Therapy      Therapy Time   Individual Concurrent Group Co-treatment   Time In           Time Out           Minutes                   Sullivan Drafts, PT

## 2022-12-20 NOTE — PROGRESS NOTES
Palliative Care/Spiritual Care: Met with pt and pt's  Philip Piña. 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 Pt was eating her breakfast and her  spoke with me. He says she had two TIA's in the past week. Pt has other diagnoses in past medical history. Pt is known to palliative care. Advance Directives:  Pt has a LW and was consulted for goals of care. Pt's  said pt does not want CPR or Ventilator. He said, \"absolutely nothing. \" This pt informed pt's nurse Dilcia Gunter and code status was made DNR prior to charting this note. SEE ACP NOTE. Pain/other symptoms: Pt is having some pain in her legs from neuropathy. This  encouraged pt's  to notify her nurse if she is having pain needing medication. Social/Spiritual: Pt is Rastafari.         Pt/family discussion r/t goals: Pt has a  and four adult children. A daughter who recently moved here is [place]. They have a granddaughter who helps them Chelsea Sosa. Pt lives at home with her  and she ambulates without assistance, cooks some with assistance, and cares for her needs at home. Pt's goal is to return home with her  and previous quality of life. Provided spiritual care  with sustaining presence, nurtured hope, and prayer. Pt and  expressed gratitude for spiritual care.      Electronically signed by Placido Rayo on 12/20/2022 at 10:21 AM Weekly

## 2022-12-20 NOTE — PROGRESS NOTES
4 Eyes Skin Assessment    Jamaal Leger is being assessed upon: Admission    I agree that I, Con Clifford RN, along with Mary Hall LPN have performed a thorough Head to Toe Skin Assessment on the patient. ALL assessment sites listed below have been assessed. Areas assessed by both nurses:     [x]   Head, Face, and Ears   [x]   Shoulders, Back, and Chest  [x]   Arms, Elbows, and Hands   [x]   Coccyx, Sacrum, and Ischium  [x]   Legs, Feet, and Heels    Does the Patient have Skin Breakdown?  No    Albino Prevention initiated: No  Wound Care Orders initiated: No    WOC nurse consulted for Pressure Injury (Stage 3,4, Unstageable, DTI, NWPT, and Complex wounds) and New or Established Ostomies: No        Primary Nurse eSignature: Con Clifford RN on 12/19/2022 at 6:04 PM      Co-Signer eSignature: Electronically signed by Mary Hall LPN on 88/11/39 at 0:79 PM CST

## 2022-12-20 NOTE — PROGRESS NOTES
Speech Language Pathology  Facility/Department: [facility] SURG SERVICES  Initial Speech/Language/Cognitive/Swallow Assessment    NAME: Minda Oakley  : 11/10/1926   MRN: [id number]  ADMISSION DATE: 2022  ADMITTING DIAGNOSIS: has Chest tightness or pressure; Hyperlipidemia; HTN (hypertension); Shortness of breath; Dizziness; Abnormal echocardiogram; Lumbar facet arthropathy; Ataxia; Tremor; Palpitations; Lumbar spinal stenosis; Nonrheumatic tricuspid valve regurgitation; Vertebrobasilar artery syndrome; Syncope and collapse; Anxiety; Nonrheumatic mitral valve regurgitation; Ventricular septal defect; UTI (urinary tract infection); Acute cystitis without hematuria; Elevated brain natriuretic peptide (BNP) level; Palliative care patient; Ischemic stroke ([facility] [address] Aphasia; Neuropathy; Acquired hypothyroidism; and TIA (transient ischemic attack) on their problem list.    Date of Eval: 2022   Evaluating Therapist: DAVID Lee    Pain:  No pain reported during assessment. Assessment:  Completed assessment. Patient exhibited decreased labial movements and slowed lingual movements during utterances. Patient also exhibited slow processing, delayed auditory comprehension, delayed verbalizations, and decreased short-term memory. When asked, patient and present family members reported back to baseline cognitive-linguistic functioning. Also evaluated patient's swallowing function. Patient exhibited sluggish, mild-moderately decreased laryngeal elevation for swallow airway protection. Even so, no outward S/S penetration/aspiration was noted/reported with any regular solid consistency trial or thin liquid trial presented during assessment this date. At this time, would recommend continuation regular solid consistency with thin liquids. Thank you for this consult.      Goals:  Short-term Goals  Timeframe for Short-term Goals: 1-2x/day for 3 days  Goal 1: Patient will tolerate regular solid consistency and thin liquids with min S/S penetration/aspiration during PO intake. Goal 2: Monitor speech/language/cognitive functioning. Subjective:  Chart Reviewed: Yes  Patient assessed for rehabilitation services?: Yes  Family / Caregiver Present: Yes     Objective: Auditory Comprehension  Comprehension:  (While delayed, patient demonstrated ability to answer simple yes/no questions regarding immediate environment and current state of being at independent level. While delayed, patient demonstrated ability to follow simple 1 step commands independently. While delayed, patient demonstrated ability to answer yes/no questions of increased complexity and to follow simple 2 step commands at independent level.)     Expression  Primary Mode of Expression:  (Confrontation naming of items in room was considered to be delayed. Structured responsive speech and responses in natural conversation were considered to be frequently delayed but appropriate.)     Motor Speech:  (Patient exhibited decreased labial movements and slowed lingual movements during verbalizations. SLP still ranked functional intelligibility of speech for unfamiliar listeners at 100% in utterances with background noise present.)     Overall Orientation Status:  (Patient demonstrated ability to verbalize name, birthday, age, address, city, state, and hospital at independent level. Patient did not verbalize any phone number or temporal information independently; present family members reported baseline.)     Memory: (Patient demonstrated appropriate immediate memory with sequences of unrelated numbers/words set up to 5 items with repetitions provided. Patient demonstrated decreased short-term memory.)     Problem Solving:  (While delayed, patient demonstrated ability to verbalize appropriate simple solutions to situations that could occur during activities of daily living independently.)     Additional Assessment:   Assessed patient's swallowing function.  With regular solid consistency trials presented independently, patient exhibited decreased rotary jaw movement during oral prep. Oral transit of regular solid consistency varied from 1-3 seconds in length and min oral cavity residue was noted post swallows; residue cleared from the mouth with additional dry swallows. Oral transit of thin liquid trials, presented independently via cup, primarily measured 1 second in length. Laryngeal elevation during swallow initiation was considered to be sluggish and mild-moderately sed for swallow airway protection. Even so, no outward S/S penetration/aspiration was observed/reported with any regular solid consistency trial or thin liquid trial presented during evaluation this date. At this time, would recommend continuation regular solid consistency with thin liquids.      Electronically signed by DAVID Cook on 12/20/2022 at 1:16 PM

## 2022-12-20 NOTE — ACP (ADVANCE CARE PLANNING)
Advance Care Planning     Advance Care Planning Activator (Inpatient)  Conversation Note      Date of ACP Conversation: 12/20/2022     Conversation Conducted with: Patient    ACP Activator: Saint Merlin      Current Designated Health Care Decision Maker:     Primary Decision Maker: Ladonna Rivas - Spouse - [phone]    Supplemental (Other) Decision Maker: Rosa Zepeda - Brother/Sister - [phone]        Care Preferences    Ventilation: \"If you were in your present state of health and suddenly became very ill and were unable to breathe on your own, what would your preference be about the use of a ventilator (breathing machine) if it were available to you? \"      Would the patient desire the use of ventilator (breathing machine)?: No    \"If your health worsens and it becomes clear that your chance of recovery is unlikely, what would your preference be about the use of a ventilator (breathing machine) if it were available to you? \"     Would the patient desire the use of ventilator (breathing machine)?: No      Resuscitation  \"CPR works best to restart the heart when there is a sudden event, like a heart attack, in someone who is otherwise healthy. Unfortunately, CPR does not typically restart the heart for people who have serious health conditions or who are very sick. \"    \"In the event your heart stopped as a result of an underlying serious health condition, would you want attempts to be made to restart your heart (answer \"yes\" for attempt to resuscitate) or would you prefer a natural death (answer \"no\" for do not attempt to resuscitate)? \" No       [x] Yes   [] No   Educated Patient / Eddie Villegas regarding differences between Advance Directives and portable DNR orders. Pt's  says they have an portable EMS DNR at home.         Conversation Outcomes:  [x] ACP discussion completed  [x] Existing advance directive reviewed with patient; no changes to patient's previously recorded wishes      Electronically signed by Saint Merlin on 12/20/2022 at 10:25 AM

## 2022-12-20 NOTE — PROGRESS NOTES
Occupational Therapy  Facility/Department: [facility], [facility]  Occupational Therapy Initial Assessment    Name: Aysha Salinas  : 11/10/1926  MRN: [id number]  Date of Service: 2022    Discharge Recommendations:             Patient Diagnosis(es): The primary encounter diagnosis was Cerebrovascular accident (CVA), unspecified mechanism ([facility] [address] A diagnosis of Aphasia was also pertinent to this visit. 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 Past Surgical History:  has a past surgical history that includes Hemorrhoid surgery; Cataract removal; Cholecystectomy; Hysterectomy; Appendectomy; Cardiac catheterization (5/26/15  MDL); Nerve Block Lumb Facet Level 1 Bilateral (Bilateral, 2016); and Breast cyst incision and drainage. Treatment Diagnosis: Ischemic stroke      Assessment   Performance deficits / Impairments: Decreased functional mobility ; Decreased strength;Decreased endurance;Decreased ADL status; Decreased balance  Assessment: Will progress as tolerated  Treatment Diagnosis: Ischemic stroke  Prognosis: Good  Decision Making: Low Complexity  REQUIRES OT FOLLOW-UP: Yes  Activity Tolerance  Activity Tolerance: Patient Tolerated treatment well        Plan   Occupational Therapy Plan  Times Per Week: 3-5x/week  Times Per Day:  Once a day     Restrictions       Subjective   General  Chart Reviewed: Yes  Patient assessed for rehabilitation services?: Yes  Family / Caregiver Present: No  Diagnosis: Ischemic stroke  DENIES  Social/Functional History  Social/Functional History  Lives With: Spouse  Type of Home: House  Home Equipment: Cane  ADL Assistance: Independent  Ambulation Assistance: Independent  Transfer Assistance: Independent       Objective   Heart Rate: 74  Heart Rate Source: Monitor  BP: (!) 164/73  BP Location: Right upper arm  Patient Position: Supine  MAP (Calculated): 103  Resp: 16  SpO2: 97 %  O2 Device: None (Room air)                      AROM: Within functional limits  Strength: Generally decreased, functional  ADL  Feeding: Independent  Grooming: Supervision  UE Bathing: Supervision  LE Bathing: Minimal assistance  UE Dressing: Supervision  LE Dressing: Minimal assistance  Toileting: Contact guard assistance        Bed mobility  Supine to Sit: Supervision  Transfers  Stand Step Transfers: Contact guard assistance  Sit to stand: Supervision  Hearing  Hearing: Exceptions to [facility]  Hearing Exceptions: Bilateral hearing aid;Hard of hearing/hearing concerns  Cognition  Overall Cognitive Status: WFL  Orientation  Overall Orientation Status: Within Functional Limits                                           G-Code     OutComes Score                                                  AM-PAC Score             Tinneti Score       Goals  Short Term Goals  Time Frame for Short Term Goals: 1 week  Short Term Goal 1: Supervision with toilet tfers  Short Term Goal 2: Supervision with LB dsg  Short Term Goal 3: Supervision with light ambulatory ADLs.   Long Term Goals  Long Term Goal 1: Return to PLOF       Therapy Time   Individual Concurrent Group Co-treatment   Time In           Time Out           Minutes                   Rafael Love OT

## 2023-01-18 ENCOUNTER — OFFICE VISIT (OUTPATIENT)
Dept: INTERNAL MEDICINE | Facility: CLINIC | Age: 88
End: 2023-01-18
Payer: MEDICARE

## 2023-01-18 VITALS
BODY MASS INDEX: 16.71 KG/M2 | WEIGHT: 90.8 LBS | HEART RATE: 98 BPM | SYSTOLIC BLOOD PRESSURE: 132 MMHG | OXYGEN SATURATION: 74 % | HEIGHT: 62 IN | DIASTOLIC BLOOD PRESSURE: 82 MMHG | TEMPERATURE: 97.4 F

## 2023-01-18 DIAGNOSIS — F06.4 ANXIETY DISORDER DUE TO KNOWN PHYSIOLOGICAL CONDITION: Chronic | ICD-10-CM

## 2023-01-18 DIAGNOSIS — G62.9 PERIPHERAL POLYNEUROPATHY: Primary | Chronic | ICD-10-CM

## 2023-01-18 DIAGNOSIS — F03.90 DEMENTIA WITHOUT BEHAVIORAL DISTURBANCE: ICD-10-CM

## 2023-01-18 PROBLEM — R22.41: Status: RESOLVED | Noted: 2021-03-11 | Resolved: 2023-01-18

## 2023-01-18 PROBLEM — T14.8XXA NONHEALING NONSURGICAL WOUND: Status: RESOLVED | Noted: 2020-10-29 | Resolved: 2023-01-18

## 2023-01-18 PROBLEM — N30.00 ACUTE CYSTITIS WITHOUT HEMATURIA: Status: RESOLVED | Noted: 2021-08-24 | Resolved: 2023-01-18

## 2023-01-18 PROBLEM — M35.9 COLLAGEN VASCULAR DISEASE: Status: RESOLVED | Noted: 2020-02-11 | Resolved: 2023-01-18

## 2023-01-18 PROBLEM — R30.0 BURNING WITH URINATION: Status: RESOLVED | Noted: 2020-06-10 | Resolved: 2023-01-18

## 2023-01-18 PROBLEM — I10 BENIGN HYPERTENSION: Chronic | Status: ACTIVE | Noted: 2020-01-23

## 2023-01-18 PROBLEM — S81.802S WOUND OF LEFT LEG, SEQUELA: Status: RESOLVED | Noted: 2020-02-11 | Resolved: 2023-01-18

## 2023-01-18 PROBLEM — F41.0 PANIC ATTACK: Status: RESOLVED | Noted: 2020-02-11 | Resolved: 2023-01-18

## 2023-01-18 PROBLEM — S22.31XA RIGHT RIB FRACTURE: Status: RESOLVED | Noted: 2022-12-09 | Resolved: 2023-01-18

## 2023-01-18 PROBLEM — R39.9 UTI SYMPTOMS: Status: RESOLVED | Noted: 2021-08-24 | Resolved: 2023-01-18

## 2023-01-18 PROBLEM — D70.9 NEUTROPENIA: Status: RESOLVED | Noted: 2020-02-11 | Resolved: 2023-01-18

## 2023-01-18 PROCEDURE — 99214 OFFICE O/P EST MOD 30 MIN: CPT | Performed by: INTERNAL MEDICINE

## 2023-01-19 NOTE — PROGRESS NOTES
"      Chief Complaint  Hypertension (6 month follow up ), Peripheral Neuropathy (Wants a cream for neuropathy /Recommended by Dr. Tubbs ), and Memory Loss (Worsening/)    Subjective        Meenu Arteaga presents to [facility]    HPI  Patient seen for above problems.  Patient hospitalized in December for concern for CVA workup was negative.    Patient having worsening neuropathy would like to try some cream recommended by neurology but neurology did not make any specific recommendations.  Unsure ifthey had something specific in mind.      Memory getting worse but overall stable.    BP elevated when I saw her but improved when I rechecked.    Review of Systems   Constitutional: Negative for chills and fever.   Respiratory: Negative for cough and shortness of breath.    Gastrointestinal: Negative for abdominal distention, abdominal pain, diarrhea, nausea and vomiting.       Objective   Vital Signs:  /82   Pulse 98   Temp 97.4 °F (36.3 °C) (Temporal)   Ht 157.5 cm (62\")   Wt 41.2 kg (90 lb 12.8 oz)   SpO2 (!) 74%   BMI 16.61 kg/m²   Estimated body mass index is 16.61 kg/m² as calculated from the following:    Height as of this encounter: 157.5 cm (62\").    Weight as of this encounter: 41.2 kg (90 lb 12.8 oz).      Physical Exam  Vitals reviewed.   HENT:      Mouth/Throat:      Mouth: Mucous membranes are dry.      Pharynx: Oropharynx is clear.   Eyes:      General: No scleral icterus.     Conjunctiva/sclera: Conjunctivae normal.   Cardiovascular:      Rate and Rhythm: Normal rate and regular rhythm.      Heart sounds: Murmur heard.   Pulmonary:      Effort: Pulmonary effort is normal. No respiratory distress.   Abdominal:      General: Abdomen is flat. Bowel sounds are normal.      Palpations: Abdomen is soft.   Skin:     General: Skin is warm.      Coloration: Skin is pale.   Neurological:      General: No focal deficit present.      Mental Status: She is alert and oriented to " person, place, and time.   Psychiatric:         Mood and Affect: Mood normal.         Behavior: Behavior normal.                     Assessment and Plan   Diagnoses and all orders for this visit:    1. Peripheral polyneuropathy (Primary)  Assessment & Plan:  Send over cream to [place] the DDDGB cream 1 ounce to try      2. Dementia without behavioral disturbance (HCC)  Assessment & Plan:  Stable, some days better than others.      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  Takes BDZs.  Although high risk given her age, she has been on this for some time and tolerated it well prior to me assuming the care of the patient.      2 or more chronic medical conditions  Prescribed prescription cream (compounded at [facility])     Result Review :  The following data was reviewed by: Scotty Miles MD on 01/18/2023:  CMP    CMP 7/20/22 10/24/22 11/29/22 11/29/22      1012 1012   Glucose 111 (A) 106 (A)     BUN 13 15     Creatinine 0.69 0.69     Sodium 135 137     Potassium 3.8 3.9     Chloride 96 95 (A)     Calcium 9.7 10.0 (A)     Total Protein 6.9      Albumin 4.6      Globulin 2.3      Total Bilirubin 0.4      Alkaline Phosphatase 65      AST (SGOT) 30  35 (A)    ALT (SGPT) 13   15   BUN/Creatinine Ratio 19 21.7     (A) Abnormal value       Comments are available for some flowsheets but are not being displayed.           CBC    CBC 10/24/22 12/19/22 12/20/22   WBC 6.03 6.2 5.2   RBC 3.91 4.21 3.78 (A)   Hemoglobin 12.6 13.8 12.2   Hematocrit 37.7 41.7 36.1 (A)   MCV 96.4 99.0 95.5   MCH 32.2 32.8 (A) 32.3 (A)   MCHC 33.4 33.1 33.8   RDW 12.3 14.1 13.6   Platelets 299 259 228   (A) Abnormal value            Lipid Panel    Lipid Panel 6/23/22 7/20/22 12/20/22   Total Cholesterol  219 (A) 197   Triglycerides 51 77 65   HDL Cholesterol 75 92 86   VLDL Cholesterol  13    LDL Cholesterol  99 114 (A) 98   (A) Abnormal value       Comments are available for some  flowsheets but are not being displayed.           TSH    TSH 7/20/22 10/24/22 12/20/22   TSH 2.660 3.770 6.440 (A)   (A) Abnormal value            Data reviewed: Recent hospitalization notes Reviewed hospitalization from 12/19-12/20 at [facility]     Admitted for concern of CVA, MRI appeaed to be negative.      Latest Reference Range & Units 12/20/22 02:31   Hemoglobin A1C 4.0 - 6.0 % 5.6 (E)   TSH Baseline 0.270 - 4.200 uIU/mL 6.440 (H) (E)   Total Cholesterol 160 - 199 mg/dL 197 (E)   HDL Cholesterol 65 - 121 mg/dL 86 (E)   LDL Cholesterol  <100 mg/dL 98 (E)   Triglycerides 0 - 149 mg/dL 65 (E)   WBC 4.8 - 10.8 K/uL 5.2 (E)   RBC 4.20 - 5.40 M/uL 3.78 (L) (E)   Hemoglobin 12.0 - 16.0 g/dL 12.2 (E)   Hematocrit 37.0 - 47.0 % 36.1 (L) (E)   RDW 11.5 - 14.5 % 13.6 (E)   MCV 81.0 - 99.0 fL 95.5 (E)   MCH 27.0 - 31.0 pg 32.3 (H) (E)   MCHC 33.0 - 37.0 g/dL 33.8 (E)   MPV 9.4 - 12.3 fL 9.8 (E)   Platelets 130 - 400 K/uL 228 (E)   (H): Data is abnormally high  (L): Data is abnormally low  (E): External lab result                 Follow Up   Return in about 6 months (around 7/18/2023), or if symptoms worsen or fail to improve, for Recheck, Annual physical.  Patient was given instructions and counseling regarding her condition or for health maintenance advice. Please see specific information pulled into the AVS if appropriate.       CHRISTOPHE Miles MD, FACP, FHM      Electronically signed by Scotty Miles MD, 01/18/23, 8:30 PM CST.

## 2023-01-30 ENCOUNTER — OFFICE VISIT (OUTPATIENT)
Dept: CARDIOLOGY CLINIC | Age: 88
End: 2023-01-30
Payer: MEDICARE

## 2023-01-30 VITALS
SYSTOLIC BLOOD PRESSURE: 122 MMHG | OXYGEN SATURATION: 98 % | BODY MASS INDEX: 19.14 KG/M2 | HEART RATE: 80 BPM | DIASTOLIC BLOOD PRESSURE: 78 MMHG | HEIGHT: 62 IN | WEIGHT: 104 LBS

## 2023-01-30 DIAGNOSIS — E78.5 DYSLIPIDEMIA: ICD-10-CM

## 2023-01-30 DIAGNOSIS — I10 ESSENTIAL HYPERTENSION: Primary | ICD-10-CM

## 2023-01-30 PROCEDURE — 1036F TOBACCO NON-USER: CPT | Performed by: NURSE PRACTITIONER

## 2023-01-30 PROCEDURE — G8427 DOCREV CUR MEDS BY ELIG CLIN: HCPCS | Performed by: NURSE PRACTITIONER

## 2023-01-30 PROCEDURE — 1090F PRES/ABSN URINE INCON ASSESS: CPT | Performed by: NURSE PRACTITIONER

## 2023-01-30 PROCEDURE — 99214 OFFICE O/P EST MOD 30 MIN: CPT | Performed by: NURSE PRACTITIONER

## 2023-01-30 PROCEDURE — G8420 CALC BMI NORM PARAMETERS: HCPCS | Performed by: NURSE PRACTITIONER

## 2023-01-30 PROCEDURE — 1123F ACP DISCUSS/DSCN MKR DOCD: CPT | Performed by: NURSE PRACTITIONER

## 2023-01-30 PROCEDURE — G8484 FLU IMMUNIZE NO ADMIN: HCPCS | Performed by: NURSE PRACTITIONER

## 2023-01-30 ASSESSMENT — ENCOUNTER SYMPTOMS
COUGH: 0
SORE THROAT: 0
WHEEZING: 0
SHORTNESS OF BREATH: 0
CHEST TIGHTNESS: 0

## 2023-01-30 NOTE — PROGRESS NOTES
[facility]   Established Patient Office Visit   [address] [facility]  [phone]        OFFICE VISIT:  2023    [facility] - : 11/10/1926    Reason For Visit:  Craig Napier is a 80 y.o. female who is here for 6 Month Follow-Up    1. Essential hypertension    2. Dyslipidemia      Patient with a history of hypertension, very small ventricular septal defect seen on echocardiogram in 2019, dyslipidemia and palpitations. She was last seen in our office 2 years ago by Dr. Tino Sheriff        Patient had a hospitalization on 2022 for  complaints of loss of consciousness at home.  had reported he helped the patient to the floor she did not fall.  felt patient had been out for about 5 minutes. There was no seizure activity. Work-up in the emergency department proBNP 2000, troponin 0.11, and urinalysis showed moderate leukocyte Estrace. Chest x-ray showed COPD otherwise no acute pathology. CT of the abdomen was unremarkable. Cardiology was consulted and recommended Zio patch at discharge. Telemetry did note a 17-second incident of SVT during her hospitalization and patient was asymptomatic with this. She was treated with antibiotics for her urinary tract infection. ZIO Patch showed:  Test Date: 2022     Analysis Date: 7/3/2022     Date Interpreted: 2022          1. Nearly 8 days and 21 hours of rhythm are processed. 2.  The underlying rhythm is normal sinus rhythm at a mean heart rate of 74 bpm, with a range of 55 to 130 bpm.     3.  First-degree AV block and bundle branch block/IVCD was present. No other significant heart block or pauses. 4.  17 runs of supraventricular tachycardia occurred.   The run with the fastest interval lasted 4 beats at a maximum rate of 179 bpm.  The longest run lasted 41 seconds and an average rate 153 bpm.     5.  Isolated supraventricular ectopics were rare (less than 1%) with rare couplets and rare triplets. Isolated ventricular ectopics were rare (less than 1% with no couplets or triplets. 6.  No atrial fibrillation or ventricular tachycardia noted. Electronically signed by Alexis Cruz MD on 7/12/22     2D echo on 6/22/2022 showed:   Summary   Mitral valve leaflets are mildly thickened with preserved leaflet   mobility. Mild mitral regurgitation is present. Mildly thickened aortic valve leaflets with preserved leaflet mobility. Tricuspid valve is structurally normal.   Mild tricuspid regurgitation with estimated RVSP of 27 mm Hg. Normal left ventricular size with preserved LV function and an estimated   ejection fraction of approximately 55-60%. Mild concentric left ventricular hypertrophy. Impaired relaxation compatible with diastolic dysfunction. ( reversed E/A ratio)      Signature      ----------------------------------------------------------------   Electronically signed by Will Vaughn MD(Interpreting   physician) on 06/22/2022 05:46 PM   ----------------------------------------------------------------     Had a recent hospitalization on 12/19/2022 with complaints of speech difficulty. He described the patient with expressive aphasia. Neurology was consulted. CT of the head nonspecific but presumed microvascular changes and the periventricular white matter. CT of head neck with no significant intracranial arterial abnormalities. TTE with bubble study:   EF 60 to 65%. No regional wall motion abnormalities. No evidence of significant intra-arterial communication by agitated saline study or color flow Doppler. Patient was discharged. Patient presents to clinic today for routine follow-up. She denies any complaints of chest pain, pressure or tightness. There is no shortness of breath, orthopnea or PND. Patient denies any lightheadedness, dizziness or syncope.     Ke Winkler is a 80 y.o. female with the following history as recorded in [facility]:    Patient Active Problem List    Diagnosis Date Noted    Aphasia 12/20/2022    Neuropathy 12/20/2022    Acquired hypothyroidism 12/20/2022    TIA (transient ischemic attack) 12/20/2022    Ischemic stroke ([facility] [address] 12/19/2022    Palliative care patient 06/23/2022    Elevated brain natriuretic peptide (BNP) level 06/22/2022    Acute cystitis without hematuria 03/28/2022    UTI (urinary tract infection) 03/25/2022    Ventricular septal defect 11/08/2019    Anxiety 10/02/2019    Nonrheumatic mitral valve regurgitation 10/02/2019    Syncope and collapse 09/23/2019    Vertebrobasilar artery syndrome     Ataxia     Tremor     Palpitations     Lumbar spinal stenosis     Nonrheumatic tricuspid valve regurgitation     Lumbar facet arthropathy 01/19/2016    Shortness of breath 05/20/2015    Dizziness 05/20/2015    Abnormal echocardiogram 05/20/2015    Hyperlipidemia     HTN (hypertension)     Chest tightness or pressure 08/26/2013     Current Outpatient Medications   Medication Sig Dispense Refill    atorvastatin (LIPITOR) 20 MG tablet Take 1 tablet by mouth nightly 30 tablet 0    estrogens conjugated (PREMARIN) 0.625 MG/GM CREA vaginal cream Place small amountt on end of finger and apply to urethra three times per week 30 g 3    aspirin 81 MG chewable tablet Take 1 tablet by mouth daily 30 tablet 3    bumetanide (BUMEX) 0.5 MG tablet Take 1 tablet by mouth daily as needed (weight gain greater than 3 pounds overnight or 5 pounds in 1 week) 30 tablet 3    vitamin C (ASCORBIC ACID) 500 MG tablet Take 500 mg by mouth daily      docusate sodium (COLACE) 100 MG capsule Take 100 mg by mouth 2 times daily       LORazepam (ATIVAN) 0.5 MG tablet Take 0.5 mg by mouth nightly.       levothyroxine (SYNTHROID) 25 MCG tablet Take 2 tablets by mouth Daily (Patient taking differently: Take 25 mcg by mouth Daily) 30 tablet 3    omeprazole (PRILOSEC) 20 MG capsule Take 20 mg by mouth 2 times daily      Multiple Vitamins-Minerals (CENTRUM SILVER ADULT 50+) TABS Take 1 tablet by mouth daily       Magnesium Oxide (WHIPPLE PO) Take 2 tablets by mouth daily       candesartan (ATACAND) 32 MG tablet Take 32 mg by mouth daily      Psyllium (METAMUCIL PO) Take 2 tablets by mouth daily        No current facility-administered medications for this visit. Allergies: Ampicillin, Bactrim [sulfamethoxazole-trimethoprim], Cephalosporins, Ciprocinonide [fluocinolone], Codeine, Cymbalta [duloxetine hcl], Doxycycline, Enablex [darifenacin hydrobromide er], Gabapentin, Hctz [hydrochlorothiazide], Keppra [levetiracetam], Levaquin [levofloxacin in d5w], Lyrica [pregabalin], Pyridium [phenazopyridine hcl], Quinolones, and Sulfa antibiotics  Past Medical History:   Diagnosis Date    Anxiety 10/02/2019    Chest tightness or pressure 08/26/2013 8/26/2013  lexiscan negative for myocardial ischemia, EF 61%    Edema     Essential and other specified forms of tremor     Generalized anxiety disorder     HTN (hypertension)     Hyperlipidemia     Hypertension     Insomnia, unspecified     Neuropathy     both legs up to both hips    Other and unspecified ovarian cyst     Other left bundle branch block     Palliative care patient 06/23/2022    Pure hypercholesterolemia     Restless legs syndrome (RLS)     Solitary cyst of breast     Spinal stenosis, lumbar region, without neurogenic claudication     Unspecified hypothyroidism     Urinary frequency     UTI (urinary tract infection) 03/25/2022     Past Surgical History:   Procedure Laterality Date    APPENDECTOMY      CARDIAC CATHETERIZATION  5/26/15  MDL    with aortic root injection.  EF 60%    CATARACT REMOVAL      CHOLECYSTECTOMY      CYST INCISION AND DRAINAGE      drained liver cyst    HEMORRHOID SURGERY      HYSTERECTOMY (CERVIX STATUS UNKNOWN)      NERVE BLOCK LUMB FACET LEVEL 1 BILATERAL Bilateral 1/19/2016    LUMBAR FACET CATE L4-5  performed by Aristeo Hoang at [facility] OR     Family History   Problem Relation Age of Onset    Other Father stroke, MI,  46     Social History     Tobacco Use    Smoking status: Never    Smokeless tobacco: Never   Substance Use Topics    Alcohol use: No          Review of Systems:    Review of Systems   Constitutional:  Negative for activity change, chills, diaphoresis, fatigue and fever. HENT:  Negative for congestion and sore throat. Respiratory:  Negative for cough, chest tightness, shortness of breath and wheezing. Cardiovascular:  Negative for chest pain, palpitations and leg swelling. Neurological:  Negative for dizziness, syncope, light-headedness and headaches. Psychiatric/Behavioral:  Negative for confusion. The patient is not nervous/anxious. Objective:    /78   Pulse 80   Ht 5' 2\" (1.575 m)   Wt 104 lb (47.2 kg)   LMP  (LMP Unknown)   SpO2 98%   BMI 19.02 kg/m²     GENERAL - well developed and well nourished, conversant  HEENT -  PERRLA, Hearing appears normal, gentleman lids are normal.  External inspection of ears and nose appear normal.  NECK - no thyromegaly, no JVD, trachea is in the midline  CARDIOVASCULAR - PMI is in the mid line clavicular position, Normal S1 and S2 with no  systolic murmur. No S3 or S4    PULMONARY - no respiratory distress. No wheezes or rales. Lungs are clear to ausculation, normal respiratory effort. ABDOMEN  - soft, non tender, no rebound  MUSCULOSKELETAL  - range of motion of the upper and lower extermites appears normal and equal and is without pain   EXTREMITIES - no significant edema   NEUROLOGIC - gait and station are normal  SKIN - turgor is normal, can warm and dry.   PSYCHIATRIC - normal mood and affect, alert and orientated x 3,      ASSESSMENT:    ALL THE CARDIOLOGY PROBLEMS ARE LISTED ABOVE; HOWEVER, THE FOLLOWING SPECIFIC CARDIAC PROBLEMS / CONDITIONS WERE ADDRESSED AND TREATED DURING THE OFFICE VISIT TODAY:                                                                                            MEDICAL DECISION MAKING Cardiac Specific Problem / Diagnosis  Discussion and Data Reviewed Diagnostic Procedures Ordered Management Options Selected           1. Hypertension  Well Controlled   Review and summation of old records:    Pressure in the office today 122/78. O2 sat 98%. She is on Atacand 32 mg daily. Bumex 0.5 mg as needed. No Continue current medications:     Yes           2. Dyslipidemia  Stable   Patient is on Lipitor 20 mg nightly. No Continue current medications: Yes                                     No orders of the defined types were placed in this encounter. No orders of the defined types were placed in this encounter. Discussed with patient and spouse. Return in about 1 year (around 1/30/2024) for Yearly with me . I greatly appreciate the opportunity to meet Ada Welch and your confidence in allowing me to participate in her cardiovascular care. JERE Higgins NP  1/30/2023 10:30 AM CST                    This dictation was generated by voice recognition computer software. Although all attempts are made to edit dictation for accuracy, there may be errors in the transcription that are not intended.

## 2023-03-06 RX ORDER — BUMETANIDE 0.5 MG/1
0.5 TABLET ORAL DAILY PRN
Status: CANCELLED | OUTPATIENT
Start: 2023-03-06

## 2023-03-07 ENCOUNTER — OFFICE VISIT (OUTPATIENT)
Dept: INTERNAL MEDICINE | Facility: CLINIC | Age: 88
End: 2023-03-07
Payer: MEDICARE

## 2023-03-07 VITALS
HEIGHT: 62 IN | SYSTOLIC BLOOD PRESSURE: 156 MMHG | BODY MASS INDEX: 16.56 KG/M2 | HEART RATE: 80 BPM | WEIGHT: 90 LBS | RESPIRATION RATE: 18 BRPM | DIASTOLIC BLOOD PRESSURE: 90 MMHG | OXYGEN SATURATION: 99 % | TEMPERATURE: 98.6 F

## 2023-03-07 DIAGNOSIS — R30.0 BURNING WITH URINATION: ICD-10-CM

## 2023-03-07 DIAGNOSIS — R60.9 EDEMA, UNSPECIFIED TYPE: ICD-10-CM

## 2023-03-07 DIAGNOSIS — N30.91 CYSTITIS WITH HEMATURIA: Primary | ICD-10-CM

## 2023-03-07 DIAGNOSIS — R11.0 NAUSEA: ICD-10-CM

## 2023-03-07 LAB
BILIRUB BLD-MCNC: NEGATIVE MG/DL
CLARITY, POC: ABNORMAL
COLOR UR: YELLOW
EXPIRATION DATE: ABNORMAL
GLUCOSE UR STRIP-MCNC: NEGATIVE MG/DL
KETONES UR QL: NEGATIVE
LEUKOCYTE EST, POC: ABNORMAL
Lab: ABNORMAL
NITRITE UR-MCNC: NEGATIVE MG/ML
PH UR: 5 [PH] (ref 5–8)
PROT UR STRIP-MCNC: NEGATIVE MG/DL
RBC # UR STRIP: ABNORMAL /UL
SP GR UR: 1.02 (ref 1–1.03)
UROBILINOGEN UR QL: ABNORMAL

## 2023-03-07 PROCEDURE — 99214 OFFICE O/P EST MOD 30 MIN: CPT

## 2023-03-07 PROCEDURE — 81003 URINALYSIS AUTO W/O SCOPE: CPT

## 2023-03-07 RX ORDER — BUMETANIDE 0.5 MG/1
0.5 TABLET ORAL DAILY PRN
Qty: 30 TABLET | Refills: 3 | Status: SHIPPED | OUTPATIENT
Start: 2023-03-07

## 2023-03-07 RX ORDER — ONDANSETRON 4 MG/1
4 TABLET, ORALLY DISINTEGRATING ORAL EVERY 8 HOURS PRN
Qty: 40 TABLET | Refills: 0 | Status: SHIPPED | OUTPATIENT
Start: 2023-03-07

## 2023-03-07 RX ORDER — CIPROFLOXACIN 500 MG/1
500 TABLET, FILM COATED ORAL 2 TIMES DAILY
Qty: 10 TABLET | Refills: 0 | Status: SHIPPED | OUTPATIENT
Start: 2023-03-07 | End: 2023-03-12

## 2023-03-07 NOTE — PROGRESS NOTES
Subjective   Meenu Arteaga is a 89 y.o. female.   Chief Complaint   Patient presents with   • Urinary Tract Infection     Pt has cloudy urine and has burning when urinating X4 weeks.         History of Present Illness   Ms. Arteaga presents here today with complaints of cloudy urine, dysuria, pelvic discomfort, urgency x4 weeks with increased severity.  She has had a long history of recurrent urinary tract infections, pelvic discomfort, dysuria and urgency, however feels that it has been progressively getting worse over the last month.  She denies any flank pain, reports no fevers, chills, denies any decreased appetite.  She reports that she feels lousy most of the time, her  reports that this is not abnormal for her, reports that she is always fatigued and this also is not new.  She denies any diarrhea or vomiting or nausea.  She reports that when she takes antibiotics she always gets nauseated and if any are prescribed today she will need medicine for this.  She reports always feeling bloated, she states that she feels like her stomach is very distended.  She states that she usually has to take medication such as milk of magnesia to get her bowels to move, she states that she stays on top of this and thus her bowels move regularly.  She reports that she had a procedure done when she was around 20 years old and has a very small rectum and because of this she also has difficulty passing stool.  She has previously had a work-up with urology, saw them last spring.  She saw gynecology back in 2019 for continued pelvic discomfort.  Her blood pressures elevated today at 156/90, she states that this does occur intermittently and she rushed really fast to get here today.  She denies any chest pain, shortness of breath, headache.  The following portions of the patient's history were reviewed and updated as appropriate: allergies, current medications, past family history, past medical history, past social history, past  surgical history and problem list.    Review of Systems    Objective   Past Medical History:   Diagnosis Date   • Arthritis    • GERD (gastroesophageal reflux disease)    • Hyperlipidemia    • Hypothyroidism    • Liver cyst    • Neuropathy    • Ovarian cyst       Past Surgical History:   Procedure Laterality Date   • ABDOMINAL HYSTERECTOMY     • APPENDECTOMY     • BREAST BIOPSY     • CHOLECYSTECTOMY     • COLONOSCOPY  03/05/2008    normal   • COLONOSCOPY  01/12/2012   • ENDOSCOPY  08/29/2013    normal   • ENDOSCOPY  01/23/2012    eus with stacy  normal endoscopic ultrascope of the pancreas.fortunately there was no mass seen   • ENDOSCOPY N/A 8/3/2018    Procedure: ESOPHAGOGASTRODUODENOSCOPY WITH ANESTHESIA;  Surgeon: Obed Patrick DO;  Location: [facility] ENDOSCOPY;  Service: Gastroenterology   • HEMORRHOIDECTOMY     • SKIN CANCER EXCISION  11/27/2021    left arm and left leg    • UPPER GASTROINTESTINAL ENDOSCOPY  08/29/2013        Current Outpatient Medications:   •  amLODIPine (NORVASC) 5 MG tablet, Take 1 tablet by mouth 2 (Two) Times a Day., Disp: 60 tablet, Rfl: 1  •  Ascorbic Acid (VITAMIN C PO), Daily., Disp: , Rfl:   •  aspirin 81 MG chewable tablet, Chew 1 tablet Daily., Disp: , Rfl:   •  atorvastatin (Lipitor) 10 MG tablet, Take 1 tablet by mouth Daily., Disp: 90 tablet, Rfl: 1  •  bumetanide (BUMEX) 0.5 MG tablet, Take 1 tablet by mouth Daily As Needed (edema)., Disp: 30 tablet, Rfl: 3  •  candesartan (ATACAND) 32 MG tablet, Take 1 tablet by mouth Daily., Disp: 90 tablet, Rfl: 3  •  Diclofenac Sodium powder, Take As Directed., Disp: , Rfl:   •  docusate sodium 100 MG capsule, Take 1 capsule by mouth., Disp: , Rfl:   •  Estrogens Conjugated (Premarin) 0.625 MG/GM vaginal cream, 2 (Two) Times a Week., Disp: , Rfl:   •  gabapentin (NEURONTIN) 100 MG capsule, Take 1 capsule by mouth 3 (Three) Times a Day., Disp: 30 capsule, Rfl: 1  •  levothyroxine (SYNTHROID, LEVOTHROID) 25 MCG tablet, Take 1 tablet by  "mouth Daily., Disp: 90 tablet, Rfl: 3  •  LORazepam (ATIVAN) 0.5 MG tablet, TAKEK ONE-FOURTH TABLET BY MOUTH TWO TIMES A DAY DURING THE DAY AND ONE-HALF TABLET BY MOUTH EVERY NIGHT AT BEDTIME, Disp: 30 tablet, Rfl: 5  •  Multiple Vitamins-Minerals (CENTRUM SILVER ADULT 50+) tablet, Take 1 tablet by mouth daily, Disp: , Rfl:   •  omeprazole (priLOSEC) 20 MG capsule, Take 1 capsule by mouth 2 (Two) Times a Day., Disp: 180 capsule, Rfl: 3  •  Psyllium (METAMUCIL FIBER PO), Take 1 package by mouth As Needed., Disp: , Rfl:   •  senna (Senokot) 8.6 MG tablet, Take 1 tablet by mouth Daily., Disp: 30 tablet, Rfl: 1  •  simethicone (Gas-X) 80 MG chewable tablet, Chew 1 tablet Every 6 (Six) Hours As Needed for Flatulence (Bloating)., Disp: , Rfl:   •  vitamin D (ERGOCALCIFEROL) 1.25 MG (56879 UT) capsule capsule, TAKE 1 CAPSULE BY MOUTH EVERY OTHER WEEK, Disp: 6 capsule, Rfl: 3  •  ciprofloxacin (Cipro) 500 MG tablet, Take 1 tablet by mouth 2 (Two) Times a Day for 5 days., Disp: 10 tablet, Rfl: 0  •  ondansetron ODT (ZOFRAN-ODT) 4 MG disintegrating tablet, Place 1 tablet on the tongue Every 8 (Eight) Hours As Needed for Nausea or Vomiting., Disp: 40 tablet, Rfl: 0      /90 (BP Location: Left arm, Patient Position: Sitting, Cuff Size: Adult)   Pulse 80   Temp 98.6 °F (37 °C) (Infrared)   Resp 18   Ht 157.5 cm (62\")   Wt 40.8 kg (90 lb)   LMP  (LMP Unknown)   SpO2 99%   BMI 16.46 kg/m²      Body mass index is 16.46 kg/m².         Physical Exam  Vitals and nursing note reviewed.   Constitutional:       General: She is not in acute distress.     Appearance: Normal appearance. She is underweight. She is not ill-appearing, toxic-appearing or diaphoretic.   HENT:      Head: Normocephalic and atraumatic.   Eyes:      Extraocular Movements: Extraocular movements intact.      Conjunctiva/sclera: Conjunctivae normal.      Pupils: Pupils are equal, round, and reactive to light.   Cardiovascular:      Rate and Rhythm: Normal " rate and regular rhythm.      Pulses: Normal pulses.      Heart sounds: Normal heart sounds.   Pulmonary:      Effort: Pulmonary effort is normal.      Breath sounds: Normal breath sounds.   Abdominal:      General: There is distension.      Palpations: There is no mass.      Tenderness: There is abdominal tenderness. There is no guarding or rebound.      Hernia: No hernia is present.      Comments: Bowel sounds are hyperactive, tenderness on palpation throughout abdomen, more specifically so in the pelvic region   Musculoskeletal:      Cervical back: Normal range of motion and neck supple.      Right lower leg: No edema.      Left lower leg: No edema.   Skin:     General: Skin is warm and dry.      Capillary Refill: Capillary refill takes less than 2 seconds.   Neurological:      General: No focal deficit present.      Mental Status: She is alert and oriented to person, place, and time. Mental status is at baseline.      Motor: Weakness present.   Psychiatric:         Mood and Affect: Mood normal.         Behavior: Behavior normal. Behavior is cooperative.         Thought Content: Thought content normal.         Judgment: Judgment normal.               Assessment & Plan   Diagnoses and all orders for this visit:    1. Cystitis with hematuria (Primary)  -     ciprofloxacin (Cipro) 500 MG tablet; Take 1 tablet by mouth 2 (Two) Times a Day for 5 days.  Dispense: 10 tablet; Refill: 0  -     Urine Culture - Urine, Urine, Clean Catch    2. Burning with urination  -     POCT urinalysis dipstick, automated  -     Basic metabolic panel  -     CBC w AUTO Differential  -     Urine Culture - Urine, Urine, Clean Catch    3. Nausea  -     ondansetron ODT (ZOFRAN-ODT) 4 MG disintegrating tablet; Place 1 tablet on the tongue Every 8 (Eight) Hours As Needed for Nausea or Vomiting.  Dispense: 40 tablet; Refill: 0    4. Edema, unspecified type  -     bumetanide (BUMEX) 0.5 MG tablet; Take 1 tablet by mouth Daily As Needed (edema).   "Dispense: 30 tablet; Refill: 3               Plan of care reviewed with Ms. Arteaga and Mr. Arteaga  Symptoms seem to correlate with acute cystitis along with chronic inflammatory cystitis.  It appears the antibiotic she tolerates the best the Cipro, we will send this in along with some Zofran since she does get nauseated with antibiotic use, advised to take the Zofran 45 minutes prior to antibiotic administration.   I have advised to eliminate all liquids except for water over the next 7 days to avoid any additional irritants, advised to increase water intake, her urine today presents with cloudiness, large amount of leukocytes, large amount of blood and some urobilinogen, is negative for nitrites.  We will send for culture.  I advised that she could take Aleve 1 tablet every 6 hours as needed over the next 5 days for discomfort, typically utilizes only Tylenol for pain.  She denies any obvious hematuria or blood when wiping.  Denies any bloody or black tarry stools.  She feels that the Bumex helps not only with preventing edema in her lower extremities or fluid accumulation in her lungs but also with the symptoms in her bladder, I suspect that this is because it helps with increased urine production and helps to \"flush \"the bladder increasing suspicion for chronic inflammation of the lining.  I did advise that if symptoms do not improve over the next couple days they are to reach out, would consider referring her back to urology for further evaluation.  She reports compliance with primary and use at this time.   She has not extensively trialed Gas-X for the bloating, I have advised that she do so.    She will keep her next scheduled appointment for now.    Please note that portions of this note were completed with a voice recognition program.     Electronically signed by CHAIM Figueroa, 03/07/23, 10:28 CST.  "

## 2023-03-08 LAB
BASOPHILS # BLD AUTO: 0.06 10*3/MM3 (ref 0–0.2)
BASOPHILS NFR BLD AUTO: 1 % (ref 0–1.5)
BUN SERPL-MCNC: 18 MG/DL (ref 8–23)
BUN/CREAT SERPL: 23.4 (ref 7–25)
CALCIUM SERPL-MCNC: 10.2 MG/DL (ref 8.2–9.6)
CHLORIDE SERPL-SCNC: 96 MMOL/L (ref 98–107)
CO2 SERPL-SCNC: 27.4 MMOL/L (ref 22–29)
CREAT SERPL-MCNC: 0.77 MG/DL (ref 0.57–1)
EGFRCR SERPLBLD CKD-EPI 2021: 70.7 ML/MIN/1.73
EOSINOPHIL # BLD AUTO: 0.03 10*3/MM3 (ref 0–0.4)
EOSINOPHIL NFR BLD AUTO: 0.5 % (ref 0.3–6.2)
ERYTHROCYTE [DISTWIDTH] IN BLOOD BY AUTOMATED COUNT: 12.8 % (ref 12.3–15.4)
GLUCOSE SERPL-MCNC: 96 MG/DL (ref 65–99)
HCT VFR BLD AUTO: 38 % (ref 34–46.6)
HGB BLD-MCNC: 12.9 G/DL (ref 12–15.9)
IMM GRANULOCYTES # BLD AUTO: 0.02 10*3/MM3 (ref 0–0.05)
IMM GRANULOCYTES NFR BLD AUTO: 0.3 % (ref 0–0.5)
LYMPHOCYTES # BLD AUTO: 0.59 10*3/MM3 (ref 0.7–3.1)
LYMPHOCYTES NFR BLD AUTO: 9.5 % (ref 19.6–45.3)
MCH RBC QN AUTO: 32 PG (ref 26.6–33)
MCHC RBC AUTO-ENTMCNC: 33.9 G/DL (ref 31.5–35.7)
MCV RBC AUTO: 94.3 FL (ref 79–97)
MONOCYTES # BLD AUTO: 0.73 10*3/MM3 (ref 0.1–0.9)
MONOCYTES NFR BLD AUTO: 11.7 % (ref 5–12)
NEUTROPHILS # BLD AUTO: 4.81 10*3/MM3 (ref 1.7–7)
NEUTROPHILS NFR BLD AUTO: 77 % (ref 42.7–76)
NRBC BLD AUTO-RTO: 0 /100 WBC (ref 0–0.2)
PLATELET # BLD AUTO: 259 10*3/MM3 (ref 140–450)
POTASSIUM SERPL-SCNC: 3.5 MMOL/L (ref 3.5–5.2)
RBC # BLD AUTO: 4.03 10*6/MM3 (ref 3.77–5.28)
SODIUM SERPL-SCNC: 136 MMOL/L (ref 136–145)
WBC # BLD AUTO: 6.24 10*3/MM3 (ref 3.4–10.8)

## 2023-03-09 LAB
BACTERIA UR CULT: NORMAL
BACTERIA UR CULT: NORMAL

## 2023-03-29 ENCOUNTER — OFFICE VISIT (OUTPATIENT)
Dept: INTERNAL MEDICINE | Facility: CLINIC | Age: 88
End: 2023-03-29
Payer: MEDICARE

## 2023-03-29 VITALS
BODY MASS INDEX: 16.75 KG/M2 | HEART RATE: 89 BPM | OXYGEN SATURATION: 100 % | WEIGHT: 91 LBS | SYSTOLIC BLOOD PRESSURE: 140 MMHG | DIASTOLIC BLOOD PRESSURE: 78 MMHG | TEMPERATURE: 96.2 F | HEIGHT: 62 IN

## 2023-03-29 DIAGNOSIS — E06.3 HYPOTHYROIDISM DUE TO HASHIMOTO'S THYROIDITIS: ICD-10-CM

## 2023-03-29 DIAGNOSIS — F41.9 ANXIETY: ICD-10-CM

## 2023-03-29 DIAGNOSIS — R82.90 ABNORMAL URINE: ICD-10-CM

## 2023-03-29 DIAGNOSIS — E03.8 HYPOTHYROIDISM DUE TO HASHIMOTO'S THYROIDITIS: ICD-10-CM

## 2023-03-29 DIAGNOSIS — E55.9 VITAMIN D DEFICIENCY: ICD-10-CM

## 2023-03-29 PROCEDURE — 81003 URINALYSIS AUTO W/O SCOPE: CPT | Performed by: INTERNAL MEDICINE

## 2023-03-29 PROCEDURE — 99214 OFFICE O/P EST MOD 30 MIN: CPT | Performed by: INTERNAL MEDICINE

## 2023-03-29 RX ORDER — LORAZEPAM 0.5 MG/1
0.5 TABLET ORAL
Qty: 30 TABLET | Refills: 5 | Status: SHIPPED | OUTPATIENT
Start: 2023-03-29

## 2023-03-29 RX ORDER — ACETAMINOPHEN 500 MG
500 TABLET ORAL AS NEEDED
COMMUNITY

## 2023-03-29 NOTE — PROGRESS NOTES
"      Chief Complaint  Med Refill (Pt here to discuss medication), Fatigue (Pt experiences fatigue before lunch and after supper), Dizziness (Pt experiences dizziness when getting up), Memory Loss (Pt stated she has trouble remembering things and trouble concentrating), Bloated (Pt experiences bloating in lower abdomen and states its uncomfortable), and Difficulty Urinating (Pt states she has trouble urinating)    Subjective        Meenu Arteaga presents to [facility] PRIMARY [facility]ARE    HPI  Patient seen for the above problems.  Memory continues to worsen.  I think that part of the reason her memory is worsening, it is due to her age as well as continuing to have worsening hearing.  Patient does have some dizziness when standing up.  Blood pressure looks okay.  Patient having more fatigue than previous.  She was taking a whole Ativan at night, recommended that they cut that back to half.  Patient cannot sleep without it and has been on it for many years, do not think discontinuing will be helpful.  They are aware of the risks given her age being on this medication.  Patient has a little bit of bloating.  UA was performed and shown to be negative.    Review of Systems    Objective   Vital Signs:  /78 (BP Location: Left arm, Patient Position: Sitting, Cuff Size: Adult)   Pulse 89   Temp 96.2 °F (35.7 °C) (Temporal)   Ht 157.5 cm (62\")   Wt 41.3 kg (91 lb)   SpO2 100%   BMI 16.64 kg/m²   Estimated body mass index is 16.64 kg/m² as calculated from the following:    Height as of this encounter: 157.5 cm (62\").    Weight as of this encounter: 41.3 kg (91 lb).      Physical Exam  Vitals reviewed.   Constitutional:       Appearance: She is not ill-appearing.   Cardiovascular:      Rate and Rhythm: Normal rate.      Heart sounds: Murmur heard.   Pulmonary:      Effort: Pulmonary effort is normal. No respiratory distress.   Neurological:      General: No focal deficit present.      Mental Status: " She is alert and oriented to person, place, and time.   Psychiatric:         Mood and Affect: Mood normal.         Behavior: Behavior normal.                     Assessment and Plan   Diagnoses and all orders for this visit:    1. Anxiety  -     LORazepam (ATIVAN) 0.5 MG tablet; Take 1 tablet by mouth every night at bedtime.  Dispense: 30 tablet; Refill: 5    2. Hypothyroidism due to Hashimoto's thyroiditis  -     Vitamin B12  -     TSH Rfx On Abnormal To Free T4    3. Vitamin D deficiency  -     Vitamin D 25 hydroxy    4. Abnormal urine  -     POC Urinalysis Dipstick, Automated      For the patient's history of hypothyroidism, its been a while since we checked her level, given the worsening fatigue, we will trial checking her TSH to see if the medication needs to be adjusted.  Patient is also not had her B12 checked in a while, we will have this checked today as well.  The patient is having some burning urination, urinalysis was noted to be normal.    Patient has a history of vitamin D deficiency, and has not been routinely taking her vitamin D.  We will check this to see if that is playing a role.    Patient is any eating and drinking some.  But I think some of her fatigue may be related to just progressively worsening age as well as her dementia.  She is sleeping well.  Sleeping all night.  Remains fatigued.        Result Review :                      Follow Up   Return if symptoms worsen or fail to improve, for Next scheduled follow up, Medicare Wellness.  Patient was given instructions and counseling regarding her condition or for health maintenance advice. Please see specific information pulled into the AVS if appropriate.       CHRISTOPHE Miles MD, FACP, [facility]      Electronically signed by Scotty Miles MD, 03/29/23, 2:39 PM CDT.

## 2023-03-30 ENCOUNTER — TELEPHONE (OUTPATIENT)
Dept: INTERNAL MEDICINE | Facility: CLINIC | Age: 88
End: 2023-03-30

## 2023-03-30 LAB
25(OH)D3+25(OH)D2 SERPL-MCNC: 71.3 NG/ML (ref 30–100)
BILIRUB BLD-MCNC: NEGATIVE MG/DL
CLARITY, POC: CLEAR
COLOR UR: YELLOW
EXPIRATION DATE: ABNORMAL
GLUCOSE UR STRIP-MCNC: NEGATIVE MG/DL
KETONES UR QL: NEGATIVE
LEUKOCYTE EST, POC: NEGATIVE
Lab: ABNORMAL
NITRITE UR-MCNC: NEGATIVE MG/ML
PH UR: 6.5 [PH] (ref 5–8)
PROT UR STRIP-MCNC: NEGATIVE MG/DL
RBC # UR STRIP: ABNORMAL /UL
SP GR UR: 1.01 (ref 1–1.03)
TSH SERPL DL<=0.005 MIU/L-ACNC: 4.5 UIU/ML (ref 0.27–4.2)
UROBILINOGEN UR QL: ABNORMAL
VIT B12 SERPL-MCNC: 1495 PG/ML (ref 211–946)

## 2023-03-30 NOTE — TELEPHONE ENCOUNTER
Caller: Alex Arteaga    Relationship: Emergency Contact    Best call back number:  [phone]    What is the best time to reach you:  ANYTIME    Who are you requesting to speak with (clinical staff, provider,  specific staff member):  CLINICAL    What was the call regarding:   REPORTED THAT PATIENT WAS PRESCRIBED CIPROFLOXACIN & THAT INSTRUCTIONS SAY TO TAKE FOR INFECTION.   SAID WIFE'S TEST RESULTS SHOW THAT SHE DOESN'T HAVE AN INFECTION, & WANTS TO KNOW IF WIFE NEEDS TO TAKE THE CIPROFLOXACIN.     Do you require a callback:  YES

## 2023-03-31 ENCOUNTER — TELEPHONE (OUTPATIENT)
Dept: INTERNAL MEDICINE | Facility: CLINIC | Age: 88
End: 2023-03-31
Payer: MEDICARE

## 2023-03-31 NOTE — TELEPHONE ENCOUNTER
Pt  called stating the pill splitter is wrong size and pharmacy won't split it. Per Dr. Miles just stay with the prescribed dose.

## 2023-04-23 ENCOUNTER — APPOINTMENT (OUTPATIENT)
Dept: CT IMAGING | Age: 88
End: 2023-04-23
Payer: MEDICARE

## 2023-04-23 ENCOUNTER — HOSPITAL ENCOUNTER (EMERGENCY)
Age: 88
Discharge: HOME | End: 2023-04-24
Attending: EMERGENCY MEDICINE
Payer: MEDICARE

## 2023-04-23 DIAGNOSIS — S51.811A SKIN TEAR OF RIGHT FOREARM WITHOUT COMPLICATION, INITIAL ENCOUNTER: ICD-10-CM
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
## 2023-04-23 DIAGNOSIS — S01.01XA LACERATION OF SCALP, INITIAL ENCOUNTER: Primary | ICD-10-CM

## 2023-04-23 DIAGNOSIS — S09.90XA CLOSED HEAD INJURY, INITIAL ENCOUNTER: ICD-10-CM

## 2023-04-23 LAB
ALBUMIN SERPL-MCNC: 4.7 G/DL (ref 3.5–5.2)
ALP SERPL-CCNC: 67 U/L (ref 35–104)
ALT SERPL-CCNC: 10 U/L (ref 5–33)
ANION GAP SERPL CALCULATED.3IONS-SCNC: 11 MMOL/L (ref 7–19)
AST SERPL-CCNC: 32 U/L (ref 5–32)
BACTERIA URNS QL MICRO: NEGATIVE /HPF
BASOPHILS # BLD: 0 K/UL (ref 0–0.2)
BASOPHILS NFR BLD: 0.7 % (ref 0–1)
BILIRUB SERPL-MCNC: 0.3 MG/DL (ref 0.2–1.2)
BILIRUB UR QL STRIP: NEGATIVE
BUN SERPL-MCNC: 18 MG/DL (ref 8–23)
CALCIUM SERPL-MCNC: 10 MG/DL (ref 8.2–9.6)
CHLORIDE SERPL-SCNC: 96 MMOL/L (ref 98–111)
CLARITY UR: CLEAR
CO2 SERPL-SCNC: 29 MMOL/L (ref 22–29)
COLOR UR: YELLOW
CREAT SERPL-MCNC: 0.8 MG/DL (ref 0.5–0.9)
CRYSTALS URNS MICRO: ABNORMAL /HPF
EOSINOPHIL # BLD: 0.1 K/UL (ref 0–0.6)
EOSINOPHIL NFR BLD: 1.3 % (ref 0–5)
EPI CELLS #/AREA URNS AUTO: 4 /HPF (ref 0–5)
ERYTHROCYTE [DISTWIDTH] IN BLOOD BY AUTOMATED COUNT: 13.3 % (ref 11.5–14.5)
GLUCOSE BLD-MCNC: 101 MG/DL
GLUCOSE BLD-MCNC: 101 MG/DL (ref 70–99)
GLUCOSE SERPL-MCNC: 99 MG/DL (ref 74–109)
GLUCOSE UR STRIP.AUTO-MCNC: NEGATIVE MG/DL
HCT VFR BLD AUTO: 39.2 % (ref 37–47)
HGB BLD-MCNC: 13 G/DL (ref 12–16)
HGB UR STRIP.AUTO-MCNC: ABNORMAL MG/L
HYALINE CASTS #/AREA URNS AUTO: 1 /HPF (ref 0–8)
IMM GRANULOCYTES # BLD: 0 K/UL
KETONES UR STRIP.AUTO-MCNC: NEGATIVE MG/DL
LEUKOCYTE ESTERASE UR QL STRIP.AUTO: ABNORMAL
LYMPHOCYTES # BLD: 1.5 K/UL (ref 1.1–4.5)
LYMPHOCYTES NFR BLD: 27.1 % (ref 20–40)
MCH RBC QN AUTO: 32.2 PG (ref 27–31)
MCHC RBC AUTO-ENTMCNC: 33.2 G/DL (ref 33–37)
MCV RBC AUTO: 97 FL (ref 81–99)
MONOCYTES # BLD: 0.7 K/UL (ref 0–0.9)
MONOCYTES NFR BLD: 12.2 % (ref 0–10)
NEUTROPHILS # BLD: 3.2 K/UL (ref 1.5–7.5)
NEUTS SEG NFR BLD: 58.5 % (ref 50–65)
NITRITE UR QL STRIP.AUTO: NEGATIVE
PERFORMED ON: ABNORMAL
PH UR STRIP.AUTO: 8 [PH] (ref 5–8)
PLATELET # BLD AUTO: 218 K/UL (ref 130–400)
PMV BLD AUTO: 9.9 FL (ref 9.4–12.3)
POTASSIUM SERPL-SCNC: 3.3 MMOL/L (ref 3.5–5)
PROT SERPL-MCNC: 7.5 G/DL (ref 6.6–8.7)
PROT UR STRIP.AUTO-MCNC: ABNORMAL MG/DL
RBC # BLD AUTO: 4.04 M/UL (ref 4.2–5.4)
RBC #/AREA URNS AUTO: 2 /HPF (ref 0–4)
SARS-COV-2 RDRP RESP QL NAA+PROBE: NOT DETECTED
SODIUM SERPL-SCNC: 136 MMOL/L (ref 136–145)
SP GR UR STRIP.AUTO: 1.01 (ref 1–1.03)
UROBILINOGEN UR STRIP.AUTO-MCNC: 0.2 E.U./DL
WBC # BLD AUTO: 5.4 K/UL (ref 4.8–10.8)
WBC #/AREA URNS AUTO: 7 /HPF (ref 0–5)

## 2023-04-23 PROCEDURE — 80053 COMPREHEN METABOLIC PANEL: CPT

## 2023-04-23 PROCEDURE — 36415 COLL VENOUS BLD VENIPUNCTURE: CPT

## 2023-04-23 PROCEDURE — 6370000000 HC RX 637 (ALT 250 FOR IP): Performed by: EMERGENCY MEDICINE

## 2023-04-23 PROCEDURE — 82962 GLUCOSE BLOOD TEST: CPT

## 2023-04-23 PROCEDURE — 70450 CT HEAD/BRAIN W/O DYE: CPT

## 2023-04-23 PROCEDURE — 12001 RPR S/N/AX/GEN/TRNK 2.5CM/<: CPT

## 2023-04-23 PROCEDURE — 72125 CT NECK SPINE W/O DYE: CPT

## 2023-04-23 PROCEDURE — 81001 URINALYSIS AUTO W/SCOPE: CPT

## 2023-04-23 PROCEDURE — 85025 COMPLETE CBC W/AUTO DIFF WBC: CPT

## 2023-04-23 PROCEDURE — 87635 SARS-COV-2 COVID-19 AMP PRB: CPT

## 2023-04-23 PROCEDURE — 93005 ELECTROCARDIOGRAM TRACING: CPT | Performed by: EMERGENCY MEDICINE

## 2023-04-23 PROCEDURE — 12002 RPR S/N/AX/GEN/TRNK2.6-7.5CM: CPT

## 2023-04-23 PROCEDURE — 99284 EMERGENCY DEPT VISIT MOD MDM: CPT

## 2023-04-23 RX ORDER — LORAZEPAM 0.5 MG/1
0.5 TABLET ORAL ONCE
Status: COMPLETED | OUTPATIENT
Start: 2023-04-23 | End: 2023-04-23

## 2023-04-23 RX ADMIN — LORAZEPAM 0.5 MG: 0.5 TABLET ORAL at 23:35

## 2023-04-23 ASSESSMENT — PAIN - FUNCTIONAL ASSESSMENT: PAIN_FUNCTIONAL_ASSESSMENT: 0-10

## 2023-04-23 ASSESSMENT — PAIN SCALES - GENERAL: PAINLEVEL_OUTOF10: 7

## 2023-04-24 ENCOUNTER — OFFICE VISIT (OUTPATIENT)
Dept: INTERNAL MEDICINE | Facility: CLINIC | Age: 88
End: 2023-04-24
Payer: MEDICARE

## 2023-04-24 VITALS
RESPIRATION RATE: 16 BRPM | SYSTOLIC BLOOD PRESSURE: 114 MMHG | OXYGEN SATURATION: 97 % | BODY MASS INDEX: 16.56 KG/M2 | WEIGHT: 90 LBS | DIASTOLIC BLOOD PRESSURE: 68 MMHG | TEMPERATURE: 98.1 F | HEIGHT: 62 IN | HEART RATE: 78 BPM

## 2023-04-24 VITALS
WEIGHT: 88 LBS | HEART RATE: 88 BPM | OXYGEN SATURATION: 99 % | BODY MASS INDEX: 16.2 KG/M2 | DIASTOLIC BLOOD PRESSURE: 78 MMHG | SYSTOLIC BLOOD PRESSURE: 150 MMHG | HEIGHT: 62 IN | TEMPERATURE: 97.5 F

## 2023-04-24 DIAGNOSIS — S01.01XD LACERATION OF SCALP, SUBSEQUENT ENCOUNTER: Primary | ICD-10-CM

## 2023-04-24 DIAGNOSIS — S51.811D SKIN TEAR OF RIGHT FOREARM WITHOUT COMPLICATION, SUBSEQUENT ENCOUNTER: ICD-10-CM

## 2023-04-24 DIAGNOSIS — R53.1 WEAKNESS: ICD-10-CM

## 2023-04-24 LAB
EKG P AXIS: 87 DEGREES
EKG P-R INTERVAL: 182 MS
EKG Q-T INTERVAL: 406 MS
EKG QRS DURATION: 128 MS
EKG QTC CALCULATION (BAZETT): 435 MS
EKG T AXIS: 105 DEGREES

## 2023-04-24 PROCEDURE — 90714 TD VACC NO PRESV 7 YRS+ IM: CPT | Performed by: EMERGENCY MEDICINE

## 2023-04-24 PROCEDURE — 93010 ELECTROCARDIOGRAM REPORT: CPT | Performed by: INTERNAL MEDICINE

## 2023-04-24 PROCEDURE — 90471 IMMUNIZATION ADMIN: CPT | Performed by: EMERGENCY MEDICINE

## 2023-04-24 PROCEDURE — 2500000003 HC RX 250 WO HCPCS: Performed by: EMERGENCY MEDICINE

## 2023-04-24 PROCEDURE — 6360000002 HC RX W HCPCS: Performed by: EMERGENCY MEDICINE

## 2023-04-24 RX ORDER — TETANUS AND DIPHTHERIA TOXOIDS ADSORBED 2; 2 [LF]/.5ML; [LF]/.5ML
0.5 INJECTION INTRAMUSCULAR ONCE
Status: COMPLETED | OUTPATIENT
Start: 2023-04-24 | End: 2023-04-24

## 2023-04-24 RX ORDER — LIDOCAINE HYDROCHLORIDE AND EPINEPHRINE 10; 10 MG/ML; UG/ML
20 INJECTION, SOLUTION INFILTRATION; PERINEURAL ONCE
Status: COMPLETED | OUTPATIENT
Start: 2023-04-24 | End: 2023-04-24

## 2023-04-24 RX ADMIN — LIDOCAINE HYDROCHLORIDE,EPINEPHRINE BITARTRATE 20 ML: 10; .01 INJECTION, SOLUTION INFILTRATION; PERINEURAL at 00:35

## 2023-04-24 RX ADMIN — TETANUS AND DIPHTHERIA TOXOIDS ADSORBED 0.5 ML: 2; 2 INJECTION INTRAMUSCULAR at 00:34

## 2023-04-24 ASSESSMENT — PAIN SCALES - GENERAL: PAINLEVEL_OUTOF10: 2

## 2023-04-24 ASSESSMENT — PAIN - FUNCTIONAL ASSESSMENT: PAIN_FUNCTIONAL_ASSESSMENT: 0-10

## 2023-04-24 NOTE — ED NOTES
9 ghassan placed by Dr. William Guillory after lidocaine with epi administered to site and wound washed with betadine and NS. PT tolerated well. Dressing applied consisting of Triple Antibiotic ointment, gauze, and curlex. PT tolerated well. Edges of wound well approximated. Wound care discussed and additional wound care supplies provided.       Fred Lebron RN  04/24/23 [id number]

## 2023-04-24 NOTE — ED PROVIDER NOTES
[facility]  eMERGENCY dEPARTMENT eNCOUnter      Pt Name: Dominic Rush  MRN: [id number]  [place] 11/10/1926  Date of evaluation: 4/23/2023  Provider: Mariposa Ireland MD    [address] [place] [address]       Chief Complaint   Patient presents with    Fall     Scalp laceration, skin tear to RT forearm, unsure of LOC, starting to have confusion with EMS en route         HISTORY OF PRESENT ILLNESS   (Location/Symptom, Timing/Onset,Context/Setting, Quality, Duration, Modifying Factors, Severity)  Note limiting factors. Dominic Rush is a 80 y.o. female who presents to the emergency department for evaluation of injury sustained from fall. Patient arrived here to the ED via EMS. Apparently had a fall where she injured her right upper extremity and struck her head. Does not believe that she sustained a loss of consciousness but she is not sure. States that she just stumbled and fell backwards. Denies any preceding chest pain or palpitations. Patient is currently maintained on daily aspirin therapy. She has not had recent illnesses, fever or chills. She denies pain in her neck or upper back area. Denies any upper extremity numbness or tingling. Last tetanus is unknown. HPI    NursingNotes were reviewed. REVIEW OF SYSTEMS    (2-9 systems for level 4, 10 or more for level 5)     Review of Systems   Constitutional:  Negative for chills and fever. Respiratory:  Negative for cough and shortness of breath. Cardiovascular:  Negative for chest pain and palpitations. Gastrointestinal:  Negative for abdominal pain, nausea and vomiting. Skin:  Positive for wound. Neurological:  Positive for dizziness and headaches. All other systems reviewed and are negative.          PAST MEDICALHISTORY     Past Medical History:   Diagnosis Date    Anxiety 10/02/2019    Chest tightness or pressure 08/26/2013 8/26/2013  lexiscan negative for myocardial ischemia, EF 61%    Edema     Essential and other specified forms of

## 2023-04-25 ENCOUNTER — HOME HEALTH ADMISSION (OUTPATIENT)
Dept: HOME HEALTH SERVICES | Facility: HOME HEALTHCARE | Age: 88
End: 2023-04-25
Payer: MEDICARE

## 2023-04-25 NOTE — PROGRESS NOTES
"      Chief Complaint  Fall (Fell yesterday and hit head and skinned aright arm and is bruised everywhere/She went to [facility] and was treated. They are wanting to see about getting home health to come out to treat wounds and clean head lac while staples are still in /)    Subjective        Meenu Arteaga presents to [facility] PRIMARY CARE    HPI  Patient presents for fall.  Patient was seen in [facility] ER last night.  The patient was noted to have been moving a chair in the kitchen and fell.  She does not recall exactly how she fell.  Patient hit her head as well as got a skin tear on her arm.  Patient got staples and some wound care on her arm.  We are going to set the patient up with home health physical therapy as well as wound care.    Review of Systems    Objective   Vital Signs:  /78 (BP Location: Left arm, Patient Position: Sitting, Cuff Size: Adult)   Pulse 88   Temp 97.5 °F (36.4 °C) (Temporal)   Ht 157.5 cm (62\")   Wt 39.9 kg (88 lb)   SpO2 99%   BMI 16.10 kg/m²   Estimated body mass index is 16.1 kg/m² as calculated from the following:    Height as of this encounter: 157.5 cm (62\").    Weight as of this encounter: 39.9 kg (88 lb).      Physical Exam  Vitals and nursing note reviewed.   Constitutional:       Appearance: She is not ill-appearing.   HENT:      Head:      Comments: Laceration, stapled  Cardiovascular:      Rate and Rhythm: Normal rate.   Skin:     Findings: Lesion (skin tear, right arm) present.   Neurological:      Mental Status: She is alert.                                     Assessment and Plan   Diagnoses and all orders for this visit:    1. Laceration of scalp, subsequent encounter (Primary)  -     Ambulatory Referral to Home Health    2. Weakness  -     Ambulatory Referral to Home Health    3. Skin tear of right forearm without complication, subsequent encounter  -     Ambulatory Referral to Home Health          Result Review :                      Follow " Up   Return if symptoms worsen or fail to improve, for Next scheduled follow up.  Patient was given instructions and counseling regarding her condition or for health maintenance advice. Please see specific information pulled into the AVS if appropriate.       CHRISTOPHE Miles MD, FACP, [facility]      Electronically signed by Scotty Miles MD, 04/24/23, 10:31 PM CDT.

## 2023-04-26 ENCOUNTER — HOME CARE VISIT (OUTPATIENT)
Dept: HOME HEALTH SERVICES | Facility: CLINIC | Age: 88
End: 2023-04-26
Payer: MEDICARE

## 2023-04-26 VITALS
OXYGEN SATURATION: 98 % | BODY MASS INDEX: 16.56 KG/M2 | WEIGHT: 90 LBS | SYSTOLIC BLOOD PRESSURE: 130 MMHG | HEART RATE: 90 BPM | HEIGHT: 62 IN | TEMPERATURE: 97.9 F | RESPIRATION RATE: 17 BRPM | DIASTOLIC BLOOD PRESSURE: 80 MMHG

## 2023-04-26 PROCEDURE — G0151 HHCP-SERV OF PT,EA 15 MIN: HCPCS

## 2023-04-26 NOTE — Clinical Note
PT performed admission    PT to treat one additional time this week    PT to treat 2 times a week for 2 weeks

## 2023-04-26 NOTE — Clinical Note
SOC Note:    Home Health ordered for: disciplines SN and PT    Reason for Hosp/Primary Dx/Co-morbidities: lacertation without foreign body of scalp    Focus of Care: PT to focus on gait, balance and safety due to recent increase in falls with related: lacertation without foreign body of scalp    Skilled Need: PT provided gait, transfer, balance, exercise/strength and safety training to decrease fall risk and allow safe access to community.    Current Functional status/mobility/DME: Patient with no AD or DME, up ambulating in the home with no AD, transfers with no AD, tends to use furniture and walls for balance. Patient demonstrates unsafe gait and increased in fall risk    HB status/Living Arrangements: Patient lives with spouse in single level home, one step at door.  Friend takes spouse shopping for food.    Patient requires hands on assistance, AD, extra time and v/c for safe mobility in the home and community.  Leaving the home is a hardship and taxing effort upon patient due to recent diagnosis.    Skin Integrity/wound status: skin tear on right forearm (4 steri strips),  laceration to right side of scalp (7 staples)    Code Status: CPR    Fall Risk: Moderate to high fall risk per MACH 10    POC confirmed with MD office on date 4/23/23    Plan for next visit: PT to focus on est. HEP and safety with ambulation next visit    PT to treat 2 times a week

## 2023-04-27 ENCOUNTER — HOME CARE VISIT (OUTPATIENT)
Dept: HOME HEALTH SERVICES | Facility: CLINIC | Age: 88
End: 2023-04-27
Payer: MEDICARE

## 2023-04-27 PROCEDURE — G0299 HHS/HOSPICE OF RN EA 15 MIN: HCPCS

## 2023-04-28 VITALS
BODY MASS INDEX: 16.46 KG/M2 | OXYGEN SATURATION: 99 % | WEIGHT: 90 LBS | HEART RATE: 78 BPM | TEMPERATURE: 97.8 F | SYSTOLIC BLOOD PRESSURE: 138 MMHG | DIASTOLIC BLOOD PRESSURE: 74 MMHG

## 2023-04-29 ASSESSMENT — ENCOUNTER SYMPTOMS
COUGH: 0
ABDOMINAL PAIN: 0
VOMITING: 0
SHORTNESS OF BREATH: 0
NAUSEA: 0

## 2023-05-01 ENCOUNTER — HOME CARE VISIT (OUTPATIENT)
Dept: HOME HEALTH SERVICES | Facility: CLINIC | Age: 88
End: 2023-05-01
Payer: MEDICARE

## 2023-05-01 VITALS
SYSTOLIC BLOOD PRESSURE: 132 MMHG | TEMPERATURE: 97 F | HEART RATE: 70 BPM | DIASTOLIC BLOOD PRESSURE: 70 MMHG | OXYGEN SATURATION: 97 %

## 2023-05-01 PROCEDURE — G0299 HHS/HOSPICE OF RN EA 15 MIN: HCPCS

## 2023-05-01 NOTE — HOME HEALTH
FOCUS OF CARE/SKILLED NEED: Laceration to right forearm with steristrips and crown of head - with staples    TEACHING/INTERVENTIONS: Pt/cg agreeable to SN evaluation .  Medication reconciliation completed and no issues found. Pt had no further questions regarding medication and/or plan of care.  Instructed  to daily clean head laceration with normal saline and pat dry.  Patient is to see Dr. Miles for staple removal from head next week.  No signs or symptoms of infection.  Right forearm - steristrips are intact - instructed to keep clean and dry and clip the edges of the steri strips as lift from skin with scissors.   understood.    Wife is very hard of hearing.    PLAN FOR NEXT VISIT: Monday and Thursday next week for evaluation of right forearm and head laceration.    PRE-SCREENED FOR COVID? Yes, No s/s of COVID. No recent exposure.

## 2023-05-02 ENCOUNTER — PROCEDURE VISIT (OUTPATIENT)
Dept: INTERNAL MEDICINE | Facility: CLINIC | Age: 88
End: 2023-05-02
Payer: MEDICARE

## 2023-05-02 ENCOUNTER — HOME CARE VISIT (OUTPATIENT)
Dept: HOME HEALTH SERVICES | Facility: CLINIC | Age: 88
End: 2023-05-02
Payer: MEDICARE

## 2023-05-02 VITALS
RESPIRATION RATE: 18 BRPM | DIASTOLIC BLOOD PRESSURE: 80 MMHG | TEMPERATURE: 98.5 F | HEART RATE: 85 BPM | OXYGEN SATURATION: 98 % | SYSTOLIC BLOOD PRESSURE: 130 MMHG

## 2023-05-02 VITALS
HEART RATE: 85 BPM | OXYGEN SATURATION: 99 % | TEMPERATURE: 97.1 F | DIASTOLIC BLOOD PRESSURE: 76 MMHG | BODY MASS INDEX: 16.56 KG/M2 | WEIGHT: 90 LBS | SYSTOLIC BLOOD PRESSURE: 138 MMHG | HEIGHT: 62 IN

## 2023-05-02 DIAGNOSIS — S51.811D SKIN TEAR OF RIGHT FOREARM WITHOUT COMPLICATION, SUBSEQUENT ENCOUNTER: ICD-10-CM

## 2023-05-02 DIAGNOSIS — S01.01XD LACERATION OF SCALP, SUBSEQUENT ENCOUNTER: Primary | ICD-10-CM

## 2023-05-02 PROCEDURE — G0157 HHC PT ASSISTANT EA 15: HCPCS

## 2023-05-02 NOTE — HOME HEALTH
No signs of covid  No new falls  No new meds  No edema    Plan:  Continue strengthening and gt training.

## 2023-05-02 NOTE — PROGRESS NOTES
Subjective   Meenu Arteaga is a 89 y.o. female.   Chief Complaint   Patient presents with    Suture / Staple Removal     F/u from ED visit on 04/23/2023; scalp laceration and skin tear of R forearm        History of Present Illness   Ms. Arteaga presents to the office today for staple removal.  She went to the ER on April 23 after sustaining a fall and receiving laceration on the right forearm as well as a posterior/right aspect of the skull.  She returns here today and states that the home health nurse said it was time to get the staples removed.  She denies any pain, swelling, neurological changes, no reports of fever, chills, or drainage.  She is also wondering about the Steri-Strips that are still present on her right forearm as well as a small knot that has been present ever since the fall right next to the laceration.  She has avoided showering as she was afraid that it would cause the strips to come off.  She has not had any subsequent bleeding.  She denies having any recurrent falls, dizziness, syncope.  Reports that she is getting therapy twice weekly.    The following portions of the patient's history were reviewed and updated as appropriate: allergies, current medications, past family history, past medical history, past social history, past surgical history and problem list.    Review of Systems    Objective   Past Medical History:   Diagnosis Date    Arthritis     GERD (gastroesophageal reflux disease)     Hyperlipidemia     Hypothyroidism     Liver cyst     Neuropathy     Ovarian cyst       Past Surgical History:   Procedure Laterality Date    ABDOMINAL HYSTERECTOMY      APPENDECTOMY      BREAST BIOPSY      CHOLECYSTECTOMY      COLONOSCOPY  03/05/2008    normal    COLONOSCOPY  01/12/2012    ENDOSCOPY  08/29/2013    normal    ENDOSCOPY  01/23/2012    eus with stacy  normal endoscopic ultrascope of the pancreas.fortunately there was no mass seen    ENDOSCOPY N/A 8/3/2018    Procedure:  ESOPHAGOGASTRODUODENOSCOPY WITH ANESTHESIA;  Surgeon: Obed Patrick DO;  Location: [facility] ENDOSCOPY;  Service: Gastroenterology    HEMORRHOIDECTOMY      SKIN CANCER EXCISION  11/27/2021    left arm and left leg     UPPER GASTROINTESTINAL ENDOSCOPY  08/29/2013        Current Outpatient Medications:     acetaminophen (TYLENOL) 500 MG tablet, Take 1 tablet by mouth As Needed for Mild Pain. Indications: Pain, Disp: , Rfl:     Ascorbic Acid (VITAMIN C PO), Daily. Indications: supplement, Disp: , Rfl:     aspirin 81 MG chewable tablet, Chew 1 tablet Daily. Indications: Disease involving a Thrombosis or an Embolism, Disp: , Rfl:     atorvastatin (LIPITOR) 20 MG tablet, Take 1 tablet by mouth Daily. Indications: High Amount of Fats in the Blood, Disp: , Rfl:     bumetanide (BUMEX) 0.5 MG tablet, Take 1 tablet by mouth Daily As Needed (edema)., Disp: 30 tablet, Rfl: 3    candesartan (ATACAND) 32 MG tablet, Take 1 tablet by mouth Daily., Disp: 90 tablet, Rfl: 3    docusate sodium (COLACE) 100 MG capsule, Take 1 capsule by mouth 2 (Two) Times a Day. Indications: Constipation, Disp: , Rfl:     Estrogens Conjugated (Premarin) 0.625 MG/GM vaginal cream, 2 (Two) Times a Week. Indications: Pain with [place], Disp: , Rfl:     levothyroxine (SYNTHROID, LEVOTHROID) 25 MCG tablet, Take 1 tablet by mouth Daily., Disp: 90 tablet, Rfl: 3    LORazepam (ATIVAN) 0.5 MG tablet, Take 1 tablet by mouth every night at bedtime., Disp: 30 tablet, Rfl: 5    Multiple Vitamins-Minerals (CENTRUM SILVER ADULT 50+) tablet, Take 1 tablet by mouth daily, Disp: , Rfl:     omeprazole (priLOSEC) 20 MG capsule, Take 1 capsule by mouth 2 (Two) Times a Day., Disp: 180 capsule, Rfl: 3    ondansetron ODT (ZOFRAN-ODT) 4 MG disintegrating tablet, Place 1 tablet on the tongue Every 8 (Eight) Hours As Needed for Nausea or Vomiting., Disp: 40 tablet, Rfl: 0    Psyllium (METAMUCIL FIBER PO), Take 1 package by mouth As Needed. Indications: constipation, Disp: ,  "Rfl:     simethicone (Gas-X) 80 MG chewable tablet, Chew 1 tablet Every 6 (Six) Hours As Needed for Flatulence (Bloating)., Disp: , Rfl:       /76 (BP Location: Left arm, Patient Position: Sitting, Cuff Size: Adult)   Pulse 85   Temp 97.1 °F (36.2 °C) (Temporal)   Ht 157.5 cm (62\")   Wt 40.8 kg (90 lb)   LMP  (LMP Unknown)   SpO2 99%   BMI 16.46 kg/m²      Body mass index is 16.46 kg/m².         Physical Exam  Vitals and nursing note reviewed.   Constitutional:       Appearance: Normal appearance.   HENT:      Head: Normocephalic.        Comments: Staples present with dried blood beneath, multiple staples are raised above skin, no erythema, swelling, drainage is present, no localized pain reported, will proceed with removal  Eyes:      Extraocular Movements: Extraocular movements intact.      Conjunctiva/sclera: Conjunctivae normal.      Pupils: Pupils are equal, round, and reactive to light.   Cardiovascular:      Rate and Rhythm: Normal rate and regular rhythm.      Pulses: Normal pulses.      Heart sounds: Normal heart sounds.   Pulmonary:      Effort: Pulmonary effort is normal.      Breath sounds: Normal breath sounds.   Abdominal:      General: Abdomen is flat. Bowel sounds are normal.   Musculoskeletal:         General: Normal range of motion.      Cervical back: Normal range of motion and neck supple.   Skin:     General: Skin is warm and dry.      Findings: Laceration present.             Comments: Laceration with steristrips in place, appears to be healing well, no erythema, discharge present.  Steri-Strips are still very well adhered.  There is a 2 cm sized hematoma slightly distal to the laceration, is tender on palpation.  Advised to proceed with showers per usual, may allow Steri-Strips to remove.  Advised to continue to monitor small hematoma, if no decrease in size in 2 weeks is to return to the office.   Neurological:      General: No focal deficit present.      Mental Status: She is " alert. Mental status is at baseline.   Psychiatric:         Mood and Affect: Mood normal.         Thought Content: Thought content normal.         Judgment: Judgment normal.             Assessment & Plan   Diagnoses and all orders for this visit:    1. Laceration of scalp, subsequent encounter (Primary)    2. Skin tear of right forearm without complication, subsequent encounter    Other orders  -     Suture Removal               Suture Removal    Date/Time: 5/2/2023 10:24 AM  Performed by: Laina Macias APRN  Authorized by: Laina Macias APRN   Body area: head/neck  Location details: scalp  Wound Appearance: clean, pink and warm  Staples Removed: 9  Post-removal: antibiotic ointment applied  Patient tolerance: patient tolerated the procedure well with no immediate complications       Continue with monitoring as directed, report any swelling, bleeding, abnormal discharge, increased pain, fever, additonal falls. May proceed with going to  on Friday, did advise avoiding contact with any chemicals such as hair spray directly to site of laceration. Keep clean and dry besides washing hair.   Return to office if hematoma on right forearm does not resolve in 2 weeks. Continue with home health/PT.     Please note that portions of this note were completed with a voice recognition program.     Electronically signed by CHAIM Figueroa, 05/02/23, 10:27 CDT.

## 2023-05-02 NOTE — HOME HEALTH
COVID SCREENING:No s/s of covid 19    FOCUS OF CARE/SKILLED NEED: wd assessment and care. Med and safety assessment and teaching.    TEACHING/INTERVENTIONS: Instructed to report s/s of infection in wounds such as redness, pain, or ofor.    PATIENT/CG RESPONSE:cooperative    PROGRESS TOWARD GOALS:no s/s of wd infection. No new falls.    PHYSICIAN CONTACT: none    FALLS SINCE LAST VISIT?none    MEDICATION CHANGES SINCE LAST VISIT?no    PLAN FOR NEXT VISIT:wd assessment, falls, pain, med compliance

## 2023-05-04 ENCOUNTER — HOME CARE VISIT (OUTPATIENT)
Dept: HOME HEALTH SERVICES | Facility: CLINIC | Age: 88
End: 2023-05-04
Payer: MEDICARE

## 2023-05-04 VITALS
SYSTOLIC BLOOD PRESSURE: 126 MMHG | HEART RATE: 78 BPM | DIASTOLIC BLOOD PRESSURE: 80 MMHG | RESPIRATION RATE: 18 BRPM | OXYGEN SATURATION: 100 % | TEMPERATURE: 98.1 F

## 2023-05-04 VITALS
TEMPERATURE: 97.8 F | OXYGEN SATURATION: 98 % | RESPIRATION RATE: 16 BRPM | DIASTOLIC BLOOD PRESSURE: 74 MMHG | SYSTOLIC BLOOD PRESSURE: 130 MMHG | HEART RATE: 87 BPM

## 2023-05-04 PROCEDURE — G0299 HHS/HOSPICE OF RN EA 15 MIN: HCPCS

## 2023-05-04 PROCEDURE — G0157 HHC PT ASSISTANT EA 15: HCPCS

## 2023-05-04 NOTE — HOME HEALTH
No signs or symptoms of covid  No new falls  No new meds  No edema     Plan:  Continue strengthening and improving balance

## 2023-05-06 NOTE — HOME HEALTH
FOCUS OF CARE/SKILLED NEED: Patient had her staples removed by provider at PCP office.  Steri strips intact on left forearm.  Bruising is chaning lighter color.  Patient denies pain at this time.    TEACHING/INTERVENTIONS: No active drainage on scalp laceration - dried drainage noted.  Patient reports she is going to her hair appointment this Friday - provider okayed for her to wash her hair. Instructed on signs and symptoms of infection.       MEDICATION CHANGES SINCE LAST VISIT?    PLAN FOR NEXT VISIT:  Next Monday 5/8 - will determine if further visits needed.    PRE-SCREENED FOR COVID? Yes, No s/s of COVID. No recent exposure.

## 2023-05-08 ENCOUNTER — HOME CARE VISIT (OUTPATIENT)
Dept: HOME HEALTH SERVICES | Facility: CLINIC | Age: 88
End: 2023-05-08
Payer: MEDICARE

## 2023-05-08 PROCEDURE — G0299 HHS/HOSPICE OF RN EA 15 MIN: HCPCS

## 2023-05-08 NOTE — CASE COMMUNICATION
Skilled Nursing Discipline Discharge - Laceration on scalp is healing, several scabs apparent, no signs or symptonms of infection.  Laceration on right forearm - steristrips intact - instructions given to patient and spouse as steristrips lift off skin to clip the ends.  Patient and caregiver did not feel necessary to continue skilled nursing visits at this time.    Discharge from nursing as of 5/8/23.  Thank you,  Maureen

## 2023-05-08 NOTE — HOME HEALTH
FOCUS OF CARE/SKILLED NEED: Assessment of laceration of scalp and right forearm.  Steri strips intact on right forearm, dried drainage no active drainage. No signs or symptoms of infection.  Scalp laceration is dry and skin is intact.    TEACHING/INTERVENTIONS: Patient doesn't feel the need for further nursing visits, knowledgable of signs and symptoms of infection.\  Instructed on when to call phyisican, signs and symptoms of infection.   manages medications.   to clip off the steri strips as they come off the skin.     PROGRESS TOWARD GOALS:  SN Goals have been met.    PRE-SCREENED FOR COVID? Yes, No s/s of COVID. No recent exposure.

## 2023-05-09 ENCOUNTER — HOME CARE VISIT (OUTPATIENT)
Dept: HOME HEALTH SERVICES | Facility: CLINIC | Age: 88
End: 2023-05-09
Payer: MEDICARE

## 2023-05-09 VITALS
TEMPERATURE: 98.8 F | OXYGEN SATURATION: 98 % | HEART RATE: 84 BPM | RESPIRATION RATE: 18 BRPM | SYSTOLIC BLOOD PRESSURE: 116 MMHG | DIASTOLIC BLOOD PRESSURE: 70 MMHG

## 2023-05-09 PROCEDURE — G0157 HHC PT ASSISTANT EA 15: HCPCS

## 2023-05-09 NOTE — HOME HEALTH
No new meds  No falls since last visit  No edema  No signs or symptoms of covid    Plan:      PT [facility]

## 2023-05-11 ENCOUNTER — HOME CARE VISIT (OUTPATIENT)
Dept: HOME HEALTH SERVICES | Facility: CLINIC | Age: 88
End: 2023-05-11
Payer: MEDICARE

## 2023-05-11 VITALS
DIASTOLIC BLOOD PRESSURE: 74 MMHG | TEMPERATURE: 97.4 F | OXYGEN SATURATION: 98 % | RESPIRATION RATE: 18 BRPM | HEART RATE: 78 BPM | SYSTOLIC BLOOD PRESSURE: 138 MMHG

## 2023-05-11 VITALS
HEART RATE: 80 BPM | RESPIRATION RATE: 18 BRPM | TEMPERATURE: 97.9 F | OXYGEN SATURATION: 97 % | DIASTOLIC BLOOD PRESSURE: 78 MMHG | SYSTOLIC BLOOD PRESSURE: 120 MMHG

## 2023-05-11 PROCEDURE — G0151 HHCP-SERV OF PT,EA 15 MIN: HCPCS

## 2023-06-21 ENCOUNTER — OFFICE VISIT (OUTPATIENT)
Dept: UROLOGY | Age: 88
End: 2023-06-21
Payer: MEDICARE

## 2023-06-21 VITALS — BODY MASS INDEX: 16.67 KG/M2 | TEMPERATURE: 96.6 F | HEIGHT: 62 IN | WEIGHT: 90.6 LBS

## 2023-06-21 DIAGNOSIS — N39.0 RECURRENT UTI: Primary | ICD-10-CM

## 2023-06-21 DIAGNOSIS — R30.0 DYSURIA: ICD-10-CM

## 2023-06-21 LAB
APPEARANCE FLUID: CLEAR
BILIRUBIN, POC: NORMAL
BLOOD URINE, POC: NORMAL
CLARITY, POC: CLEAR
COLOR, POC: YELLOW
GLUCOSE URINE, POC: NORMAL
KETONES, POC: NORMAL
LEUKOCYTE EST, POC: NORMAL
NITRITE, POC: NORMAL
PH, POC: 7
PROTEIN, POC: NORMAL
SPECIFIC GRAVITY, POC: 1.01
UROBILINOGEN, POC: 0.2

## 2023-06-21 PROCEDURE — 1123F ACP DISCUSS/DSCN MKR DOCD: CPT | Performed by: NURSE PRACTITIONER

## 2023-06-21 PROCEDURE — 81002 URINALYSIS NONAUTO W/O SCOPE: CPT | Performed by: NURSE PRACTITIONER

## 2023-06-21 PROCEDURE — 1036F TOBACCO NON-USER: CPT | Performed by: NURSE PRACTITIONER

## 2023-06-21 PROCEDURE — G8419 CALC BMI OUT NRM PARAM NOF/U: HCPCS | Performed by: NURSE PRACTITIONER

## 2023-06-21 PROCEDURE — G8427 DOCREV CUR MEDS BY ELIG CLIN: HCPCS | Performed by: NURSE PRACTITIONER

## 2023-06-21 PROCEDURE — 1090F PRES/ABSN URINE INCON ASSESS: CPT | Performed by: NURSE PRACTITIONER

## 2023-06-21 PROCEDURE — 99214 OFFICE O/P EST MOD 30 MIN: CPT | Performed by: NURSE PRACTITIONER

## 2023-06-21 RX ORDER — CONJUGATED ESTROGENS 0.62 MG/G
CREAM VAGINAL
Qty: 30 G | Refills: 5 | Status: SHIPPED | OUTPATIENT
Start: 2023-06-21 | End: 2023-06-23

## 2023-06-21 ASSESSMENT — ENCOUNTER SYMPTOMS
NAUSEA: 0
ABDOMINAL PAIN: 0
BACK PAIN: 0
VOMITING: 0
ABDOMINAL DISTENTION: 0

## 2023-06-21 NOTE — PROGRESS NOTES
Patricia Carrington is a 80 y.o. female who presents today   Chief Complaint   Patient presents with    Follow-up     I am here today for my 1 year UTI follow up      Patient is a 80-year-old female who presents to clinic today for follow-up recurrent UTI. Been maintained on Premarin and doing well on this medication. Denies any symptoms of frequency, urgency, pelvic pain or incontinence. She feels this is working well for her. Denies any flank pain fever or chills. No positive urine culture for over a year now. Has had intermittent dysuria but improves quickly      Previous urine cultures  6/22/2022 No growth   4/6/2022          No growth   3/21/2022        Pseudomonas aeruginosa   3/5/2022          no growth  2/17/2022        Pseudomonas aeruginosa  2/9/2022          Pseudomonas aeruginosa   8/24/2021        Enterococcus faecalis     Past Medical History:   Diagnosis Date    Anxiety 10/02/2019    Chest tightness or pressure 08/26/2013 8/26/2013  lexiscan negative for myocardial ischemia, EF 61%    Edema     Essential and other specified forms of tremor     Generalized anxiety disorder     HTN (hypertension)     Hyperlipidemia     Hypertension     Insomnia, unspecified     Neuropathy     both legs up to both hips    Other and unspecified ovarian cyst     Other left bundle branch block     Palliative care patient 06/23/2022    Pure hypercholesterolemia     Restless legs syndrome (RLS)     Solitary cyst of breast     Spinal stenosis, lumbar region, without neurogenic claudication     Unspecified hypothyroidism     Urinary frequency     UTI (urinary tract infection) 03/25/2022       Past Surgical History:   Procedure Laterality Date    APPENDECTOMY      CARDIAC CATHETERIZATION  5/26/15  MDL    with aortic root injection.  EF 60%    CATARACT REMOVAL      CHOLECYSTECTOMY      CYST INCISION AND DRAINAGE      drained liver cyst    HEMORRHOID SURGERY      HYSTERECTOMY (CERVIX STATUS UNKNOWN)      NERVE BLOCK LUMB FACET

## 2023-06-23 ENCOUNTER — TELEPHONE (OUTPATIENT)
Dept: UROLOGY | Age: 88
End: 2023-06-23

## 2023-06-23 RX ORDER — ESTRADIOL 0.1 MG/G
1 CREAM VAGINAL
Qty: 42.5 G | Refills: 3 | Status: SHIPPED | OUTPATIENT
Start: 2023-06-26

## 2023-06-23 NOTE — TELEPHONE ENCOUNTER
Patients  called stating that patient had premarin cream called in for her and that the pharmacy is trying to chage them full price. Patient states that someone in the office worked on this last time to lower the cost. Please advise?

## 2023-07-17 PROBLEM — F41.9 ANXIETY: Status: RESOLVED | Noted: 2020-01-23 | Resolved: 2023-07-17

## 2023-07-19 ENCOUNTER — TELEPHONE (OUTPATIENT)
Dept: INTERNAL MEDICINE | Facility: CLINIC | Age: 88
End: 2023-07-19

## 2023-07-19 NOTE — TELEPHONE ENCOUNTER
Caller: Alex Arteaga    Relationship: Emergency Contact    Best call back number: [phone]     What is the best time to reach you: SOON PLEASE    Who are you requesting to speak with (clinical staff, provider,  specific staff member): PROVIDER OR CLINICAL STAFF       What was the call regarding: PATIENTS SPOUSE REQUESTING A CALL BACK TO CHECK ON THE STATUS OF THE MEDICATION FOR NEUROPATHY THAT PROVIDER WAS GOING TO SEND ON MONDAY TO   [facility] - [facility] [address] - [phone]  - [phone]  [phone]     PHARMACY TOLD PATIENTS SPOUSE THEY HAVE NOT RECEIVED ANYTHING FOR PATIENT YET    Is it okay if the provider responds through MyChart: PREFERS A CALL BACK

## 2023-07-19 NOTE — TELEPHONE ENCOUNTER
informed that I handed him the Rx for this when he was checking out and he can take the order to [facility] pharmacy.

## 2023-07-31 RX ORDER — LEVOTHYROXINE SODIUM 0.03 MG/1
TABLET ORAL
Qty: 90 TABLET | Refills: 3 | Status: SHIPPED | OUTPATIENT
Start: 2023-07-31

## 2023-08-15 RX ORDER — OMEPRAZOLE 20 MG/1
CAPSULE, DELAYED RELEASE ORAL
Qty: 180 CAPSULE | Refills: 3 | Status: SHIPPED | OUTPATIENT
Start: 2023-08-15

## 2023-08-22 DIAGNOSIS — U07.1 POSITIVE SELF-ADMINISTERED ANTIGEN TEST FOR COVID-19: Primary | ICD-10-CM

## 2023-08-22 RX ORDER — NIRMATRELVIR AND RITONAVIR 300-100 MG
3 KIT ORAL 2 TIMES DAILY
Qty: 1 EACH | Refills: 0 | Status: SHIPPED | OUTPATIENT
Start: 2023-08-22

## 2023-08-24 ENCOUNTER — HOSPITAL ENCOUNTER (OUTPATIENT)
Age: 88
Setting detail: OBSERVATION
Discharge: HOME HEALTH | End: 2023-08-25
Attending: EMERGENCY MEDICINE | Admitting: STUDENT IN AN ORGANIZED HEALTH CARE EDUCATION/TRAINING PROGRAM
Payer: MEDICARE

## 2023-08-24 ENCOUNTER — APPOINTMENT (OUTPATIENT)
Dept: CT IMAGING | Age: 88
End: 2023-08-24
Payer: MEDICARE

## 2023-08-24 ENCOUNTER — APPOINTMENT (OUTPATIENT)
Dept: GENERAL RADIOLOGY | Age: 88
End: 2023-08-24
Payer: MEDICARE

## 2023-08-24 DIAGNOSIS — E87.6 HYPOKALEMIA: ICD-10-CM

## 2023-08-24 DIAGNOSIS — R19.7 DIARRHEA, UNSPECIFIED TYPE: ICD-10-CM

## 2023-08-24 DIAGNOSIS — R55 SYNCOPE AND COLLAPSE: Primary | ICD-10-CM

## 2023-08-24 DIAGNOSIS — S50.811A ABRASION OF RIGHT FOREARM, INITIAL ENCOUNTER: ICD-10-CM

## 2023-08-24 DIAGNOSIS — U07.1 COVID-19 VIRUS INFECTION: ICD-10-CM

## 2023-08-24 PROBLEM — I49.9 IRREGULAR HEART RHYTHM: Status: ACTIVE | Noted: 2023-08-24

## 2023-08-24 LAB
ALBUMIN SERPL-MCNC: 3.9 G/DL (ref 3.5–5.2)
ALP SERPL-CCNC: 61 U/L (ref 35–104)
ALT SERPL-CCNC: 16 U/L (ref 5–33)
ANION GAP SERPL CALCULATED.3IONS-SCNC: 16 MMOL/L (ref 7–19)
AST SERPL-CCNC: 38 U/L (ref 5–32)
BASOPHILS # BLD: 0 K/UL (ref 0–0.2)
BASOPHILS NFR BLD: 0.3 % (ref 0–1)
BILIRUB SERPL-MCNC: 0.4 MG/DL (ref 0.2–1.2)
BILIRUB UR STRIP.AUTO-MCNC: NEGATIVE MG/DL
BNP BLD-MCNC: 445 PG/ML (ref 0–449)
BUN SERPL-MCNC: 14 MG/DL (ref 8–23)
CALCIUM SERPL-MCNC: 9 MG/DL (ref 8.2–9.6)
CHLORIDE SERPL-SCNC: 94 MMOL/L (ref 98–111)
CLARITY UR: CLEAR
CO2 SERPL-SCNC: 24 MMOL/L (ref 22–29)
COLOR UR: YELLOW
CREAT SERPL-MCNC: 0.6 MG/DL (ref 0.5–0.9)
CRP SERPL HS-MCNC: 1.27 MG/DL (ref 0–0.5)
EOSINOPHIL # BLD: 0 K/UL (ref 0–0.6)
EOSINOPHIL NFR BLD: 0.3 % (ref 0–5)
ERYTHROCYTE [DISTWIDTH] IN BLOOD BY AUTOMATED COUNT: 13.5 % (ref 11.5–14.5)
GLUCOSE SERPL-MCNC: 92 MG/DL (ref 74–109)
GLUCOSE UR STRIP.AUTO-MCNC: NEGATIVE MG/DL
HCT VFR BLD AUTO: 35.6 % (ref 37–47)
HGB BLD-MCNC: 11.9 G/DL (ref 12–16)
HGB UR STRIP.AUTO-MCNC: NEGATIVE MG/L
IMM GRANULOCYTES # BLD: 0 K/UL
KETONES UR STRIP.AUTO-MCNC: ABNORMAL MG/DL
LACTATE BLDV-SCNC: 1.9 MMOL/L (ref 0.5–1.9)
LEUKOCYTE ESTERASE UR QL STRIP.AUTO: NEGATIVE
LYMPHOCYTES # BLD: 0.7 K/UL (ref 1.1–4.5)
LYMPHOCYTES NFR BLD: 18.4 % (ref 20–40)
MCH RBC QN AUTO: 31.9 PG (ref 27–31)
MCHC RBC AUTO-ENTMCNC: 33.4 G/DL (ref 33–37)
MCV RBC AUTO: 95.4 FL (ref 81–99)
MONOCYTES # BLD: 0.6 K/UL (ref 0–0.9)
MONOCYTES NFR BLD: 15.8 % (ref 0–10)
NEUTROPHILS # BLD: 2.5 K/UL (ref 1.5–7.5)
NEUTS SEG NFR BLD: 64.9 % (ref 50–65)
NITRITE UR QL STRIP.AUTO: NEGATIVE
PH UR STRIP.AUTO: 7.5 [PH] (ref 5–8)
PLATELET # BLD AUTO: 170 K/UL (ref 130–400)
PMV BLD AUTO: 10 FL (ref 9.4–12.3)
POTASSIUM SERPL-SCNC: 3 MMOL/L (ref 3.5–5)
PROT SERPL-MCNC: 6.5 G/DL (ref 6.6–8.7)
PROT UR STRIP.AUTO-MCNC: NEGATIVE MG/DL
RBC # BLD AUTO: 3.73 M/UL (ref 4.2–5.4)
REASON FOR REJECTION: NORMAL
REASON FOR REJECTION: NORMAL
REJECTED TEST: NORMAL
REJECTED TEST: NORMAL
SARS-COV-2 RDRP RESP QL NAA+PROBE: DETECTED
SODIUM SERPL-SCNC: 134 MMOL/L (ref 136–145)
SP GR UR STRIP.AUTO: 1.01 (ref 1–1.03)
TROPONIN T SERPL-MCNC: <0.01 NG/ML (ref 0–0.03)
UROBILINOGEN UR STRIP.AUTO-MCNC: 0.2 E.U./DL
WBC # BLD AUTO: 3.8 K/UL (ref 4.8–10.8)

## 2023-08-24 PROCEDURE — 36415 COLL VENOUS BLD VENIPUNCTURE: CPT

## 2023-08-24 PROCEDURE — 96365 THER/PROPH/DIAG IV INF INIT: CPT

## 2023-08-24 PROCEDURE — 85025 COMPLETE CBC W/AUTO DIFF WBC: CPT

## 2023-08-24 PROCEDURE — 81003 URINALYSIS AUTO W/O SCOPE: CPT

## 2023-08-24 PROCEDURE — 96372 THER/PROPH/DIAG INJ SC/IM: CPT

## 2023-08-24 PROCEDURE — 99285 EMERGENCY DEPT VISIT HI MDM: CPT

## 2023-08-24 PROCEDURE — 6360000002 HC RX W HCPCS: Performed by: NURSE PRACTITIONER

## 2023-08-24 PROCEDURE — 80053 COMPREHEN METABOLIC PANEL: CPT

## 2023-08-24 PROCEDURE — 70450 CT HEAD/BRAIN W/O DYE: CPT

## 2023-08-24 PROCEDURE — 71045 X-RAY EXAM CHEST 1 VIEW: CPT

## 2023-08-24 PROCEDURE — G0378 HOSPITAL OBSERVATION PER HR: HCPCS

## 2023-08-24 PROCEDURE — 6370000000 HC RX 637 (ALT 250 FOR IP): Performed by: NURSE PRACTITIONER

## 2023-08-24 PROCEDURE — 86140 C-REACTIVE PROTEIN: CPT

## 2023-08-24 PROCEDURE — 96366 THER/PROPH/DIAG IV INF ADDON: CPT

## 2023-08-24 PROCEDURE — 84484 ASSAY OF TROPONIN QUANT: CPT

## 2023-08-24 PROCEDURE — 93005 ELECTROCARDIOGRAM TRACING: CPT

## 2023-08-24 PROCEDURE — 87635 SARS-COV-2 COVID-19 AMP PRB: CPT

## 2023-08-24 PROCEDURE — 93880 EXTRACRANIAL BILAT STUDY: CPT

## 2023-08-24 PROCEDURE — 96361 HYDRATE IV INFUSION ADD-ON: CPT

## 2023-08-24 PROCEDURE — 83880 ASSAY OF NATRIURETIC PEPTIDE: CPT

## 2023-08-24 PROCEDURE — 83605 ASSAY OF LACTIC ACID: CPT

## 2023-08-24 PROCEDURE — 96360 HYDRATION IV INFUSION INIT: CPT

## 2023-08-24 PROCEDURE — 2580000003 HC RX 258: Performed by: EMERGENCY MEDICINE

## 2023-08-24 PROCEDURE — 6370000000 HC RX 637 (ALT 250 FOR IP): Performed by: EMERGENCY MEDICINE

## 2023-08-24 PROCEDURE — 6360000002 HC RX W HCPCS: Performed by: EMERGENCY MEDICINE

## 2023-08-24 PROCEDURE — 2580000003 HC RX 258: Performed by: NURSE PRACTITIONER

## 2023-08-24 RX ORDER — ENOXAPARIN SODIUM 100 MG/ML
30 INJECTION SUBCUTANEOUS DAILY
Status: DISCONTINUED | OUTPATIENT
Start: 2023-08-24 | End: 2023-08-25 | Stop reason: HOSPADM

## 2023-08-24 RX ORDER — POLYETHYLENE GLYCOL 3350 17 G/17G
17 POWDER, FOR SOLUTION ORAL DAILY PRN
Status: DISCONTINUED | OUTPATIENT
Start: 2023-08-24 | End: 2023-08-25 | Stop reason: HOSPADM

## 2023-08-24 RX ORDER — SODIUM CHLORIDE 9 MG/ML
INJECTION, SOLUTION INTRAVENOUS PRN
Status: DISCONTINUED | OUTPATIENT
Start: 2023-08-24 | End: 2023-08-25 | Stop reason: HOSPADM

## 2023-08-24 RX ORDER — SODIUM CHLORIDE, SODIUM LACTATE, POTASSIUM CHLORIDE, CALCIUM CHLORIDE 600; 310; 30; 20 MG/100ML; MG/100ML; MG/100ML; MG/100ML
INJECTION, SOLUTION INTRAVENOUS CONTINUOUS
Status: ACTIVE | OUTPATIENT
Start: 2023-08-24 | End: 2023-08-25

## 2023-08-24 RX ORDER — POTASSIUM CHLORIDE 20 MEQ/1
40 TABLET, EXTENDED RELEASE ORAL PRN
Status: DISCONTINUED | OUTPATIENT
Start: 2023-08-24 | End: 2023-08-25 | Stop reason: HOSPADM

## 2023-08-24 RX ORDER — M-VIT,TX,IRON,MINS/CALC/FOLIC 27MG-0.4MG
1 TABLET ORAL DAILY
Status: DISCONTINUED | OUTPATIENT
Start: 2023-08-24 | End: 2023-08-25 | Stop reason: HOSPADM

## 2023-08-24 RX ORDER — MAGNESIUM SULFATE IN WATER 40 MG/ML
2000 INJECTION, SOLUTION INTRAVENOUS PRN
Status: DISCONTINUED | OUTPATIENT
Start: 2023-08-24 | End: 2023-08-25 | Stop reason: HOSPADM

## 2023-08-24 RX ORDER — VALSARTAN 40 MG/1
80 TABLET ORAL DAILY
Status: DISCONTINUED | OUTPATIENT
Start: 2023-08-24 | End: 2023-08-25 | Stop reason: HOSPADM

## 2023-08-24 RX ORDER — POTASSIUM CHLORIDE 7.45 MG/ML
10 INJECTION INTRAVENOUS ONCE
Status: COMPLETED | OUTPATIENT
Start: 2023-08-24 | End: 2023-08-25

## 2023-08-24 RX ORDER — ASCORBIC ACID 500 MG
500 TABLET ORAL DAILY
Status: DISCONTINUED | OUTPATIENT
Start: 2023-08-24 | End: 2023-08-25 | Stop reason: HOSPADM

## 2023-08-24 RX ORDER — LEVOTHYROXINE SODIUM 0.05 MG/1
50 TABLET ORAL DAILY
Status: DISCONTINUED | OUTPATIENT
Start: 2023-08-24 | End: 2023-08-25 | Stop reason: HOSPADM

## 2023-08-24 RX ORDER — SODIUM CHLORIDE 0.9 % (FLUSH) 0.9 %
5-40 SYRINGE (ML) INJECTION EVERY 12 HOURS SCHEDULED
Status: DISCONTINUED | OUTPATIENT
Start: 2023-08-24 | End: 2023-08-25 | Stop reason: HOSPADM

## 2023-08-24 RX ORDER — ACETAMINOPHEN 650 MG/1
650 SUPPOSITORY RECTAL EVERY 6 HOURS PRN
Status: DISCONTINUED | OUTPATIENT
Start: 2023-08-24 | End: 2023-08-25 | Stop reason: HOSPADM

## 2023-08-24 RX ORDER — SODIUM CHLORIDE 0.9 % (FLUSH) 0.9 %
5-40 SYRINGE (ML) INJECTION PRN
Status: DISCONTINUED | OUTPATIENT
Start: 2023-08-24 | End: 2023-08-25 | Stop reason: HOSPADM

## 2023-08-24 RX ORDER — ONDANSETRON 2 MG/ML
4 INJECTION INTRAMUSCULAR; INTRAVENOUS EVERY 6 HOURS PRN
Status: DISCONTINUED | OUTPATIENT
Start: 2023-08-24 | End: 2023-08-25 | Stop reason: HOSPADM

## 2023-08-24 RX ORDER — NIRMATRELVIR AND RITONAVIR 300-100 MG
KIT ORAL
Status: ON HOLD | COMMUNITY
Start: 2023-08-22 | End: 2023-09-03

## 2023-08-24 RX ORDER — ATORVASTATIN CALCIUM 20 MG/1
20 TABLET, FILM COATED ORAL NIGHTLY
Status: DISCONTINUED | OUTPATIENT
Start: 2023-08-24 | End: 2023-08-25 | Stop reason: HOSPADM

## 2023-08-24 RX ORDER — LORAZEPAM 0.5 MG/1
0.5 TABLET ORAL NIGHTLY
Status: DISCONTINUED | OUTPATIENT
Start: 2023-08-24 | End: 2023-08-25 | Stop reason: HOSPADM

## 2023-08-24 RX ORDER — ACETAMINOPHEN 325 MG/1
650 TABLET ORAL ONCE
Status: COMPLETED | OUTPATIENT
Start: 2023-08-24 | End: 2023-08-24

## 2023-08-24 RX ORDER — POTASSIUM CHLORIDE 7.45 MG/ML
10 INJECTION INTRAVENOUS PRN
Status: DISCONTINUED | OUTPATIENT
Start: 2023-08-24 | End: 2023-08-25 | Stop reason: HOSPADM

## 2023-08-24 RX ORDER — ACETAMINOPHEN 325 MG/1
650 TABLET ORAL EVERY 6 HOURS PRN
Status: DISCONTINUED | OUTPATIENT
Start: 2023-08-24 | End: 2023-08-25 | Stop reason: HOSPADM

## 2023-08-24 RX ORDER — PANTOPRAZOLE SODIUM 40 MG/1
40 TABLET, DELAYED RELEASE ORAL
Status: DISCONTINUED | OUTPATIENT
Start: 2023-08-25 | End: 2023-08-25 | Stop reason: HOSPADM

## 2023-08-24 RX ORDER — ASPIRIN 81 MG/1
81 TABLET, CHEWABLE ORAL DAILY
Status: DISCONTINUED | OUTPATIENT
Start: 2023-08-24 | End: 2023-08-25 | Stop reason: HOSPADM

## 2023-08-24 RX ORDER — SODIUM CHLORIDE, SODIUM LACTATE, POTASSIUM CHLORIDE, AND CALCIUM CHLORIDE .6; .31; .03; .02 G/100ML; G/100ML; G/100ML; G/100ML
1000 INJECTION, SOLUTION INTRAVENOUS ONCE
Status: COMPLETED | OUTPATIENT
Start: 2023-08-24 | End: 2023-08-24

## 2023-08-24 RX ORDER — ESTRADIOL 0.1 MG/G
1 CREAM VAGINAL
Status: DISCONTINUED | OUTPATIENT
Start: 2023-08-24 | End: 2023-08-25 | Stop reason: HOSPADM

## 2023-08-24 RX ORDER — ONDANSETRON 4 MG/1
4 TABLET, ORALLY DISINTEGRATING ORAL EVERY 8 HOURS PRN
Status: DISCONTINUED | OUTPATIENT
Start: 2023-08-24 | End: 2023-08-25 | Stop reason: HOSPADM

## 2023-08-24 RX ADMIN — ENOXAPARIN SODIUM 30 MG: 100 INJECTION SUBCUTANEOUS at 17:42

## 2023-08-24 RX ADMIN — POTASSIUM CHLORIDE 10 MEQ: 7.46 INJECTION, SOLUTION INTRAVENOUS at 12:57

## 2023-08-24 RX ADMIN — LORAZEPAM 0.5 MG: 0.5 TABLET ORAL at 20:28

## 2023-08-24 RX ADMIN — OXYCODONE HYDROCHLORIDE AND ACETAMINOPHEN 500 MG: 500 TABLET ORAL at 17:42

## 2023-08-24 RX ADMIN — SODIUM CHLORIDE, POTASSIUM CHLORIDE, SODIUM LACTATE AND CALCIUM CHLORIDE 1000 ML: 600; 310; 30; 20 INJECTION, SOLUTION INTRAVENOUS at 09:48

## 2023-08-24 RX ADMIN — MULTIPLE VITAMINS W/ MINERALS TAB 1 TABLET: TAB at 17:42

## 2023-08-24 RX ADMIN — ACETAMINOPHEN 650 MG: 325 TABLET ORAL at 09:59

## 2023-08-24 RX ADMIN — ESTRADIOL 1 G: 0.1 CREAM VAGINAL at 17:43

## 2023-08-24 RX ADMIN — POTASSIUM BICARBONATE 40 MEQ: 782 TABLET, EFFERVESCENT ORAL at 14:16

## 2023-08-24 RX ADMIN — SODIUM CHLORIDE, POTASSIUM CHLORIDE, SODIUM LACTATE AND CALCIUM CHLORIDE: 600; 310; 30; 20 INJECTION, SOLUTION INTRAVENOUS at 18:43

## 2023-08-24 ASSESSMENT — ENCOUNTER SYMPTOMS
EYE DISCHARGE: 0
VOICE CHANGE: 0
ABDOMINAL PAIN: 0
APNEA: 0
CONSTIPATION: 0
DIARRHEA: 0
BLOOD IN STOOL: 0
SORE THROAT: 0
SINUS PRESSURE: 0
CHOKING: 0
DIARRHEA: 1
SHORTNESS OF BREATH: 0
NAUSEA: 0
FACIAL SWELLING: 0

## 2023-08-24 ASSESSMENT — PAIN - FUNCTIONAL ASSESSMENT: PAIN_FUNCTIONAL_ASSESSMENT: NONE - DENIES PAIN

## 2023-08-24 NOTE — ED PROVIDER NOTES
Mood and Affect: Mood is anxious. DIAGNOSTIC RESULTS     EKG: All EKG's are interpreted by the Emergency Department Physician who either signs or Co-signs this chart in the absence of a cardiologist.    Sinus rhythm 67 rate. AR interval 187. QTc 490. Basically unchanged from EKG of April 2023. RADIOLOGY:   Non-plain film images such as CT, Ultrasound and MRI are read by the radiologist. Plainradiographic images are visualized and preliminarily interpreted by the emergency physician with the below findings:    Reviewed the results. Interpretation per the Radiologist below, if available at the time of this note:    CT Head W/O Contrast   Final Result   1. No intracranial hemorrhage. 2.  No acute findings. Atrophy and small vessel disease. All CT scans are performed using dose optimization techniques as appropriate to the performed exam and include    at least one of the following: Automated exposure control, adjustment of the mA and/or kV according to size, and the use of iterative reconstruction technique. ______________________________________    Electronically signed by: Katty Bañuelos M.D. Date:     08/24/2023   Time:    09:38       XR CHEST PORTABLE   Final Result       1. No acute findings in the chest.   2.  Stable cardiomegaly. ______________________________________    Electronically signed by: Zeoy Gonzalez M.D.    Date:     08/24/2023   Time:    09:06             ED BEDSIDE ULTRASOUND:   Performed by ED Physician - none    LABS:  Labs Reviewed   COVID-19, RAPID - Abnormal; Notable for the following components:       Result Value    SARS-CoV-2, NAAT DETECTED (*)     All other components within normal limits   CBC WITH AUTO DIFFERENTIAL - Abnormal; Notable for the following components:    WBC 3.8 (*)     RBC 3.73 (*)     Hemoglobin 11.9 (*)     Hematocrit 35.6 (*)     MCH 31.9 (*)     Lymphocytes % 18.4 (*)     Monocytes % 15.8 (*)     Lymphocytes Absolute 0.7 (*) PM      PATIENT REFERRED TO:  @FUP@    DISCHARGE MEDICATIONS:  New Prescriptions    No medications on file          (Please note that portions of this note were completed with a voice recognition program.  Efforts were made to edit the dictations butoccasionally words are mis-transcribed.)    Loren Henderson MD (electronically signed)  AttendingEmergency Physician          Irasema Rojas MD  08/24/23 7221

## 2023-08-24 NOTE — PROGRESS NOTES
4 Eyes Skin Assessment     NAME:  Neha Becerra  YOB: 1926  MEDICAL RECORD NUMBER:  [id number]    The patient is being assessed for  Admission    I agree that at least one RN has performed a thorough Head to Toe Skin Assessment on the patient. ALL assessment sites listed below have been assessed. Areas assessed by both nurses:    Head, Face, Ears, Shoulders, Back, Chest, Arms, Elbows, Hands, Sacrum. Buttock, Coccyx, Ischium, and Legs. Feet and Heels        Does the Patient have a Wound?  No noted wound(s)       Albino Prevention initiated by RN: Yes  Wound Care Orders initiated by RN: Yes    Pressure Injury (Stage 3,4, Unstageable, DTI, NWPT, and Complex wounds) if present, place Wound referral order by RN under : No    New Ostomies, if present place, Ostomy referral order under : No     Nurse 1 eSignature: Electronically signed by Denise Andrews RN on 8/24/23 at 5:00 PM CDT    **SHARE this note so that the co-signing nurse can place an eSignature**    Nurse 2 eSignature: Electronically signed by Rochelle Valerio RN on 8/24/23 at 5:03 PM CDT

## 2023-08-25 ENCOUNTER — TELEPHONE (OUTPATIENT)
Dept: INTERNAL MEDICINE | Facility: CLINIC | Age: 88
End: 2023-08-25

## 2023-08-25 VITALS
WEIGHT: 95 LBS | DIASTOLIC BLOOD PRESSURE: 70 MMHG | SYSTOLIC BLOOD PRESSURE: 176 MMHG | RESPIRATION RATE: 16 BRPM | OXYGEN SATURATION: 96 % | TEMPERATURE: 96.8 F | HEIGHT: 62 IN | BODY MASS INDEX: 17.48 KG/M2 | HEART RATE: 66 BPM

## 2023-08-25 LAB
ANION GAP SERPL CALCULATED.3IONS-SCNC: 13 MMOL/L (ref 7–19)
BASOPHILS # BLD: 0 K/UL (ref 0–0.2)
BASOPHILS NFR BLD: 0.4 % (ref 0–1)
BUN SERPL-MCNC: 13 MG/DL (ref 8–23)
CALCIUM SERPL-MCNC: 8.6 MG/DL (ref 8.2–9.6)
CHLORIDE SERPL-SCNC: 99 MMOL/L (ref 98–111)
CO2 SERPL-SCNC: 27 MMOL/L (ref 22–29)
CREAT SERPL-MCNC: 0.6 MG/DL (ref 0.5–0.9)
EKG P AXIS: 78 DEGREES
EKG P-R INTERVAL: 178 MS
EKG Q-T INTERVAL: 416 MS
EKG QRS DURATION: 130 MS
EKG QTC CALCULATION (BAZETT): 445 MS
EKG T AXIS: 95 DEGREES
EOSINOPHIL # BLD: 0 K/UL (ref 0–0.6)
EOSINOPHIL NFR BLD: 0.7 % (ref 0–5)
ERYTHROCYTE [DISTWIDTH] IN BLOOD BY AUTOMATED COUNT: 13.2 % (ref 11.5–14.5)
GLUCOSE SERPL-MCNC: 76 MG/DL (ref 74–109)
HCT VFR BLD AUTO: 34.1 % (ref 37–47)
HGB BLD-MCNC: 11.6 G/DL (ref 12–16)
IMM GRANULOCYTES # BLD: 0 K/UL
LV EF: 58 %
LVEF MODALITY: NORMAL
LYMPHOCYTES # BLD: 0.8 K/UL (ref 1.1–4.5)
LYMPHOCYTES NFR BLD: 27.1 % (ref 20–40)
MAGNESIUM SERPL-MCNC: 1.7 MG/DL (ref 1.7–2.3)
MCH RBC QN AUTO: 32.1 PG (ref 27–31)
MCHC RBC AUTO-ENTMCNC: 34 G/DL (ref 33–37)
MCV RBC AUTO: 94.5 FL (ref 81–99)
MONOCYTES # BLD: 0.6 K/UL (ref 0–0.9)
MONOCYTES NFR BLD: 20.8 % (ref 0–10)
NEUTROPHILS # BLD: 1.4 K/UL (ref 1.5–7.5)
NEUTS SEG NFR BLD: 50.6 % (ref 50–65)
PLATELET # BLD AUTO: 174 K/UL (ref 130–400)
PMV BLD AUTO: 10.3 FL (ref 9.4–12.3)
POTASSIUM SERPL-SCNC: 3.4 MMOL/L (ref 3.5–5)
RBC # BLD AUTO: 3.61 M/UL (ref 4.2–5.4)
SODIUM SERPL-SCNC: 139 MMOL/L (ref 136–145)
T4 FREE SERPL-MCNC: 1.42 NG/DL (ref 0.93–1.7)
TSH SERPL DL<=0.005 MIU/L-ACNC: 6.39 UIU/ML (ref 0.27–4.2)
WBC # BLD AUTO: 2.8 K/UL (ref 4.8–10.8)

## 2023-08-25 PROCEDURE — 84443 ASSAY THYROID STIM HORMONE: CPT

## 2023-08-25 PROCEDURE — 84439 ASSAY OF FREE THYROXINE: CPT

## 2023-08-25 PROCEDURE — 99222 1ST HOSP IP/OBS MODERATE 55: CPT | Performed by: PHYSICIAN ASSISTANT

## 2023-08-25 PROCEDURE — 83735 ASSAY OF MAGNESIUM: CPT

## 2023-08-25 PROCEDURE — 96368 THER/DIAG CONCURRENT INF: CPT

## 2023-08-25 PROCEDURE — 85025 COMPLETE CBC W/AUTO DIFF WBC: CPT

## 2023-08-25 PROCEDURE — 93005 ELECTROCARDIOGRAM TRACING: CPT

## 2023-08-25 PROCEDURE — 97530 THERAPEUTIC ACTIVITIES: CPT

## 2023-08-25 PROCEDURE — 6370000000 HC RX 637 (ALT 250 FOR IP): Performed by: NURSE PRACTITIONER

## 2023-08-25 PROCEDURE — 96372 THER/PROPH/DIAG INJ SC/IM: CPT

## 2023-08-25 PROCEDURE — 6370000000 HC RX 637 (ALT 250 FOR IP): Performed by: STUDENT IN AN ORGANIZED HEALTH CARE EDUCATION/TRAINING PROGRAM

## 2023-08-25 PROCEDURE — 93306 TTE W/DOPPLER COMPLETE: CPT

## 2023-08-25 PROCEDURE — 80048 BASIC METABOLIC PNL TOTAL CA: CPT

## 2023-08-25 PROCEDURE — 36415 COLL VENOUS BLD VENIPUNCTURE: CPT

## 2023-08-25 PROCEDURE — 97162 PT EVAL MOD COMPLEX 30 MIN: CPT

## 2023-08-25 PROCEDURE — 94760 N-INVAS EAR/PLS OXIMETRY 1: CPT

## 2023-08-25 PROCEDURE — 93246 EXT ECG>7D<15D RECORDING: CPT

## 2023-08-25 PROCEDURE — G0378 HOSPITAL OBSERVATION PER HR: HCPCS

## 2023-08-25 PROCEDURE — 97165 OT EVAL LOW COMPLEX 30 MIN: CPT

## 2023-08-25 PROCEDURE — 97116 GAIT TRAINING THERAPY: CPT

## 2023-08-25 PROCEDURE — 6360000002 HC RX W HCPCS: Performed by: NURSE PRACTITIONER

## 2023-08-25 PROCEDURE — 96366 THER/PROPH/DIAG IV INF ADDON: CPT

## 2023-08-25 PROCEDURE — 2580000003 HC RX 258: Performed by: NURSE PRACTITIONER

## 2023-08-25 RX ORDER — METOPROLOL SUCCINATE 25 MG/1
25 TABLET, EXTENDED RELEASE ORAL DAILY
Qty: 30 TABLET | Refills: 3 | Status: ON HOLD | OUTPATIENT
Start: 2023-08-26

## 2023-08-25 RX ORDER — LEVOTHYROXINE SODIUM 0.03 MG/1
25 TABLET ORAL DAILY
Qty: 30 TABLET | Refills: 0 | Status: ON HOLD | OUTPATIENT
Start: 2023-08-25

## 2023-08-25 RX ORDER — POTASSIUM CHLORIDE 750 MG/1
10 TABLET, EXTENDED RELEASE ORAL DAILY
Qty: 30 TABLET | Refills: 0 | Status: ON HOLD | OUTPATIENT
Start: 2023-08-25

## 2023-08-25 RX ORDER — METOPROLOL SUCCINATE 25 MG/1
25 TABLET, EXTENDED RELEASE ORAL DAILY
Status: DISCONTINUED | OUTPATIENT
Start: 2023-08-25 | End: 2023-08-25 | Stop reason: HOSPADM

## 2023-08-25 RX ORDER — MAGNESIUM SULFATE IN WATER 40 MG/ML
2000 INJECTION, SOLUTION INTRAVENOUS ONCE
Status: COMPLETED | OUTPATIENT
Start: 2023-08-25 | End: 2023-08-25

## 2023-08-25 RX ADMIN — METOPROLOL SUCCINATE 25 MG: 25 TABLET, EXTENDED RELEASE ORAL at 08:17

## 2023-08-25 RX ADMIN — ASPIRIN 81 MG: 81 TABLET, CHEWABLE ORAL at 08:17

## 2023-08-25 RX ADMIN — ENOXAPARIN SODIUM 30 MG: 100 INJECTION SUBCUTANEOUS at 08:17

## 2023-08-25 RX ADMIN — MAGNESIUM SULFATE HEPTAHYDRATE 2000 MG: 2 INJECTION, SOLUTION INTRAVENOUS at 10:17

## 2023-08-25 RX ADMIN — SODIUM CHLORIDE, PRESERVATIVE FREE 10 ML: 5 INJECTION INTRAVENOUS at 08:17

## 2023-08-25 RX ADMIN — OXYCODONE HYDROCHLORIDE AND ACETAMINOPHEN 500 MG: 500 TABLET ORAL at 08:17

## 2023-08-25 RX ADMIN — MULTIPLE VITAMINS W/ MINERALS TAB 1 TABLET: TAB at 08:17

## 2023-08-25 RX ADMIN — LEVOTHYROXINE SODIUM 50 MCG: 50 TABLET ORAL at 08:17

## 2023-08-25 NOTE — ACP (ADVANCE CARE PLANNING)
Advance Care Planning     Advance Care Planning (ACP) Physician/NP/PA (Provider) Mara Burns      Date of ACP Conversation: 08/25/2023    Conversation Conducted with:    Healthcare Decision Maker: Named in Advance Directive or Healthcare Power of  (name) [address] [place] [address] 75:    Current Designated Health Care Decision Maker:    Primary Decision Maker: Ashu Melvin - Spouse - [phone]    Supplemental (Other) Decision Maker: Rosa Zepeda - Brother/Sister - [phone]    Care Preferences:    Ventilation:  No    Resuscitation:  No    Length of Voluntary ACP Conversation in minutes:  10 minutes included in total consult time     Nova Watson PA-C How Severe Is Your Skin Lesion?: moderate Have Your Skin Lesions Been Treated?: not been treated Is This A New Presentation, Or A Follow-Up?: Skin Lesions

## 2023-08-25 NOTE — PROGRESS NOTES
Occupational Therapy  Facility/Department: [facility] 4 ONCOLOGY UNIT  Occupational Therapy Initial Assessment    Name: Kvng Hadley  : 11/10/1926  MRN: [id number]  Date of Service: 2023    Discharge Recommendations:             Patient Diagnosis(es): The primary encounter diagnosis was Syncope and collapse. Diagnoses of COVID-19 virus infection, Hypokalemia, Diarrhea, unspecified type, and Abrasion of right forearm, initial encounter were also pertinent to this visit. 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 Past Surgical History:  has a past surgical history that includes Hemorrhoid surgery; Cataract removal; Cholecystectomy; Hysterectomy; Appendectomy; Cardiac catheterization (5/26/15  MDL); Nerve Block Lumb Facet Level 1 Bilateral (Bilateral, 2016); and Breast cyst incision and drainage. Treatment Diagnosis: Syncope and collapse      Assessment   Performance deficits / Impairments: Decreased safe awareness;Decreased cognition;Decreased balance;Decreased ADL status; Decreased functional mobility ; Decreased endurance  Assessment: Will progress as tolerated  Treatment Diagnosis: Syncope and collapse  Prognosis: Good  Decision Making: Low Complexity  REQUIRES OT FOLLOW-UP: Yes  Activity Tolerance  Activity Tolerance: Treatment limited secondary to medical complications (free text)        Plan   Occupational Therapy Plan  Times Per Week: 3-5x/week  Times Per Day:  Once a day

## 2023-08-25 NOTE — DISCHARGE SUMMARY
Pablo Rose  :  11/10/1926  MRN:  [id number]    Admit date:  2023  Discharge date:  2023    Discharging Physician:  Dr. Melissa Tinajero    Advance Directive: Full Code    Consults: Gifty ELISE     Primary Care Physician:  Lynda Zacarias MD    Discharge Diagnoses:  Principal Problem:    Syncope and collapse  Active Problems:    Acquired hypothyroidism    COVID-19    Hypokalemia    Irregular heart rhythm  Resolved Problems:    * No resolved hospital problems. *      Portions of this note have been copied forward, however, changed to reflect the most current clinical status of this patient. Hospital Course: The patient is a 80 y.o. female PMH of HTN, RODNEY, thyroid disease, MR, and palpitations who presented to [address] [place] [address] ED on 2023 complaining of syncope. History is obtained from her family members who state that she is intermittently forgetful, can be very anxious, and the patient being severely hard of hearing. The patient recently tested positive for COVID 2 days prior and has been started on Paxlovid. He tested due to her  having COVID approximately 1 week prior and her stepdaughter 2 weeks prior. Since her positive COVID test she has felt fatigued and generally weak. She stated that she was dizzy the AM of admission and had an apparent syncopal episode. She was found by her spouse. She then was assisted up and sat on the toilet where she had a large diarrheal stool and had a presyncopal episode. The patient and her family report that she has constipation at baseline. EMS was called and she was brought to the ED for further evaluation. Patient does not recall any of her syncopal episode and states that she woke up in the ED. Her spouse denies any head trauma. ED work-up revealed , K3.0, CL 94, BUN 14 creatinine 0.6. CRP 1.27. WBC 3.8, H&H 11.9/35.6 with a platelet count of 553.   Initial troponin

## 2023-08-25 NOTE — PLAN OF CARE
Problem: Skin/Tissue Integrity  Goal: Absence of new skin breakdown  Description: 1. Monitor for areas of redness and/or skin breakdown  2. Assess vascular access sites hourly  3. Every 4-6 hours minimum:  Change oxygen saturation probe site  4. Every 4-6 hours:  If on nasal continuous positive airway pressure, respiratory therapy assess nares and determine need for appliance change or resting period.   Outcome: Adequate for Discharge     Problem: Safety - Adult  Goal: Free from fall injury  Outcome: Adequate for Discharge     Problem: ABCDS Injury Assessment  Goal: Absence of physical injury  Outcome: Adequate for Discharge     Problem: Chronic Conditions and Co-morbidities  Goal: Patient's chronic conditions and co-morbidity symptoms are monitored and maintained or improved  Outcome: Adequate for Discharge

## 2023-08-25 NOTE — PROGRESS NOTES
Comprehensive Nutrition Assessment    Type and Reason for Visit:  Initial    Nutrition Recommendations/Plan:   Continue current POC     Malnutrition Assessment:  Malnutrition Status:  Severe malnutrition (08/25/23 4199)    Context:  Chronic Illness     Findings of the 6 clinical characteristics of malnutrition:  Energy Intake:  No significant decrease in energy intake  Weight Loss:  No significant weight loss     Body Fat Loss:  Severe body fat loss Buccal region, Orbital   Muscle Mass Loss:  Severe muscle mass loss Clavicles (pectoralis & deltoids), Temples (temporalis)  Fluid Accumulation:  No significant fluid accumulation     Strength:  Not Performed    Nutrition Assessment:    Following patient for Low BMI = 17.4. Aware pt has been dx-with COVID. Decreased po intake past couple of days. Intak improved since admission. Now ranging %. Current weight is actually up from UBW of 90#. Hoping to go home    Nutrition Related Findings:    very thin Wound Type: None       Current Nutrition Intake & Therapies:    Average Meal Intake: %  Average Supplements Intake: None Ordered  ADULT DIET; Regular; Low Fat/Low Chol/High Fiber/FRANCO    Anthropometric Measures:  Height: 5' 2\" (157.5 cm)  Ideal Body Weight (IBW): 110 lbs (50 kg)    Admission Body Weight: 95 lb (43.1 kg)  Current Body Weight: 95 lb (43.1 kg), 86.4 % IBW.     Current BMI (kg/m2): 17.4  Usual Body Weight: 90 lb (40.8 kg)  % Weight Change (Calculated): 5.6                    BMI Categories: Underweight (BMI less than 22) age over 72    Estimated Daily Nutrient Needs:  Energy Requirements Based On: Kcal/kg  Weight Used for Energy Requirements: Current  Energy (kcal/day): 2880-6759 kcals  Weight Used for Protein Requirements: Current  Protein (g/day): 65g  Method Used for Fluid Requirements: 1 ml/kcal  Fluid (ml/day): 6394-5625 ml    Nutrition Diagnosis:   Underweight related to early satiety as evidenced by BMI, severe loss of subcutaneous fat,

## 2023-08-25 NOTE — CONSULTS
Palliative Care Consult Note    8/25/2023 1:55 PM  Subjective:  Admit Date: 8/24/2023  PCP: Efra Shepherd MD    Date of Service: 8/25/2023    Reason for Consultation:  Goals of Care, Code Status, Family Support     History Obtained From: EMR/Patient and their Family    History Of Present Illness: The patient is a 80 y.o. female with PMH HTN, anxiety, hypothyroidism, mitral regurgitation, HLD, hearing loss, memory loss who presented to [address] [place] [address] ED on 08/24/2023 complaining of a syncopal episode after having a loose bowel movement. She was diagnosed with COVID-19 2 days prior to presentation in the ED and was started on Paxlovid. Her spouse and her stepdaughter also had recent diagnosis of COVID. The patient reports fatigue, weakness, decreased appetite, dizziness prior to her syncopal episode. CT head reported no intracranial hemorrhage. Chest x-ray reported no acute findings. Rapid COVID was positive. She was admitted to hospitalist service and was placed on droplet isolation and Paxlovid was continued. NICS reported \"There is mixed plaque visualized in the bilateral internal carotid arteries. There is less than 50% stenosis in the right internal carotid artery. There is less than 50% stenosis of the left internal carotid artery. There is normal antegrade flow in the bilateral vertebral arteries. \" Echo ordered and is pending at time of this note. Zio patch ordered at discharge. Palliative care was consulted for goals of care, code status discussion.     Past Medical History:        Diagnosis Date    Anxiety 10/02/2019    Chest tightness or pressure 08/26/2013 8/26/2013  lexiscan negative for myocardial ischemia, EF 61%    Edema     Essential and other specified forms of tremor     Generalized anxiety disorder     HTN (hypertension)     Hyperlipidemia     Hypertension     Insomnia, unspecified     Neuropathy     both legs up to both hips    Other and unspecified ovarian cyst     Other left bundle branch

## 2023-08-25 NOTE — CARE COORDINATION
Spoke with patient regarding MD orders for [facility] services. Patient did not have a preference of [facility] agencies. Patient had [facility] in the past. Will fax new referral to [facility]. [address] [facility] [phone]. [phone]. Please notify [phone] [facility] when patient discharges and fax DC Summary,  DC med list and any new [facility] orders. The Patient was provided with a choice of provider and agrees   with the discharge plan. [x] Yes [] No    Freedom of choice list was provided with basic dialogue that supports the patient's individualized plan of care/goals, treatment preferences and shares the quality data associated with the providers.  [x] Yes [] No  Electronically signed by Frank Mckeon on 8/25/2023 at 1:15 PM
Sw spoke to Pt and at bedside Pt spouse Vale Oneill, and Rufina Marshall daughter Fior Ledezma. Pt would like to d/c home once medically stable. Pt was agreeable to home health. Pt and and spouse are not interested in SNF at this time. Sw provided a list of sitters, SNF choice list, and information on the surrounding assisted livings. No other questions or concerns at this time.    Electronically signed by MIRYAM Pérez on 8/25/2023 at 11:23 AM
how certain appliances work, and gets confused at times. Sw is awaiting PT/OT evals to see their recommendations. The Plan for Transition of Care is related to the following treatment goals of Syncope and collapse [R55]  Hypokalemia [E87.6]  Abrasion of right forearm, initial encounter [S50.811A]  Diarrhea, unspecified type [R19.7]  COVID-19 virus infection [Q26.9]    IF APPLICABLE: The Patient and/or patient representative Megan Amezcua and her family were provided with a choice of provider and agrees with the discharge plan. Freedom of choice list with basic dialogue that supports the patient's individualized plan of care/goals and shares the quality data associated with the providers was provided to: (P) Patient   Patient Representative Name:   self, spouse Bettie Leigh    The Patient and/or Patient Representative Agree with the Discharge Plan? (P) Yes    MIRYAM Banerjee  Case Management Department  Electronically signed by MIRYAM Banerjee on 8/25/2023 at 9:20 AM       08/25/23 0906   Service Assessment   Patient Orientation Alert and Oriented;Person;Self;Situation   Cognition Short Term Memory Deficit   History Provided By Patient   Primary [address] is: Legal Next of Kin   PCP Verified by CM Yes   Last Visit to PCP Within last 6 months   Prior Functional Level Assistance with the following:;Cooking; Shopping   Current Functional Level Assistance with the following:;Cooking; Shopping   Can patient return to prior living arrangement Unknown at present   Ability to make needs known: Fair   Family able to assist with home care needs: Yes   Would you like for me to discuss the discharge plan with any other family members/significant others, and if so, who?  No   Financial Resources Medicare;Medicaid   [employer] None   CM/SW Referral Emotional support   Social/Functional History   Lives With Spouse   Type of Home House

## 2023-08-25 NOTE — TELEPHONE ENCOUNTER
Caller: Alex Arteaga    Relationship: Emergency Contact    Best call back number: [phone]     What is the best time to reach you: ANY    Who are you requesting to speak with (clinical staff, provider,  specific staff member): CLINICAL    What was the call regarding:     PATIENT'S  WOULD LIKE TO INFORM PROVIDER THAT PATIENT IS CURRENTLY HOSPITALIZED AT [facility].

## 2023-08-25 NOTE — PROGRESS NOTES
Physical Therapy  Facility/Department: [facility] 4 ONCOLOGY UNIT  Physical Therapy Initial Assessment    Name: Shanti Garcia  : 11/10/1926  MRN: [id number]  Date of Service: 2023    Discharge Recommendations:  Continue to assess pending progress, 24 hour supervision or assist, Patient would benefit from continued therapy after discharge          Patient Diagnosis(es): The primary encounter diagnosis was Syncope and collapse. Diagnoses of COVID-19 virus infection, Hypokalemia, Diarrhea, unspecified type, and Abrasion of right forearm, initial encounter were also pertinent to this visit. 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 Past Surgical History:  has a past surgical history that includes Hemorrhoid surgery; Cataract removal; Cholecystectomy; Hysterectomy; Appendectomy; Cardiac catheterization (5/26/15  MDL); Nerve Block Lumb Facet Level 1 Bilateral (Bilateral, 2016); and Breast cyst incision and drainage. Assessment   Body Structures, Functions, Activity Limitations Requiring Skilled Therapeutic Intervention: Decreased functional mobility ; Decreased ADL status; Decreased ROM; Decreased endurance;Decreased cognition;Decreased safe awareness;Decreased strength;Decreased balance  Assessment: Pt. will benefit from cont. PT to decrease impairments. Pt. a fall risk and should not attempt mobility on her own at this time. Pt. standing at foot of bed upon PT entry to room.  Pt. stating she was about to give out. Pt. encouraged to sit on EOB, gait belt donned and pt given RW. Pt. able to get around foot of bed to chair and was set up for breakfast. Pt. slightly confused and needs 24 hr care. Encouraged out of bed to chair and will work on progressive mobility. Treatment Diagnosis: impaired gait and mobility  Therapy Prognosis: Fair  Decision Making: Medium Complexity  Barriers to Learning: none noted  Requires PT Follow-Up: Yes  Activity Tolerance  Activity Tolerance: Treatment limited secondary to decreased cognition;Patient limited by endurance     Plan   Physcial Therapy Plan  General Plan: 5-7 times per week  Therapy Duration: 2 Weeks  Current Treatment Recommendations: Strengthening, Balance training, Functional mobility training, Transfer training, Gait training, Endurance training, Cognitive reorientation, Patient/Caregiver education & training, Positioning, Safety education & training, Equipment evaluation, education, & procurement, Therapeutic activities  Safety Devices  Type of Devices: Gait belt, Call light within reach, Chair alarm in place, Patient at risk for falls, Heels elevated for pressure relief, Left in chair (reclined in chair; PCA aware)     Restrictions  Restrictions/Precautions  Restrictions/Precautions: Fall Risk, Contact Precautions, Isolation  Required Braces or Orthoses?: No  Position Activity Restriction  Other position/activity restrictions: covid 19, c diff rule out     Subjective   Pain: DENIES  General  Chart Reviewed: Yes  Patient assessed for rehabilitation services?: Yes  Response To Previous Treatment: Not applicable  Family / Caregiver Present: No  Referring Practitioner: JERE Faust  Referral Date : 08/24/23  Diagnosis: syncope and collapse, covid 19, diarrhea, hypokalemia, abrasion R forearm  Follows Commands: Within Functional Limits  Subjective  Subjective: Pt. willing to work with therapy. Wants to eat breakfast. \"I'm about to give out. \"

## 2023-08-28 ENCOUNTER — TELEPHONE (OUTPATIENT)
Dept: INTERNAL MEDICINE | Facility: CLINIC | Age: 88
End: 2023-08-28

## 2023-08-28 LAB
EKG P AXIS: 75 DEGREES
EKG P-R INTERVAL: 186 MS
EKG Q-T INTERVAL: 454 MS
EKG QRS DURATION: 132 MS
EKG QTC CALCULATION (BAZETT): 466 MS
EKG T AXIS: 94 DEGREES

## 2023-08-28 NOTE — TELEPHONE ENCOUNTER
HOME HEALTH CARE PHYSICAL THERAPIST CALLED IN WITH PLAN OF CARE FOR PATIENT   PATIENT WILL BE SEEN ONCE MORE THIS WEEK THEN TWICE A WEEK FOR TWO   WILL WORK ON HOME SAFETY GAIT TRANS ER STRENGTH AND BALANCE     GOOD CALL BACK   [phone]

## 2023-08-29 ENCOUNTER — TELEPHONE (OUTPATIENT)
Dept: INTERNAL MEDICINE | Facility: CLINIC | Age: 88
End: 2023-08-29

## 2023-08-29 NOTE — TELEPHONE ENCOUNTER
BILL  WITH [facility]  CALLED IN TO LET PROVIDER KNOW LONG CARE PLANNING  IS FOR PATIENT AND SPOUSE TO MOVE INTO CHARTER ASSISTED LIVING SOON     GOOD CALL BACK   [phone]

## 2023-08-31 ENCOUNTER — OFFICE VISIT (OUTPATIENT)
Dept: INTERNAL MEDICINE | Facility: CLINIC | Age: 88
End: 2023-08-31
Payer: MEDICARE

## 2023-08-31 VITALS
HEART RATE: 78 BPM | WEIGHT: 87 LBS | SYSTOLIC BLOOD PRESSURE: 146 MMHG | HEIGHT: 62 IN | OXYGEN SATURATION: 97 % | TEMPERATURE: 97.8 F | DIASTOLIC BLOOD PRESSURE: 80 MMHG | BODY MASS INDEX: 16.01 KG/M2

## 2023-08-31 DIAGNOSIS — I10 BENIGN HYPERTENSION: Primary | ICD-10-CM

## 2023-08-31 DIAGNOSIS — R55 VASOVAGAL SYNCOPE: ICD-10-CM

## 2023-08-31 DIAGNOSIS — F41.1 GENERALIZED ANXIETY DISORDER: ICD-10-CM

## 2023-08-31 DIAGNOSIS — H90.6 MIXED CONDUCTIVE AND SENSORINEURAL HEARING LOSS OF BOTH EARS: ICD-10-CM

## 2023-08-31 DIAGNOSIS — U07.1 COVID-19 VIRUS INFECTION: ICD-10-CM

## 2023-08-31 DIAGNOSIS — F03.90 DEMENTIA WITHOUT BEHAVIORAL DISTURBANCE: ICD-10-CM

## 2023-08-31 RX ORDER — LORAZEPAM 0.5 MG/1
0.5 TABLET ORAL EVERY 8 HOURS PRN
COMMUNITY

## 2023-08-31 NOTE — PROGRESS NOTES
"      Chief Complaint  Hospital Follow Up Visit (D/c 8/25 from [facility] /Covid, syncopal episode. Loss of consciousness, confusion /Has been more anxious since episode - wondering about ativan again as the melatonin did not work so she went back to it. /Patient has refused home health help  ), Anxiety, zio patch (Want to discuss removing before the 8th as it makes her anxious ), and other (Was given metoprolol suc ER 25 at hospital for high BP but makes pt dizzy so does not take )    Subjective        Meenu Arteaga presents to [facility]    HPI    Patient here for the above problems.  See Assessment and Plan for further HPI components.      The patient was hospitalized at [facility].     From their discharge summary:  HPI:  \"The patient is a 89 y.o. female PMH of HTN, VIJAY, thyroid disease, MR, and palpitations who presented to [facility] ED on 8/24/2023 complaining of syncope. History is obtained from her family members who state that she is intermittently forgetful, can be very anxious, and the patient being severely hard of hearing. The patient recently tested positive for COVID 2 days prior and has been started on Paxlovid. He tested due to her  having COVID approximately 1 week prior and her stepdaughter 2 weeks prior. Since her positive COVID test she has felt fatigued and generally weak. She states that she had some dizziness this a.m. and had recently gotten out of the shower when she had a syncopal episode. She was found by her spouse. She then was assisted up and sat on the toilet where she had a large diarrheal stool and had a presyncopal episode. The patient and her family report that she has constipation at baseline. Denies any nausea or vomiting. Denies any fever or chills. EMS was called and she was brought to the ED for further evaluation. Patient does not recall any of her syncopal episode and states that she woke up in the ED. Her spouse denies any head trauma. " "    Her ED work-up revealed , K3.0, CL 94, BUN 14 creatinine 0.6. CRP 1.27. WBC 3.8, H&H 11.9/35.6 with a platelet count of 170. Initial troponin negative. UA negative for nitrates, negative for leukocytes. Chest x-ray showed no acute findings. Stable cardiomegaly. CT of the head showed no acute intercranial hemorrhage. Her orthostatics were found to be positive in ED. She was noted to have brief runs of an irregular appearing rhythm that was tachycardic in nature per bedside monitor. The patient will be admitted to hospital medicine for syncope and COVID-19 positivity.\"    Hospital Course:  "The patient is a 89 y.o. female PMH of HTN, VIJAY, thyroid disease, MR, and palpitations who presented to [facility] ED on 8/24/2023 complaining of syncope. History is obtained from her family members who state that she is intermittently forgetful, can be very anxious, and the patient being severely hard of hearing. The patient recently tested positive for COVID 2 days prior and has been started on Paxlovid. He tested due to her  having COVID approximately 1 week prior and her stepdaughter 2 weeks prior. Since her positive COVID test she has felt fatigued and generally weak. She stated that she was dizzy the AM of admission and had an apparent syncopal episode. She was found by her spouse. She then was assisted up and sat on the toilet where she had a large diarrheal stool and had a presyncopal episode. The patient and her family report that she has constipation at baseline. EMS was called and she was brought to the ED for further evaluation. Patient does not recall any of her syncopal episode and states that she woke up in the ED. Her spouse denies any head trauma. ED work-up revealed , K3.0, CL 94, BUN 14 creatinine 0.6. CRP 1.27. WBC 3.8, H&H 11.9/35.6 with a platelet count of 170. Initial troponin negative. UA negative for nitrates, negative for leukocytes. Chest x-ray showed no acute findings. Stable " "cardiomegaly. CT of the head showed no acute intercranial hemorrhage. Her orthostatics were found to be positive in ED. She was noted to have brief runs of an irregular appearing rhythm that was tachycardic in nature per bedside monitor. The patient will be admitted to hospital medicine for syncope and COVID-19 positivity.  She was given supplemental K in the ED, in addition to IV K. She was placed on telemetry. No episodes of tachycardia have been reported since her admission. AM EKG showed sinus rhythm. Carotid Doppler ultrasounds were performed which showed less than 50% stenosis to bilateral internal carotid arteries. Normal antegrade flow to bilateral vertebral arteries.  She has been evaluated by PT/OT who does recommend that she have 24-hour supervision or assist. Her spouse is at bedside requesting that if she is medically stable for her to come home with home health further arrangements can be made after they return home. Home health was consulted for PT/OT/home care needs. He and her spouse have accepted [facility] for home health services. He will be notified of her discharge. This management has been involved with discharge.  ED echo ordered and to be performed outpatient. Long-term cardiac monitor will be placed prior to discharge. Her electrolytes have improved-K3.4 supplemented appropriately. She will be sent with 10 mEq p.o. daily on an outpatient basis. All other electrolytes are stable. Her CBC remained stable. He is to complete her Paxlovid on an outpatient basis.  She is to follow-up with her PCP within 1 week of discharge for hospital follow-up. He is medically stable for discharge. She will be discharged home in stable condition."      HPI from [facility]:  "    Review of Systems    Objective   Vital Signs:  /80 (BP Location: Left arm, Patient Position: Sitting, Cuff Size: Adult)   Pulse 78   Temp 97.8 øF (36.6 øC) (Temporal)   Ht 157.5 cm (62\")   Wt 39.5 kg (87 lb)   SpO2 97%   " "BMI 15.91 kg/mý   Estimated body mass index is 15.91 kg/mý as calculated from the following:    Height as of this encounter: 157.5 cm (62\").    Weight as of this encounter: 39.5 kg (87 lb).      Physical Exam  Constitutional:       Appearance: She is not ill-appearing.   Cardiovascular:      Rate and Rhythm: Normal rate and regular rhythm.      Heart sounds: Murmur heard.   Pulmonary:      Effort: Pulmonary effort is normal. No respiratory distress.   Skin:     General: Skin is warm.      Coloration: Skin is pale.   Neurological:      General: No focal deficit present.      Mental Status: She is alert. Mental status is at baseline.                       Assessment and Plan   Diagnoses and all orders for this visit:    1. Benign hypertension (Primary)    2. Generalized anxiety disorder    3. 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 Vasovagal syncope    6. COVID-19 virus infection      Carotid US showed < 50% stenosis bilaterally  CT head showed no acute process    Echo:   Summary    Mitral valve leaflets are mildly thickened with preserved leaflet    mobility.    Mild to moderate mitral regurgitation is present.    The aortic valve is trileaflet, sclerotic with good leaflet separation.    No evidence of aortic stenosis; trivial aortic regurgitation.    Tricuspid valve is structurally normal.    Moderate tricuspid regurgitation noted.    Mild concentric left ventricular hypertrophy noted.    Normal left ventricular size with preserved LV function and an estimated    ejection fraction of approximately 55-60%.    Impaired relaxation compatible with diastolic dysfunction. ( reversed E/A    ratio)    No regional wall motion abnormalities.    No evidence of left ventricular mass or thrombus noted.       I suspect vasovagal/vasodilatory syncope in the setting of dehydration.    The patient does not want to do the zio patch.  I think she likely had " obtained enough data.  Told them they can take off and send in to [facility] for report.  The patient indicates it has caused a lot of anxiety    Patient needs assistance at home but she is hesitant on doing so.  Family very stressed regarding trying to take care of them at their home.  They are going to move into assisted living soon.      Patient does not want a lot of extensive work up as she tells me at every visit she is ready to go whenever it is her time.    Discontinue metoprolol as patient reports dizziness with it         Result Review :  The following data was reviewed by: Scotty Miles MD on 08/31/2023:  CMP          1/18/2023    10:51 3/7/2023    09:21 7/10/2023    09:29   CMP   Glucose  96  124    BUN  18  16    Creatinine  0.77  0.73    Sodium  136  137    Potassium  3.5  3.6    Chloride  96  99    Calcium  10.2  10.3    Total Protein 6.9   6.6    Albumin 4.9   4.4    Globulin   2.2    Total Bilirubin 0.4   0.4    Alkaline Phosphatase 70   61    AST (SGOT) 33   26    ALT (SGPT) 17   13    BUN/Creatinine Ratio  23.4  21.9      CBC          7/10/2023    09:29 8/24/2023    08:52 8/25/2023    03:08   CBC   WBC 5.29  3.8     2.8       RBC 3.75  3.73     3.61       Hemoglobin 12.1  11.9     11.6       Hematocrit 36.2  35.6     34.1       MCV 96.5  95.4     94.5       MCH 32.3  31.9     32.1       MCHC 33.4  33.4     34.0       RDW 12.0  13.5     13.2       Platelets 249  170     174          Details          This result is from an external source.             CBC w/diff          7/10/2023    09:29 8/24/2023    08:52 8/25/2023    03:08   CBC w/Diff   WBC 5.29  3.8     2.8       RBC 3.75  3.73     3.61       Hemoglobin 12.1  11.9     11.6       Hematocrit 36.2  35.6     34.1       MCV 96.5  95.4     94.5       MCH 32.3  31.9     32.1       MCHC 33.4  33.4     34.0       RDW 12.0  13.5     13.2       Platelets 249  170     174       Neutrophil Rel % 77.1  64.9     50.6       Lymphocyte Rel % 11.9  18.4     27.1        Monocyte Rel % 9.6  15.8     20.8       Eosinophil Rel % 0.4  0.3     0.7       Basophil Rel % 0.8  0.3     0.4          Details          This result is from an external source.             Lipid Panel          12/20/2022    02:31 1/18/2023    10:51 7/10/2023    09:29   Lipid Panel   Total Cholesterol 197     232  213    Triglycerides 65     85  81    HDL Cholesterol 86     96  78    VLDL Cholesterol  14  14    LDL Cholesterol  98     122  121       Details          This result is from an external source.             TSH          3/29/2023    13:46 7/10/2023    09:29 8/25/2023    03:08   TSH   TSH 4.500  4.030  6.390          Details          This result is from an external source.             BMP          10/24/2022    11:33 3/7/2023    09:21 7/10/2023    09:29   BMP   BUN 15  18  16    Creatinine 0.69  0.77  0.73    Sodium 137  136  137    Potassium 3.9  3.5  3.6    Chloride 95  96  99    CO2 27.3  27.4  29.5    Calcium 10.0  10.2  10.3               Reviewed all relevant labs from hospitalization.  Not a TCM visit as our facility did not make contact with the patient within 2 business days.     BMI is below normal parameters (malnutrition). Recommendations: recommended supplementation.            Follow Up   Return if symptoms worsen or fail to improve, for Next scheduled follow up.  Patient was given instructions and counseling regarding her condition or for health maintenance advice. Please see specific information pulled into the AVS if appropriate.       CHRISTOPHE Miles MD, FACP, FH      Electronically signed by Scotty Miles MD, 08/31/23, 6:39 PM CDT.

## 2023-09-01 ENCOUNTER — TELEPHONE (OUTPATIENT)
Dept: INTERNAL MEDICINE | Facility: CLINIC | Age: 88
End: 2023-09-01

## 2023-09-01 NOTE — TELEPHONE ENCOUNTER
Caller: KRISTI    Relationship: Home Health    Best call back number: [phone]     What is the best time to reach you: PREFERABLY BEFORE 1:30 PM ON 09.01.23, IF POSSIBLE     Who are you requesting to speak with (clinical staff, provider,  specific staff member): CLINICAL    What was the call regarding:     KRISTI FROM [facility] STATES PATIENT WAS DIAGNOSED WITH COVID-19 ON 08.22.23. KRISTI IS WONDERING IF IT IS OKAY TO DISCONTINUE COVID-19 PROTOCOLS?

## 2023-09-02 ENCOUNTER — HOSPITAL ENCOUNTER (INPATIENT)
Age: 88
LOS: 11 days | Discharge: PSYCHIATRIC OR SUBSTANCE USE FACILITY | DRG: 880 | End: 2023-09-13
Attending: HOSPITALIST | Admitting: HOSPITALIST
Payer: MEDICARE

## 2023-09-02 DIAGNOSIS — E87.1 HYPONATREMIA: ICD-10-CM

## 2023-09-02 DIAGNOSIS — R45.851 SUICIDAL IDEATION: Primary | ICD-10-CM

## 2023-09-02 DIAGNOSIS — U07.1 COVID-19: ICD-10-CM

## 2023-09-02 LAB
25(OH)D3 SERPL-MCNC: 56.5 NG/ML
ALBUMIN SERPL-MCNC: 4.2 G/DL (ref 3.5–5.2)
ALP SERPL-CCNC: 55 U/L (ref 35–104)
ALT SERPL-CCNC: 27 U/L (ref 5–33)
AMPHET UR QL SCN: NEGATIVE
ANION GAP SERPL CALCULATED.3IONS-SCNC: 12 MMOL/L (ref 7–19)
AST SERPL-CCNC: 43 U/L (ref 5–32)
BACTERIA URNS QL MICRO: ABNORMAL /HPF
BARBITURATES UR QL SCN: NEGATIVE
BASOPHILS # BLD: 0 K/UL (ref 0–0.2)
BASOPHILS NFR BLD: 0.5 % (ref 0–1)
BENZODIAZ UR QL SCN: NEGATIVE
BILIRUB SERPL-MCNC: 0.4 MG/DL (ref 0.2–1.2)
BILIRUB UR QL STRIP: NEGATIVE
BUN SERPL-MCNC: 12 MG/DL (ref 8–23)
BUPRENORPHINE URINE: NEGATIVE
CALCIUM SERPL-MCNC: 9.3 MG/DL (ref 8.2–9.6)
CANNABINOIDS UR QL SCN: NEGATIVE
CHLORIDE SERPL-SCNC: 94 MMOL/L (ref 98–111)
CLARITY UR: CLEAR
CO2 SERPL-SCNC: 24 MMOL/L (ref 22–29)
COCAINE UR QL SCN: NEGATIVE
COLOR UR: YELLOW
CREAT SERPL-MCNC: 0.5 MG/DL (ref 0.5–0.9)
CRP SERPL HS-MCNC: <0.3 MG/DL (ref 0–0.5)
CRYSTALS URNS MICRO: ABNORMAL /HPF
D DIMER PPP FEU-MCNC: 0.7 UG/ML FEU (ref 0–0.48)
DRUG SCREEN COMMENT UR-IMP: NORMAL
EOSINOPHIL # BLD: 0 K/UL (ref 0–0.6)
EOSINOPHIL NFR BLD: 0.7 % (ref 0–5)
EPI CELLS #/AREA URNS AUTO: 1 /HPF (ref 0–5)
ERYTHROCYTE [DISTWIDTH] IN BLOOD BY AUTOMATED COUNT: 13.4 % (ref 11.5–14.5)
ETHANOLAMINE SERPL-MCNC: <10 MG/DL (ref 0–0.08)
FERRITIN SERPL-MCNC: 255 NG/ML (ref 13–150)
GLUCOSE SERPL-MCNC: 93 MG/DL (ref 74–109)
GLUCOSE UR STRIP.AUTO-MCNC: NEGATIVE MG/DL
HCT VFR BLD AUTO: 31.9 % (ref 37–47)
HGB BLD-MCNC: 10.8 G/DL (ref 12–16)
HGB UR STRIP.AUTO-MCNC: NEGATIVE MG/L
HYALINE CASTS #/AREA URNS AUTO: 1 /HPF (ref 0–8)
IMM GRANULOCYTES # BLD: 0 K/UL
KETONES UR STRIP.AUTO-MCNC: ABNORMAL MG/DL
LEUKOCYTE ESTERASE UR QL STRIP.AUTO: ABNORMAL
LYMPHOCYTES # BLD: 0.8 K/UL (ref 1.1–4.5)
LYMPHOCYTES NFR BLD: 13.7 % (ref 20–40)
MCH RBC QN AUTO: 32.2 PG (ref 27–31)
MCHC RBC AUTO-ENTMCNC: 33.9 G/DL (ref 33–37)
MCV RBC AUTO: 95.2 FL (ref 81–99)
METHADONE UR QL SCN: NEGATIVE
METHAMPHETAMINE, URINE: NEGATIVE
MONOCYTES # BLD: 0.9 K/UL (ref 0–0.9)
MONOCYTES NFR BLD: 15.8 % (ref 0–10)
NEUTROPHILS # BLD: 4 K/UL (ref 1.5–7.5)
NEUTS SEG NFR BLD: 69 % (ref 50–65)
NITRITE UR QL STRIP.AUTO: NEGATIVE
OPIATES UR QL SCN: NEGATIVE
OXYCODONE UR QL SCN: NEGATIVE
PCP UR QL SCN: NEGATIVE
PH UR STRIP.AUTO: 8 [PH] (ref 5–8)
PLATELET # BLD AUTO: 284 K/UL (ref 130–400)
PMV BLD AUTO: 9.8 FL (ref 9.4–12.3)
POTASSIUM SERPL-SCNC: 3.8 MMOL/L (ref 3.5–5)
PROCALCITONIN: 0.05 NG/ML (ref 0–0.09)
PROPOXYPH UR QL SCN: NEGATIVE
PROT SERPL-MCNC: 6.5 G/DL (ref 6.6–8.7)
PROT UR STRIP.AUTO-MCNC: NEGATIVE MG/DL
RBC # BLD AUTO: 3.35 M/UL (ref 4.2–5.4)
RBC #/AREA URNS AUTO: 2 /HPF (ref 0–4)
SARS-COV-2 RDRP RESP QL NAA+PROBE: DETECTED
SODIUM SERPL-SCNC: 130 MMOL/L (ref 136–145)
SP GR UR STRIP.AUTO: 1 (ref 1–1.03)
TRICYCLIC, URINE: NEGATIVE
UROBILINOGEN UR STRIP.AUTO-MCNC: 0.2 E.U./DL
WBC # BLD AUTO: 5.8 K/UL (ref 4.8–10.8)
WBC #/AREA URNS AUTO: 13 /HPF (ref 0–5)

## 2023-09-02 PROCEDURE — 82077 ASSAY SPEC XCP UR&BREATH IA: CPT

## 2023-09-02 PROCEDURE — 80053 COMPREHEN METABOLIC PANEL: CPT

## 2023-09-02 PROCEDURE — 85025 COMPLETE CBC W/AUTO DIFF WBC: CPT

## 2023-09-02 PROCEDURE — 6360000002 HC RX W HCPCS: Performed by: HOSPITALIST

## 2023-09-02 PROCEDURE — 1210000000 HC MED SURG R&B

## 2023-09-02 PROCEDURE — 36415 COLL VENOUS BLD VENIPUNCTURE: CPT

## 2023-09-02 PROCEDURE — 84145 PROCALCITONIN (PCT): CPT

## 2023-09-02 PROCEDURE — 82306 VITAMIN D 25 HYDROXY: CPT

## 2023-09-02 PROCEDURE — 6370000000 HC RX 637 (ALT 250 FOR IP): Performed by: HOSPITALIST

## 2023-09-02 PROCEDURE — 81001 URINALYSIS AUTO W/SCOPE: CPT

## 2023-09-02 PROCEDURE — 82728 ASSAY OF FERRITIN: CPT

## 2023-09-02 PROCEDURE — 2580000003 HC RX 258: Performed by: HOSPITALIST

## 2023-09-02 PROCEDURE — 6370000000 HC RX 637 (ALT 250 FOR IP): Performed by: NURSE PRACTITIONER

## 2023-09-02 PROCEDURE — 87186 SC STD MICRODIL/AGAR DIL: CPT

## 2023-09-02 PROCEDURE — 80306 DRUG TEST PRSMV INSTRMNT: CPT

## 2023-09-02 PROCEDURE — 85379 FIBRIN DEGRADATION QUANT: CPT

## 2023-09-02 PROCEDURE — 86140 C-REACTIVE PROTEIN: CPT

## 2023-09-02 PROCEDURE — 87086 URINE CULTURE/COLONY COUNT: CPT

## 2023-09-02 PROCEDURE — 99285 EMERGENCY DEPT VISIT HI MDM: CPT

## 2023-09-02 PROCEDURE — 87635 SARS-COV-2 COVID-19 AMP PRB: CPT

## 2023-09-02 RX ORDER — CALCIUM CARBONATE 500 MG/1
500 TABLET, CHEWABLE ORAL 3 TIMES DAILY PRN
Status: DISCONTINUED | OUTPATIENT
Start: 2023-09-02 | End: 2023-09-13 | Stop reason: HOSPADM

## 2023-09-02 RX ORDER — GUAIFENESIN/DEXTROMETHORPHAN 100-10MG/5
10 SYRUP ORAL EVERY 4 HOURS PRN
Status: DISCONTINUED | OUTPATIENT
Start: 2023-09-02 | End: 2023-09-13 | Stop reason: HOSPADM

## 2023-09-02 RX ORDER — ONDANSETRON 2 MG/ML
4 INJECTION INTRAMUSCULAR; INTRAVENOUS EVERY 6 HOURS PRN
Status: DISCONTINUED | OUTPATIENT
Start: 2023-09-02 | End: 2023-09-13 | Stop reason: HOSPADM

## 2023-09-02 RX ORDER — FAMOTIDINE 20 MG/1
20 TABLET, FILM COATED ORAL DAILY
Status: DISCONTINUED | OUTPATIENT
Start: 2023-09-03 | End: 2023-09-13 | Stop reason: HOSPADM

## 2023-09-02 RX ORDER — ASPIRIN 81 MG/1
81 TABLET, CHEWABLE ORAL DAILY
Status: DISCONTINUED | OUTPATIENT
Start: 2023-09-03 | End: 2023-09-13 | Stop reason: HOSPADM

## 2023-09-02 RX ORDER — LORAZEPAM 1 MG/1
0.5 TABLET ORAL NIGHTLY
Status: DISCONTINUED | OUTPATIENT
Start: 2023-09-02 | End: 2023-09-13 | Stop reason: HOSPADM

## 2023-09-02 RX ORDER — ALBUTEROL SULFATE 90 UG/1
2 AEROSOL, METERED RESPIRATORY (INHALATION) EVERY 4 HOURS PRN
Status: DISCONTINUED | OUTPATIENT
Start: 2023-09-02 | End: 2023-09-13 | Stop reason: HOSPADM

## 2023-09-02 RX ORDER — MECOBALAMIN 5000 MCG
5 TABLET,DISINTEGRATING ORAL NIGHTLY PRN
Status: DISCONTINUED | OUTPATIENT
Start: 2023-09-02 | End: 2023-09-13 | Stop reason: HOSPADM

## 2023-09-02 RX ORDER — M-VIT,TX,IRON,MINS/CALC/FOLIC 27MG-0.4MG
1 TABLET ORAL DAILY
Status: DISCONTINUED | OUTPATIENT
Start: 2023-09-03 | End: 2023-09-13 | Stop reason: HOSPADM

## 2023-09-02 RX ORDER — POLYETHYLENE GLYCOL 3350 17 G/17G
17 POWDER, FOR SOLUTION ORAL DAILY PRN
Status: DISCONTINUED | OUTPATIENT
Start: 2023-09-02 | End: 2023-09-13 | Stop reason: HOSPADM

## 2023-09-02 RX ORDER — ESTRADIOL 0.1 MG/G
1 CREAM VAGINAL
Status: DISCONTINUED | OUTPATIENT
Start: 2023-09-04 | End: 2023-09-13 | Stop reason: HOSPADM

## 2023-09-02 RX ORDER — DOCUSATE SODIUM 100 MG/1
100 CAPSULE, LIQUID FILLED ORAL 2 TIMES DAILY
Status: DISCONTINUED | OUTPATIENT
Start: 2023-09-02 | End: 2023-09-04

## 2023-09-02 RX ORDER — VALSARTAN 160 MG/1
320 TABLET ORAL DAILY
Status: DISCONTINUED | OUTPATIENT
Start: 2023-09-03 | End: 2023-09-13

## 2023-09-02 RX ORDER — ONDANSETRON 4 MG/1
4 TABLET, ORALLY DISINTEGRATING ORAL EVERY 8 HOURS PRN
Status: DISCONTINUED | OUTPATIENT
Start: 2023-09-02 | End: 2023-09-13 | Stop reason: HOSPADM

## 2023-09-02 RX ORDER — SODIUM CHLORIDE 0.9 % (FLUSH) 0.9 %
5-40 SYRINGE (ML) INJECTION PRN
Status: DISCONTINUED | OUTPATIENT
Start: 2023-09-02 | End: 2023-09-13 | Stop reason: HOSPADM

## 2023-09-02 RX ORDER — ENOXAPARIN SODIUM 100 MG/ML
30 INJECTION SUBCUTANEOUS 2 TIMES DAILY
Status: DISCONTINUED | OUTPATIENT
Start: 2023-09-02 | End: 2023-09-03

## 2023-09-02 RX ORDER — NITROFURANTOIN 25; 75 MG/1; MG/1
100 CAPSULE ORAL EVERY 12 HOURS SCHEDULED
Status: DISCONTINUED | OUTPATIENT
Start: 2023-09-02 | End: 2023-09-04

## 2023-09-02 RX ORDER — METOPROLOL SUCCINATE 50 MG/1
25 TABLET, EXTENDED RELEASE ORAL DAILY
Status: CANCELLED | OUTPATIENT
Start: 2023-09-03

## 2023-09-02 RX ORDER — ACETAMINOPHEN 325 MG/1
650 TABLET ORAL EVERY 4 HOURS PRN
Status: DISCONTINUED | OUTPATIENT
Start: 2023-09-02 | End: 2023-09-13 | Stop reason: HOSPADM

## 2023-09-02 RX ORDER — LEVOTHYROXINE SODIUM 0.03 MG/1
25 TABLET ORAL
Status: DISCONTINUED | OUTPATIENT
Start: 2023-09-03 | End: 2023-09-13 | Stop reason: HOSPADM

## 2023-09-02 RX ORDER — MECOBALAMIN 5000 MCG
5 TABLET,DISINTEGRATING ORAL NIGHTLY
Status: DISCONTINUED | OUTPATIENT
Start: 2023-09-02 | End: 2023-09-13 | Stop reason: HOSPADM

## 2023-09-02 RX ORDER — SODIUM CHLORIDE 9 MG/ML
INJECTION, SOLUTION INTRAVENOUS PRN
Status: DISCONTINUED | OUTPATIENT
Start: 2023-09-02 | End: 2023-09-13 | Stop reason: HOSPADM

## 2023-09-02 RX ORDER — SODIUM CHLORIDE 0.9 % (FLUSH) 0.9 %
5-40 SYRINGE (ML) INJECTION EVERY 12 HOURS SCHEDULED
Status: DISCONTINUED | OUTPATIENT
Start: 2023-09-02 | End: 2023-09-13 | Stop reason: HOSPADM

## 2023-09-02 RX ORDER — POTASSIUM CHLORIDE 750 MG/1
10 TABLET, EXTENDED RELEASE ORAL DAILY
Status: DISCONTINUED | OUTPATIENT
Start: 2023-09-03 | End: 2023-09-04

## 2023-09-02 RX ORDER — ASCORBIC ACID 500 MG
500 TABLET ORAL DAILY
Status: DISCONTINUED | OUTPATIENT
Start: 2023-09-03 | End: 2023-09-13 | Stop reason: HOSPADM

## 2023-09-02 RX ADMIN — ENOXAPARIN SODIUM 30 MG: 100 INJECTION SUBCUTANEOUS at 22:39

## 2023-09-02 RX ADMIN — SODIUM CHLORIDE, PRESERVATIVE FREE 10 ML: 5 INJECTION INTRAVENOUS at 22:36

## 2023-09-02 RX ADMIN — LORAZEPAM 0.5 MG: 1 TABLET ORAL at 22:37

## 2023-09-02 RX ADMIN — DOCUSATE SODIUM 100 MG: 100 CAPSULE, LIQUID FILLED ORAL at 22:37

## 2023-09-02 RX ADMIN — NITROFURANTOIN MONOHYDRATE/MACROCRYSTALLINE 100 MG: 25; 75 CAPSULE ORAL at 22:39

## 2023-09-02 RX ADMIN — Medication 5 MG: at 22:39

## 2023-09-02 ASSESSMENT — PAIN - FUNCTIONAL ASSESSMENT: PAIN_FUNCTIONAL_ASSESSMENT: NONE - DENIES PAIN

## 2023-09-02 ASSESSMENT — LIFESTYLE VARIABLES
HOW OFTEN DO YOU HAVE A DRINK CONTAINING ALCOHOL: NEVER
HOW MANY STANDARD DRINKS CONTAINING ALCOHOL DO YOU HAVE ON A TYPICAL DAY: PATIENT DOES NOT DRINK

## 2023-09-02 ASSESSMENT — ENCOUNTER SYMPTOMS
RESPIRATORY NEGATIVE: 1
EYES NEGATIVE: 1
GASTROINTESTINAL NEGATIVE: 1

## 2023-09-02 ASSESSMENT — PATIENT HEALTH QUESTIONNAIRE - PHQ9: SUM OF ALL RESPONSES TO PHQ QUESTIONS 1-9: 18

## 2023-09-02 NOTE — PROGRESS NOTES
[facility] ADULT INITIAL INTAKE ASSESSMENT     9/2/23    J Luis Bernard ,a 80 y.o. female, presents to the ED for a psychiatric assessment. ED Arrival time: [address]  ED physician:  Ana Laura Tavera [facility] AN AFFILIATE OF [facility] Notification time: 65  [facility] Assessment start time: [address]  Psychiatrist call time: 5  Spoke with Dr. Mary Dolan    Patient is referred by: Mayank Feliz in by daughter. Reason for visit to ED - Presenting problem:     PT states reason for ED visit, \"  reports anxiety, pain, and drug insufficiency. \"\" Constant talk about ending her life, because she cannot endure what she feels. \", per . Per daughter, patient ,and  lives alone. Patient refusing outside help, and  taking on all tasks.  walking patient threw the day, per daughter. Daughter witnessed patient being upset when  out of site. Per daughter, will let her come in home, but will not allow her to help. Refusing hygiene, and when   helps patient will not sit still. Patient just started taking medications a week ago. Patient takes medications that are already set up in pill box by family. Per  , patient questioning medications, hearing music playing, or someone hammering. Per , patient very dependent on him. Per , patient with history of fainting, fell two months ago and required stitches according to daughter. Per daughter at bedside, they have applied for assistant living. Suicidal thoughts for several months now per patient. Patient says that she aching all over, and her mind is not right. \" I cannot control myself no longer. \" Per  , no past suicide attempts. Denies any past inpatient treatment. \" I feel like my brain is gone, and going to pieces, per patient. Duration of symptoms: Months    Current Stressors:  Mental Health and body aches.      C-SSRS Completed: yes  Risk Assessment Completed:yes  Risk of Suicide: High Risk  Provider notified of the C-SSRS Screening and Risk Assessment

## 2023-09-02 NOTE — ED PROVIDER NOTES
[facility] EMERGENCY DEPT  eMERGENCY dEPARTMENT eNCOUnter      Pt Name: Jeny Panda  MRN: [id number]  [id number] [facility] [address] 11/10/1926  Date of evaluation: 9/2/2023  Provider: JERE San NP    CHIEF COMPLAINT       Chief Complaint   Patient presents with    Mental Health Problem     Pt states \"Look, I'm 89, I've got a lot going on with me. \" Pt SI with plan to take a bunch of medication. HISTORY OF PRESENT ILLNESS   (Location/Symptom, Timing/Onset,Context/Setting, Quality, Duration, Modifying Factors, Severity)  Note limiting factors. 80year-old alert and oriented female presents to the emergency department and reports that she is tired of living and had planned to kill herself by taking some kind of medication. She did not want to come to the emergency room and states that her family made her. She reports chronic pain that \"never lets up\". She states that she is just tired. Her spouse is at the bedside and states that she does not eat or drink very much. He is concerned that she is not eating or drinking on purpose. Patient and spouse deny any recent illnesses. The history is provided by the patient, the spouse and a relative. NursingNotes were reviewed. REVIEW OF SYSTEMS    (2-9 systems for level 4, 10 or more for level 5)     Review of Systems   Constitutional:  Positive for activity change and appetite change. HENT: Negative. Eyes: Negative. Respiratory: Negative. Cardiovascular: Negative. Gastrointestinal: Negative. Genitourinary:  Positive for urgency. Musculoskeletal:  Positive for arthralgias and myalgias. Skin: Negative. Bruise to left hand from prior hospitalization. Neurological:  Negative for syncope, light-headedness and headaches. Psychiatric/Behavioral:  Positive for suicidal ideas. Negative for behavioral problems, confusion, hallucinations and self-injury. All other systems reviewed and are negative.          PAST MEDICALHISTORY

## 2023-09-02 NOTE — ED NOTES
Iigature risks removed from room. Pt placed into maroon scrubs. Sitter at bedside initiated.       Zenia Castro RN  09/02/23 [id number]

## 2023-09-03 LAB
ALBUMIN SERPL-MCNC: 3.8 G/DL (ref 3.5–5.2)
ALP SERPL-CCNC: 50 U/L (ref 35–104)
ALT SERPL-CCNC: 24 U/L (ref 5–33)
ANION GAP SERPL CALCULATED.3IONS-SCNC: 12 MMOL/L (ref 7–19)
AST SERPL-CCNC: 38 U/L (ref 5–32)
BASOPHILS # BLD: 0 K/UL (ref 0–0.2)
BASOPHILS NFR BLD: 0.4 % (ref 0–1)
BILIRUB SERPL-MCNC: 0.6 MG/DL (ref 0.2–1.2)
BUN SERPL-MCNC: 10 MG/DL (ref 8–23)
CALCIUM SERPL-MCNC: 9 MG/DL (ref 8.2–9.6)
CHLORIDE SERPL-SCNC: 99 MMOL/L (ref 98–111)
CO2 SERPL-SCNC: 24 MMOL/L (ref 22–29)
CREAT SERPL-MCNC: 0.5 MG/DL (ref 0.5–0.9)
CRP SERPL HS-MCNC: <0.3 MG/DL (ref 0–0.5)
D DIMER PPP FEU-MCNC: 0.63 UG/ML FEU (ref 0–0.48)
EOSINOPHIL # BLD: 0.1 K/UL (ref 0–0.6)
EOSINOPHIL NFR BLD: 1.3 % (ref 0–5)
ERYTHROCYTE [DISTWIDTH] IN BLOOD BY AUTOMATED COUNT: 13.2 % (ref 11.5–14.5)
GLUCOSE SERPL-MCNC: 75 MG/DL (ref 74–109)
HCT VFR BLD AUTO: 30.6 % (ref 37–47)
HGB BLD-MCNC: 10.5 G/DL (ref 12–16)
IMM GRANULOCYTES # BLD: 0 K/UL
LYMPHOCYTES # BLD: 0.8 K/UL (ref 1.1–4.5)
LYMPHOCYTES NFR BLD: 16.5 % (ref 20–40)
MCH RBC QN AUTO: 32.3 PG (ref 27–31)
MCHC RBC AUTO-ENTMCNC: 34.3 G/DL (ref 33–37)
MCV RBC AUTO: 94.2 FL (ref 81–99)
MONOCYTES # BLD: 0.8 K/UL (ref 0–0.9)
MONOCYTES NFR BLD: 18.2 % (ref 0–10)
NEUTROPHILS # BLD: 2.9 K/UL (ref 1.5–7.5)
NEUTS SEG NFR BLD: 63 % (ref 50–65)
PLATELET # BLD AUTO: 282 K/UL (ref 130–400)
PMV BLD AUTO: 9.8 FL (ref 9.4–12.3)
POTASSIUM SERPL-SCNC: 3.7 MMOL/L (ref 3.5–5)
PROT SERPL-MCNC: 5.5 G/DL (ref 6.6–8.7)
RBC # BLD AUTO: 3.25 M/UL (ref 4.2–5.4)
SODIUM SERPL-SCNC: 135 MMOL/L (ref 136–145)
WBC # BLD AUTO: 4.6 K/UL (ref 4.8–10.8)

## 2023-09-03 PROCEDURE — 86140 C-REACTIVE PROTEIN: CPT

## 2023-09-03 PROCEDURE — 6370000000 HC RX 637 (ALT 250 FOR IP): Performed by: NURSE PRACTITIONER

## 2023-09-03 PROCEDURE — 6370000000 HC RX 637 (ALT 250 FOR IP): Performed by: STUDENT IN AN ORGANIZED HEALTH CARE EDUCATION/TRAINING PROGRAM

## 2023-09-03 PROCEDURE — 6370000000 HC RX 637 (ALT 250 FOR IP): Performed by: HOSPITALIST

## 2023-09-03 PROCEDURE — 97162 PT EVAL MOD COMPLEX 30 MIN: CPT

## 2023-09-03 PROCEDURE — 36415 COLL VENOUS BLD VENIPUNCTURE: CPT

## 2023-09-03 PROCEDURE — 97530 THERAPEUTIC ACTIVITIES: CPT

## 2023-09-03 PROCEDURE — 6370000000 HC RX 637 (ALT 250 FOR IP): Performed by: PSYCHIATRY & NEUROLOGY

## 2023-09-03 PROCEDURE — 80053 COMPREHEN METABOLIC PANEL: CPT

## 2023-09-03 PROCEDURE — 85379 FIBRIN DEGRADATION QUANT: CPT

## 2023-09-03 PROCEDURE — 85025 COMPLETE CBC W/AUTO DIFF WBC: CPT

## 2023-09-03 PROCEDURE — 99221 1ST HOSP IP/OBS SF/LOW 40: CPT | Performed by: PSYCHIATRY & NEUROLOGY

## 2023-09-03 PROCEDURE — 6360000002 HC RX W HCPCS: Performed by: STUDENT IN AN ORGANIZED HEALTH CARE EDUCATION/TRAINING PROGRAM

## 2023-09-03 PROCEDURE — 2580000003 HC RX 258: Performed by: HOSPITALIST

## 2023-09-03 PROCEDURE — 1210000000 HC MED SURG R&B

## 2023-09-03 RX ORDER — METOPROLOL SUCCINATE 25 MG/1
25 TABLET, EXTENDED RELEASE ORAL DAILY
Status: DISCONTINUED | OUTPATIENT
Start: 2023-09-03 | End: 2023-09-05

## 2023-09-03 RX ORDER — NIFEDIPINE 30 MG/1
60 TABLET, EXTENDED RELEASE ORAL 2 TIMES DAILY
Status: DISCONTINUED | OUTPATIENT
Start: 2023-09-03 | End: 2023-09-13 | Stop reason: HOSPADM

## 2023-09-03 RX ORDER — LABETALOL HYDROCHLORIDE 5 MG/ML
10 INJECTION, SOLUTION INTRAVENOUS EVERY 4 HOURS PRN
Status: DISCONTINUED | OUTPATIENT
Start: 2023-09-03 | End: 2023-09-13 | Stop reason: HOSPADM

## 2023-09-03 RX ORDER — MIRTAZAPINE 7.5 MG/1
3.75 TABLET, FILM COATED ORAL NIGHTLY
Status: DISCONTINUED | OUTPATIENT
Start: 2023-09-03 | End: 2023-09-13 | Stop reason: HOSPADM

## 2023-09-03 RX ORDER — ENOXAPARIN SODIUM 100 MG/ML
30 INJECTION SUBCUTANEOUS DAILY
Status: DISCONTINUED | OUTPATIENT
Start: 2023-09-03 | End: 2023-09-13 | Stop reason: HOSPADM

## 2023-09-03 RX ADMIN — NITROFURANTOIN MONOHYDRATE/MACROCRYSTALLINE 100 MG: 25; 75 CAPSULE ORAL at 09:19

## 2023-09-03 RX ADMIN — ENOXAPARIN SODIUM 30 MG: 100 INJECTION SUBCUTANEOUS at 09:19

## 2023-09-03 RX ADMIN — DOCUSATE SODIUM 100 MG: 100 CAPSULE, LIQUID FILLED ORAL at 20:07

## 2023-09-03 RX ADMIN — SODIUM CHLORIDE, PRESERVATIVE FREE 10 ML: 5 INJECTION INTRAVENOUS at 09:18

## 2023-09-03 RX ADMIN — POTASSIUM CHLORIDE 10 MEQ: 750 TABLET, EXTENDED RELEASE ORAL at 09:20

## 2023-09-03 RX ADMIN — OXYCODONE HYDROCHLORIDE AND ACETAMINOPHEN 500 MG: 500 TABLET ORAL at 09:19

## 2023-09-03 RX ADMIN — Medication 1.04 G: at 09:19

## 2023-09-03 RX ADMIN — ASPIRIN 81 MG: 81 TABLET, CHEWABLE ORAL at 09:20

## 2023-09-03 RX ADMIN — Medication 5 MG: at 20:07

## 2023-09-03 RX ADMIN — NIFEDIPINE 60 MG: 30 TABLET, FILM COATED, EXTENDED RELEASE ORAL at 20:06

## 2023-09-03 RX ADMIN — LEVOTHYROXINE SODIUM 25 MCG: 25 TABLET ORAL at 09:20

## 2023-09-03 RX ADMIN — ONDANSETRON 4 MG: 4 TABLET, ORALLY DISINTEGRATING ORAL at 11:41

## 2023-09-03 RX ADMIN — FAMOTIDINE 20 MG: 20 TABLET ORAL at 09:19

## 2023-09-03 RX ADMIN — DOCUSATE SODIUM 100 MG: 100 CAPSULE, LIQUID FILLED ORAL at 09:20

## 2023-09-03 RX ADMIN — MIRTAZAPINE 3.75 MG: 7.5 TABLET ORAL at 20:07

## 2023-09-03 RX ADMIN — NITROFURANTOIN MONOHYDRATE/MACROCRYSTALLINE 100 MG: 25; 75 CAPSULE ORAL at 20:07

## 2023-09-03 RX ADMIN — MULTIPLE VITAMINS W/ MINERALS TAB 1 TABLET: TAB at 09:21

## 2023-09-03 RX ADMIN — LORAZEPAM 0.5 MG: 1 TABLET ORAL at 20:07

## 2023-09-03 RX ADMIN — SODIUM CHLORIDE, PRESERVATIVE FREE 10 ML: 5 INJECTION INTRAVENOUS at 20:10

## 2023-09-03 RX ADMIN — METOPROLOL SUCCINATE 25 MG: 25 TABLET, EXTENDED RELEASE ORAL at 09:21

## 2023-09-03 NOTE — H&P
[facility]      [facility]ospitalist - History & Physical      PCP: Geronimo Warren MD    Date of Admission: 9/2/2023    Date of Service: 9/2/2023    Chief Complaint:  Suicidal thoughts    History Of Present Illness: The patient is a 80 y.o. female who presented to [facility] ED for evaluation of suicidal thoughts. Pt has history of HTN, anxiety, stroke, HTN, hyperlipidemia, neuropathy and hypothyroidism. Pt is hard of hearing. She is hear with her daughter who assists with history of present illness. She has voiced to her  and family over past month her wishes for death. She tells me \" I can't live like this anymore\". She reports problems with increased episodes of confusion from baseline over past month and problems with chronic pain. She denies to me specific suicidal thoughts. Her daughter describes patient as being more anxious and depressed today with increased problems with thoughts for dying. Pt was last discharged from this facility on 8/25/2023, following evaluation for syncope and covid. Pt had completed paxlovid that was prescribed prior to admission on 8/22/2023. He family tells me that she has had resolution of cough and congestion. Pt tells me that she has had urinary frequency today and vague abdominal discomfort. Pt's metoprolol was recently discontinued by her pcp having had office visit follow up from hospitalization. In ED, covid test positive, pt not requiring supplemental oxygen at this time. UA micro-trace bacteria, 13 wbc, uds negative, sodium 130, potassium 3.8, creatiine 0.5/bun 12, glucose 93, etoh less than 10, wbc 5.8, hgb 10.8, platelets 496G, lymphocyte 13.7%.  Pt is admitted inpatient to hospitalist.     Past Medical History:        Diagnosis Date    Anxiety 10/02/2019    Chest tightness or pressure 08/26/2013 8/26/2013  lexiscan negative for myocardial ischemia, EF 61%    Edema     Essential and other specified forms of tremor     Generalized anxiety JERE - CNP, 9/2/2023 8:10 PM

## 2023-09-03 NOTE — CONSULTS
[facility]  Psychiatry Consult    Reason for Consult:  SI  19-year-old white female with history of anxiety, hypertension, neuropathy, hypothyroidism, recently treated for an episode of COVID, admitted to medicine with UTI and suicidal ideation. Tested positive for COVID. Per family, patient had a fall about 2 months ago. She is very dependent on her  and gets upset and anxious when he is not around. She is refusing help from home health. Family have applied for assisted living. Per home med list, she is on Ativan at bedtime. Patient is observed resting in bed this morning. She is very hard of hearing. She is oriented to person. Knows she is at the hospital.  Unable to name the hospital.  [facility] the year is \"2022 or 2021\". States she is having a headache. She is not sleeping. Reports poor appetite. Worried about everything. States her  has history of strokes and is struggling at home. She worries about him. She has been depressed. She endorses suicidal ideation at this time. Hopeless and helpless. Psychiatric History:    Diagnoses: Depression, anxiety  Suicide attempts/gestures: Denies   Prior hospitalizations: Denies   Medication trials: Lorazepam  Mental health contact: Denies  Head trauma: Denies    Substance Use History:  Denies alcohol and illicits. Denies tobacco use.     Allergies:  Nitrofurantoin, Ampicillin, Bactrim [sulfamethoxazole-trimethoprim], Cephalosporins, Ciprocinonide [fluocinolone], Codeine, Cymbalta [duloxetine hcl], Doxycycline, Enablex [darifenacin hydrobromide er], Gabapentin, Hctz [hydrochlorothiazide], Keppra [levetiracetam], Levaquin [levofloxacin in d5w], Lyrica [pregabalin], Pyridium [phenazopyridine hcl], Quinolones, and Sulfa antibiotics    Medical History:  Past Medical History:   Diagnosis Date    Anxiety 10/02/2019    Chest tightness or pressure 08/26/2013 8/26/2013  lexiscan negative for myocardial ischemia, EF 61%

## 2023-09-03 NOTE — PROGRESS NOTES
Physical Therapy  Facility/Department: [facility] 4 ONCOLOGY UNIT  Physical Therapy Initial Assessment    Name: Nano Armenta  : 11/10/1926  MRN: [id number]  Date of Service: 9/3/2023    Discharge Recommendations:  Continue to assess pending progress, 24 hour supervision or assist, Patient would benefit from continued therapy after discharge          Patient Diagnosis(es): The primary encounter diagnosis was Suicidal ideation. Diagnoses of COVID-19 and Hyponatremia were also pertinent to this visit. 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 Past Surgical History:  has a past surgical history that includes Hemorrhoid surgery; Cataract removal; Cholecystectomy; Hysterectomy; Appendectomy; Cardiac catheterization (5/26/15  MDL); Nerve Block Lumb Facet Level 1 Bilateral (Bilateral, 2016); and Breast cyst incision and drainage. Assessment   Body Structures, Functions, Activity Limitations Requiring Skilled Therapeutic Intervention: Decreased functional mobility ; Decreased ROM; Decreased endurance;Decreased safe awareness;Decreased strength;Decreased balance; Increased pain;Decreased posture  Assessment: Pt. will benefit from cont. PT to decrease impairments. Pt. a fall risk and should not attempt mobility on her own. Pt. encouraged to sit up in the chair for lunch and work on progressive mobility. Pt. seems to have memory issues and needs 24 hr care at this time.  Would benefit from use of RW, but likely she would not use it or not remember to use it due to some impulsivity. Treatment Diagnosis: impaired gait and mobility  Therapy Prognosis: Good  Decision Making: Medium Complexity  Barriers to Learning: memory  Requires PT Follow-Up: Yes  Activity Tolerance  Activity Tolerance: Patient limited by endurance     Plan   Physcial Therapy Plan  General Plan: 5-7 times per week  Therapy Duration: 2 Weeks  Current Treatment Recommendations: Strengthening, Balance training, Functional mobility training, Transfer training, Gait training, Endurance training, Patient/Caregiver education & training, Positioning, Safety education & training, Equipment evaluation, education, & procurement, Therapeutic activities  Safety Devices  Type of Devices: Call light within reach, Chair alarm in place, St. radha present, Nurse notified, Patient at risk for falls, Gait belt, Left in chair     Restrictions  Restrictions/Precautions  Restrictions/Precautions: Fall Risk, Other (comment) (suicide precautions)  Required Braces or Orthoses?: No  Position Activity Restriction  Other position/activity restrictions: droplet prec. Subjective   Pain: nothing more than usual  General  Chart Reviewed: Yes  Patient assessed for rehabilitation services?: Yes  Additional Pertinent Hx: pt with recent admit and d/c from [facility]  Response To Previous Treatment: Not applicable  Family / Caregiver Present: No  Referring Practitioner: Jennifer Kennedy MD  Referral Date : 09/02/23  Diagnosis: suicide ideation, covid 23, hyponatremia  Follows Commands: Within Functional Limits  General Comment  Comments: RNTeresa, aware pt going to have PT eval.  Subjective  Subjective: Pt. willing to get up and go to the bathroom and then sit up for lunch.          Social/Functional History  Social/Functional History  Lives With: Spouse  Type of Home: House  Home Layout: One level  Bathroom Shower/Tub: Walk-in shower  Bathroom Equipment: Grab bars in shower  Home

## 2023-09-04 PROBLEM — B95.2 ENTEROCOCCUS UTI: Status: ACTIVE | Noted: 2022-03-25

## 2023-09-04 LAB
BACTERIA UR CULT: ABNORMAL
BACTERIA UR CULT: ABNORMAL
ORGANISM: ABNORMAL

## 2023-09-04 PROCEDURE — 6370000000 HC RX 637 (ALT 250 FOR IP): Performed by: HOSPITALIST

## 2023-09-04 PROCEDURE — 6360000002 HC RX W HCPCS: Performed by: STUDENT IN AN ORGANIZED HEALTH CARE EDUCATION/TRAINING PROGRAM

## 2023-09-04 PROCEDURE — 1210000000 HC MED SURG R&B

## 2023-09-04 PROCEDURE — 2580000003 HC RX 258: Performed by: HOSPITALIST

## 2023-09-04 PROCEDURE — 6370000000 HC RX 637 (ALT 250 FOR IP): Performed by: PSYCHIATRY & NEUROLOGY

## 2023-09-04 PROCEDURE — 99232 SBSQ HOSP IP/OBS MODERATE 35: CPT | Performed by: PSYCHIATRY & NEUROLOGY

## 2023-09-04 PROCEDURE — 6370000000 HC RX 637 (ALT 250 FOR IP): Performed by: NURSE PRACTITIONER

## 2023-09-04 PROCEDURE — 6370000000 HC RX 637 (ALT 250 FOR IP): Performed by: STUDENT IN AN ORGANIZED HEALTH CARE EDUCATION/TRAINING PROGRAM

## 2023-09-04 PROCEDURE — 94760 N-INVAS EAR/PLS OXIMETRY 1: CPT

## 2023-09-04 RX ORDER — DOCUSATE SODIUM 100 MG/1
200 CAPSULE, LIQUID FILLED ORAL 2 TIMES DAILY
Status: DISCONTINUED | OUTPATIENT
Start: 2023-09-04 | End: 2023-09-13 | Stop reason: HOSPADM

## 2023-09-04 RX ORDER — NITROFURANTOIN 25; 75 MG/1; MG/1
100 CAPSULE ORAL EVERY 12 HOURS SCHEDULED
Status: COMPLETED | OUTPATIENT
Start: 2023-09-04 | End: 2023-09-07

## 2023-09-04 RX ADMIN — DOCUSATE SODIUM 200 MG: 100 CAPSULE, LIQUID FILLED ORAL at 20:53

## 2023-09-04 RX ADMIN — Medication 5 MG: at 20:53

## 2023-09-04 RX ADMIN — DOCUSATE SODIUM 100 MG: 100 CAPSULE, LIQUID FILLED ORAL at 09:13

## 2023-09-04 RX ADMIN — FAMOTIDINE 20 MG: 20 TABLET ORAL at 09:13

## 2023-09-04 RX ADMIN — NITROFURANTOIN (MONOHYDRATE/MACROCRYSTALS) 100 MG: 75; 25 CAPSULE ORAL at 09:30

## 2023-09-04 RX ADMIN — VALSARTAN 320 MG: 160 TABLET, FILM COATED ORAL at 09:25

## 2023-09-04 RX ADMIN — METOPROLOL SUCCINATE 25 MG: 25 TABLET, EXTENDED RELEASE ORAL at 09:16

## 2023-09-04 RX ADMIN — MAGNESIUM HYDROXIDE 30 ML: 400 SUSPENSION ORAL at 12:34

## 2023-09-04 RX ADMIN — SODIUM CHLORIDE, PRESERVATIVE FREE 10 ML: 5 INJECTION INTRAVENOUS at 09:14

## 2023-09-04 RX ADMIN — ENOXAPARIN SODIUM 30 MG: 100 INJECTION SUBCUTANEOUS at 09:13

## 2023-09-04 RX ADMIN — OXYCODONE HYDROCHLORIDE AND ACETAMINOPHEN 500 MG: 500 TABLET ORAL at 09:13

## 2023-09-04 RX ADMIN — MIRTAZAPINE 3.75 MG: 7.5 TABLET ORAL at 20:53

## 2023-09-04 RX ADMIN — NIFEDIPINE 60 MG: 30 TABLET, FILM COATED, EXTENDED RELEASE ORAL at 20:53

## 2023-09-04 RX ADMIN — LEVOTHYROXINE SODIUM 25 MCG: 25 TABLET ORAL at 09:25

## 2023-09-04 RX ADMIN — LORAZEPAM 0.5 MG: 1 TABLET ORAL at 20:53

## 2023-09-04 RX ADMIN — NITROFURANTOIN (MONOHYDRATE/MACROCRYSTALS) 100 MG: 75; 25 CAPSULE ORAL at 20:53

## 2023-09-04 RX ADMIN — Medication 1.04 G: at 09:13

## 2023-09-04 RX ADMIN — SODIUM CHLORIDE, PRESERVATIVE FREE 10 ML: 5 INJECTION INTRAVENOUS at 20:53

## 2023-09-04 RX ADMIN — ESTRADIOL 1 G: 0.1 CREAM VAGINAL at 09:14

## 2023-09-04 RX ADMIN — MULTIPLE VITAMINS W/ MINERALS TAB 1 TABLET: TAB at 09:13

## 2023-09-04 RX ADMIN — ASPIRIN 81 MG: 81 TABLET, CHEWABLE ORAL at 09:13

## 2023-09-04 NOTE — CARE COORDINATION
09/04/23 0645   Readmission Assessment   Number of Days since last admission? 8-30 days   Previous Disposition Home with Home Health   Who is being Interviewed Patient   What was the patient's/caregiver's perception as to why they think they needed to return back to the hospital? Other (Comment); Did not realize care needs would be so extensive  (SI statments)   Did you visit your Primary Care Physician after you left the hospital, before you returned this time? No   Why weren't you able to visit your PCP? Other (Comment)  (not enough time to go before re admitting)   Did you see a specialist, such as Cardiac, Pulmonary, Orthopedic Physician, etc. after you left the hospital? No   Who advised the patient to return to the hospital? Self-referral   Does the patient report anything that got in the way of taking their medications? No   In our efforts to provide the best possible care to you and others like you, can you think of anything that we could have done to help you after you left the hospital the first time, so that you might not have needed to return so soon?  Arrange for more help when leaving the hospital;Identify patient's health literacy needs     Electronically signed by MIRYAM Guy on 9/4/2023 at 6:46 AM

## 2023-09-04 NOTE — BH NOTE
Department of Psychiatry  Attending Progress Note - Geriatric      SUBJECTIVE:    80years old female with previous psychiatric history of anxiety, complicated by history of multiple medical conditions and recent COVID-19 infection, who has been admitted to medical services secondary to urinary tract infection and suicidal ideations. Patient has been seen and consulted by psychiatry yesterday, recommended to start on the Remeron for depression, anxiety, stimulation of appetite and insomnia. Patient has been seen in her room with presence of one-to-one sitter and the patient's granddaughter. Patient reported that her appetite improved, as well is She reported that she is still experiencing suicidal thoughts, however, said that she will never hurt herself. Patient's granddaughter stated that 10 years ago it was the first time when patient said that she will be suicidal if she would be placed to nursing home or any other facilities, as she wanted to stay in her home. During the last 10 years patient was always talking about suicidal thoughts, however, she does not have any previous suicidal attempts. During the interview patient was alert only in personal information and current situation. She denies having feeling of depression or anxiety, reported low energy level, and feeling of fatigue and tiredness. She is compliant with currently prescribed medications and denies any side effects. Patient denies any homicidal ideations, she denies auditory and visual hallucinations. Patient did not endorse any delusions or paranoid thoughts. Patient's granddaughter stated that patient was accepted to [address] [place] [address], however, day of admission is not available yet. Also she stated that patient's  recently had a stroke and he cannot take care the patient anymore.   Discussed possibility to place patient to geriatric psychiatric facility, due to chronic suicidal thoughts, and patient's

## 2023-09-04 NOTE — CARE COORDINATION
Sw spoke to Pt and Pt granddaughter Dyllan Locke and PT step daughter Amanda Matta via phone. Pt and family would like a referral to [facility] and [facility]. Ayanna states Pt has had caregivers at home to help assist her with ADLs. Sw will make referrals to  the three chosen facility's. No other questions or concerns at this time.    Electronically signed by MIRYAM Alamo on 9/4/2023 at 10:54 AM

## 2023-09-04 NOTE — PLAN OF CARE
Problem: Safety - Adult  Goal: Free from fall injury  9/3/2023 2203 by Asmita Echavarria RN  Outcome: Progressing  9/3/2023 1343 by Robert Ramirez RN  Outcome: Progressing  Flowsheets (Taken 9/3/2023 1343)  Free From Fall Injury: Ladonna Samaniego family/caregiver on patient safety     Problem: ABCDS Injury Assessment  Goal: Absence of physical injury  9/3/2023 2203 by Asmita Echavarria RN  Outcome: Progressing  9/3/2023 1343 by Robert Ramirez RN  Outcome: Progressing  Flowsheets (Taken 9/3/2023 1343)  Absence of Physical Injury: Implement safety measures based on patient assessment

## 2023-09-04 NOTE — PLAN OF CARE
Problem: Safety - Adult  Goal: Free from fall injury  9/4/2023 1139 by Davis Washington RN  Outcome: Progressing  9/3/2023 2203 by Abdulaziz Noonan RN  Outcome: Progressing     Problem: ABCDS Injury Assessment  Goal: Absence of physical injury  9/4/2023 1139 by Davis Washington RN  Outcome: Progressing  9/3/2023 2203 by Abdulaziz Noonan RN  Outcome: Progressing

## 2023-09-05 PROBLEM — E43 SEVERE MALNUTRITION (HCC): Status: ACTIVE | Noted: 2023-09-05

## 2023-09-05 PROCEDURE — 6370000000 HC RX 637 (ALT 250 FOR IP): Performed by: STUDENT IN AN ORGANIZED HEALTH CARE EDUCATION/TRAINING PROGRAM

## 2023-09-05 PROCEDURE — 1210000000 HC MED SURG R&B

## 2023-09-05 PROCEDURE — 94760 N-INVAS EAR/PLS OXIMETRY 1: CPT

## 2023-09-05 PROCEDURE — 6370000000 HC RX 637 (ALT 250 FOR IP): Performed by: HOSPITALIST

## 2023-09-05 PROCEDURE — 6370000000 HC RX 637 (ALT 250 FOR IP): Performed by: NURSE PRACTITIONER

## 2023-09-05 PROCEDURE — 6370000000 HC RX 637 (ALT 250 FOR IP): Performed by: PSYCHIATRY & NEUROLOGY

## 2023-09-05 PROCEDURE — 2580000003 HC RX 258: Performed by: HOSPITALIST

## 2023-09-05 PROCEDURE — 97110 THERAPEUTIC EXERCISES: CPT

## 2023-09-05 PROCEDURE — 97116 GAIT TRAINING THERAPY: CPT

## 2023-09-05 PROCEDURE — 6360000002 HC RX W HCPCS: Performed by: STUDENT IN AN ORGANIZED HEALTH CARE EDUCATION/TRAINING PROGRAM

## 2023-09-05 RX ADMIN — NIFEDIPINE 60 MG: 30 TABLET, FILM COATED, EXTENDED RELEASE ORAL at 11:25

## 2023-09-05 RX ADMIN — METOPROLOL SUCCINATE 25 MG: 25 TABLET, EXTENDED RELEASE ORAL at 09:28

## 2023-09-05 RX ADMIN — ENOXAPARIN SODIUM 30 MG: 100 INJECTION SUBCUTANEOUS at 09:28

## 2023-09-05 RX ADMIN — MIRTAZAPINE 3.75 MG: 7.5 TABLET ORAL at 21:25

## 2023-09-05 RX ADMIN — LORAZEPAM 0.5 MG: 1 TABLET ORAL at 21:24

## 2023-09-05 RX ADMIN — SODIUM CHLORIDE, PRESERVATIVE FREE 10 ML: 5 INJECTION INTRAVENOUS at 09:29

## 2023-09-05 RX ADMIN — MULTIPLE VITAMINS W/ MINERALS TAB 1 TABLET: TAB at 11:20

## 2023-09-05 RX ADMIN — Medication 5 MG: at 21:27

## 2023-09-05 RX ADMIN — LEVOTHYROXINE SODIUM 25 MCG: 25 TABLET ORAL at 11:19

## 2023-09-05 RX ADMIN — NITROFURANTOIN (MONOHYDRATE/MACROCRYSTALS) 100 MG: 75; 25 CAPSULE ORAL at 09:28

## 2023-09-05 RX ADMIN — NITROFURANTOIN (MONOHYDRATE/MACROCRYSTALS) 100 MG: 75; 25 CAPSULE ORAL at 21:25

## 2023-09-05 RX ADMIN — FAMOTIDINE 20 MG: 20 TABLET ORAL at 09:28

## 2023-09-05 RX ADMIN — OXYCODONE HYDROCHLORIDE AND ACETAMINOPHEN 500 MG: 500 TABLET ORAL at 09:28

## 2023-09-05 RX ADMIN — MAGNESIUM HYDROXIDE 30 ML: 400 SUSPENSION ORAL at 09:28

## 2023-09-05 RX ADMIN — SODIUM CHLORIDE, PRESERVATIVE FREE 10 ML: 5 INJECTION INTRAVENOUS at 21:26

## 2023-09-05 RX ADMIN — ASPIRIN 81 MG: 81 TABLET, CHEWABLE ORAL at 09:28

## 2023-09-05 RX ADMIN — VALSARTAN 320 MG: 160 TABLET, FILM COATED ORAL at 09:28

## 2023-09-05 RX ADMIN — DOCUSATE SODIUM 200 MG: 100 CAPSULE, LIQUID FILLED ORAL at 09:28

## 2023-09-05 RX ADMIN — NIFEDIPINE 60 MG: 30 TABLET, FILM COATED, EXTENDED RELEASE ORAL at 21:24

## 2023-09-05 RX ADMIN — Medication 1.04 G: at 11:20

## 2023-09-05 ASSESSMENT — PAIN DESCRIPTION - ORIENTATION: ORIENTATION: LOWER

## 2023-09-05 ASSESSMENT — PAIN DESCRIPTION - FREQUENCY: FREQUENCY: INTERMITTENT

## 2023-09-05 ASSESSMENT — PAIN DESCRIPTION - PAIN TYPE: TYPE: ACUTE PAIN

## 2023-09-05 ASSESSMENT — PAIN DESCRIPTION - LOCATION: LOCATION: BACK

## 2023-09-05 ASSESSMENT — PAIN SCALES - WONG BAKER: WONGBAKER_NUMERICALRESPONSE: 4

## 2023-09-05 ASSESSMENT — PAIN - FUNCTIONAL ASSESSMENT: PAIN_FUNCTIONAL_ASSESSMENT: PREVENTS OR INTERFERES SOME ACTIVE ACTIVITIES AND ADLS

## 2023-09-05 ASSESSMENT — PAIN DESCRIPTION - DESCRIPTORS: DESCRIPTORS: ACHING

## 2023-09-05 NOTE — PROGRESS NOTES
Comprehensive Nutrition Assessment    Type and Reason for Visit:  Initial    Nutrition Recommendations/Plan:   Start ONS     Malnutrition Assessment:  Malnutrition Status:  Severe malnutrition (09/05/23 1439)    Context:  Chronic Illness     Findings of the 6 clinical characteristics of malnutrition:  Energy Intake:  Mild decrease in energy intake (Comment)  Weight Loss:  Greater than 7.5% over 3 months     Body Fat Loss:  Severe body fat loss Buccal region, Orbital   Muscle Mass Loss:  Severe muscle mass loss Temples (temporalis), Clavicles (pectoralis & deltoids), Calf (gastrocnemius), Hand (interosseous)  Fluid Accumulation:  No significant fluid accumulation Extremities   Strength:  Not Performed    Nutrition Assessment:    Following for low BMI = 14.9. Pt is receiving a Regular diet. PO intake is decreased, but Remeron has been started and intake has increased just a bit. Will start Ensure Compacet to help with intake. Nutrition Related Findings:    very thin Wound Type: None       Current Nutrition Intake & Therapies:    Average Meal Intake: 1-25%, 26-50%  Average Supplements Intake: None Ordered  ADULT DIET; Regular; Safety Tray; Safety Tray (Disposables No Utensils)    Anthropometric Measures:  Height: 5' 2\" (157.5 cm)  Ideal Body Weight (IBW): 110 lbs (50 kg)    Admission Body Weight: 95 lb (43.1 kg) (started)  Current Body Weight: 81 lb 9 oz (37 kg), 74.1 % IBW.  Weight Source: Bed Scale  Current BMI (kg/m2): 14.9  Usual Body Weight: 90 lb 9.6 oz (41.1 kg)  % Weight Change (Calculated): -10  BMI Categories: Underweight (BMI less than 22) age over 72    Estimated Daily Nutrient Needs:  Energy Requirements Based On: Kcal/kg  Weight Used for Energy Requirements: Current  Energy (kcal/day): 1176-7910 kcals (30-35 kcals/kg)  Weight Used for Protein Requirements: Current  Protein (g/day): 56g  Method Used for Fluid Requirements: 1 ml/kcal  Fluid (ml/day): 3967-8608 ml    Nutrition Diagnosis:   Severe malnutrition, In context of chronic, non-illness related related to early satiety, pain, inadequate protein-energy intake as evidenced by intake 0-25%, intake 26-50%, poor intake prior to admission, weight loss 7.5% in 3 months    Nutrition Interventions:   Food and/or Nutrient Delivery: Continue Current Diet, Start Oral Nutrition Supplement  Nutrition Education/Counseling: No recommendation at this time  Coordination of Nutrition Care: Continue to monitor while inpatient       Goals:     Goals: PO intake 50% or greater       Nutrition Monitoring and Evaluation:   Behavioral-Environmental Outcomes: None Identified  Food/Nutrient Intake Outcomes: Food and Nutrient Intake, Supplement Intake  Physical Signs/Symptoms Outcomes: Biochemical Data, Fluid Status or Edema, Weight, Skin    Discharge Planning:    Continue current diet     Radha Tovar MS, RD, LD  Contact: [phone]

## 2023-09-05 NOTE — CARE COORDINATION
Patient is current with [facility] PT Services. Will follow. Please advise when patient discharges. WILL NEED RESUMPTION OF CARE ORDERS TO RESUME HH SERVICES WHEN PATIENT DISCHARGES. Thank you. [id number] [facility] [phone]. [phone].     Marcelina Kendall RN, Home Care Liaison  [phone] P  Electronically signed by Marcelina Kendall on 9/5/2023 at 9:13 AM

## 2023-09-05 NOTE — CARE COORDINATION
Pb spoke to Ethan with [facility] geriatric who reports they do not have any bed at this time. Pb called Pt POA to get approval for a referral to [facility] behavioral unit. Awaiting approval/denial    Spoke to Angely who is the  at [facility] behavioral unit who reports Pt will have to quarantine 10 days prior to admission if accepted. Electronically signed by MIRYAM Jacinto on 9/5/2023 at 8:24 AM    Spoke to admission coordinator at [facility] in [place], [place] they report they cannot take Pt because of the one to one sitter. They do not have the staff to watch her one to one. Admission coordinator reports they can revaluate after pt has quarantined 10 days.   Electronically signed by MIRYAM Jacinto on 9/5/2023 at 2:39 PM

## 2023-09-05 NOTE — PLAN OF CARE
Problem: Safety - Adult  Goal: Free from fall injury  9/4/2023 2315 by Sandhya Garcia RN  Outcome: Progressing  9/4/2023 1139 by Angelica Posada RN  Outcome: Progressing     Problem: ABCDS Injury Assessment  Goal: Absence of physical injury  9/4/2023 2315 by Sandhya Garcia RN  Outcome: Progressing  9/4/2023 1139 by Angelica Posada RN  Outcome: Progressing

## 2023-09-05 NOTE — PROGRESS NOTES
Palliative Care/Spiritual Care: Met with pt and pt's step daughter to initiate palliative care. Pt tested positive for Covid two weeks ago, but was still positive in the ED. Pt had suicidal ideation, Covid, and hyponatremia. Pt has other diagnoses in past medical history. Pt is known to palliative care. Advance Directives: Pt has a LW. Her  is the primary decision maker. Pt also has a POA and her granddaughter London Heath and stepdaughter Kareem Hamm are her POA's. Carol De Luna and Rio Donaldson say pt is supposed to be a DNR per Katie Carlin and per conversation with Carol De Luna and she says she will email pt's POA. Pt's current code status is full code. Also, asked pt's stepdaughter for a copy of pt's EMS DNR. She said pt has a green paper on the refrigerator. All three ACP notes says pt does not want CPR or Ventilator. SEE ACP NOTES. Pain/other symptoms: Pt has chronic pain but not currently hurting. Social/Spiritual: Pt says she is Jain.         Pt/family discussion r/t goals: Pt lives at home with her  Katie Carlin. Pt's granddaughter says Katie Carlin is in waning health with blood cancer. Pt did ambulate without assistance at home. Family is considering assisted living or SNF for both pt and her , due to age and declining health, per stepdaughter, and granddaughter. Provided spiritual care with sustaining presence, nurtured hope, and prayer. Pt and family members expressed gratitude for spiritual care.      Electronically signed by Carlos Russ on 9/5/2023 at 11:37 AM

## 2023-09-05 NOTE — PLAN OF CARE
Nutrition Problem #1: Severe malnutrition, In context of chronic, non-illness related  Intervention: Food and/or Nutrient Delivery: Continue Current Diet, Start Oral Nutrition Supplement  Nutritional

## 2023-09-05 NOTE — PROGRESS NOTES
Physical Therapy  Name: Nano Armenta  MRN:  [id number]  Date of service:  9/5/2023 09/05/23 1432   Restrictions/Precautions   Restrictions/Precautions Fall Risk; Other (comment)  (suicide precautions)   Position Activity Restriction   Other position/activity restrictions droplet prec. Subjective   Subjective Pt pleasant and agreeable to therapy. Pain Assessment   Pain Assessment Weston-Baker FACES   Weston-Baker Pain Rating 4   Pain Location Back   Pain Orientation Lower   Pain Descriptors Aching   Functional Pain Assessment Prevents or interferes some active activities and ADLs   Pain Type Acute pain   Pain Frequency Intermittent   Non-Pharmaceutical Pain Intervention(s) Ambulation/Increased Activity;Repositioned; Rest   Response to Pain Intervention Patient satisfied   Bed Mobility   Supine to Sit Supervision   Sit to Supine Supervision   Scooting Supervision   Transfers   Sit to Stand Contact guard assistance   Stand to Sit Contact guard assistance   Ambulation   Surface Level tile   Device No Device;Hand-Held Assist   Assistance Contact guard assistance;Minimal assistance   Quality of Gait unsteady at times   Gait Deviations Slow Veronika;Decreased step length;Decreased step height   Distance 50' 10'   Exercises   Hip Flexion x5   Hip Abduction x5   Knee Long Arc Quad x5   Ankle Pumps x5   Comments BLE sitting exercise; standing marching in place x10 and hip abd x5 with UE support   Patient Goals    Patient Goals  go home   Short Term Goals   Time Frame for Short Term Goals 2 wks   Short Term Goal 1 supine to sit indep   Short Term Goal 2 sit to stand indep   Short Term Goal 3 amb. 300' with RW SBA   Short Term Goal 4 bed to chair SBA   Conditions Requiring Skilled Therapeutic Intervention   Body Structures, Functions, Activity Limitations Requiring Skilled Therapeutic Intervention Decreased functional mobility ; Decreased ROM; Decreased endurance;Decreased safe awareness;Decreased strength;Decreased

## 2023-09-05 NOTE — PROGRESS NOTES
Nondistended  Extremities: No clubbing, cyanosis or edema  Neurologic: Hearing impaired, pleasantly confused, easily follows commands, normal speech      Lab Review   No results found for this or any previous visit (from the past 24 hour(s)). I/O last 3 completed shifts: In: 140 [P.O.:120;  I.V.:20]  Out: -   I/O this shift:  In: 105 [P.O.:105]  Out: -       Current Facility-Administered Medications:     nitrofurantoin (macrocrystal-monohydrate) (MACROBID) capsule 100 mg, 100 mg, Oral, 2 times per day, Demetra Patel MD, 100 mg at 09/05/23 9721    docusate sodium (COLACE) capsule 200 mg, 200 mg, Oral, BID, Demetra Patel MD, 200 mg at 09/05/23 9018    magnesium hydroxide (MILK OF MAGNESIA) 400 MG/5ML suspension 30 mL, 30 mL, Oral, Daily, Demetra Patel MD, 30 mL at 09/05/23 0928    enoxaparin Sodium (LOVENOX) injection 30 mg, 30 mg, SubCUTAneous, Daily, Demetra Patel MD, 30 mg at 09/05/23 0610    NIFEdipine (PROCARDIA XL) extended release tablet 60 mg, 60 mg, Oral, BID, Demetra Patel MD, 60 mg at 09/04/23 2053    labetalol (NORMODYNE;TRANDATE) injection 10 mg, 10 mg, IntraVENous, Q4H PRN, Demetra Patel MD    mirtazapine (REMERON) tablet 3.75 mg, 3.75 mg, Oral, Nightly, Kennedy Delacruz MD, 3.75 mg at 09/04/23 2053    aspirin chewable tablet 81 mg, 81 mg, Oral, Daily, Sejal Rosenthal MD, 81 mg at 09/05/23 9512    valsartan (DIOVAN) tablet 320 mg, 320 mg, Oral, Daily, Sejal Rosenthal MD, 320 mg at 09/05/23 2994    estradiol (ESTRACE) vaginal cream 1 g, 1 g, Vaginal, Once per day on Mon Thu, Sejal Rosenthal MD, 1 g at 09/04/23 2986    levothyroxine (SYNTHROID) tablet 25 mcg, 25 mcg, Oral, QAM AC, Sejal Rosenthal MD, 25 mcg at 09/05/23 1119    melatonin disintegrating tablet 5 mg, 5 mg, Oral, Nightly, Sejal Rosenthal MD, 5 mg at 09/04/23 2053    melatonin disintegrating tablet 5 mg, 5 mg, Oral, Nightly PRN, Sejal Rosenthal MD    polyethylene glycol (GLYCOLAX) packet 17 g, 17 g, Oral, Daily PRN, Shane Solis Rafael Valencia MD    calcium carbonate (TUMS) chewable tablet 500 mg, 500 mg, Oral, TID PRN, Alistair Faith MD    therapeutic multivitamin-minerals 1 tablet, 1 tablet, Oral, Daily, Alistair Faith MD, 1 tablet at 09/05/23 1120    famotidine (PEPCID) tablet 20 mg, 20 mg, Oral, Daily, Alistair Faith MD, 20 mg at 09/05/23 4338    psyllium capsule 1.04 g, 2 capsule, Oral, Daily, Alistair Faith MD, 1.04 g at 09/05/23 1120    ascorbic acid (VITAMIN C) tablet 500 mg, 500 mg, Oral, Daily, Alistair Faith MD, 500 mg at 09/05/23 8558    sodium chloride flush 0.9 % injection 5-40 mL, 5-40 mL, IntraVENous, 2 times per day, Alistair Faith MD, 10 mL at 09/05/23 0929    sodium chloride flush 0.9 % injection 5-40 mL, 5-40 mL, IntraVENous, PRN, Alistair Faith MD    0.9 % sodium chloride infusion, , IntraVENous, PRN, Alistair Faith MD    ondansetron (ZOFRAN-ODT) disintegrating tablet 4 mg, 4 mg, Oral, Q8H PRN, 4 mg at 09/03/23 1141 **OR** ondansetron (ZOFRAN) injection 4 mg, 4 mg, IntraVENous, Q6H PRN, Alistair Faith MD    acetaminophen (TYLENOL) tablet 650 mg, 650 mg, Oral, Q4H PRN **OR** acetaminophen (TYLENOL) suppository 650 mg, 650 mg, Rectal, Q4H PRN, Alistair Faith MD    guaiFENesin-dextromethorphan (ROBITUSSIN DM) 100-10 MG/5ML syrup 10 mL, 10 mL, Oral, Q4H PRN, Alistair Faith MD    albuterol sulfate HFA (PROVENTIL;VENTOLIN;PROAIR) 108 (90 Base) MCG/ACT inhaler 2 puff, 2 puff, Inhalation, Q4H PRN, Alistair Faith MD    LORazepam (ATIVAN) tablet 0.5 mg, 0.5 mg, Oral, Nightly, Himanshu Pollock, APRN - CNP, 0.5 mg at 09/04/23 2053        Assessment/plan  Principal Problem:    Suicidal ideation  Active Problems:    Enterococcus UTI    COVID-19  Resolved Problems:    * No resolved hospital problems.  *      Anxiety/depression with suicidal ideation  - Psychiatry following  - Continue Remeron, some improvement in appetite noted    UTI, Enterococcus from urine culture  - Reviewed urine culture speciation and susceptibilities  - Continue Macrobid x

## 2023-09-06 PROCEDURE — 6360000002 HC RX W HCPCS: Performed by: STUDENT IN AN ORGANIZED HEALTH CARE EDUCATION/TRAINING PROGRAM

## 2023-09-06 PROCEDURE — 6370000000 HC RX 637 (ALT 250 FOR IP): Performed by: NURSE PRACTITIONER

## 2023-09-06 PROCEDURE — 6370000000 HC RX 637 (ALT 250 FOR IP): Performed by: STUDENT IN AN ORGANIZED HEALTH CARE EDUCATION/TRAINING PROGRAM

## 2023-09-06 PROCEDURE — 2500000003 HC RX 250 WO HCPCS: Performed by: HOSPITALIST

## 2023-09-06 PROCEDURE — 6370000000 HC RX 637 (ALT 250 FOR IP): Performed by: PSYCHIATRY & NEUROLOGY

## 2023-09-06 PROCEDURE — 6370000000 HC RX 637 (ALT 250 FOR IP): Performed by: HOSPITALIST

## 2023-09-06 PROCEDURE — 1210000000 HC MED SURG R&B

## 2023-09-06 PROCEDURE — 2580000003 HC RX 258: Performed by: HOSPITALIST

## 2023-09-06 RX ADMIN — MIRTAZAPINE 3.75 MG: 7.5 TABLET ORAL at 19:56

## 2023-09-06 RX ADMIN — LORAZEPAM 0.5 MG: 1 TABLET ORAL at 19:55

## 2023-09-06 RX ADMIN — NIFEDIPINE 60 MG: 30 TABLET, FILM COATED, EXTENDED RELEASE ORAL at 20:13

## 2023-09-06 RX ADMIN — LEVOTHYROXINE SODIUM 25 MCG: 25 TABLET ORAL at 09:16

## 2023-09-06 RX ADMIN — VALSARTAN 320 MG: 160 TABLET, FILM COATED ORAL at 09:16

## 2023-09-06 RX ADMIN — SODIUM CHLORIDE, PRESERVATIVE FREE 10 ML: 5 INJECTION INTRAVENOUS at 19:57

## 2023-09-06 RX ADMIN — OXYCODONE HYDROCHLORIDE AND ACETAMINOPHEN 500 MG: 500 TABLET ORAL at 09:16

## 2023-09-06 RX ADMIN — TUBERCULIN PURIFIED PROTEIN DERIVATIVE 5 UNITS: 5 INJECTION, SOLUTION INTRADERMAL at 16:57

## 2023-09-06 RX ADMIN — NIFEDIPINE 60 MG: 30 TABLET, FILM COATED, EXTENDED RELEASE ORAL at 09:20

## 2023-09-06 RX ADMIN — ENOXAPARIN SODIUM 30 MG: 100 INJECTION SUBCUTANEOUS at 09:16

## 2023-09-06 RX ADMIN — DOCUSATE SODIUM 200 MG: 100 CAPSULE, LIQUID FILLED ORAL at 19:57

## 2023-09-06 RX ADMIN — FAMOTIDINE 20 MG: 20 TABLET ORAL at 09:16

## 2023-09-06 RX ADMIN — Medication 5 MG: at 19:54

## 2023-09-06 RX ADMIN — SODIUM CHLORIDE, PRESERVATIVE FREE 10 ML: 5 INJECTION INTRAVENOUS at 09:17

## 2023-09-06 RX ADMIN — ASPIRIN 81 MG: 81 TABLET, CHEWABLE ORAL at 09:16

## 2023-09-06 RX ADMIN — MULTIPLE VITAMINS W/ MINERALS TAB 1 TABLET: TAB at 09:20

## 2023-09-06 RX ADMIN — DOCUSATE SODIUM 200 MG: 100 CAPSULE, LIQUID FILLED ORAL at 09:16

## 2023-09-06 RX ADMIN — NITROFURANTOIN (MONOHYDRATE/MACROCRYSTALS) 100 MG: 75; 25 CAPSULE ORAL at 09:16

## 2023-09-06 RX ADMIN — Medication 1.04 G: at 09:20

## 2023-09-06 RX ADMIN — MAGNESIUM HYDROXIDE 30 ML: 400 SUSPENSION ORAL at 09:16

## 2023-09-06 RX ADMIN — NITROFURANTOIN (MONOHYDRATE/MACROCRYSTALS) 100 MG: 75; 25 CAPSULE ORAL at 19:56

## 2023-09-06 NOTE — PROGRESS NOTES
This  stopped by pt's room to see if pt's step daughter Ptio Hills had brought a copy of pt's EMS DNR. She had it on her phone and emailed it to this . The document has been emailed to Avelino Rayo to be scanned to pt's EMR. Pt's and families wishes for pt to be a DNR to be addressed now.      Electronically signed by Milvia Mckoy on 9/6/2023 at 10:21 AM

## 2023-09-07 PROCEDURE — 2580000003 HC RX 258: Performed by: HOSPITALIST

## 2023-09-07 PROCEDURE — 94760 N-INVAS EAR/PLS OXIMETRY 1: CPT

## 2023-09-07 PROCEDURE — 6360000002 HC RX W HCPCS: Performed by: STUDENT IN AN ORGANIZED HEALTH CARE EDUCATION/TRAINING PROGRAM

## 2023-09-07 PROCEDURE — 6370000000 HC RX 637 (ALT 250 FOR IP): Performed by: PSYCHIATRY & NEUROLOGY

## 2023-09-07 PROCEDURE — 6370000000 HC RX 637 (ALT 250 FOR IP): Performed by: HOSPITALIST

## 2023-09-07 PROCEDURE — 6370000000 HC RX 637 (ALT 250 FOR IP): Performed by: STUDENT IN AN ORGANIZED HEALTH CARE EDUCATION/TRAINING PROGRAM

## 2023-09-07 PROCEDURE — 6370000000 HC RX 637 (ALT 250 FOR IP): Performed by: NURSE PRACTITIONER

## 2023-09-07 PROCEDURE — 1210000000 HC MED SURG R&B

## 2023-09-07 RX ADMIN — ENOXAPARIN SODIUM 30 MG: 100 INJECTION SUBCUTANEOUS at 08:43

## 2023-09-07 RX ADMIN — DOCUSATE SODIUM 200 MG: 100 CAPSULE, LIQUID FILLED ORAL at 20:50

## 2023-09-07 RX ADMIN — ACETAMINOPHEN 650 MG: 325 TABLET ORAL at 08:44

## 2023-09-07 RX ADMIN — MAGNESIUM HYDROXIDE 30 ML: 400 SUSPENSION ORAL at 08:43

## 2023-09-07 RX ADMIN — ESTRADIOL 1 G: 0.1 CREAM VAGINAL at 08:43

## 2023-09-07 RX ADMIN — SODIUM CHLORIDE, PRESERVATIVE FREE 10 ML: 5 INJECTION INTRAVENOUS at 20:56

## 2023-09-07 RX ADMIN — NIFEDIPINE 60 MG: 30 TABLET, FILM COATED, EXTENDED RELEASE ORAL at 08:43

## 2023-09-07 RX ADMIN — LEVOTHYROXINE SODIUM 25 MCG: 25 TABLET ORAL at 06:16

## 2023-09-07 RX ADMIN — FAMOTIDINE 20 MG: 20 TABLET ORAL at 08:44

## 2023-09-07 RX ADMIN — SODIUM CHLORIDE, PRESERVATIVE FREE 10 ML: 5 INJECTION INTRAVENOUS at 08:44

## 2023-09-07 RX ADMIN — Medication 5 MG: at 21:03

## 2023-09-07 RX ADMIN — MIRTAZAPINE 3.75 MG: 7.5 TABLET ORAL at 20:48

## 2023-09-07 RX ADMIN — OXYCODONE HYDROCHLORIDE AND ACETAMINOPHEN 500 MG: 500 TABLET ORAL at 08:44

## 2023-09-07 RX ADMIN — DOCUSATE SODIUM 200 MG: 100 CAPSULE, LIQUID FILLED ORAL at 08:44

## 2023-09-07 RX ADMIN — VALSARTAN 320 MG: 160 TABLET, FILM COATED ORAL at 08:43

## 2023-09-07 RX ADMIN — LORAZEPAM 0.5 MG: 1 TABLET ORAL at 20:49

## 2023-09-07 RX ADMIN — MULTIPLE VITAMINS W/ MINERALS TAB 1 TABLET: TAB at 08:44

## 2023-09-07 RX ADMIN — ONDANSETRON 4 MG: 4 TABLET, ORALLY DISINTEGRATING ORAL at 10:01

## 2023-09-07 RX ADMIN — Medication 1.04 G: at 08:43

## 2023-09-07 RX ADMIN — NIFEDIPINE 60 MG: 30 TABLET, FILM COATED, EXTENDED RELEASE ORAL at 20:48

## 2023-09-07 RX ADMIN — ASPIRIN 81 MG: 81 TABLET, CHEWABLE ORAL at 08:43

## 2023-09-07 RX ADMIN — NITROFURANTOIN (MONOHYDRATE/MACROCRYSTALS) 100 MG: 75; 25 CAPSULE ORAL at 08:44

## 2023-09-07 ASSESSMENT — PAIN SCALES - GENERAL: PAINLEVEL_OUTOF10: 6

## 2023-09-07 ASSESSMENT — PAIN DESCRIPTION - LOCATION: LOCATION: BACK

## 2023-09-07 NOTE — PROGRESS NOTES
Physical Therapy  Name: Gerber Pelaez  MRN:  [id number]  Date of service:  9/7/2023    Maryellen Araiza states that she has walked patient around room twice today and patient sat up in chair to visit with family. Patient just returned to bed. Physical Therapy will continue to follow and progress as able.     Electronically signed by Fernando Sousa PTA on 9/7/2023 at 4:09 PM

## 2023-09-07 NOTE — PROGRESS NOTES
Max:73    I/O (24Hr): Intake/Output Summary (Last 24 hours) at 9/7/2023 1223  Last data filed at 9/7/2023 0843  Gross per 24 hour   Intake 570 ml   Output --   Net 570 ml         Physical Examination:  General: Frail, [place], no acute distress lying comfortably in bed. HEENT: Atraumatic normocephalic, range of motion normal, no JVD, no tracheal deviation noted. Cardiac: Normal S1-S2   Respiratory: clear To auscultation bilaterally, no rhonchi or rales, no wheezing  Abdomen: Soft, positive bowel sounds in all quadrants, no distention, nontender to palpation  extremities: no tenderness, no edema, moves all extremities  Psych: Affect normal and good eye contact, behavioral normal.      Labs/Imaging/Diagnostics    Labs:  CBC:No results for input(s): WBC, RBC, HGB, HCT, MCV, RDW, PLT in the last 72 hours. CHEMISTRIES:No results for input(s): NA, K, CL, CO2, BUN, CREATININE, GLUCOSE, CA, PHOS, MG in the last 72 hours. PT/INR:No results for input(s): PROTIME, INR in the last 72 hours. APTT:No results for input(s): APTT in the last 72 hours. LIVER PROFILE:No results for input(s): AST, ALT, BILIDIR, BILITOT, ALKPHOS in the last 72 hours. Imaging Last 24 Hours:  No results found.   Assessment//Plan           Hospital Problems             Last Modified POA    * (Principal) Suicidal ideation 9/2/2023 Yes    Enterococcus UTI 9/4/2023 Yes    COVID-19 9/2/2023 Yes    Severe malnutrition ([address]) 9/5/2023 Yes   Assessment & Plan    Anxiety/depression with suicidal ideation  Psychiatry rec inpt geriatric placement  Continue Remeron, some improvement in appetite noted     UTI, Enterococcus from urine culture  Reviewed urine culture speciation and susceptibilities  Continue Macrobid x 5-day course, will monitor for any intolerance to medication     Constipation  Continue milk of magnesia  Colace     Recent history of COVID-19 infection now resolved, repeat testing for COVID-19 positive but not clinically significant Dementia  Difficulty with ADLs  Failure to thrive     Continue supportive care     Status and plan of care discussed with nursing staff and case management     Extensive discussion had with patient/family regarding status and plan of care     Referrals sent to geriatric psychiatry facilities     Await placement, will need to complete COVID quarantine prior to discharge. Electronically signed by   Lyly Meza MD   Internal Medicine Hospitalist  On 9/7/2023  At 12:40 PM    EMR Dragon/Transcription disclaimer:   Much of this encounter note is an electronic transcription/translation of spoken language to printed text.  The electronic translation of spoken language may permit erroneous, or at times, nonsensical words or phrases to be inadvertently transcribed; although attempts have made to review the note for such errors, some may still exist.

## 2023-09-07 NOTE — PLAN OF CARE
Problem: Safety - Adult  Goal: Free from fall injury  Outcome: Progressing     Problem: ABCDS Injury Assessment  Goal: Absence of physical injury  Outcome: Progressing     Problem: Chronic Conditions and Co-morbidities  Goal: Patient's chronic conditions and co-morbidity symptoms are monitored and maintained or improved  Outcome: Progressing     Problem: Nutrition Deficit:  Goal: Optimize nutritional status  Outcome: Progressing     Problem: Pain  Goal: Verbalizes/displays adequate comfort level or baseline comfort level  Outcome: Progressing

## 2023-09-07 NOTE — PLAN OF CARE
Problem: Safety - Adult  Goal: Free from fall injury  Outcome: Progressing     Problem: ABCDS Injury Assessment  Goal: Absence of physical injury  Outcome: Progressing     Problem: Chronic Conditions and Co-morbidities  Goal: Patient's chronic conditions and co-morbidity symptoms are monitored and maintained or improved  Outcome: Progressing     Problem: Nutrition Deficit:  Goal: Optimize nutritional status  9/7/2023 1837 by Rochelle Valerio RN  Outcome: Progressing  9/7/2023 1456 by Yung Lopez, MS, RD, LD  Outcome: Progressing  Flowsheets (Taken 9/7/2023 1442)  Nutrient intake appropriate for improving, restoring, or maintaining nutritional needs:   Assess nutritional status and recommend course of action   Monitor oral intake, labs, and treatment plans   Recommend appropriate diets, oral nutritional supplements, and vitamin/mineral supplements     Problem: Pain  Goal: Verbalizes/displays adequate comfort level or baseline comfort level  Outcome: Progressing

## 2023-09-07 NOTE — PLAN OF CARE
Problem: Nutrition Deficit:  Goal: Optimize nutritional status  Outcome: Progressing  Flowsheets (Taken 9/7/2023 7632)  Nutrient intake appropriate for improving, restoring, or maintaining nutritional needs:   Assess nutritional status and recommend course of action   Monitor oral intake, labs, and treatment plans   Recommend appropriate diets, oral nutritional supplements, and vitamin/mineral supplements

## 2023-09-08 PROCEDURE — 1210000000 HC MED SURG R&B

## 2023-09-08 PROCEDURE — 6370000000 HC RX 637 (ALT 250 FOR IP): Performed by: PSYCHIATRY & NEUROLOGY

## 2023-09-08 PROCEDURE — 6370000000 HC RX 637 (ALT 250 FOR IP): Performed by: NURSE PRACTITIONER

## 2023-09-08 PROCEDURE — 6360000002 HC RX W HCPCS: Performed by: STUDENT IN AN ORGANIZED HEALTH CARE EDUCATION/TRAINING PROGRAM

## 2023-09-08 PROCEDURE — 6370000000 HC RX 637 (ALT 250 FOR IP): Performed by: HOSPITALIST

## 2023-09-08 PROCEDURE — 6370000000 HC RX 637 (ALT 250 FOR IP): Performed by: STUDENT IN AN ORGANIZED HEALTH CARE EDUCATION/TRAINING PROGRAM

## 2023-09-08 PROCEDURE — 94760 N-INVAS EAR/PLS OXIMETRY 1: CPT

## 2023-09-08 PROCEDURE — 2580000003 HC RX 258: Performed by: HOSPITALIST

## 2023-09-08 RX ADMIN — SODIUM CHLORIDE, PRESERVATIVE FREE 10 ML: 5 INJECTION INTRAVENOUS at 08:20

## 2023-09-08 RX ADMIN — DOCUSATE SODIUM 200 MG: 100 CAPSULE, LIQUID FILLED ORAL at 08:20

## 2023-09-08 RX ADMIN — Medication 5 MG: at 19:57

## 2023-09-08 RX ADMIN — SODIUM CHLORIDE, PRESERVATIVE FREE 10 ML: 5 INJECTION INTRAVENOUS at 19:58

## 2023-09-08 RX ADMIN — OXYCODONE HYDROCHLORIDE AND ACETAMINOPHEN 500 MG: 500 TABLET ORAL at 08:20

## 2023-09-08 RX ADMIN — ENOXAPARIN SODIUM 30 MG: 100 INJECTION SUBCUTANEOUS at 08:21

## 2023-09-08 RX ADMIN — FAMOTIDINE 20 MG: 20 TABLET ORAL at 08:20

## 2023-09-08 RX ADMIN — MIRTAZAPINE 3.75 MG: 7.5 TABLET ORAL at 19:57

## 2023-09-08 RX ADMIN — LEVOTHYROXINE SODIUM 25 MCG: 25 TABLET ORAL at 05:49

## 2023-09-08 RX ADMIN — LORAZEPAM 0.5 MG: 1 TABLET ORAL at 19:55

## 2023-09-08 RX ADMIN — NIFEDIPINE 60 MG: 30 TABLET, FILM COATED, EXTENDED RELEASE ORAL at 08:20

## 2023-09-08 RX ADMIN — Medication 1.04 G: at 08:19

## 2023-09-08 RX ADMIN — DOCUSATE SODIUM 200 MG: 100 CAPSULE, LIQUID FILLED ORAL at 19:57

## 2023-09-08 RX ADMIN — NIFEDIPINE 60 MG: 30 TABLET, FILM COATED, EXTENDED RELEASE ORAL at 19:58

## 2023-09-08 RX ADMIN — VALSARTAN 320 MG: 160 TABLET, FILM COATED ORAL at 08:20

## 2023-09-08 RX ADMIN — MULTIPLE VITAMINS W/ MINERALS TAB 1 TABLET: TAB at 08:20

## 2023-09-08 RX ADMIN — ASPIRIN 81 MG: 81 TABLET, CHEWABLE ORAL at 08:20

## 2023-09-08 RX ADMIN — MAGNESIUM HYDROXIDE 30 ML: 400 SUSPENSION ORAL at 08:20

## 2023-09-08 ASSESSMENT — PAIN SCALES - WONG BAKER: WONGBAKER_NUMERICALRESPONSE: 0

## 2023-09-08 ASSESSMENT — PAIN SCALES - GENERAL: PAINLEVEL_OUTOF10: 0

## 2023-09-08 NOTE — PLAN OF CARE
Problem: Safety - Adult  Goal: Free from fall injury  9/7/2023 2312 by Timmy Valdez RN  Outcome: Progressing  9/7/2023 1837 by Jose Lord RN  Outcome: Progressing     Problem: ABCDS Injury Assessment  Goal: Absence of physical injury  9/7/2023 2312 by Timmy Valdez RN  Outcome: Progressing  9/7/2023 1837 by Jose Lord RN  Outcome: Progressing     Problem: Chronic Conditions and Co-morbidities  Goal: Patient's chronic conditions and co-morbidity symptoms are monitored and maintained or improved  9/7/2023 2312 by Timmy Valdez RN  Outcome: Progressing  9/7/2023 1837 by Jose Lord RN  Outcome: Progressing     Problem: Nutrition Deficit:  Goal: Optimize nutritional status  9/7/2023 2312 by Timmy Valdez RN  Outcome: Progressing  9/7/2023 1837 by Jose Lord RN  Outcome: Progressing  9/7/2023 1456 by Ly Ramos MS, RD, LD  Outcome: Progressing  Flowsheets (Taken 9/7/2023 1442)  Nutrient intake appropriate for improving, restoring, or maintaining nutritional needs:   Assess nutritional status and recommend course of action   Monitor oral intake, labs, and treatment plans   Recommend appropriate diets, oral nutritional supplements, and vitamin/mineral supplements     Problem: Pain  Goal: Verbalizes/displays adequate comfort level or baseline comfort level  9/7/2023 2312 by Timmy Valdez RN  Outcome: Progressing  9/7/2023 1837 by Jose Lord RN  Outcome: Progressing

## 2023-09-08 NOTE — CARE COORDINATION
Pb reached out to [address]  about a referral sent earlier this week. Altaf Uriarte reports to send the referral on Monday for possible d/c Tuesday.    Electronically signed by MIRYAM Chino on 9/8/2023 at 10:53 AM

## 2023-09-09 PROCEDURE — 1210000000 HC MED SURG R&B

## 2023-09-09 PROCEDURE — 6370000000 HC RX 637 (ALT 250 FOR IP): Performed by: PSYCHIATRY & NEUROLOGY

## 2023-09-09 PROCEDURE — 2580000003 HC RX 258: Performed by: HOSPITALIST

## 2023-09-09 PROCEDURE — 6360000002 HC RX W HCPCS: Performed by: STUDENT IN AN ORGANIZED HEALTH CARE EDUCATION/TRAINING PROGRAM

## 2023-09-09 PROCEDURE — 6370000000 HC RX 637 (ALT 250 FOR IP): Performed by: NURSE PRACTITIONER

## 2023-09-09 PROCEDURE — 6370000000 HC RX 637 (ALT 250 FOR IP): Performed by: STUDENT IN AN ORGANIZED HEALTH CARE EDUCATION/TRAINING PROGRAM

## 2023-09-09 PROCEDURE — 6370000000 HC RX 637 (ALT 250 FOR IP): Performed by: HOSPITALIST

## 2023-09-09 PROCEDURE — 94760 N-INVAS EAR/PLS OXIMETRY 1: CPT

## 2023-09-09 RX ADMIN — DOCUSATE SODIUM 200 MG: 100 CAPSULE, LIQUID FILLED ORAL at 21:03

## 2023-09-09 RX ADMIN — MULTIPLE VITAMINS W/ MINERALS TAB 1 TABLET: TAB at 08:37

## 2023-09-09 RX ADMIN — ENOXAPARIN SODIUM 30 MG: 100 INJECTION SUBCUTANEOUS at 08:38

## 2023-09-09 RX ADMIN — LEVOTHYROXINE SODIUM 25 MCG: 25 TABLET ORAL at 06:35

## 2023-09-09 RX ADMIN — Medication 1.04 G: at 08:37

## 2023-09-09 RX ADMIN — MAGNESIUM HYDROXIDE 30 ML: 400 SUSPENSION ORAL at 08:38

## 2023-09-09 RX ADMIN — MIRTAZAPINE 3.75 MG: 7.5 TABLET ORAL at 21:04

## 2023-09-09 RX ADMIN — SODIUM CHLORIDE, PRESERVATIVE FREE 10 ML: 5 INJECTION INTRAVENOUS at 08:38

## 2023-09-09 RX ADMIN — VALSARTAN 320 MG: 160 TABLET, FILM COATED ORAL at 08:38

## 2023-09-09 RX ADMIN — NIFEDIPINE 60 MG: 30 TABLET, FILM COATED, EXTENDED RELEASE ORAL at 08:37

## 2023-09-09 RX ADMIN — FAMOTIDINE 20 MG: 20 TABLET ORAL at 08:37

## 2023-09-09 RX ADMIN — LORAZEPAM 0.5 MG: 1 TABLET ORAL at 21:03

## 2023-09-09 RX ADMIN — Medication 5 MG: at 21:05

## 2023-09-09 RX ADMIN — ASPIRIN 81 MG: 81 TABLET, CHEWABLE ORAL at 08:37

## 2023-09-09 RX ADMIN — DOCUSATE SODIUM 200 MG: 100 CAPSULE, LIQUID FILLED ORAL at 08:38

## 2023-09-09 RX ADMIN — NIFEDIPINE 60 MG: 30 TABLET, FILM COATED, EXTENDED RELEASE ORAL at 21:04

## 2023-09-09 RX ADMIN — OXYCODONE HYDROCHLORIDE AND ACETAMINOPHEN 500 MG: 500 TABLET ORAL at 08:38

## 2023-09-09 NOTE — PROGRESS NOTES
TB skin test read to [facility], 0mm, negative TB skin test result. Read 9/8/23 at 1330.   Electronically signed by Tu Logan RN on 9/9/2023 at 2:25 PM

## 2023-09-10 PROCEDURE — 6360000002 HC RX W HCPCS: Performed by: STUDENT IN AN ORGANIZED HEALTH CARE EDUCATION/TRAINING PROGRAM

## 2023-09-10 PROCEDURE — 6370000000 HC RX 637 (ALT 250 FOR IP): Performed by: PSYCHIATRY & NEUROLOGY

## 2023-09-10 PROCEDURE — 1210000000 HC MED SURG R&B

## 2023-09-10 PROCEDURE — 6370000000 HC RX 637 (ALT 250 FOR IP): Performed by: STUDENT IN AN ORGANIZED HEALTH CARE EDUCATION/TRAINING PROGRAM

## 2023-09-10 PROCEDURE — 6370000000 HC RX 637 (ALT 250 FOR IP): Performed by: HOSPITALIST

## 2023-09-10 PROCEDURE — 6370000000 HC RX 637 (ALT 250 FOR IP): Performed by: NURSE PRACTITIONER

## 2023-09-10 RX ADMIN — OXYCODONE HYDROCHLORIDE AND ACETAMINOPHEN 500 MG: 500 TABLET ORAL at 08:24

## 2023-09-10 RX ADMIN — NIFEDIPINE 60 MG: 30 TABLET, FILM COATED, EXTENDED RELEASE ORAL at 20:08

## 2023-09-10 RX ADMIN — LORAZEPAM 0.5 MG: 1 TABLET ORAL at 20:08

## 2023-09-10 RX ADMIN — VALSARTAN 320 MG: 160 TABLET, FILM COATED ORAL at 08:24

## 2023-09-10 RX ADMIN — NIFEDIPINE 60 MG: 30 TABLET, FILM COATED, EXTENDED RELEASE ORAL at 08:24

## 2023-09-10 RX ADMIN — MIRTAZAPINE 3.75 MG: 7.5 TABLET ORAL at 20:08

## 2023-09-10 RX ADMIN — MAGNESIUM HYDROXIDE 30 ML: 400 SUSPENSION ORAL at 08:24

## 2023-09-10 RX ADMIN — LEVOTHYROXINE SODIUM 25 MCG: 25 TABLET ORAL at 06:18

## 2023-09-10 RX ADMIN — MULTIPLE VITAMINS W/ MINERALS TAB 1 TABLET: TAB at 08:24

## 2023-09-10 RX ADMIN — ASPIRIN 81 MG: 81 TABLET, CHEWABLE ORAL at 08:24

## 2023-09-10 RX ADMIN — DOCUSATE SODIUM 200 MG: 100 CAPSULE, LIQUID FILLED ORAL at 08:23

## 2023-09-10 RX ADMIN — Medication 1.04 G: at 08:24

## 2023-09-10 RX ADMIN — Medication 5 MG: at 20:08

## 2023-09-10 RX ADMIN — FAMOTIDINE 20 MG: 20 TABLET ORAL at 08:24

## 2023-09-10 RX ADMIN — DOCUSATE SODIUM 200 MG: 100 CAPSULE, LIQUID FILLED ORAL at 20:08

## 2023-09-10 RX ADMIN — ENOXAPARIN SODIUM 30 MG: 100 INJECTION SUBCUTANEOUS at 08:23

## 2023-09-11 ENCOUNTER — TELEPHONE (OUTPATIENT)
Dept: INTERNAL MEDICINE | Facility: CLINIC | Age: 88
End: 2023-09-11

## 2023-09-11 PROCEDURE — 1210000000 HC MED SURG R&B

## 2023-09-11 PROCEDURE — 6370000000 HC RX 637 (ALT 250 FOR IP): Performed by: PSYCHIATRY & NEUROLOGY

## 2023-09-11 PROCEDURE — 6370000000 HC RX 637 (ALT 250 FOR IP): Performed by: STUDENT IN AN ORGANIZED HEALTH CARE EDUCATION/TRAINING PROGRAM

## 2023-09-11 PROCEDURE — 6360000002 HC RX W HCPCS: Performed by: STUDENT IN AN ORGANIZED HEALTH CARE EDUCATION/TRAINING PROGRAM

## 2023-09-11 PROCEDURE — 6370000000 HC RX 637 (ALT 250 FOR IP): Performed by: NURSE PRACTITIONER

## 2023-09-11 PROCEDURE — 6370000000 HC RX 637 (ALT 250 FOR IP): Performed by: HOSPITALIST

## 2023-09-11 PROCEDURE — 94760 N-INVAS EAR/PLS OXIMETRY 1: CPT

## 2023-09-11 RX ADMIN — LEVOTHYROXINE SODIUM 25 MCG: 25 TABLET ORAL at 06:06

## 2023-09-11 RX ADMIN — ENOXAPARIN SODIUM 30 MG: 100 INJECTION SUBCUTANEOUS at 08:43

## 2023-09-11 RX ADMIN — NIFEDIPINE 60 MG: 30 TABLET, FILM COATED, EXTENDED RELEASE ORAL at 08:43

## 2023-09-11 RX ADMIN — OXYCODONE HYDROCHLORIDE AND ACETAMINOPHEN 500 MG: 500 TABLET ORAL at 08:43

## 2023-09-11 RX ADMIN — Medication 1.04 G: at 08:43

## 2023-09-11 RX ADMIN — DOCUSATE SODIUM 200 MG: 100 CAPSULE, LIQUID FILLED ORAL at 21:15

## 2023-09-11 RX ADMIN — MAGNESIUM HYDROXIDE 30 ML: 400 SUSPENSION ORAL at 08:42

## 2023-09-11 RX ADMIN — MULTIPLE VITAMINS W/ MINERALS TAB 1 TABLET: TAB at 08:43

## 2023-09-11 RX ADMIN — VALSARTAN 320 MG: 160 TABLET, FILM COATED ORAL at 08:42

## 2023-09-11 RX ADMIN — ASPIRIN 81 MG: 81 TABLET, CHEWABLE ORAL at 08:43

## 2023-09-11 RX ADMIN — Medication 5 MG: at 21:15

## 2023-09-11 RX ADMIN — MIRTAZAPINE 3.75 MG: 7.5 TABLET ORAL at 21:15

## 2023-09-11 RX ADMIN — FAMOTIDINE 20 MG: 20 TABLET ORAL at 08:42

## 2023-09-11 RX ADMIN — NIFEDIPINE 60 MG: 30 TABLET, FILM COATED, EXTENDED RELEASE ORAL at 21:15

## 2023-09-11 RX ADMIN — DOCUSATE SODIUM 200 MG: 100 CAPSULE, LIQUID FILLED ORAL at 08:43

## 2023-09-11 RX ADMIN — LORAZEPAM 0.5 MG: 1 TABLET ORAL at 21:15

## 2023-09-11 NOTE — CARE COORDINATION
Rima spoke to [address] [place] [address] with [facility] who reports the referral is under review. Rima spoke to Ashvin Preston at  [facility] who has requested updates to fax:[phone]. Sw [address] to [address] Phone: [phone] awaiting return call. Electronically signed by MIRYAM Mcdaniels on 9/11/2023 at 9:27 AM  Rima called [place] with [facility]. [facility] reports that she has to look over the referral and get back with rima. Electronically signed by MIRYAM Mcdaniels on 9/11/2023 at 1:46 PM  [place] reports on Wednesday to call [facility] to see if a bed has opened up.    [place] reports that if they have a bed that opens up that the pt can d/c to [facility]  Electronically signed by MIRYAM Mcdaniels on 9/11/2023 at 3:03 PM

## 2023-09-11 NOTE — TELEPHONE ENCOUNTER
I have the paperwork for assisted living but it has not been completed yet and she was supposed to return for a TB skin test, per assisted living requirements.

## 2023-09-11 NOTE — TELEPHONE ENCOUNTER
Caller: SHAUN MCKEON    Relationship: Emergency Contact    Best call back number: [phone]     Who are you requesting to speak with (clinical staff, provider,  specific staff member): LEAVE MESSAGE WITH DR. POOLE    What was the call regarding: SHAUN MCKEON REQUESTING PAPERWORK REGARDING PATIENT BE PLACED UP FRONT FOR PICKUP. PLEASE CALL WHEN READY.

## 2023-09-11 NOTE — PLAN OF CARE
Problem: Safety - Adult  Goal: Free from fall injury  Outcome: Progressing     Problem: ABCDS Injury Assessment  Goal: Absence of physical injury  Outcome: Progressing     Problem: Chronic Conditions and Co-morbidities  Goal: Patient's chronic conditions and co-morbidity symptoms are monitored and maintained or improved  Outcome: Progressing     Problem: Pain  Goal: Verbalizes/displays adequate comfort level or baseline comfort level  Outcome: Progressing     Problem: Skin/Tissue Integrity  Goal: Absence of new skin breakdown  Description: 1. Monitor for areas of redness and/or skin breakdown  2. Assess vascular access sites hourly  3. Every 4-6 hours minimum:  Change oxygen saturation probe site  4. Every 4-6 hours:  If on nasal continuous positive airway pressure, respiratory therapy assess nares and determine need for appliance change or resting period.   Outcome: Progressing

## 2023-09-12 PROCEDURE — 6370000000 HC RX 637 (ALT 250 FOR IP): Performed by: PSYCHIATRY & NEUROLOGY

## 2023-09-12 PROCEDURE — 1210000000 HC MED SURG R&B

## 2023-09-12 PROCEDURE — 6360000002 HC RX W HCPCS: Performed by: STUDENT IN AN ORGANIZED HEALTH CARE EDUCATION/TRAINING PROGRAM

## 2023-09-12 PROCEDURE — 6370000000 HC RX 637 (ALT 250 FOR IP): Performed by: NURSE PRACTITIONER

## 2023-09-12 PROCEDURE — 6370000000 HC RX 637 (ALT 250 FOR IP): Performed by: STUDENT IN AN ORGANIZED HEALTH CARE EDUCATION/TRAINING PROGRAM

## 2023-09-12 PROCEDURE — 6370000000 HC RX 637 (ALT 250 FOR IP): Performed by: HOSPITALIST

## 2023-09-12 PROCEDURE — 94760 N-INVAS EAR/PLS OXIMETRY 1: CPT

## 2023-09-12 RX ADMIN — ASPIRIN 81 MG: 81 TABLET, CHEWABLE ORAL at 09:23

## 2023-09-12 RX ADMIN — DOCUSATE SODIUM 200 MG: 100 CAPSULE, LIQUID FILLED ORAL at 20:12

## 2023-09-12 RX ADMIN — MULTIPLE VITAMINS W/ MINERALS TAB 1 TABLET: TAB at 09:23

## 2023-09-12 RX ADMIN — LORAZEPAM 0.5 MG: 1 TABLET ORAL at 20:12

## 2023-09-12 RX ADMIN — NIFEDIPINE 60 MG: 30 TABLET, FILM COATED, EXTENDED RELEASE ORAL at 09:23

## 2023-09-12 RX ADMIN — VALSARTAN 320 MG: 160 TABLET, FILM COATED ORAL at 09:23

## 2023-09-12 RX ADMIN — ENOXAPARIN SODIUM 30 MG: 100 INJECTION SUBCUTANEOUS at 09:23

## 2023-09-12 RX ADMIN — FAMOTIDINE 20 MG: 20 TABLET ORAL at 09:23

## 2023-09-12 RX ADMIN — NIFEDIPINE 60 MG: 30 TABLET, FILM COATED, EXTENDED RELEASE ORAL at 20:13

## 2023-09-12 RX ADMIN — LEVOTHYROXINE SODIUM 25 MCG: 25 TABLET ORAL at 05:59

## 2023-09-12 RX ADMIN — Medication 5 MG: at 20:13

## 2023-09-12 RX ADMIN — Medication 1.04 G: at 09:23

## 2023-09-12 RX ADMIN — OXYCODONE HYDROCHLORIDE AND ACETAMINOPHEN 500 MG: 500 TABLET ORAL at 09:23

## 2023-09-12 RX ADMIN — DOCUSATE SODIUM 200 MG: 100 CAPSULE, LIQUID FILLED ORAL at 09:23

## 2023-09-12 RX ADMIN — MAGNESIUM HYDROXIDE 30 ML: 400 SUSPENSION ORAL at 09:23

## 2023-09-12 RX ADMIN — MIRTAZAPINE 3.75 MG: 7.5 TABLET ORAL at 20:13

## 2023-09-12 NOTE — CARE COORDINATION
Pb called [place] at [facility] who reports that sw should call tomorrow to see if Pt is good to transfer tomorrow. Pb has faxed updated notes to [place].   Electronically signed by MIRYAM Salgado on 9/12/2023 at 1:53 PM

## 2023-09-12 NOTE — PLAN OF CARE
Problem: Safety - Adult  Goal: Free from fall injury  9/12/2023 1147 by Sherwin Amaya RN  Outcome: Progressing  9/11/2023 2310 by Vivek Rod RN  Outcome: Progressing     Problem: ABCDS Injury Assessment  Goal: Absence of physical injury  9/12/2023 1147 by Sherwin Amaya RN  Outcome: Progressing  9/11/2023 2310 by Vivek Rod RN  Outcome: Progressing     Problem: Chronic Conditions and Co-morbidities  Goal: Patient's chronic conditions and co-morbidity symptoms are monitored and maintained or improved  9/12/2023 1147 by Sherwin Amaya RN  Outcome: Progressing  9/11/2023 2310 by Vivek Rod RN  Outcome: Progressing     Problem: Nutrition Deficit:  Goal: Optimize nutritional status  9/12/2023 1147 by Sherwin Amaya RN  Outcome: Progressing  9/11/2023 2310 by Vivek Rod RN  Outcome: Progressing     Problem: Pain  Goal: Verbalizes/displays adequate comfort level or baseline comfort level  9/12/2023 1147 by Sherwin Amaya RN  Outcome: Progressing  9/11/2023 2310 by Vivek Rod RN  Outcome: Progressing     Problem: Skin/Tissue Integrity  Goal: Absence of new skin breakdown  Description: 1. Monitor for areas of redness and/or skin breakdown  2. Assess vascular access sites hourly  3. Every 4-6 hours minimum:  Change oxygen saturation probe site  4. Every 4-6 hours:  If on nasal continuous positive airway pressure, respiratory therapy assess nares and determine need for appliance change or resting period.   9/12/2023 1147 by Sherwin Amaya RN  Outcome: Progressing  9/11/2023 2310 by Vivek Rod RN  Outcome: Progressing

## 2023-09-12 NOTE — PROGRESS NOTES
Comprehensive Nutrition Assessment    Type and Reason for Visit:  Reassess    Nutrition Recommendations/Plan:   Continue current POC     Malnutrition Assessment:  Malnutrition Status:  Severe malnutrition (09/12/23 1429)    Context:  Chronic Illness     Findings of the 6 clinical characteristics of malnutrition:  Energy Intake:  Mild decrease in energy intake (Comment)  Weight Loss:  Greater than 7.5% over 3 months     Body Fat Loss:  Severe body fat loss Orbital, Buccal region   Muscle Mass Loss:  Severe muscle mass loss Temples (temporalis), Clavicles (pectoralis & deltoids), Hand (interosseous)  Fluid Accumulation:  No significant fluid accumulation Extremities   Strength:  Not Performed    Nutrition Assessment:    Pt's wt has increased slightly in the pasts week. COVID quarantine has been completed. No new labs available. PO intake has improved the past 24 hours. Nutrition Related Findings:    very thin Wound Type: None       Current Nutrition Intake & Therapies:    Average Meal Intake: 1-25%, %  Average Supplements Intake: None Ordered  ADULT DIET; Regular; Safety Tray; Safety Tray (Disposables No Utensils)    Anthropometric Measures:  Height: 5' 2\" (157.5 cm)  Ideal Body Weight (IBW): 110 lbs (50 kg)    Admission Body Weight: 95 lb (43.1 kg) (started)  Current Body Weight: 84 lb (38.1 kg), 76.4 % IBW.  Weight Source: Bed Scale  Current BMI (kg/m2): 15.4  Usual Body Weight: 90 lb 9.6 oz (41.1 kg)  % Weight Change (Calculated): -10  BMI Categories: Underweight (BMI less than 22) age over 72    Estimated Daily Nutrient Needs:  Energy Requirements Based On: Kcal/kg  Weight Used for Energy Requirements: Current  Energy (kcal/day): 3994-2586 kcals (30-35 kcals/kg)  Weight Used for Protein Requirements: Current  Protein (g/day): 56g  Method Used for Fluid Requirements: 1 ml/kcal  Fluid (ml/day): 0617-5709 ml    Nutrition Diagnosis:   Severe malnutrition, In context of chronic, non-illness related

## 2023-09-12 NOTE — PLAN OF CARE
Problem: Safety - Adult  Goal: Free from fall injury  Outcome: Progressing     Problem: ABCDS Injury Assessment  Goal: Absence of physical injury  Outcome: Progressing     Problem: Chronic Conditions and Co-morbidities  Goal: Patient's chronic conditions and co-morbidity symptoms are monitored and maintained or improved  Outcome: Progressing     Problem: Nutrition Deficit:  Goal: Optimize nutritional status  Outcome: Progressing     Problem: Pain  Goal: Verbalizes/displays adequate comfort level or baseline comfort level  Outcome: Progressing     Problem: Skin/Tissue Integrity  Goal: Absence of new skin breakdown  Description: 1. Monitor for areas of redness and/or skin breakdown  2. Assess vascular access sites hourly  3. Every 4-6 hours minimum:  Change oxygen saturation probe site  4. Every 4-6 hours:  If on nasal continuous positive airway pressure, respiratory therapy assess nares and determine need for appliance change or resting period.   Outcome: Progressing

## 2023-09-12 NOTE — PLAN OF CARE
Problem: Nutrition Deficit:  Goal: Optimize nutritional status  9/12/2023 1431 by Gil Biggs MS, RD, LD  Outcome: Progressing  9/12/2023 1147 by Vik Barnett RN  Outcome: Progressing

## 2023-09-12 NOTE — PROGRESS NOTES
This  visited with pt to provide spiritual care. Pt is very hard of hearing but appreciated the visit. Pt was sitting up in her chair eating breakfast, she appeared a little stronger. Dr's note says pt is awaiting Covid quarantine in-house. This  provided sustaining presence, support, and prayer. Pt expressed gratitude for spiritual care.      Electronically signed by Lizbeth Dillard on 9/12/2023 at 11:02 AM

## 2023-09-13 VITALS
SYSTOLIC BLOOD PRESSURE: 119 MMHG | RESPIRATION RATE: 17 BRPM | WEIGHT: 84 LBS | HEART RATE: 95 BPM | TEMPERATURE: 97.5 F | BODY MASS INDEX: 15.46 KG/M2 | OXYGEN SATURATION: 92 % | HEIGHT: 62 IN | DIASTOLIC BLOOD PRESSURE: 99 MMHG

## 2023-09-13 PROCEDURE — 6370000000 HC RX 637 (ALT 250 FOR IP): Performed by: HOSPITALIST

## 2023-09-13 PROCEDURE — 6370000000 HC RX 637 (ALT 250 FOR IP): Performed by: STUDENT IN AN ORGANIZED HEALTH CARE EDUCATION/TRAINING PROGRAM

## 2023-09-13 PROCEDURE — 6360000002 HC RX W HCPCS: Performed by: STUDENT IN AN ORGANIZED HEALTH CARE EDUCATION/TRAINING PROGRAM

## 2023-09-13 PROCEDURE — 94760 N-INVAS EAR/PLS OXIMETRY 1: CPT

## 2023-09-13 RX ORDER — MIRTAZAPINE 7.5 MG/1
3.75 TABLET, FILM COATED ORAL NIGHTLY
Qty: 30 TABLET | Refills: 3 | Status: SHIPPED | OUTPATIENT
Start: 2023-09-13

## 2023-09-13 RX ADMIN — MAGNESIUM HYDROXIDE 30 ML: 400 SUSPENSION ORAL at 10:39

## 2023-09-13 RX ADMIN — ASPIRIN 81 MG: 81 TABLET, CHEWABLE ORAL at 10:39

## 2023-09-13 RX ADMIN — LEVOTHYROXINE SODIUM 25 MCG: 25 TABLET ORAL at 10:42

## 2023-09-13 RX ADMIN — DOCUSATE SODIUM 200 MG: 100 CAPSULE, LIQUID FILLED ORAL at 10:40

## 2023-09-13 RX ADMIN — MULTIPLE VITAMINS W/ MINERALS TAB 1 TABLET: TAB at 10:39

## 2023-09-13 RX ADMIN — ENOXAPARIN SODIUM 30 MG: 100 INJECTION SUBCUTANEOUS at 10:39

## 2023-09-13 RX ADMIN — OXYCODONE HYDROCHLORIDE AND ACETAMINOPHEN 500 MG: 500 TABLET ORAL at 10:39

## 2023-09-13 RX ADMIN — FAMOTIDINE 20 MG: 20 TABLET ORAL at 10:40

## 2023-09-13 RX ADMIN — Medication 1.04 G: at 10:39

## 2023-09-13 NOTE — CARE COORDINATION
Pb called [facility] and is awaiting a  returned call from [place] with [facility]. Electronically signed by MIRYAM Epperson on 9/13/2023 at 8:02 AM  Sw spoke to Cameron at [facility] unit. She reports she can accept pt today.    Pb has updated Attending Dr. Vivien Fletcher MD  Electronically signed by MIRYAM Epperson on 9/13/2023 at 8:48 AM

## 2023-09-13 NOTE — PROGRESS NOTES
Noted initial COVID positive test result on 08/24/2023     Patient is currently on room air and afebrile without the use of fever reducing medications in the past 24 hours. Symptom Based Strategy for discontinuation of Droplet Plus Precautions have been met. Recommendation:  Discontinuation of Droplet Plus precautions.

## 2023-09-13 NOTE — CARE COORDINATION
09/13/23 1128   IMM Letter   IMM Letter given to Patient/Family/Significant other/Guardian/POA/by: rima cruz presented Pt with second IMM letter   IMM Letter date given: 09/13/23   IMM Letter time given: 12     Second IMM given to patient and explained with patient verbalizing understanding. All questions and concerns addressed     Signed letter placed in pt soft chart  Patient declined waiting 4 hr period prior to discharge.   Electronically signed by MIRYAM Salgado on 9/13/2023 at 11:29 AM

## 2023-09-13 NOTE — PROGRESS NOTES
I called report to Caitlyn quintanilla at 1200 for patient, called [facility] for  to transfer. After waiting for EMS, the patient and family got agitated and impatient. At 1700 pt's daughter came to nurses station and stated that if the family member got her before Ems, they where taking her themselves. Psych and SW aware and said that's fine.

## 2023-09-13 NOTE — PROGRESS NOTES
Physical Therapy  Name: Shelli Cruz  MRN:  [id number]  Date of service:  9/13/2023    Patient states that she just walk with family. Family states they are waiting on the ambulance for transportation to a SNF.      Electronically signed by Brenda Patiño PTA on 9/13/2023 at 3:51 PM

## 2023-09-13 NOTE — PLAN OF CARE
Problem: Safety - Adult  Goal: Free from fall injury  9/13/2023 0128 by Janie Valadez RN  Outcome: Progressing  9/12/2023 1147 by Elio Riley RN  Outcome: Progressing     Problem: ABCDS Injury Assessment  Goal: Absence of physical injury  9/13/2023 0128 by Janie Valadez RN  Outcome: Progressing  9/12/2023 1147 by Elio Riley RN  Outcome: Progressing     Problem: Chronic Conditions and Co-morbidities  Goal: Patient's chronic conditions and co-morbidity symptoms are monitored and maintained or improved  9/13/2023 0128 by Janie Valadez RN  Outcome: Progressing  9/12/2023 1147 by Elio Riley RN  Outcome: Progressing     Problem: Nutrition Deficit:  Goal: Optimize nutritional status  9/13/2023 0128 by Janie Valadez RN  Outcome: Progressing  9/12/2023 1431 by Yung Lopez, MS, RD, LD  Outcome: Progressing  9/12/2023 1147 by Elio Riley RN  Outcome: Progressing     Problem: Pain  Goal: Verbalizes/displays adequate comfort level or baseline comfort level  9/13/2023 0128 by Janie Valadez RN  Outcome: Progressing  9/12/2023 1147 by Elio Riley RN  Outcome: Progressing     Problem: Skin/Tissue Integrity  Goal: Absence of new skin breakdown  Description: 1. Monitor for areas of redness and/or skin breakdown  2. Assess vascular access sites hourly  3. Every 4-6 hours minimum:  Change oxygen saturation probe site  4. Every 4-6 hours:  If on nasal continuous positive airway pressure, respiratory therapy assess nares and determine need for appliance change or resting period.   9/13/2023 0128 by Janie Valadez RN  Outcome: Progressing  9/12/2023 1147 by Elio Riley RN  Outcome: Progressing

## 2023-09-13 NOTE — DISCHARGE SUMMARY
Discharge Summary    Date:9/13/2023        Patient Name:Bradny Noble     YOB: 1926     Age:89 y.o. Admit Date:9/2/2023   Admission Condition:fair   Discharged Condition:stable  Discharge Date: 09/13/23       Discharge Diagnoses   Principal Problem:    Suicidal ideation  Active Problems:    Enterococcus UTI    COVID-19    Severe malnutrition ([address])  Resolved Problems:    * No resolved hospital problems. [facility] Stay   Narrative of Hospital Course:     80-year-old female with hypertension, history of stroke, dyslipidemia, hypothyroidism, neuropathy, anxiety, hearing impairment, brought in by stepdaughter who assisted with history. Patient has had ongoing suicidal ideations for the past couple of weeks, as well as anxiety and depression, issues with intermittent confusion and chronic pain. Recently discharged 8/25-23 for evaluation for syncope and was COVID-positive at that time but has not had any further respiratory symptoms or fevers. She has had recent increased urinary frequency, mild abdominal discomfort, intermittent nausea and felt to have UTI with pyuria, bacteriuria and leukocyte esterase positive. Enterococcus isolated from urine culture. Patient was treated on Macrobid, which was sensitive. She was seen in consultation by psychiatry. Remeron added to regimen. Given her depression with SI and dementia with inability to perform ADLs, referral was sent for geriatric psychiatry facility placement. Skin TB test requested by some SNF facilities prior to discharge. Tb skin test was done 9/6/23 and read on 9/8 which was negative. Completed COVID quarantine in-house on 9/11/23. Physical Examination:  General: Frail, [place], no acute distress lying comfortably in bed. HEENT: Atraumatic normocephalic, range of motion normal, no JVD, no tracheal deviation noted.   Cardiac: Normal S1-S2   Respiratory: clear To auscultation bilaterally, no rhonchi or rales, no wheezing  Abdomen: Soft, positive bowel sounds in all quadrants, no distention, nontender to palpation  extremities: no tenderness, no edema, moves all extremities  Psych: Affect normal and good eye contact, behavioral normal.      Consultants:   IP CONSULT TO PSYCHIATRY  IP CONSULT TO SPIRITUAL SERVICES  IP CONSULT TO SOCIAL WORK  PALLIATIVE CARE EVAL    Time Spent on Discharge:  35 minutes were spent in patient examination, evaluation, counseling as well as medication reconciliation, prescriptions for required medications, discharge plan and follow up. Surgeries/Procedures Performed:  NONE     Significant Diagnostic Studies:   Recent Labs:  CBC:   Lab Results   Component Value Date/Time    WBC 4.6 09/03/2023 02:18 AM    RBC 3.25 09/03/2023 02:18 AM    HGB 10.5 09/03/2023 02:18 AM    HCT 30.6 09/03/2023 02:18 AM    MCV 94.2 09/03/2023 02:18 AM    MCH 32.3 09/03/2023 02:18 AM    MCHC 34.3 09/03/2023 02:18 AM    RDW 13.2 09/03/2023 02:18 AM     09/03/2023 02:18 AM     BMP:    Lab Results   Component Value Date/Time    GLUCOSE 75 09/03/2023 02:18 AM     09/03/2023 02:18 AM    K 3.7 09/03/2023 02:18 AM    CL 99 09/03/2023 02:18 AM    CO2 24 09/03/2023 02:18 AM    ANIONGAP 12 09/03/2023 02:18 AM    BUN 10 09/03/2023 02:18 AM    CREATININE 0.5 09/03/2023 02:18 AM    CALCIUM 9.0 09/03/2023 02:18 AM    LABGLOM >60 09/03/2023 02:18 AM    GFRAA >59 06/23/2022 03:12 AM       Radiology Last 7 Days:  No results found. Discharge Plan   Disposition: To a non-[facility] facility    Provider Follow-Up:   No follow-up provider specified.        Patient Instructions   Diet: regular diet    Activity: activity as tolerated      Discharge Medications         Medication List        START taking these medications      mirtazapine 7.5 MG tablet  Commonly known as: REMERON  Take 0.5 tablets by mouth nightly            CONTINUE taking these medications      aspirin 81 MG chewable tablet  Take 1 tablet by mouth daily     candesartan 32 MG

## 2023-09-29 ENCOUNTER — OFFICE VISIT (OUTPATIENT)
Dept: INTERNAL MEDICINE | Facility: CLINIC | Age: 88
End: 2023-09-29
Payer: MEDICARE

## 2023-09-29 VITALS
WEIGHT: 88.8 LBS | HEART RATE: 65 BPM | SYSTOLIC BLOOD PRESSURE: 134 MMHG | DIASTOLIC BLOOD PRESSURE: 78 MMHG | OXYGEN SATURATION: 97 % | HEIGHT: 62 IN | TEMPERATURE: 96.8 F | BODY MASS INDEX: 16.34 KG/M2

## 2023-09-29 DIAGNOSIS — F03.90 DEMENTIA WITHOUT BEHAVIORAL DISTURBANCE: Chronic | ICD-10-CM

## 2023-09-29 DIAGNOSIS — R45.851 PASSIVE SUICIDAL IDEATIONS: ICD-10-CM

## 2023-09-29 DIAGNOSIS — F51.01 PRIMARY INSOMNIA: ICD-10-CM

## 2023-09-29 DIAGNOSIS — F41.1 GENERALIZED ANXIETY DISORDER: ICD-10-CM

## 2023-09-29 DIAGNOSIS — H90.6 MIXED CONDUCTIVE AND SENSORINEURAL HEARING LOSS OF BOTH EARS: Primary | ICD-10-CM

## 2023-10-05 ENCOUNTER — APPOINTMENT (OUTPATIENT)
Dept: CT IMAGING | Age: 88
End: 2023-10-05
Payer: MEDICARE

## 2023-10-05 ENCOUNTER — APPOINTMENT (OUTPATIENT)
Dept: GENERAL RADIOLOGY | Age: 88
End: 2023-10-05
Payer: MEDICARE

## 2023-10-05 ENCOUNTER — HOSPITAL ENCOUNTER (EMERGENCY)
Age: 88
Discharge: HOME | End: 2023-10-05
Attending: EMERGENCY MEDICINE
Payer: MEDICARE

## 2023-10-05 VITALS
SYSTOLIC BLOOD PRESSURE: 166 MMHG | HEART RATE: 78 BPM | RESPIRATION RATE: 18 BRPM | OXYGEN SATURATION: 97 % | TEMPERATURE: 98.1 F | DIASTOLIC BLOOD PRESSURE: 69 MMHG
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
## 2023-10-05 LAB
ALBUMIN SERPL-MCNC: 3.6 G/DL (ref 3.5–5.2)
ALP SERPL-CCNC: 82 U/L (ref 35–104)
ALT SERPL-CCNC: 19 U/L (ref 5–33)
ANION GAP SERPL CALCULATED.3IONS-SCNC: 9 MMOL/L (ref 7–19)
AST SERPL-CCNC: 30 U/L (ref 5–32)
BACTERIA URNS QL MICRO: NEGATIVE /HPF
BASOPHILS # BLD: 0.1 K/UL (ref 0–0.2)
BASOPHILS NFR BLD: 0.7 % (ref 0–1)
BILIRUB SERPL-MCNC: <0.2 MG/DL (ref 0.2–1.2)
BILIRUB UR QL STRIP: NEGATIVE
BUN SERPL-MCNC: 22 MG/DL (ref 8–23)
CALCIUM SERPL-MCNC: 9 MG/DL (ref 8.2–9.6)
CHLORIDE SERPL-SCNC: 102 MMOL/L (ref 98–111)
CLARITY UR: CLEAR
CO2 SERPL-SCNC: 26 MMOL/L (ref 22–29)
COLOR UR: YELLOW
CREAT SERPL-MCNC: 0.6 MG/DL (ref 0.5–0.9)
CRYSTALS URNS MICRO: ABNORMAL /HPF
EOSINOPHIL # BLD: 0.1 K/UL (ref 0–0.6)
EOSINOPHIL NFR BLD: 1.2 % (ref 0–5)
EPI CELLS #/AREA URNS AUTO: 2 /HPF (ref 0–5)
ERYTHROCYTE [DISTWIDTH] IN BLOOD BY AUTOMATED COUNT: 14.8 % (ref 11.5–14.5)
GLUCOSE SERPL-MCNC: 99 MG/DL (ref 74–109)
GLUCOSE UR STRIP.AUTO-MCNC: NEGATIVE MG/DL
HCT VFR BLD AUTO: 30.8 % (ref 37–47)
HGB BLD-MCNC: 10 G/DL (ref 12–16)
HGB UR STRIP.AUTO-MCNC: NEGATIVE MG/L
HYALINE CASTS #/AREA URNS AUTO: 0 /HPF (ref 0–8)
IMM GRANULOCYTES # BLD: 0.1 K/UL
KETONES UR STRIP.AUTO-MCNC: NEGATIVE MG/DL
LEUKOCYTE ESTERASE UR QL STRIP.AUTO: ABNORMAL
LYMPHOCYTES # BLD: 0.7 K/UL (ref 1.1–4.5)
LYMPHOCYTES NFR BLD: 10 % (ref 20–40)
MCH RBC QN AUTO: 32.2 PG (ref 27–31)
MCHC RBC AUTO-ENTMCNC: 32.5 G/DL (ref 33–37)
MCV RBC AUTO: 99 FL (ref 81–99)
MONOCYTES # BLD: 0.8 K/UL (ref 0–0.9)
MONOCYTES NFR BLD: 10.5 % (ref 0–10)
NEUTROPHILS # BLD: 5.6 K/UL (ref 1.5–7.5)
NEUTS SEG NFR BLD: 76.9 % (ref 50–65)
NITRITE UR QL STRIP.AUTO: NEGATIVE
PH UR STRIP.AUTO: 7.5 [PH] (ref 5–8)
PLATELET # BLD AUTO: 279 K/UL (ref 130–400)
PMV BLD AUTO: 9.5 FL (ref 9.4–12.3)
POTASSIUM SERPL-SCNC: 3.7 MMOL/L (ref 3.5–5)
PROT SERPL-MCNC: 6.4 G/DL (ref 6.6–8.7)
PROT UR STRIP.AUTO-MCNC: NEGATIVE MG/DL
RBC # BLD AUTO: 3.11 M/UL (ref 4.2–5.4)
RBC #/AREA URNS AUTO: 2 /HPF (ref 0–4)
SODIUM SERPL-SCNC: 137 MMOL/L (ref 136–145)
SP GR UR STRIP.AUTO: 1.01 (ref 1–1.03)
TROPONIN T SERPL-MCNC: <0.01 NG/ML (ref 0–0.03)
UROBILINOGEN UR STRIP.AUTO-MCNC: 0.2 E.U./DL
WBC # BLD AUTO: 7.3 K/UL (ref 4.8–10.8)
WBC #/AREA URNS AUTO: 3 /HPF (ref 0–5)

## 2023-10-05 PROCEDURE — 72131 CT LUMBAR SPINE W/O DYE: CPT

## 2023-10-05 PROCEDURE — 93005 ELECTROCARDIOGRAM TRACING: CPT | Performed by: PHYSICIAN ASSISTANT

## 2023-10-05 PROCEDURE — 87086 URINE CULTURE/COLONY COUNT: CPT

## 2023-10-05 PROCEDURE — 81001 URINALYSIS AUTO W/SCOPE: CPT

## 2023-10-05 PROCEDURE — 85025 COMPLETE CBC W/AUTO DIFF WBC: CPT

## 2023-10-05 PROCEDURE — 72125 CT NECK SPINE W/O DYE: CPT

## 2023-10-05 PROCEDURE — 72128 CT CHEST SPINE W/O DYE: CPT

## 2023-10-05 PROCEDURE — 84484 ASSAY OF TROPONIN QUANT: CPT

## 2023-10-05 PROCEDURE — 80053 COMPREHEN METABOLIC PANEL: CPT

## 2023-10-05 PROCEDURE — 99285 EMERGENCY DEPT VISIT HI MDM: CPT

## 2023-10-05 PROCEDURE — 36415 COLL VENOUS BLD VENIPUNCTURE: CPT

## 2023-10-05 PROCEDURE — 71045 X-RAY EXAM CHEST 1 VIEW: CPT

## 2023-10-05 PROCEDURE — 6370000000 HC RX 637 (ALT 250 FOR IP): Performed by: PHYSICIAN ASSISTANT

## 2023-10-05 RX ORDER — ACETAMINOPHEN 325 MG/1
650 TABLET ORAL ONCE
Status: COMPLETED | OUTPATIENT
Start: 2023-10-05 | End: 2023-10-05

## 2023-10-05 RX ORDER — ACETAMINOPHEN 325 MG/1
650 TABLET ORAL EVERY 6 HOURS PRN
Qty: 120 TABLET | Refills: 3 | Status: SHIPPED | OUTPATIENT
Start: 2023-10-05

## 2023-10-05 RX ADMIN — ACETAMINOPHEN 650 MG: 325 TABLET ORAL at 23:42

## 2023-10-05 ASSESSMENT — ENCOUNTER SYMPTOMS
SHORTNESS OF BREATH: 0
ABDOMINAL PAIN: 0
BACK PAIN: 1

## 2023-10-06 LAB
EKG P AXIS: 76 DEGREES
EKG P-R INTERVAL: 200 MS
EKG Q-T INTERVAL: 392 MS
EKG QRS DURATION: 116 MS
EKG QTC CALCULATION (BAZETT): 418 MS
EKG T AXIS: 95 DEGREES

## 2023-10-06 PROCEDURE — 93010 ELECTROCARDIOGRAM REPORT: CPT | Performed by: INTERNAL MEDICINE

## 2023-10-06 NOTE — ED PROVIDER NOTES
[facility] EMERGENCY DEPT  eMERGENCY dEPARTMENT eNCOUnter      Pt Name: Nano Armenta  MRN: [id number]  [address] [place] 11/10/1926  Date of evaluation: 10/5/2023  Provider: Nav Pérez       Chief Complaint   Patient presents with    Fall     Pt had fall at assisted living. Pt having recurrent falls. Pt not sure if back pain is from last fall or tonight so family wanted her checked out. Denies LOC and denies hitting head         HISTORY OF PRESENT ILLNESS   (Location/Symptom, Timing/Onset,Context/Setting, Quality, Duration, Modifying Factors, Severity)  Note limiting factors. Nano Armenta is a 80 y.o. female with history including stroke, hyperlipidemia, hypertension, restless leg syndrome, anxiety, and dementia who presents to the emergency department with complaint of a fall. The patient moved into her new [place] care at the [facility] living about 2 to 3 days ago. She has had multiple falls in the last 2 to 3 days. She is complaining of pain in the low back. There is no note of any head injury or change in level of consciousness with any of her falls. She denies chest pain or shortness of breath. She denies any vomiting. She notes frequency but denies dysuria. Daughter at bedside. NursingNotes were reviewed. REVIEW OF SYSTEMS    (2-9 systems for level 4, 10 or more for level 5)     Review of Systems   Constitutional:  Negative for chills and fever. Respiratory:  Negative for shortness of breath. Cardiovascular:  Negative for chest pain. Gastrointestinal:  Negative for abdominal pain. Genitourinary:  Positive for frequency. Negative for dysuria and flank pain. Musculoskeletal:  Positive for back pain. Negative for arthralgias. Neurological:  Negative for dizziness and headaches. All other systems reviewed and are negative.            PAST MEDICALHISTORY     Past Medical History:   Diagnosis Date    Anxiety 10/02/2019    Chest tightness or pressure 08/26/2013 8/26/2013 performed using dose optimization techniques as appropriate to the performed exam and include    at least one of the following: Automated exposure control, adjustment of the mA and/or kV according to size, and the use of iterative reconstruction technique. ______________________________________    Electronically signed by: Toni Denis M.D. Date:     10/05/2023   Time:    22:25       CT LUMBAR SPINE WO CONTRAST   Final Result   Impression:   1. No acute lumbar abnormality. All CT scans are performed using dose optimization techniques as appropriate to the performed exam and include    at least one of the following: Automated exposure control, adjustment of the mA and/or kV according to size, and the use of iterative reconstruction technique. ______________________________________    Electronically signed by: Toni Denis M.D. Date:     10/05/2023   Time:    22:29       CT CERVICAL SPINE WO CONTRAST   Final Result   Impression:  No evidence of fracture in the cervical spine. Spondylolisthesis as described secondary to facet arthropathy. Degenerative changes as described. All CT scans are performed using dose optimization techniques as appropriate to the performed exam and include    at least one of the following: Automated exposure control, adjustment of the mA and/or kV according to size, and the use of iterative reconstruction technique. ______________________________________    Electronically signed by: Kulwinder Mendoza M.D.    Date:     10/05/2023   Time:    22:23       XR CHEST PORTABLE    (Results Pending)           LABS:  Labs Reviewed   URINALYSIS WITH REFLEX TO CULTURE - Abnormal; Notable for the following components:       Result Value    Leukocyte Esterase, Urine TRACE (*)     All other components within normal limits   CBC WITH AUTO DIFFERENTIAL - Abnormal; Notable for the following components:    RBC 3.11 (*)     Hemoglobin 10.0 (*)

## 2023-10-08 LAB — BACTERIA UR CULT: NORMAL

## 2023-10-08 NOTE — PROGRESS NOTES
"      Chief Complaint  Hospital Follow Up Visit (Pt. Was discharged from hospital last Friday/Pt. Was seen a few weeks ago for stating that she was wanting to die/Pt. Was seen at the [facility] and the family was told that her dementia was the reason for her suicidal thoughts/Called [facility] and they are sending over part of pt's medical records, they do not have pt's full chart but will send over once they have it) and Hearing Problem (Pt. Would like for you to look at her hearing aides as she is still having trouble hearing)    Subjective        Meenu Arteaga presents to [facility]    HPI  Patient here for the above problems.  See Assessment and Plan for further HPI components.        Review of Systems    Objective   Vital Signs:  /78 (BP Location: Left arm, Patient Position: Sitting, Cuff Size: Adult)   Pulse 65   Temp 96.8 °F (36 °C) (Infrared)   Ht 157.5 cm (62.01\")   Wt 40.3 kg (88 lb 12.8 oz)   SpO2 97%   BMI 16.24 kg/m²   Estimated body mass index is 16.24 kg/m² as calculated from the following:    Height as of this encounter: 157.5 cm (62.01\").    Weight as of this encounter: 40.3 kg (88 lb 12.8 oz).      Physical Exam  Vitals reviewed.   HENT:      Right Ear: Tympanic membrane, ear canal and external ear normal.      Left Ear: Tympanic membrane, ear canal and external ear normal.   Cardiovascular:      Rate and Rhythm: Normal rate and regular rhythm.      Heart sounds: Murmur heard.   Pulmonary:      Effort: Pulmonary effort is normal. No respiratory distress.   Skin:     General: Skin is warm.      Coloration: Skin is not pale.   Neurological:      General: No focal deficit present.      Mental Status: She is oriented to person, place, and time.   Psychiatric:         Mood and Affect: Mood normal.         Behavior: Behavior normal.                         Assessment and Plan   Diagnoses and all orders for this visit:    1. 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 Generalized anxiety disorder    3. Dementia without behavioral disturbance    4. Primary insomnia    5. Passive suicidal ideations        Patient went to [facility] with SI plan of taking \"a bunch of medication\" per the report.  Patient was kept there from 9/2-9/13 then transferred to [facility].  We have requested records from [facility] a couple of times and have not received records as they are reportedly not completed.  Per family they indicated her dementia is cause of her behavior and statements.  Patient's sleep has improved.  Patient moving into assisted living.      Discharge summary from [facility]:   \"89-year-old female with hypertension, history of stroke, dyslipidemia, hypothyroidism, neuropathy, anxiety, hearing impairment, brought in by stepdaughter who assisted with history. Patient has had ongoing suicidal ideations for the past couple of weeks, as well as anxiety and depression, issues with intermittent confusion and chronic pain. Recently discharged 8/25-23 for evaluation for syncope and was COVID-positive at that time but has not had any further respiratory symptoms or fevers. She has had recent increased urinary frequency, mild abdominal discomfort, intermittent nausea and felt to have UTI with pyuria, bacteriuria and leukocyte esterase positive. Enterococcus isolated from urine culture. Patient was treated on Macrobid, which was sensitive. She was seen in consultation by psychiatry. Remeron added to regimen. Given her depression with SI and dementia with inability to perform ADLs, referral was sent for geriatric psychiatry facility placement.\"    Patient denies any sleep issues at present  Patient denies SI/HI.  Patient willing to go to assisted living just wants to be able to stay with her .  Hearing continues to be an issues.  No cerumen impaction today    Reviewed all labs and imaging relevant to hospitalization at " [facility].  Patient recently had COVID infection.    Result Review :  The following data was reviewed by: Scotty Miles MD on 09/29/2023:  CMP          1/18/2023    10:51 3/7/2023    09:21 7/10/2023    09:29   CMP   Glucose  96  124    BUN  18  16    Creatinine  0.77  0.73    Sodium  136  137    Potassium  3.5  3.6    Chloride  96  99    Calcium  10.2  10.3    Total Protein 6.9   6.6    Albumin 4.9   4.4    Globulin   2.2    Total Bilirubin 0.4   0.4    Alkaline Phosphatase 70   61    AST (SGOT) 33   26    ALT (SGPT) 17   13    BUN/Creatinine Ratio  23.4  21.9      CBC          8/25/2023    03:08 9/2/2023    16:46 9/3/2023    02:18   CBC   WBC 2.8     5.8     4.6       RBC 3.61     3.35     3.25       Hemoglobin 11.6     10.8     10.5       Hematocrit 34.1     31.9     30.6       MCV 94.5     95.2     94.2       MCH 32.1     32.2     32.3       MCHC 34.0     33.9     34.3       RDW 13.2     13.4     13.2       Platelets 174     284     282          Details          This result is from an external source.             CBC w/diff          8/25/2023    03:08 9/2/2023    16:46 9/3/2023    02:18   CBC w/Diff   WBC 2.8     5.8     4.6       RBC 3.61     3.35     3.25       Hemoglobin 11.6     10.8     10.5       Hematocrit 34.1     31.9     30.6       MCV 94.5     95.2     94.2       MCH 32.1     32.2     32.3       MCHC 34.0     33.9     34.3       RDW 13.2     13.4     13.2       Platelets 174     284     282       Neutrophil Rel % 50.6     69.0     63.0       Lymphocyte Rel % 27.1     13.7     16.5       Monocyte Rel % 20.8     15.8     18.2       Eosinophil Rel % 0.7     0.7     1.3       Basophil Rel % 0.4     0.5     0.4          Details          This result is from an external source.             TSH          3/29/2023    13:46 7/10/2023    09:29 8/25/2023    03:08   TSH   TSH 4.500  4.030  6.390          Details          This result is from an external source.             BMP          10/24/2022    11:33 3/7/2023     09:21 7/10/2023    09:29   BMP   BUN 15  18  16    Creatinine 0.69  0.77  0.73    Sodium 137  136  137    Potassium 3.9  3.5  3.6    Chloride 95  96  99    CO2 27.3  27.4  29.5    Calcium 10.0  10.2  10.3      UA          4/23/2023    21:08 8/24/2023    09:46 9/2/2023    16:40   Urinalysis   Specific Gravity, UA 1.009     1.010     1.005       Blood, UA TRACE     Negative     Negative       Leukocytes, UA TRACE     Negative     MODERATE       Nitrite, UA Negative     Negative     Negative          Details          This result is from an external source.             Urine Culture          10/24/2022    12:00 11/15/2022    13:00 3/7/2023    13:30   Urine Culture   Urine Culture Final report  Final report  Final report                              Follow Up   Return if symptoms worsen or fail to improve, for Recheck, Next scheduled follow up.  Patient was given instructions and counseling regarding her condition or for health maintenance advice. Please see specific information pulled into the AVS if appropriate.       CHRISTOPHE Miles MD, FACP, FHM      Electronically signed by Scotty Miles MD, 10/08/23, 10:01 AM CDT.

## 2023-10-09 DIAGNOSIS — F41.1 GENERALIZED ANXIETY DISORDER: Primary | ICD-10-CM

## 2023-10-09 NOTE — TELEPHONE ENCOUNTER
Caller: [facility]    Relationship: Other [facility]     [facility] call back number: [phone]     Requested Prescriptions:   Requested Prescriptions     Pending Prescriptions Disp Refills    LORazepam (ATIVAN) 0.5 MG tablet       Sig: Take 1 tablet by mouth Every 8 (Eight) Hours As Needed for Anxiety.        Pharmacy where request should be sent: [facility] ([facility]) [address] [phone] [facility] [phone]      Last office visit with prescribing clinician: 9/29/2023   Last telemedicine visit with prescribing clinician: Visit date not found   Next office visit with prescribing clinician: 1/26/2024     Additional details provided by patient: COMPLETELY OUT     Does the patient have less than a 3 day supply:  [x] Yes  [] No        Hunter Hooks III, Dave Rep   10/09/23 14:03 CDT

## 2023-10-11 RX ORDER — CONJUGATED ESTROGENS 0.62 MG/G
CREAM VAGINAL 2 TIMES WEEKLY
Qty: 1 EACH | Refills: 0 | Status: SHIPPED | OUTPATIENT
Start: 2023-10-12

## 2023-10-12 RX ORDER — LORAZEPAM 0.5 MG/1
0.5 TABLET ORAL
Qty: 30 TABLET | Refills: 2 | Status: SHIPPED | OUTPATIENT
Start: 2023-10-12 | End: 2023-10-13 | Stop reason: SDUPTHER

## 2023-10-12 NOTE — TELEPHONE ENCOUNTER
H&P and d/c summary received from [facility], but only sent 2/9 pages of d/c summary, which did not have d/c medication list.  Have called [facility] asking them to re-fax the d/c summary and have also called [facility] and asked them to fax us a medication list for Dr. Miles to review.

## 2023-10-12 NOTE — TELEPHONE ENCOUNTER
I have printed off the d/c summary from [facility] and the pharmacies current med list and laid them on your counter to review and decide on this refill.

## 2023-10-13 ENCOUNTER — OFFICE VISIT (OUTPATIENT)
Dept: INTERNAL MEDICINE | Facility: CLINIC | Age: 88
End: 2023-10-13
Payer: MEDICARE

## 2023-10-13 ENCOUNTER — TELEPHONE (OUTPATIENT)
Dept: INTERNAL MEDICINE | Facility: CLINIC | Age: 88
End: 2023-10-13

## 2023-10-13 VITALS
SYSTOLIC BLOOD PRESSURE: 178 MMHG | BODY MASS INDEX: 16.75 KG/M2 | TEMPERATURE: 98 F | RESPIRATION RATE: 18 BRPM | DIASTOLIC BLOOD PRESSURE: 98 MMHG | OXYGEN SATURATION: 99 % | HEIGHT: 62 IN | WEIGHT: 91 LBS | HEART RATE: 69 BPM

## 2023-10-13 DIAGNOSIS — F41.1 GENERALIZED ANXIETY DISORDER: ICD-10-CM

## 2023-10-13 DIAGNOSIS — N30.00 ACUTE CYSTITIS WITHOUT HEMATURIA: ICD-10-CM

## 2023-10-13 DIAGNOSIS — R11.0 NAUSEA: ICD-10-CM

## 2023-10-13 DIAGNOSIS — M54.50 ACUTE LEFT-SIDED LOW BACK PAIN, UNSPECIFIED WHETHER SCIATICA PRESENT: Primary | ICD-10-CM

## 2023-10-13 DIAGNOSIS — R60.0 BILATERAL LOWER EXTREMITY EDEMA: ICD-10-CM

## 2023-10-13 DIAGNOSIS — Z23 FLU VACCINE NEED: ICD-10-CM

## 2023-10-13 LAB
BILIRUB BLD-MCNC: NEGATIVE MG/DL
CLARITY, POC: CLEAR
COLOR UR: YELLOW
EXPIRATION DATE: ABNORMAL
GLUCOSE UR STRIP-MCNC: NEGATIVE MG/DL
KETONES UR QL: NEGATIVE
LEUKOCYTE EST, POC: ABNORMAL
Lab: ABNORMAL
NITRITE UR-MCNC: NEGATIVE MG/ML
PH UR: 8 [PH] (ref 5–8)
PROT UR STRIP-MCNC: NEGATIVE MG/DL
RBC # UR STRIP: ABNORMAL /UL
SP GR UR: 1.01 (ref 1–1.03)
UROBILINOGEN UR QL: ABNORMAL

## 2023-10-13 RX ORDER — POTASSIUM CHLORIDE 1500 MG/1
20 TABLET, EXTENDED RELEASE ORAL 2 TIMES DAILY
COMMUNITY
Start: 2023-09-22

## 2023-10-13 RX ORDER — LORAZEPAM 0.5 MG/1
0.5 TABLET ORAL
Qty: 30 TABLET | Refills: 2 | Status: SHIPPED | OUTPATIENT
Start: 2023-10-13

## 2023-10-13 RX ORDER — NAPROXEN 250 MG/1
250 TABLET ORAL 2 TIMES DAILY PRN
Qty: 20 TABLET | Refills: 0 | Status: SHIPPED | OUTPATIENT
Start: 2023-10-13

## 2023-10-13 RX ORDER — CIPROFLOXACIN 250 MG/1
250 TABLET, FILM COATED ORAL 2 TIMES DAILY
Qty: 10 TABLET | Refills: 0 | Status: SHIPPED | OUTPATIENT
Start: 2023-10-13 | End: 2023-10-18

## 2023-10-13 RX ORDER — KETOROLAC TROMETHAMINE 30 MG/ML
30 INJECTION, SOLUTION INTRAMUSCULAR; INTRAVENOUS ONCE
Status: COMPLETED | OUTPATIENT
Start: 2023-10-13 | End: 2023-10-13

## 2023-10-13 RX ORDER — RISPERIDONE 0.25 MG/1
0.25 TABLET ORAL DAILY
COMMUNITY
Start: 2023-09-22

## 2023-10-13 RX ORDER — DIVALPROEX SODIUM 125 MG/1
125 CAPSULE, COATED PELLETS ORAL 2 TIMES DAILY
COMMUNITY
Start: 2023-09-22

## 2023-10-13 RX ORDER — ONDANSETRON 4 MG/1
4 TABLET, FILM COATED ORAL EVERY 8 HOURS PRN
Qty: 30 TABLET | Refills: 1 | Status: SHIPPED | OUTPATIENT
Start: 2023-10-13

## 2023-10-13 RX ORDER — MIRTAZAPINE 7.5 MG/1
7.5 TABLET, FILM COATED ORAL NIGHTLY
COMMUNITY
Start: 2023-09-13

## 2023-10-13 RX ADMIN — KETOROLAC TROMETHAMINE 30 MG: 30 INJECTION, SOLUTION INTRAMUSCULAR; INTRAVENOUS at 12:18

## 2023-10-13 NOTE — PATIENT INSTRUCTIONS
Keep legs elevated as much as possible - at least 30 minutes to an hour every couple of hours, specifically after she has been up on her feet; recommend wearing compression socks as well  Patient would benefit from physical therapy for strengthening   Toradol injection given in the clinic today. Naproxen has been sent in to be taken as needed for mild pain.  Take Cipro for 5 days while awaiting urine culture. May take zofran before antibiotic to help with GI upset.

## 2023-10-13 NOTE — TELEPHONE ENCOUNTER
"    Caller: [facility] - [place], [place] - [address] [place] [address] [phone] [facility] [phone] [facility]    Relationship: Pharmacy    Best call back number: [phone]     Requested Prescriptions:   Requested Prescriptions     Pending Prescriptions Disp Refills    LORazepam (ATIVAN) 0.5 MG tablet 30 tablet 2     Sig: Take 1 tablet by mouth every night at bedtime.        Pharmacy where request should be sent: [facility] - [place], [place] - [address] [place] [address] [phone] [facility] [phone] [facility]     Last office visit with prescribing clinician: 9/29/2023   Last telemedicine visit with prescribing clinician: Visit date not found   Next office visit with prescribing clinician: 1/26/2024     Additional details provided by patient: PHARMACY STATES THIS PRESCRIPTION WAS RECENTLY SENT TO \"RETAIL\" INSTEAD OF \"LONG TERM CARE\"     PHARMACY REQUESTING THIS BE RESENT.    Dave Rahman Rep   10/13/23 11:00 CDT       "

## 2023-10-13 NOTE — TELEPHONE ENCOUNTER
Caller: [facility] - [place], [place] - [address] [facility] [address] [facility] [phone] [facility] [phone] [facility]    Relationship: Pharmacy    Best call back number: [phone]     What medications are you currently taking:   Current Outpatient Medications on File Prior to Visit   Medication Sig Dispense Refill    acetaminophen (TYLENOL) 500 MG tablet Take 1 tablet by mouth As Needed for Mild Pain. Indications: Pain      Ascorbic Acid (VITAMIN C PO) Daily. Indications: supplement      candesartan (ATACAND) 32 MG tablet Take 1 tablet by mouth Daily. Indications: High Blood Pressure Disorder 90 tablet 3    ciprofloxacin (Cipro) 250 MG tablet Take 1 tablet by mouth 2 (Two) Times a Day for 5 days. 10 tablet 0    Divalproex Sodium (DEPAKOTE SPRINKLE) 125 MG capsule 1 capsule 2 (Two) Times a Day.      docusate sodium (COLACE) 100 MG capsule Take 1 capsule by mouth 2 (Two) Times a Day. Indications: Constipation      Estrogens Conjugated (Premarin) 0.625 MG/GM vaginal cream Insert  into the vagina 2 (Two) Times a Week. Indications: Pain with [place] 1 each 0    KLOR-CON 20 MEQ CR tablet 1 tablet 2 (Two) Times a Day.      levothyroxine (SYNTHROID, LEVOTHROID) 25 MCG tablet TAKE ONE TABLET BY MOUTH DAILY 90 tablet 3    LORazepam (ATIVAN) 0.5 MG tablet Take 1 tablet by mouth every night at bedtime. 30 tablet 2    mirtazapine (REMERON) 7.5 MG tablet 1 tablet Every Night.      Multiple Vitamins-Minerals (CENTRUM SILVER ADULT 50+) tablet Take 1 tablet by mouth daily      naproxen (NAPROSYN) 250 MG tablet Take 1 tablet by mouth 2 (Two) Times a Day As Needed for Mild Pain. 20 tablet 0    omeprazole (priLOSEC) 20 MG capsule TAKE ONE CAPSULE BY MOUTH TWICE A  capsule 3    ondansetron (Zofran) 4 MG tablet Take 1 tablet by mouth Every 8 (Eight) Hours As Needed for Nausea or Vomiting. 30 tablet 1    Psyllium (METAMUCIL FIBER PO) Take 1 package by mouth As Needed. Indications: constipation      risperiDONE  (risperDAL) 0.25 MG tablet 1 tablet Daily.      simethicone (Gas-X) 80 MG chewable tablet Chew 1 tablet Every 6 (Six) Hours As Needed for Flatulence (Bloating).       Current Facility-Administered Medications on File Prior to Visit   Medication Dose Route Frequency Provider Last Rate Last Admin    [COMPLETED] ketorolac (TORADOL) injection 30 mg  30 mg Intramuscular Once Sugar Sherwood APRN   30 mg at 10/13/23 1218      Which medication are you concerned about:     ciprofloxacin (Cipro) 250 MG tablet       Who prescribed you this medication: SUGAR SHERWOOD    What are your concerns: [facility] CALLED STATING, IN THEIR RECORDS, PATIENT IS ALLERGIC TO THIS MEDICATION. THEY ARE REQUESTING AN ALTERNATIVE MEDICATION BE SENT TO THE PHARMACY ASAP.

## 2023-10-13 NOTE — PROGRESS NOTES
Subjective     Chief Complaint:  Low back pain, dysuria    HPI:  Patient presents today with complaints of back pain and difficulty urinating.  She reports that her back pain has been present for approximately 4-5 days.  The pain is located in her lower back and is worse on the left side.  She was seen in the emergency department at [facility] on 10/5/2023 after she fell at assisted living. She denied hitting her head or loss of consciousness.  It was noted that she had midline vertebral tenderness in the lumbar spine.  CT was obtained which was negative for acute bony fracture.  She was provided Tylenol for pain.  The patient and her family report that her pain improved the day after her fall but has progressively gotten worse since then.  She points to her low back, almost down near her iliac crest.  Her pain is worse with movement and she cannot seem to get relief.    She has also had difficulty with urination over the last several days.  She keeps saying that she thinks her problems are related to her bladder.  She is very hard of hearing so communication is difficult.  Urinalysis today showed trace leukocytes and trace blood.  Urinalysis when she was in the emergency department showed trace leukocytes and culture was not indicated.    She also has some lower extremity edema today which is worse on the left.  The patient states that this is not normal for her.  She denies shortness of breath.  Her daughter reports that she has been up walking around more at her new facility.  Her  reports that she does not really watch her sodium intake and frequently eats salty foods.    Blood pressure in the clinic today is 180/106 which I do feel is related to her pain.  She has continued taking candesartan 32 mg daily.    Past Medical History:   Past Medical History:   Diagnosis Date    Arthritis     GERD (gastroesophageal reflux disease)     Hyperlipidemia     Hypothyroidism     Liver cyst     Neuropathy      Ovarian cyst      Past Surgical History:  Past Surgical History:   Procedure Laterality Date    ABDOMINAL HYSTERECTOMY      APPENDECTOMY      BREAST BIOPSY      CHOLECYSTECTOMY      COLONOSCOPY  03/05/2008    normal    COLONOSCOPY  01/12/2012    ENDOSCOPY  08/29/2013    normal    ENDOSCOPY  01/23/2012    eus with stacy  normal endoscopic ultrascope of the pancreas.fortunately there was no mass seen    ENDOSCOPY N/A 8/3/2018    Procedure: ESOPHAGOGASTRODUODENOSCOPY WITH ANESTHESIA;  Surgeon: Obed Patrick DO;  Location: [facility] ENDOSCOPY;  Service: Gastroenterology    HEMORRHOIDECTOMY      SKIN CANCER EXCISION  11/27/2021    left arm and left leg     UPPER GASTROINTESTINAL ENDOSCOPY  08/29/2013       Allergies:  Allergies   Allergen Reactions    Doxycycline Hyclate GI Intolerance    Ampicillin Nausea Only    Cephalosporins Nausea Only    Darifenacin Hydrobromide Er Nausea Only    Duloxetine Hcl Nausea Only    Fluocinolone Nausea Only    Gabapentin Nausea Only    Hydrochlorothiazide Nausea Only    Levetiracetam Nausea Only    Levofloxacin Nausea Only    Macrobid [Nitrofurantoin] GI Intolerance    Phenazopyridine Hcl Nausea Only    Pregabalin Nausea Only    Quinolones Nausea Only    Sulfa Antibiotics Nausea Only    Sulfamethoxazole-Trimethoprim Nausea Only    Codeine Nausea Only and Rash    Doxycycline Nausea Only and Rash     Medications:  Prior to Admission medications    Medication Sig Start Date End Date Taking? Authorizing Provider   acetaminophen (TYLENOL) 500 MG tablet Take 1 tablet by mouth As Needed for Mild Pain. Indications: Pain    Provider, MD Su   Ascorbic Acid (VITAMIN C PO) Daily. Indications: supplement    Provider, MD Su   candesartan (ATACAND) 32 MG tablet Take 1 tablet by mouth Daily. Indications: High Blood Pressure Disorder 7/17/23   Scotty Miles MD   docusate sodium (COLACE) 100 MG capsule Take 1 capsule by mouth 2 (Two) Times a Day. Indications: Constipation 4/26/23   " Scotty Miles MD   Estrogens Conjugated (Premarin) 0.625 MG/GM vaginal cream Insert  into the vagina 2 (Two) Times a Week. Indications: Pain with [place] 10/12/23   Scotty Miles MD   levothyroxine (SYNTHROID, LEVOTHROID) 25 MCG tablet TAKE ONE TABLET BY MOUTH DAILY 23   Scotty Miles MD   LORazepam (ATIVAN) 0.5 MG tablet Take 1 tablet by mouth every night at bedtime. 10/12/23   Scotty Miles MD   Multiple Vitamins-Minerals (CENTRUM SILVER ADULT 50+) tablet Take 1 tablet by mouth daily    ProviderSu MD   omeprazole (priLOSEC) 20 MG capsule TAKE ONE CAPSULE BY MOUTH TWICE A DAY 8/15/23   Scotty Miles MD   Psyllium (METAMUCIL FIBER PO) Take 1 package by mouth As Needed. Indications: constipation    Provider, MD Su   simethicone (Gas-X) 80 MG chewable tablet Chew 1 tablet Every 6 (Six) Hours As Needed for Flatulence (Bloating). 22   Estefania Madden APRN       Objective     Vital Signs: /98   Pulse 69   Temp 98 øF (36.7 øC) (Infrared)   Resp 18   Ht 157.5 cm (62\")   Wt 41.3 kg (91 lb)   LMP  (LMP Unknown)   SpO2 99%   BMI 16.64 kg/mý   Physical Exam  Vitals and nursing note reviewed.   Constitutional:       General: She is not in acute distress.     Appearance: Normal appearance.   HENT:      Right Ear: Decreased hearing noted.      Left Ear: Decreased hearing noted.   Cardiovascular:      Rate and Rhythm: Normal rate and regular rhythm.      Pulses: Normal pulses.      Heart sounds: Normal heart sounds. No murmur heard.  Pulmonary:      Effort: Pulmonary effort is normal. No respiratory distress.      Breath sounds: Normal breath sounds. No wheezing.   Abdominal:      General: Bowel sounds are normal. There is no distension.      Palpations: Abdomen is soft.      Tenderness: There is no abdominal tenderness.   Musculoskeletal:         General: Tenderness (low back on left side) present. Normal range of motion.      Right lower le+ " Edema present.      Left lower le+ Edema present.   Skin:     General: Skin is warm and dry.      Capillary Refill: Capillary refill takes less than 2 seconds.      Findings: No bruising, erythema or rash.   Neurological:      Mental Status: She is alert and oriented to person, place, and time. Mental status is at baseline.      Motor: No weakness.       Results Reviewed:  Reviewed renal function on CMP obtained on 10/5/2023.    Assessment / Plan     Assessment/Plan:  Diagnoses and all orders for this visit:    1. Acute left-sided low back pain, unspecified whether sciatica present (Primary)  -     ketorolac (TORADOL) injection 30 mg  -     naproxen (NAPROSYN) 250 MG tablet; Take 1 tablet by mouth 2 (Two) Times a Day As Needed for Mild Pain.  Dispense: 20 tablet; Refill: 0    2. Acute cystitis without hematuria  -     POCT urinalysis dipstick, automated  -     ciprofloxacin (Cipro) 250 MG tablet; Take 1 tablet by mouth 2 (Two) Times a Day for 5 days.  Dispense: 10 tablet; Refill: 0  -     Cancel: Urine Culture - Urine, Urine, Clean Catch; Future  -     Urine Culture - Urine, Urine, Clean Catch    3. Bilateral lower extremity edema    4. Nausea  -     ondansetron (Zofran) 4 MG tablet; Take 1 tablet by mouth Every 8 (Eight) Hours As Needed for Nausea or Vomiting.  Dispense: 30 tablet; Refill: 1    5. Flu vaccine need  -     Fluzone High-Dose 65+yrs       I do not feel that her back pain is related to her urinary symptoms.  I think that it is most likely related to her recent fall.  I discussed that treating with anti-inflammatory medications would probably be most beneficial for her.  Her renal function is normal so we will give a Toradol injection while she is in the clinic today.  We will also give naproxen 250 mg to be taken twice daily as needed for mild pain.  Her  has concerned that Salonpas patches would cause her skin to break out.  We discussed that this medication can be hard on the kidneys so she  will need to make sure that she stays well-hydrated and drinks plenty of water.  If her back pain persist, we may need to proceed with additional imaging.    She has an allergy to almost every single antibiotic.  Thankfully she does have some family that was able to call [facility] pharmacy and find out what antibiotic she has tolerated in the past which was ciprofloxacin.  We will send in Cipro 250 mg to be taken twice daily for 5 days.  We will send urine for culture.  We will also send in Zofran to be taken prior to taking the Cipro to help with any nausea or GI upset.    Regarding her lower extremity edema, I would recommend keeping her feet elevated is much as possible.  I also feel that she would benefit from compression socks.  She will need to monitor her sodium intake more closely and we discussed that this could cause fluid retention along with keeping her feet in a dependent position.    Would recommend monitoring blood pressure to make sure that it goes down but I do feel that this is most likely related to her back pain and will improve with treatment.    She wishes to receive the flu vaccine today.    Return for Next scheduled follow up. unless patient needs to be seen sooner or acute issues arise.    I have discussed the patient results/orders and and plan/recommendation with them at today's visit.      Sugar Beasley, APRN   10/13/2023

## 2023-10-15 LAB
BACTERIA UR CULT: NO GROWTH
BACTERIA UR CULT: NORMAL

## 2023-10-16 NOTE — TELEPHONE ENCOUNTER
Her urine culture was negative for any abnormal growth, I would recommend at this time not treating with any antibiotics at all.  Please notify them of this.

## 2023-10-18 ENCOUNTER — OFFICE VISIT (OUTPATIENT)
Dept: INTERNAL MEDICINE | Facility: CLINIC | Age: 88
End: 2023-10-18
Payer: MEDICARE

## 2023-10-18 ENCOUNTER — TELEPHONE (OUTPATIENT)
Dept: INTERNAL MEDICINE | Facility: CLINIC | Age: 88
End: 2023-10-18
Payer: MEDICARE

## 2023-10-18 VITALS
WEIGHT: 91.8 LBS | TEMPERATURE: 97.3 F | HEIGHT: 62 IN | BODY MASS INDEX: 16.89 KG/M2 | SYSTOLIC BLOOD PRESSURE: 148 MMHG | OXYGEN SATURATION: 97 % | HEART RATE: 78 BPM | DIASTOLIC BLOOD PRESSURE: 86 MMHG

## 2023-10-18 DIAGNOSIS — M48.07 SPINAL STENOSIS OF LUMBOSACRAL REGION: Primary | ICD-10-CM

## 2023-10-18 DIAGNOSIS — K59.04 CHRONIC IDIOPATHIC CONSTIPATION: ICD-10-CM

## 2023-10-18 DIAGNOSIS — F41.1 GENERALIZED ANXIETY DISORDER: ICD-10-CM

## 2023-10-18 RX ORDER — GABAPENTIN 100 MG/1
100 CAPSULE ORAL 3 TIMES DAILY
Qty: 90 CAPSULE | Refills: 1 | Status: SHIPPED | OUTPATIENT
Start: 2023-10-18

## 2023-10-18 RX ORDER — TRIAMCINOLONE ACETONIDE 40 MG/ML
40 INJECTION, SUSPENSION INTRA-ARTICULAR; INTRAMUSCULAR ONCE
Status: COMPLETED | OUTPATIENT
Start: 2023-10-18 | End: 2023-10-18

## 2023-10-18 RX ORDER — TRAMADOL HYDROCHLORIDE 50 MG/1
50 TABLET ORAL EVERY 12 HOURS PRN
Qty: 60 TABLET | Refills: 0 | Status: SHIPPED | OUTPATIENT
Start: 2023-10-18

## 2023-10-18 RX ORDER — METHYLPREDNISOLONE 4 MG/1
TABLET ORAL
Qty: 21 TABLET | Refills: 0 | Status: SHIPPED | OUTPATIENT
Start: 2023-10-19

## 2023-10-18 RX ADMIN — TRIAMCINOLONE ACETONIDE 40 MG: 40 INJECTION, SUSPENSION INTRA-ARTICULAR; INTRAMUSCULAR at 12:06

## 2023-10-18 NOTE — PROGRESS NOTES
".    Chief Complaint  Back Pain (Patient complains of intermittent lower back pain x 1 month. /Describes symptoms as a burning sensation. /States the pain is radiating down to her lower extremities. /Symptoms have worsened today - constant pain. /Tried a heating pad, Tylenol, Naproxen and these give mild relief. /), Med Management (Patient would like to get Milk of Magnesia caplets ordered. ), and Bloated (Patient complains of abdominal bloating x years. /Denies abdominal cramping, pain. )    Subjective        Meenu Arteaga presents to [facility]  History of Present Illness  See below.  Also reviewed her recent office note with CHAIM Rose.    Objective   Vital Signs:  /86 (BP Location: Left arm, Patient Position: Sitting, Cuff Size: Adult)   Pulse 78   Temp 97.3 °F (36.3 °C) (Temporal)   Ht 157.5 cm (62\")   Wt 41.6 kg (91 lb 12.8 oz)   SpO2 97%   BMI 16.79 kg/m²   Estimated body mass index is 16.79 kg/m² as calculated from the following:    Height as of this encounter: 157.5 cm (62\").    Weight as of this encounter: 41.6 kg (91 lb 12.8 oz).       Physical Exam  Constitutional:       Comments: Seen and discussed with her  and stepdaughter.   HENT:      Head: Normocephalic and atraumatic.      Ears:      Comments: [place].  Eyes:      Conjunctiva/sclera: Conjunctivae normal.      Pupils: Pupils are equal, round, and reactive to light.   Cardiovascular:      Rate and Rhythm: Normal rate and regular rhythm.      Heart sounds: Normal heart sounds.   Pulmonary:      Effort: Pulmonary effort is normal. No respiratory distress.      Breath sounds: Normal breath sounds.   Abdominal:      General: Bowel sounds are normal. There is no distension.      Palpations: Abdomen is soft.      Tenderness: There is abdominal tenderness (lower abdominal). There is no guarding or rebound.   Musculoskeletal:         General: Tenderness (midline lumbar) present. No swelling.      " Cervical back: Neck supple.      Comments: Mague from a chair and ambulated with a rolling walker without any assistance.   Skin:     General: Skin is warm and dry.      Findings: No rash.   Neurological:      General: No focal deficit present.      Mental Status: She is alert and oriented to person, place, and time.      Motor: Weakness (generalized) present.   Psychiatric:      Comments: Conversational, appropriate.        Result Review :  Reviewed the CT cervical spine, thoracic spine, and lumbar spine done at [facility] emergency department on 10/5.         Assessment and Plan   Diagnoses and all orders for this visit:    1. Spinal stenosis of lumbosacral region (Primary)  -     gabapentin (NEURONTIN) 100 MG capsule; Take 1 capsule by mouth 3 (Three) Times a Day.  Dispense: 90 capsule; Refill: 1  -     traMADol (ULTRAM) 50 MG tablet; Take 1 tablet by mouth Every 12 (Twelve) Hours As Needed for Severe Pain.  Dispense: 60 tablet; Refill: 0  -     methylPREDNISolone (MEDROL) 4 MG dose pack; Take as directed on package instructions.  Dispense: 21 tablet; Refill: 0  -     triamcinolone acetonide (KENALOG-40) injection 40 mg  -     Ambulatory Referral to Physical Therapy Evaluate and treat    2. Generalized anxiety disorder    3. Chronic idiopathic constipation  -     magnesium hydroxide ([place] Milk of Magnesia) 311 MG chewable tablet chewable tablet; Chew 1 tablet Every 6 (Six) Hours As Needed (constipation).    Presents today for follow-up and continued issues with low back discomfort, burning sensation in both legs.  She fell at assisted living on 10/5 and was ultimately evaluated in the emergency department at [facility].  She had CT scans done of the axial spine with no acute findings.  She continues to have tenderness over her low lumbar spine and has developed a bilateral burning sensation in the buttocks as well as a burning sensation that travels to both posterior calves.  She denies saddle anesthesia.  She does have  difficulty with nocturnal urinary incontinence that is no different than the chronic issue.  She has not had any fecal incontinence.  She is still able to walk on her own without any assistance from others, but does utilize a rolling walker.  She was able to rise from a chair and ambulate without any assistance in the office today as well.    Explained to the patient, her , and her stepdaughter that my concern would be for possible spinal stenosis of the lumbosacral spine.  Her radicular symptoms are bilateral rather than unilateral.  She had no definite bony injury on recent CT imaging.  She has not experienced significant relief with alternating ibuprofen and acetaminophen.  She has not been able to conduct therapy at assisted living like she wants to.  Plan to let her try some Ultram and gabapentin.  Also plan an intramuscular dose of Kenalog in the office today followed by Medrol Dosepak.  Continue conservative treatment and therapy for now.    Explained to them that if she continues to worsen or has no improvement, then we may need to pursue MRI for more definitive diagnosis.  However, she also pointed out the fact that if something were found that was surgical in nature that she would not wish to undergo anything given her age of 89.  This further supports continuing conservative management.    She has longstanding problems with chronic constipation.  This could be made worse by her recent inactivity and adding Ultram.  They are requesting an order for assisted living to give her milk of magnesia by capsule form.  That has been sent.    Requesting additional orders for PT and that has been added.    She does not see Dr. Miles again until January, but can obviously call back with continued problems or new concerns.    Also worth mentioning that she was treated for a presumptive UTI last week and her culture was negative.  She tolerated Cipro without difficulty.  No urinary symptoms today.       Follow  Up   Return for Next scheduled follow up.  Patient was given instructions and counseling regarding her condition or for health maintenance advice. Please see specific information pulled into the AVS if appropriate.

## 2023-10-18 NOTE — TELEPHONE ENCOUNTER
[facility] WAS CALLING TO SEE IF PT STILL HAD A ALLERGY TO THE FOLLOWING MEDICATIONS. THEY HAVE IS MARKED AS SHE DOES.AND WANTED TO VERIFY BEFORE FILLING.     CALL BACK NUMBER : [phone]      TRAMODOL  GABAPENTIN

## 2023-10-25 ENCOUNTER — TELEPHONE (OUTPATIENT)
Dept: INTERNAL MEDICINE | Facility: CLINIC | Age: 88
End: 2023-10-25
Payer: MEDICARE

## 2023-10-25 DIAGNOSIS — G89.29 OTHER CHRONIC PAIN: ICD-10-CM
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
## 2023-10-25 DIAGNOSIS — R10.2 PELVIC PAIN: Primary | ICD-10-CM

## 2023-10-25 DIAGNOSIS — M48.07 SPINAL STENOSIS OF LUMBOSACRAL REGION: ICD-10-CM

## 2023-10-25 DIAGNOSIS — M51.36 DEGENERATIVE DISC DISEASE, LUMBAR: Primary | ICD-10-CM

## 2023-10-25 DIAGNOSIS — M54.12 CERVICAL RADICULOPATHY: ICD-10-CM

## 2023-10-25 NOTE — TELEPHONE ENCOUNTER
Caller: JOSE E    Relationship: Other    Best call back number: [phone]     What orders are you requesting (i.e. lab or imaging): SHOWER CHAIR    In what timeframe would the patient need to come in: AS SOON AS POSSIBLE    Where will you receive your lab/imaging services: FAX: [phone] [facility]    Additional notes: THEY ARE NEEDING AN ORDER FOR A SHOWER CHAIR SO THEY CAN GET IT FOR PATIENT.

## 2023-10-25 NOTE — TELEPHONE ENCOUNTER
"Aliyah Rodriguez, the Memory Care Director at [facility] called today, stating patient had her first therapy session with [facility] and the therapist is saying her pain is exacerbated with movement and the therapist is concerned about potential pelvic fracture and is advising she have a MRI of the pelvis to r/o fx.  I have pended that order to this message, if you are in agreement.    She is also asking for a Rx for a \"alisa\" wheelchair to use prn, due to her having difficulty getting around.  I have also pended this order, if you agree.    [facility] [phone]               Fx  [phone]  "

## 2023-10-31 ENCOUNTER — TELEPHONE (OUTPATIENT)
Dept: INTERNAL MEDICINE | Facility: CLINIC | Age: 88
End: 2023-10-31

## 2023-10-31 ENCOUNTER — HOSPITAL ENCOUNTER (OUTPATIENT)
Dept: MRI IMAGING | Facility: HOSPITAL | Age: 88
Discharge: HOME | End: 2023-10-31
Admitting: INTERNAL MEDICINE
Payer: MEDICARE

## 2023-10-31 DIAGNOSIS — R10.2 PELVIC PAIN: ICD-10-CM
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
## 2023-10-31 PROCEDURE — 72195 MRI PELVIS W/O DYE: CPT

## 2023-10-31 NOTE — TELEPHONE ENCOUNTER
Caller: SHAUN MCKEON    Relationship: Emergency Contact    Best call back number: [phone]     Who are you requesting to speak with (clinical staff, provider,  specific staff member): CLINICAL    What was the call regarding: PATIENTS GRANDDAUGHTER REQUESTING CALLBACK REGARDING PATIENTS MRI RESULTS.

## 2023-11-01 DIAGNOSIS — S32.9XXA CLOSED DISPLACED FRACTURE OF PELVIS, UNSPECIFIED PART OF PELVIS, INITIAL ENCOUNTER: Primary | ICD-10-CM

## 2023-11-09 DIAGNOSIS — M54.50 ACUTE LEFT-SIDED LOW BACK PAIN, UNSPECIFIED WHETHER SCIATICA PRESENT: ICD-10-CM

## 2023-11-09 RX ORDER — NAPROXEN 250 MG/1
250 TABLET ORAL 2 TIMES DAILY PRN
Qty: 20 TABLET | Refills: 0 | Status: SHIPPED | OUTPATIENT
Start: 2023-11-09

## 2023-11-09 NOTE — TELEPHONE ENCOUNTER
Caller: [facility] ([facility]) - [place], [place] - [address] [place] [address] [phone] [facility] [phone] FX    Relationship: Pharmacy    Requested Prescriptions:   Requested Prescriptions     Pending Prescriptions Disp Refills    naproxen (NAPROSYN) 250 MG tablet 20 tablet 0     Sig: Take 1 tablet by mouth 2 (Two) Times a Day As Needed for Mild Pain.        Pharmacy where request should be sent: [facility]) - [place], [place] - [address] [place] [address] [phone] [facility] [phone] FX     Last office visit with prescribing clinician: 9/29/2023   Last telemedicine visit with prescribing clinician: Visit date not found   Next office visit with prescribing clinician: 3/1/2024     Does the patient have less than a 3 day supply:  [] Yes  [x] No    Would you like a call back once the refill request has been completed: [] Yes [x] No    If the office needs to give you a call back, can they leave a voicemail: [] Yes [x] No    Dave Saunders Rep   11/09/23 11:34 CST

## 2023-12-10 ENCOUNTER — APPOINTMENT (OUTPATIENT)
Dept: CT IMAGING | Age: 88
End: 2023-12-10
Payer: MEDICARE

## 2023-12-10 ENCOUNTER — HOSPITAL ENCOUNTER (EMERGENCY)
Age: 88
Discharge: HOME | End: 2023-12-10
Attending: STUDENT IN AN ORGANIZED HEALTH CARE EDUCATION/TRAINING PROGRAM
Payer: MEDICARE

## 2023-12-10 ENCOUNTER — APPOINTMENT (OUTPATIENT)
Dept: GENERAL RADIOLOGY | Age: 88
End: 2023-12-10
Payer: MEDICARE

## 2023-12-10 VITALS
HEART RATE: 73 BPM | DIASTOLIC BLOOD PRESSURE: 84 MMHG | RESPIRATION RATE: 16 BRPM | SYSTOLIC BLOOD PRESSURE: 159 MMHG | OXYGEN SATURATION: 94 % | TEMPERATURE: 98.3 F

## 2023-12-10 DIAGNOSIS — R47.01 APHASIA: Primary | ICD-10-CM

## 2023-12-10 DIAGNOSIS — R41.82 ALTERED MENTAL STATUS, UNSPECIFIED ALTERED MENTAL STATUS TYPE: ICD-10-CM

## 2023-12-10 LAB
ALBUMIN SERPL-MCNC: 3.5 G/DL (ref 3.5–5.2)
ALP SERPL-CCNC: 174 U/L (ref 35–104)
ALT SERPL-CCNC: 17 U/L (ref 5–33)
ANION GAP SERPL CALCULATED.3IONS-SCNC: 10 MMOL/L (ref 7–19)
AST SERPL-CCNC: 30 U/L (ref 5–32)
BASOPHILS # BLD: 0 K/UL (ref 0–0.2)
BASOPHILS NFR BLD: 0.7 % (ref 0–1)
BILIRUB SERPL-MCNC: <0.2 MG/DL (ref 0.2–1.2)
BUN SERPL-MCNC: 21 MG/DL (ref 8–23)
CALCIUM SERPL-MCNC: 9.2 MG/DL (ref 8.2–9.6)
CHLORIDE SERPL-SCNC: 101 MMOL/L (ref 98–111)
CO2 SERPL-SCNC: 27 MMOL/L (ref 22–29)
CREAT SERPL-MCNC: 0.5 MG/DL (ref 0.5–0.9)
EOSINOPHIL # BLD: 0.3 K/UL (ref 0–0.6)
EOSINOPHIL NFR BLD: 6.3 % (ref 0–5)
ERYTHROCYTE [DISTWIDTH] IN BLOOD BY AUTOMATED COUNT: 15.9 % (ref 11.5–14.5)
GLUCOSE BLD-MCNC: 91 MG/DL
GLUCOSE BLD-MCNC: 93 MG/DL (ref 70–99)
GLUCOSE SERPL-MCNC: 93 MG/DL (ref 74–109)
HCT VFR BLD AUTO: 33.5 % (ref 37–47)
HGB BLD-MCNC: 11.1 G/DL (ref 12–16)
IMM GRANULOCYTES # BLD: 0 K/UL
INR PPP: 0.96 (ref 0.88–1.18)
LYMPHOCYTES # BLD: 0.7 K/UL (ref 1.1–4.5)
LYMPHOCYTES NFR BLD: 16 % (ref 20–40)
MAGNESIUM SERPL-MCNC: 2.2 MG/DL (ref 1.7–2.3)
MCH RBC QN AUTO: 31.2 PG (ref 27–31)
MCHC RBC AUTO-ENTMCNC: 33.1 G/DL (ref 33–37)
MCV RBC AUTO: 94.1 FL (ref 81–99)
MONOCYTES # BLD: 0.7 K/UL (ref 0–0.9)
MONOCYTES NFR BLD: 16 % (ref 0–10)
NEUTROPHILS # BLD: 2.6 K/UL (ref 1.5–7.5)
NEUTS SEG NFR BLD: 60.8 % (ref 50–65)
PERFORMED ON: NORMAL
PLATELET # BLD AUTO: 314 K/UL (ref 130–400)
PMV BLD AUTO: 9.3 FL (ref 9.4–12.3)
POTASSIUM SERPL-SCNC: 3.5 MMOL/L (ref 3.5–5)
PROT SERPL-MCNC: 6.2 G/DL (ref 6.6–8.7)
PROTHROMBIN TIME: 12.5 SEC (ref 12–14.6)
RBC # BLD AUTO: 3.56 M/UL (ref 4.2–5.4)
SODIUM SERPL-SCNC: 138 MMOL/L (ref 136–145)
TROPONIN, HIGH SENSITIVITY: 24 NG/L (ref 0–14)
TROPONIN, HIGH SENSITIVITY: 25 NG/L (ref 0–14)
WBC # BLD AUTO: 4.3 K/UL (ref 4.8–10.8)

## 2023-12-10 PROCEDURE — 99285 EMERGENCY DEPT VISIT HI MDM: CPT

## 2023-12-10 PROCEDURE — 85025 COMPLETE CBC W/AUTO DIFF WBC: CPT

## 2023-12-10 PROCEDURE — 71045 X-RAY EXAM CHEST 1 VIEW: CPT

## 2023-12-10 PROCEDURE — 36415 COLL VENOUS BLD VENIPUNCTURE: CPT

## 2023-12-10 PROCEDURE — 80053 COMPREHEN METABOLIC PANEL: CPT

## 2023-12-10 PROCEDURE — 93005 ELECTROCARDIOGRAM TRACING: CPT | Performed by: STUDENT IN AN ORGANIZED HEALTH CARE EDUCATION/TRAINING PROGRAM

## 2023-12-10 PROCEDURE — 84484 ASSAY OF TROPONIN QUANT: CPT

## 2023-12-10 PROCEDURE — 70498 CT ANGIOGRAPHY NECK: CPT

## 2023-12-10 PROCEDURE — 85610 PROTHROMBIN TIME: CPT

## 2023-12-10 PROCEDURE — 6360000004 HC RX CONTRAST MEDICATION: Performed by: STUDENT IN AN ORGANIZED HEALTH CARE EDUCATION/TRAINING PROGRAM

## 2023-12-10 PROCEDURE — 70450 CT HEAD/BRAIN W/O DYE: CPT

## 2023-12-10 PROCEDURE — 83735 ASSAY OF MAGNESIUM: CPT

## 2023-12-10 PROCEDURE — 82962 GLUCOSE BLOOD TEST: CPT

## 2023-12-10 RX ADMIN — IOPAMIDOL 70 ML: 755 INJECTION, SOLUTION INTRAVENOUS at 17:26

## 2023-12-10 NOTE — ED NOTES
Ems states that we were called for possible stroke. EMS states that patient \"has no neuro symptoms, so we didn't work it as a stroke. \" EMS states that did not have a last known well time. Attempted to call assisted living, unable to reach anyone. Dr. Mervat Gonzalez made aware of pt situation.       Johnson Amaya RN  12/10/23 [id number]

## 2023-12-10 NOTE — ED NOTES
Spoke with assisted living, [phone]. Dr. Arnaud King updated.      Onesimo Sheehan, RN  12/10/23 0768

## 2023-12-10 NOTE — ED NOTES
26 Dr. Ministerio Roman called code stroke     [phone] Notified CT     [phone] PA announcement     [id number] Notified Dr. Abel Solitario, Neurology     [id number] Notified Stroke Coordinator     Patient arrived [address] 1630 today      Johana Guevara  12/10/23 1731

## 2023-12-10 NOTE — ED PROVIDER NOTES
Negative for chest pain and leg swelling. Gastrointestinal:  Negative for abdominal pain, diarrhea, nausea and vomiting. Genitourinary:  Negative for dysuria and urgency. Musculoskeletal:  Negative for neck pain and neck stiffness. Skin:  Negative for rash and wound. Neurological:  Negative for seizures, syncope and headaches. Speech difficulty   Psychiatric/Behavioral:  Positive for confusion. Negative for agitation. PAST MEDICALHISTORY     Past Medical History:   Diagnosis Date    Anxiety 10/02/2019    Chest tightness or pressure 08/26/2013 8/26/2013  lexiscan negative for myocardial ischemia, EF 61%    Edema     Essential and other specified forms of tremor     Generalized anxiety disorder     HTN (hypertension)     Hyperlipidemia     Hypertension     Insomnia, unspecified     Neuropathy     both legs up to both hips    Other and unspecified ovarian cyst     Other left bundle branch block     Palliative care patient 06/23/2022    Pure hypercholesterolemia     Restless legs syndrome (RLS)     Solitary cyst of breast     Spinal stenosis, lumbar region, without neurogenic claudication     Unspecified hypothyroidism     Urinary frequency     UTI (urinary tract infection) 03/25/2022         SURGICAL HISTORY       Past Surgical History:   Procedure Laterality Date    APPENDECTOMY      CARDIAC CATHETERIZATION  5/26/15  MDL    with aortic root injection.  EF 60%    CATARACT REMOVAL      CHOLECYSTECTOMY      CYST INCISION AND DRAINAGE      drained liver cyst    HEMORRHOID SURGERY      HYSTERECTOMY (CERVIX STATUS UNKNOWN)      NERVE BLOCK LUMB FACET LEVEL 1 BILATERAL Bilateral 1/19/2016    LUMBAR FACET CATE L4-5  performed by Michelle Ramos at [facility]     Discharge Medication List as of 12/10/2023  7:34 PM        CONTINUE these medications which have NOT CHANGED    Details   acetaminophen (AMINOFEN) 325 MG tablet Take 2 tablets by mouth every 6 hours as needed

## 2023-12-11 LAB
EKG P AXIS: 80 DEGREES
EKG P-R INTERVAL: 186 MS
EKG Q-T INTERVAL: 432 MS
EKG QRS DURATION: 130 MS
EKG QTC CALCULATION (BAZETT): 446 MS
EKG T AXIS: 90 DEGREES

## 2023-12-11 PROCEDURE — 93010 ELECTROCARDIOGRAM REPORT: CPT | Performed by: INTERNAL MEDICINE

## 2023-12-11 ASSESSMENT — ENCOUNTER SYMPTOMS
EYE PAIN: 0
EYE REDNESS: 0
NAUSEA: 0
SHORTNESS OF BREATH: 0
VOMITING: 0
SORE THROAT: 0
COUGH: 0
DIARRHEA: 0
CHEST TIGHTNESS: 0
ABDOMINAL PAIN: 0

## 2024-01-09 DIAGNOSIS — F41.1 GENERALIZED ANXIETY DISORDER: ICD-10-CM

## 2024-01-09 RX ORDER — LORAZEPAM 0.5 MG/1
0.5 TABLET ORAL
Qty: 30 TABLET | Refills: 2 | Status: SHIPPED | OUTPATIENT
Start: 2024-01-09

## 2024-01-18 ENCOUNTER — LAB REQUISITION (OUTPATIENT)
Dept: LAB | Facility: HOSPITAL | Age: 89
End: 2024-01-18
Payer: MEDICARE

## 2024-01-18 DIAGNOSIS — R30.0 DYSURIA: ICD-10-CM

## 2024-01-18 DIAGNOSIS — R39.15 URGENCY OF URINATION: ICD-10-CM

## 2024-01-18 LAB
BILIRUB UR QL STRIP: NEGATIVE
CLARITY UR: ABNORMAL
COLOR UR: YELLOW
GLUCOSE UR STRIP-MCNC: NEGATIVE MG/DL
HGB UR QL STRIP.AUTO: NEGATIVE
KETONES UR QL STRIP: NEGATIVE
LEUKOCYTE ESTERASE UR QL STRIP.AUTO: NEGATIVE
NITRITE UR QL STRIP: NEGATIVE
PH UR STRIP.AUTO: 8 [PH] (ref 5–8)
PROT UR QL STRIP: ABNORMAL
SP GR UR STRIP: 1.02 (ref 1–1.03)
UROBILINOGEN UR QL STRIP: ABNORMAL

## 2024-01-18 PROCEDURE — 81003 URINALYSIS AUTO W/O SCOPE: CPT

## 2024-01-18 PROCEDURE — 87086 URINE CULTURE/COLONY COUNT: CPT

## 2024-01-19 LAB — BACTERIA SPEC AEROBE CULT: NO GROWTH

## 2024-01-23 ENCOUNTER — LAB REQUISITION (OUTPATIENT)
Dept: LAB | Facility: HOSPITAL | Age: 89
End: 2024-01-23
Payer: MEDICARE

## 2024-01-23 DIAGNOSIS — F03.90 UNSPECIFIED DEMENTIA, UNSPECIFIED SEVERITY, WITHOUT BEHAVIORAL DISTURBANCE, PSYCHOTIC DISTURBANCE, MOOD DISTURBANCE, AND ANXIETY: ICD-10-CM

## 2024-01-23 LAB — VALPROATE SERPL-MCNC: 67.3 MCG/ML (ref 50–125)

## 2024-01-23 PROCEDURE — 80164 ASSAY DIPROPYLACETIC ACD TOT: CPT

## 2024-01-29 ENCOUNTER — LAB REQUISITION (OUTPATIENT)
Dept: LAB | Facility: HOSPITAL | Age: 89
End: 2024-01-29
Payer: MEDICARE

## 2024-01-29 DIAGNOSIS — R60.9 EDEMA, UNSPECIFIED: ICD-10-CM

## 2024-01-29 LAB
ANION GAP SERPL CALCULATED.3IONS-SCNC: 11 MMOL/L (ref 5–15)
BUN SERPL-MCNC: 25 MG/DL (ref 8–23)
BUN/CREAT SERPL: 46.3 (ref 7–25)
CALCIUM SPEC-SCNC: 9.2 MG/DL (ref 8.2–9.6)
CHLORIDE SERPL-SCNC: 103 MMOL/L (ref 98–107)
CO2 SERPL-SCNC: 30 MMOL/L (ref 22–29)
CREAT SERPL-MCNC: 0.54 MG/DL (ref 0.57–1)
EGFRCR SERPLBLD CKD-EPI 2021: 83.9 ML/MIN/1.73
GLUCOSE SERPL-MCNC: 101 MG/DL (ref 65–99)
NT-PROBNP SERPL-MCNC: 340.1 PG/ML (ref 0–1800)
POTASSIUM SERPL-SCNC: 3.2 MMOL/L (ref 3.5–5.2)
SODIUM SERPL-SCNC: 144 MMOL/L (ref 136–145)

## 2024-01-29 PROCEDURE — 36415 COLL VENOUS BLD VENIPUNCTURE: CPT

## 2024-01-29 PROCEDURE — 83880 ASSAY OF NATRIURETIC PEPTIDE: CPT

## 2024-01-29 PROCEDURE — 80048 BASIC METABOLIC PNL TOTAL CA: CPT

## 2024-01-31 ENCOUNTER — TELEPHONE (OUTPATIENT)
Dept: CARDIOLOGY CLINIC | Age: 89
End: 2024-01-31

## 2024-01-31 NOTE — TELEPHONE ENCOUNTER
Called to reschedule missed 1/30/2024 appt. Unable to LVM. If patient calls back please schedule next available with Indira Alberts.

## 2024-02-15 ENCOUNTER — LAB REQUISITION (OUTPATIENT)
Dept: LAB | Facility: HOSPITAL | Age: 89
End: 2024-02-15
Payer: MEDICARE

## 2024-02-15 DIAGNOSIS — R60.9 EDEMA, UNSPECIFIED: ICD-10-CM

## 2024-02-15 DIAGNOSIS — I10 ESSENTIAL (PRIMARY) HYPERTENSION: ICD-10-CM

## 2024-02-15 LAB
ANION GAP SERPL CALCULATED.3IONS-SCNC: 9 MMOL/L (ref 5–15)
BUN SERPL-MCNC: 34 MG/DL (ref 8–23)
BUN/CREAT SERPL: 61.8 (ref 7–25)
CALCIUM SPEC-SCNC: 9.8 MG/DL (ref 8.2–9.6)
CHLORIDE SERPL-SCNC: 105 MMOL/L (ref 98–107)
CO2 SERPL-SCNC: 31 MMOL/L (ref 22–29)
CREAT SERPL-MCNC: 0.55 MG/DL (ref 0.57–1)
EGFRCR SERPLBLD CKD-EPI 2021: 83.5 ML/MIN/1.73
GLUCOSE SERPL-MCNC: 90 MG/DL (ref 65–99)
POTASSIUM SERPL-SCNC: 3.7 MMOL/L (ref 3.5–5.2)
SODIUM SERPL-SCNC: 145 MMOL/L (ref 136–145)

## 2024-02-15 PROCEDURE — 80048 BASIC METABOLIC PNL TOTAL CA: CPT

## 2024-02-29 ENCOUNTER — HOSPITAL ENCOUNTER (INPATIENT)
Age: 89
LOS: 5 days | Discharge: NURSING, REHAB OR LONG-TERM CARE | DRG: 956 | End: 2024-03-06
Attending: STUDENT IN AN ORGANIZED HEALTH CARE EDUCATION/TRAINING PROGRAM
Payer: MEDICARE

## 2024-02-29 ENCOUNTER — APPOINTMENT (OUTPATIENT)
Dept: GENERAL RADIOLOGY | Age: 89
DRG: 956 | End: 2024-02-29
Payer: MEDICARE

## 2024-02-29 ENCOUNTER — APPOINTMENT (OUTPATIENT)
Dept: CT IMAGING | Age: 89
DRG: 956 | End: 2024-02-29
Payer: MEDICARE

## 2024-02-29 DIAGNOSIS — S72.001A CLOSED RIGHT HIP FRACTURE, INITIAL ENCOUNTER (HCC): ICD-10-CM

## 2024-02-29 DIAGNOSIS — S72.142A CLOSED 2-PART INTERTROCHANTERIC FRACTURE OF LEFT FEMUR, INITIAL ENCOUNTER (HCC): Primary | ICD-10-CM

## 2024-02-29 DIAGNOSIS — S72.142A CLOSED FRACTURE OF FEMUR, INTERTROCHANTERIC, LEFT, INITIAL ENCOUNTER (HCC): ICD-10-CM

## 2024-02-29 DIAGNOSIS — S72.002A CLOSED LEFT HIP FRACTURE, INITIAL ENCOUNTER (HCC): ICD-10-CM

## 2024-02-29 DIAGNOSIS — M25.552 LEFT HIP PAIN: ICD-10-CM

## 2024-02-29 DIAGNOSIS — F41.9 ANXIETY: ICD-10-CM

## 2024-02-29 LAB
ALBUMIN SERPL-MCNC: 3.9 G/DL (ref 3.5–5.2)
ALP SERPL-CCNC: 89 U/L (ref 35–104)
ALT SERPL-CCNC: 22 U/L (ref 5–33)
ANION GAP SERPL CALCULATED.3IONS-SCNC: 12 MMOL/L (ref 7–19)
AST SERPL-CCNC: 32 U/L (ref 5–32)
BILIRUB SERPL-MCNC: 0.4 MG/DL (ref 0.2–1.2)
BUN SERPL-MCNC: 25 MG/DL (ref 8–23)
CALCIUM SERPL-MCNC: 8.9 MG/DL (ref 8.2–9.6)
CHLORIDE SERPL-SCNC: 101 MMOL/L (ref 98–111)
CO2 SERPL-SCNC: 26 MMOL/L (ref 22–29)
CREAT SERPL-MCNC: 0.4 MG/DL (ref 0.5–0.9)
GLUCOSE SERPL-MCNC: 105 MG/DL (ref 74–109)
POTASSIUM SERPL-SCNC: 4 MMOL/L (ref 3.5–5)
PROT SERPL-MCNC: 6.3 G/DL (ref 6.6–8.7)
SODIUM SERPL-SCNC: 139 MMOL/L (ref 136–145)

## 2024-02-29 PROCEDURE — 73502 X-RAY EXAM HIP UNI 2-3 VIEWS: CPT

## 2024-02-29 PROCEDURE — 72192 CT PELVIS W/O DYE: CPT

## 2024-02-29 PROCEDURE — 6370000000 HC RX 637 (ALT 250 FOR IP): Performed by: STUDENT IN AN ORGANIZED HEALTH CARE EDUCATION/TRAINING PROGRAM

## 2024-02-29 PROCEDURE — 99285 EMERGENCY DEPT VISIT HI MDM: CPT

## 2024-02-29 PROCEDURE — 96374 THER/PROPH/DIAG INJ IV PUSH: CPT

## 2024-02-29 PROCEDURE — 6360000002 HC RX W HCPCS: Performed by: STUDENT IN AN ORGANIZED HEALTH CARE EDUCATION/TRAINING PROGRAM

## 2024-02-29 RX ORDER — LORAZEPAM 0.5 MG/1
0.5 TABLET ORAL ONCE
Status: COMPLETED | OUTPATIENT
Start: 2024-02-29 | End: 2024-02-29

## 2024-02-29 RX ORDER — MORPHINE SULFATE 2 MG/ML
2 INJECTION, SOLUTION INTRAMUSCULAR; INTRAVENOUS ONCE
Status: COMPLETED | OUTPATIENT
Start: 2024-02-29 | End: 2024-02-29

## 2024-02-29 RX ADMIN — LORAZEPAM 0.5 MG: 0.5 TABLET ORAL at 23:31

## 2024-02-29 RX ADMIN — MORPHINE SULFATE 2 MG: 2 INJECTION, SOLUTION INTRAMUSCULAR; INTRAVENOUS at 23:31

## 2024-03-01 ENCOUNTER — ANESTHESIA EVENT (OUTPATIENT)
Dept: OPERATING ROOM | Age: 89
End: 2024-03-01
Payer: MEDICARE

## 2024-03-01 ENCOUNTER — TELEPHONE (OUTPATIENT)
Dept: INTERNAL MEDICINE | Facility: CLINIC | Age: 89
End: 2024-03-01

## 2024-03-01 ENCOUNTER — APPOINTMENT (OUTPATIENT)
Dept: GENERAL RADIOLOGY | Age: 89
DRG: 956 | End: 2024-03-01
Payer: MEDICARE

## 2024-03-01 ENCOUNTER — ANESTHESIA (OUTPATIENT)
Dept: OPERATING ROOM | Age: 89
End: 2024-03-01
Payer: MEDICARE

## 2024-03-01 PROBLEM — S72.001A CLOSED RIGHT HIP FRACTURE, INITIAL ENCOUNTER (HCC): Status: ACTIVE | Noted: 2024-03-01

## 2024-03-01 PROBLEM — S72.142A CLOSED FRACTURE OF FEMUR, INTERTROCHANTERIC, LEFT, INITIAL ENCOUNTER (HCC): Status: ACTIVE | Noted: 2024-02-29

## 2024-03-01 PROBLEM — S72.002A CLOSED LEFT HIP FRACTURE, INITIAL ENCOUNTER (HCC): Status: ACTIVE | Noted: 2024-03-01

## 2024-03-01 LAB
25(OH)D3 SERPL-MCNC: 49.3 NG/ML
ANION GAP SERPL CALCULATED.3IONS-SCNC: 15 MMOL/L (ref 7–19)
BACTERIA URNS QL MICRO: NEGATIVE /HPF
BASOPHILS # BLD: 0 K/UL (ref 0–0.2)
BASOPHILS NFR BLD: 0.4 % (ref 0–1)
BILIRUB UR QL STRIP: NEGATIVE
BUN SERPL-MCNC: 22 MG/DL (ref 8–23)
CALCIUM SERPL-MCNC: 8.8 MG/DL (ref 8.2–9.6)
CHLORIDE SERPL-SCNC: 102 MMOL/L (ref 98–111)
CLARITY UR: CLEAR
CO2 SERPL-SCNC: 23 MMOL/L (ref 22–29)
COLOR UR: YELLOW
CREAT SERPL-MCNC: 0.4 MG/DL (ref 0.5–0.9)
CRYSTALS URNS MICRO: ABNORMAL /HPF
EOSINOPHIL # BLD: 0 K/UL (ref 0–0.6)
EOSINOPHIL NFR BLD: 0.4 % (ref 0–5)
EPI CELLS #/AREA URNS AUTO: 3 /HPF (ref 0–5)
ERYTHROCYTE [DISTWIDTH] IN BLOOD BY AUTOMATED COUNT: 17.5 % (ref 11.5–14.5)
ERYTHROCYTE [DISTWIDTH] IN BLOOD BY AUTOMATED COUNT: 18.4 % (ref 11.5–14.5)
FERRITIN SERPL-MCNC: 244.1 NG/ML (ref 13–150)
FOLATE SERPL-MCNC: 15.6 NG/ML (ref 4.8–37.3)
GLUCOSE SERPL-MCNC: 110 MG/DL (ref 74–109)
GLUCOSE UR STRIP.AUTO-MCNC: NEGATIVE MG/DL
HCT VFR BLD AUTO: 33.6 % (ref 37–47)
HCT VFR BLD AUTO: 34.9 % (ref 37–47)
HGB BLD-MCNC: 11.6 G/DL (ref 12–16)
HGB BLD-MCNC: 11.6 G/DL (ref 12–16)
HGB UR STRIP.AUTO-MCNC: ABNORMAL MG/L
HYALINE CASTS #/AREA URNS AUTO: 5 /HPF (ref 0–8)
IMM GRANULOCYTES # BLD: 0.1 K/UL
IRON SATN MFR SERPL: 8 % (ref 14–50)
IRON SERPL-MCNC: 17 UG/DL (ref 37–145)
KETONES UR STRIP.AUTO-MCNC: NEGATIVE MG/DL
LEUKOCYTE ESTERASE UR QL STRIP.AUTO: ABNORMAL
LYMPHOCYTES # BLD: 0.6 K/UL (ref 1.1–4.5)
LYMPHOCYTES NFR BLD: 7.1 % (ref 20–40)
MAGNESIUM SERPL-MCNC: 2 MG/DL (ref 1.7–2.3)
MCH RBC QN AUTO: 32.4 PG (ref 27–31)
MCH RBC QN AUTO: 33 PG (ref 27–31)
MCHC RBC AUTO-ENTMCNC: 33.2 G/DL (ref 33–37)
MCHC RBC AUTO-ENTMCNC: 34.5 G/DL (ref 33–37)
MCV RBC AUTO: 93.9 FL (ref 81–99)
MCV RBC AUTO: 99.4 FL (ref 81–99)
MONOCYTES # BLD: 0.8 K/UL (ref 0–0.9)
MONOCYTES NFR BLD: 10 % (ref 0–10)
NEUTROPHILS # BLD: 6.3 K/UL (ref 1.5–7.5)
NEUTS SEG NFR BLD: 81.5 % (ref 50–65)
NITRITE UR QL STRIP.AUTO: NEGATIVE
PH UR STRIP.AUTO: 6.5 [PH] (ref 5–8)
PLATELET # BLD AUTO: 205 K/UL (ref 130–400)
PLATELET # BLD AUTO: 210 K/UL (ref 130–400)
PMV BLD AUTO: 10.9 FL (ref 9.4–12.3)
PMV BLD AUTO: 11.1 FL (ref 9.4–12.3)
POTASSIUM SERPL-SCNC: 3.5 MMOL/L (ref 3.5–5)
PROT UR STRIP.AUTO-MCNC: 100 MG/DL
RBC # BLD AUTO: 3.51 M/UL (ref 4.2–5.4)
RBC # BLD AUTO: 3.58 M/UL (ref 4.2–5.4)
RBC #/AREA URNS AUTO: 307 /HPF (ref 0–4)
SODIUM SERPL-SCNC: 140 MMOL/L (ref 136–145)
SP GR UR STRIP.AUTO: 1.02 (ref 1–1.03)
T4 FREE SERPL-MCNC: 1.26 NG/DL (ref 0.93–1.7)
TIBC SERPL-MCNC: 206 UG/DL (ref 250–400)
TSH SERPL DL<=0.005 MIU/L-ACNC: 6.6 UIU/ML (ref 0.27–4.2)
UROBILINOGEN UR STRIP.AUTO-MCNC: 1 E.U./DL
VIT B12 SERPL-MCNC: 1240 PG/ML (ref 211–946)
WBC # BLD AUTO: 7.8 K/UL (ref 4.8–10.8)
WBC # BLD AUTO: 8.1 K/UL (ref 4.8–10.8)
WBC #/AREA URNS AUTO: 14 /HPF (ref 0–5)

## 2024-03-01 PROCEDURE — C1713 ANCHOR/SCREW BN/BN,TIS/BN: HCPCS | Performed by: ORTHOPAEDIC SURGERY

## 2024-03-01 PROCEDURE — 83540 ASSAY OF IRON: CPT

## 2024-03-01 PROCEDURE — 85027 COMPLETE CBC AUTOMATED: CPT

## 2024-03-01 PROCEDURE — 6370000000 HC RX 637 (ALT 250 FOR IP): Performed by: HOSPITALIST

## 2024-03-01 PROCEDURE — 1200000000 HC SEMI PRIVATE

## 2024-03-01 PROCEDURE — 2500000003 HC RX 250 WO HCPCS: Performed by: NURSE ANESTHETIST, CERTIFIED REGISTERED

## 2024-03-01 PROCEDURE — 84439 ASSAY OF FREE THYROXINE: CPT

## 2024-03-01 PROCEDURE — 82306 VITAMIN D 25 HYDROXY: CPT

## 2024-03-01 PROCEDURE — 82728 ASSAY OF FERRITIN: CPT

## 2024-03-01 PROCEDURE — 82607 VITAMIN B-12: CPT

## 2024-03-01 PROCEDURE — 83550 IRON BINDING TEST: CPT

## 2024-03-01 PROCEDURE — 2709999900 HC NON-CHARGEABLE SUPPLY: Performed by: ORTHOPAEDIC SURGERY

## 2024-03-01 PROCEDURE — 6370000000 HC RX 637 (ALT 250 FOR IP)

## 2024-03-01 PROCEDURE — 6360000002 HC RX W HCPCS: Performed by: ORTHOPAEDIC SURGERY

## 2024-03-01 PROCEDURE — 6360000002 HC RX W HCPCS: Performed by: NURSE ANESTHETIST, CERTIFIED REGISTERED

## 2024-03-01 PROCEDURE — 3600000014 HC SURGERY LEVEL 4 ADDTL 15MIN: Performed by: ORTHOPAEDIC SURGERY

## 2024-03-01 PROCEDURE — 6360000002 HC RX W HCPCS: Performed by: HOSPITALIST

## 2024-03-01 PROCEDURE — C1769 GUIDE WIRE: HCPCS | Performed by: ORTHOPAEDIC SURGERY

## 2024-03-01 PROCEDURE — 7100000000 HC PACU RECOVERY - FIRST 15 MIN: Performed by: ORTHOPAEDIC SURGERY

## 2024-03-01 PROCEDURE — 3700000000 HC ANESTHESIA ATTENDED CARE: Performed by: ORTHOPAEDIC SURGERY

## 2024-03-01 PROCEDURE — 84443 ASSAY THYROID STIM HORMONE: CPT

## 2024-03-01 PROCEDURE — 0QS706Z REPOSITION LEFT UPPER FEMUR WITH INTRAMEDULLARY INTERNAL FIXATION DEVICE, OPEN APPROACH: ICD-10-PCS | Performed by: ORTHOPAEDIC SURGERY

## 2024-03-01 PROCEDURE — 81001 URINALYSIS AUTO W/SCOPE: CPT

## 2024-03-01 PROCEDURE — 7100000001 HC PACU RECOVERY - ADDTL 15 MIN: Performed by: ORTHOPAEDIC SURGERY

## 2024-03-01 PROCEDURE — 3700000001 HC ADD 15 MINUTES (ANESTHESIA): Performed by: ORTHOPAEDIC SURGERY

## 2024-03-01 PROCEDURE — 6360000002 HC RX W HCPCS: Performed by: STUDENT IN AN ORGANIZED HEALTH CARE EDUCATION/TRAINING PROGRAM

## 2024-03-01 PROCEDURE — 94760 N-INVAS EAR/PLS OXIMETRY 1: CPT

## 2024-03-01 PROCEDURE — 80053 COMPREHEN METABOLIC PANEL: CPT

## 2024-03-01 PROCEDURE — 2580000003 HC RX 258: Performed by: ORTHOPAEDIC SURGERY

## 2024-03-01 PROCEDURE — 2720000010 HC SURG SUPPLY STERILE: Performed by: ORTHOPAEDIC SURGERY

## 2024-03-01 PROCEDURE — 36415 COLL VENOUS BLD VENIPUNCTURE: CPT

## 2024-03-01 PROCEDURE — 96376 TX/PRO/DX INJ SAME DRUG ADON: CPT

## 2024-03-01 PROCEDURE — 6370000000 HC RX 637 (ALT 250 FOR IP): Performed by: ORTHOPAEDIC SURGERY

## 2024-03-01 PROCEDURE — 82746 ASSAY OF FOLIC ACID SERUM: CPT

## 2024-03-01 PROCEDURE — 85025 COMPLETE CBC W/AUTO DIFF WBC: CPT

## 2024-03-01 PROCEDURE — 2580000003 HC RX 258: Performed by: ANESTHESIOLOGY

## 2024-03-01 PROCEDURE — 87086 URINE CULTURE/COLONY COUNT: CPT

## 2024-03-01 PROCEDURE — 2580000003 HC RX 258: Performed by: NURSE ANESTHETIST, CERTIFIED REGISTERED

## 2024-03-01 PROCEDURE — 2580000003 HC RX 258: Performed by: HOSPITALIST

## 2024-03-01 PROCEDURE — 83735 ASSAY OF MAGNESIUM: CPT

## 2024-03-01 PROCEDURE — 3600000004 HC SURGERY LEVEL 4 BASE: Performed by: ORTHOPAEDIC SURGERY

## 2024-03-01 PROCEDURE — 73502 X-RAY EXAM HIP UNI 2-3 VIEWS: CPT

## 2024-03-01 PROCEDURE — 2700000000 HC OXYGEN THERAPY PER DAY

## 2024-03-01 DEVICE — IMPLANTABLE DEVICE: Type: IMPLANTABLE DEVICE | Site: FEMUR | Status: FUNCTIONAL

## 2024-03-01 DEVICE — SCREW BNE L34MM DIA5MM TIB LT GRN TI ST CANN LOK FULL THRD: Type: IMPLANTABLE DEVICE | Site: FEMUR | Status: FUNCTIONAL

## 2024-03-01 RX ORDER — PROPOFOL 10 MG/ML
INJECTION, EMULSION INTRAVENOUS PRN
Status: DISCONTINUED | OUTPATIENT
Start: 2024-03-01 | End: 2024-03-01 | Stop reason: SDUPTHER

## 2024-03-01 RX ORDER — LORAZEPAM 0.5 MG/1
0.5 TABLET ORAL NIGHTLY
Status: DISCONTINUED | OUTPATIENT
Start: 2024-03-01 | End: 2024-03-06 | Stop reason: HOSPADM

## 2024-03-01 RX ORDER — POTASSIUM CHLORIDE 1500 MG/1
20 TABLET, EXTENDED RELEASE ORAL DAILY
COMMUNITY
Start: 2023-09-22

## 2024-03-01 RX ORDER — ONDANSETRON 2 MG/ML
INJECTION INTRAMUSCULAR; INTRAVENOUS PRN
Status: DISCONTINUED | OUTPATIENT
Start: 2024-03-01 | End: 2024-03-01 | Stop reason: SDUPTHER

## 2024-03-01 RX ORDER — METOPROLOL SUCCINATE 25 MG/1
25 TABLET, EXTENDED RELEASE ORAL DAILY
Status: DISCONTINUED | OUTPATIENT
Start: 2024-03-01 | End: 2024-03-06 | Stop reason: HOSPADM

## 2024-03-01 RX ORDER — ACETAMINOPHEN 325 MG/1
650 TABLET ORAL EVERY 6 HOURS PRN
Status: DISCONTINUED | OUTPATIENT
Start: 2024-03-01 | End: 2024-03-06 | Stop reason: HOSPADM

## 2024-03-01 RX ORDER — ONDANSETRON 2 MG/ML
4 INJECTION INTRAMUSCULAR; INTRAVENOUS EVERY 6 HOURS PRN
Status: DISCONTINUED | OUTPATIENT
Start: 2024-03-01 | End: 2024-03-02 | Stop reason: SDUPTHER

## 2024-03-01 RX ORDER — SODIUM CHLORIDE 0.9 % (FLUSH) 0.9 %
5-40 SYRINGE (ML) INJECTION EVERY 12 HOURS SCHEDULED
Status: DISCONTINUED | OUTPATIENT
Start: 2024-03-01 | End: 2024-03-01 | Stop reason: HOSPADM

## 2024-03-01 RX ORDER — TRAMADOL HYDROCHLORIDE 50 MG/1
50 TABLET ORAL EVERY 6 HOURS PRN
Status: ON HOLD | COMMUNITY
End: 2024-03-06

## 2024-03-01 RX ORDER — ROCURONIUM BROMIDE 10 MG/ML
INJECTION, SOLUTION INTRAVENOUS PRN
Status: DISCONTINUED | OUTPATIENT
Start: 2024-03-01 | End: 2024-03-01 | Stop reason: SDUPTHER

## 2024-03-01 RX ORDER — SODIUM CHLORIDE 0.9 % (FLUSH) 0.9 %
5-40 SYRINGE (ML) INJECTION PRN
Status: DISCONTINUED | OUTPATIENT
Start: 2024-03-01 | End: 2024-03-01 | Stop reason: HOSPADM

## 2024-03-01 RX ORDER — ESTRADIOL 0.1 MG/G
1 CREAM VAGINAL
Status: DISCONTINUED | OUTPATIENT
Start: 2024-03-01 | End: 2024-03-06 | Stop reason: HOSPADM

## 2024-03-01 RX ORDER — MORPHINE SULFATE 4 MG/ML
4 INJECTION, SOLUTION INTRAMUSCULAR; INTRAVENOUS ONCE
Status: DISCONTINUED | OUTPATIENT
Start: 2024-03-01 | End: 2024-03-01

## 2024-03-01 RX ORDER — RISPERIDONE 0.5 MG/1
0.25 TABLET ORAL NIGHTLY
Status: DISCONTINUED | OUTPATIENT
Start: 2024-03-01 | End: 2024-03-06 | Stop reason: HOSPADM

## 2024-03-01 RX ORDER — SODIUM CHLORIDE 9 MG/ML
INJECTION, SOLUTION INTRAVENOUS PRN
Status: DISCONTINUED | OUTPATIENT
Start: 2024-03-01 | End: 2024-03-06 | Stop reason: HOSPADM

## 2024-03-01 RX ORDER — ENOXAPARIN SODIUM 100 MG/ML
30 INJECTION SUBCUTANEOUS EVERY 24 HOURS
Status: DISCONTINUED | OUTPATIENT
Start: 2024-03-01 | End: 2024-03-06 | Stop reason: HOSPADM

## 2024-03-01 RX ORDER — PANTOPRAZOLE SODIUM 20 MG/1
20 TABLET, DELAYED RELEASE ORAL
Status: DISCONTINUED | OUTPATIENT
Start: 2024-03-01 | End: 2024-03-06 | Stop reason: HOSPADM

## 2024-03-01 RX ORDER — SODIUM CHLORIDE 9 MG/ML
INJECTION, SOLUTION INTRAVENOUS CONTINUOUS
Status: DISCONTINUED | OUTPATIENT
Start: 2024-03-01 | End: 2024-03-06

## 2024-03-01 RX ORDER — DOCUSATE SODIUM 100 MG/1
100 CAPSULE, LIQUID FILLED ORAL 2 TIMES DAILY
Status: DISCONTINUED | OUTPATIENT
Start: 2024-03-01 | End: 2024-03-06 | Stop reason: HOSPADM

## 2024-03-01 RX ORDER — OXYCODONE HYDROCHLORIDE 10 MG/1
10 TABLET ORAL EVERY 4 HOURS PRN
Status: DISCONTINUED | OUTPATIENT
Start: 2024-03-01 | End: 2024-03-06 | Stop reason: HOSPADM

## 2024-03-01 RX ORDER — EPHEDRINE SULFATE 50 MG/ML
INJECTION, SOLUTION INTRAVENOUS PRN
Status: DISCONTINUED | OUTPATIENT
Start: 2024-03-01 | End: 2024-03-01 | Stop reason: SDUPTHER

## 2024-03-01 RX ORDER — LEVOTHYROXINE SODIUM 0.03 MG/1
25 TABLET ORAL DAILY
Status: DISCONTINUED | OUTPATIENT
Start: 2024-03-01 | End: 2024-03-06 | Stop reason: HOSPADM

## 2024-03-01 RX ORDER — ONDANSETRON 4 MG/1
4 TABLET, FILM COATED ORAL EVERY 8 HOURS PRN
Status: DISCONTINUED | OUTPATIENT
Start: 2024-03-01 | End: 2024-03-05 | Stop reason: SDUPTHER

## 2024-03-01 RX ORDER — ASCORBIC ACID 500 MG
500 TABLET ORAL DAILY
Status: DISCONTINUED | OUTPATIENT
Start: 2024-03-01 | End: 2024-03-06 | Stop reason: HOSPADM

## 2024-03-01 RX ORDER — BUSPIRONE HYDROCHLORIDE 5 MG/1
5 TABLET ORAL 2 TIMES DAILY
COMMUNITY

## 2024-03-01 RX ORDER — SODIUM CHLORIDE 9 MG/ML
INJECTION, SOLUTION INTRAVENOUS CONTINUOUS
Status: ACTIVE | OUTPATIENT
Start: 2024-03-01 | End: 2024-03-03

## 2024-03-01 RX ORDER — LIDOCAINE HYDROCHLORIDE 10 MG/ML
INJECTION, SOLUTION EPIDURAL; INFILTRATION; INTRACAUDAL; PERINEURAL PRN
Status: DISCONTINUED | OUTPATIENT
Start: 2024-03-01 | End: 2024-03-01 | Stop reason: SDUPTHER

## 2024-03-01 RX ORDER — DIVALPROEX SODIUM 250 MG/1
250 TABLET, DELAYED RELEASE ORAL 3 TIMES DAILY
Status: DISCONTINUED | OUTPATIENT
Start: 2024-03-01 | End: 2024-03-06 | Stop reason: HOSPADM

## 2024-03-01 RX ORDER — FENTANYL CITRATE 50 UG/ML
INJECTION, SOLUTION INTRAMUSCULAR; INTRAVENOUS PRN
Status: DISCONTINUED | OUTPATIENT
Start: 2024-03-01 | End: 2024-03-01 | Stop reason: SDUPTHER

## 2024-03-01 RX ORDER — SODIUM CHLORIDE 0.9 % (FLUSH) 0.9 %
5-40 SYRINGE (ML) INJECTION PRN
Status: DISCONTINUED | OUTPATIENT
Start: 2024-03-01 | End: 2024-03-06 | Stop reason: HOSPADM

## 2024-03-01 RX ORDER — ONDANSETRON 4 MG/1
4 TABLET, FILM COATED ORAL EVERY 8 HOURS PRN
COMMUNITY

## 2024-03-01 RX ORDER — TOBRAMYCIN 3 MG/ML
1 SOLUTION/ DROPS OPHTHALMIC EVERY 6 HOURS SCHEDULED
Status: DISCONTINUED | OUTPATIENT
Start: 2024-03-01 | End: 2024-03-06 | Stop reason: HOSPADM

## 2024-03-01 RX ORDER — ASPIRIN 81 MG/1
81 TABLET, CHEWABLE ORAL DAILY
Status: DISCONTINUED | OUTPATIENT
Start: 2024-03-01 | End: 2024-03-06 | Stop reason: HOSPADM

## 2024-03-01 RX ORDER — ACETAMINOPHEN 650 MG/1
650 SUPPOSITORY RECTAL EVERY 6 HOURS PRN
Status: DISCONTINUED | OUTPATIENT
Start: 2024-03-01 | End: 2024-03-06 | Stop reason: HOSPADM

## 2024-03-01 RX ORDER — RISPERIDONE 0.25 MG/1
0.25 TABLET ORAL NIGHTLY
COMMUNITY
Start: 2023-09-22

## 2024-03-01 RX ORDER — DIVALPROEX SODIUM 250 MG/1
250 TABLET, DELAYED RELEASE ORAL 3 TIMES DAILY
COMMUNITY
Start: 2024-01-21

## 2024-03-01 RX ORDER — POTASSIUM CHLORIDE 20 MEQ/1
20 TABLET, EXTENDED RELEASE ORAL DAILY
Status: DISCONTINUED | OUTPATIENT
Start: 2024-03-01 | End: 2024-03-06 | Stop reason: HOSPADM

## 2024-03-01 RX ORDER — SODIUM CHLORIDE 9 MG/ML
INJECTION, SOLUTION INTRAVENOUS PRN
Status: DISCONTINUED | OUTPATIENT
Start: 2024-03-01 | End: 2024-03-01 | Stop reason: HOSPADM

## 2024-03-01 RX ORDER — NAPROXEN 250 MG/1
250 TABLET ORAL 2 TIMES DAILY PRN
COMMUNITY
Start: 2023-11-09

## 2024-03-01 RX ORDER — OXYCODONE HYDROCHLORIDE 5 MG/1
5 TABLET ORAL EVERY 4 HOURS PRN
Status: DISCONTINUED | OUTPATIENT
Start: 2024-03-01 | End: 2024-03-06 | Stop reason: HOSPADM

## 2024-03-01 RX ORDER — HYDROMORPHONE HYDROCHLORIDE 1 MG/ML
0.5 INJECTION, SOLUTION INTRAMUSCULAR; INTRAVENOUS; SUBCUTANEOUS
Status: DISCONTINUED | OUTPATIENT
Start: 2024-03-01 | End: 2024-03-06 | Stop reason: HOSPADM

## 2024-03-01 RX ORDER — TOBRAMYCIN 3 MG/ML
1 SOLUTION/ DROPS OPHTHALMIC
Status: DISCONTINUED | OUTPATIENT
Start: 2024-03-01 | End: 2024-03-01

## 2024-03-01 RX ORDER — LOPERAMIDE HYDROCHLORIDE 2 MG/1
2 CAPSULE ORAL 4 TIMES DAILY PRN
COMMUNITY

## 2024-03-01 RX ORDER — SODIUM CHLORIDE 0.9 % (FLUSH) 0.9 %
5-40 SYRINGE (ML) INJECTION EVERY 12 HOURS SCHEDULED
Status: DISCONTINUED | OUTPATIENT
Start: 2024-03-01 | End: 2024-03-06 | Stop reason: HOSPADM

## 2024-03-01 RX ORDER — MIRTAZAPINE 7.5 MG/1
3.75 TABLET, FILM COATED ORAL NIGHTLY
Status: DISCONTINUED | OUTPATIENT
Start: 2024-03-01 | End: 2024-03-06 | Stop reason: HOSPADM

## 2024-03-01 RX ORDER — ONDANSETRON 4 MG/1
4 TABLET, ORALLY DISINTEGRATING ORAL EVERY 8 HOURS PRN
Status: DISCONTINUED | OUTPATIENT
Start: 2024-03-01 | End: 2024-03-06 | Stop reason: HOSPADM

## 2024-03-01 RX ORDER — MORPHINE SULFATE 2 MG/ML
2 INJECTION, SOLUTION INTRAMUSCULAR; INTRAVENOUS ONCE
Status: COMPLETED | OUTPATIENT
Start: 2024-03-01 | End: 2024-03-01

## 2024-03-01 RX ORDER — SODIUM CHLORIDE 9 MG/ML
INJECTION, SOLUTION INTRAVENOUS CONTINUOUS
Status: DISCONTINUED | OUTPATIENT
Start: 2024-03-01 | End: 2024-03-01

## 2024-03-01 RX ORDER — DIVALPROEX SODIUM 125 MG/1
125 TABLET, DELAYED RELEASE ORAL 3 TIMES DAILY
COMMUNITY
Start: 2024-01-21

## 2024-03-01 RX ORDER — VANCOMYCIN HYDROCHLORIDE 1 G/20ML
INJECTION, POWDER, LYOPHILIZED, FOR SOLUTION INTRAVENOUS PRN
Status: DISCONTINUED | OUTPATIENT
Start: 2024-03-01 | End: 2024-03-01 | Stop reason: SDUPTHER

## 2024-03-01 RX ORDER — POTASSIUM CHLORIDE 7.45 MG/ML
10 INJECTION INTRAVENOUS PRN
Status: DISCONTINUED | OUTPATIENT
Start: 2024-03-01 | End: 2024-03-05 | Stop reason: SDUPTHER

## 2024-03-01 RX ORDER — MAGNESIUM SULFATE IN WATER 40 MG/ML
2000 INJECTION, SOLUTION INTRAVENOUS PRN
Status: DISCONTINUED | OUTPATIENT
Start: 2024-03-01 | End: 2024-03-06 | Stop reason: HOSPADM

## 2024-03-01 RX ORDER — POTASSIUM CHLORIDE 7.45 MG/ML
10 INJECTION INTRAVENOUS PRN
Status: DISCONTINUED | OUTPATIENT
Start: 2024-03-01 | End: 2024-03-06 | Stop reason: HOSPADM

## 2024-03-01 RX ORDER — POTASSIUM CHLORIDE 20 MEQ/1
40 TABLET, EXTENDED RELEASE ORAL PRN
Status: DISCONTINUED | OUTPATIENT
Start: 2024-03-01 | End: 2024-03-06 | Stop reason: HOSPADM

## 2024-03-01 RX ORDER — METOCLOPRAMIDE HYDROCHLORIDE 5 MG/ML
10 INJECTION INTRAMUSCULAR; INTRAVENOUS
Status: DISCONTINUED | OUTPATIENT
Start: 2024-03-01 | End: 2024-03-01 | Stop reason: HOSPADM

## 2024-03-01 RX ORDER — HYDROMORPHONE HYDROCHLORIDE 1 MG/ML
0.25 INJECTION, SOLUTION INTRAMUSCULAR; INTRAVENOUS; SUBCUTANEOUS EVERY 5 MIN PRN
Status: DISCONTINUED | OUTPATIENT
Start: 2024-03-01 | End: 2024-03-01 | Stop reason: HOSPADM

## 2024-03-01 RX ORDER — BUSPIRONE HYDROCHLORIDE 5 MG/1
5 TABLET ORAL 2 TIMES DAILY
Status: DISCONTINUED | OUTPATIENT
Start: 2024-03-01 | End: 2024-03-06 | Stop reason: HOSPADM

## 2024-03-01 RX ORDER — MORPHINE SULFATE 2 MG/ML
2 INJECTION, SOLUTION INTRAMUSCULAR; INTRAVENOUS
Status: DISCONTINUED | OUTPATIENT
Start: 2024-03-01 | End: 2024-03-04

## 2024-03-01 RX ORDER — SODIUM CHLORIDE, SODIUM LACTATE, POTASSIUM CHLORIDE, CALCIUM CHLORIDE 600; 310; 30; 20 MG/100ML; MG/100ML; MG/100ML; MG/100ML
INJECTION, SOLUTION INTRAVENOUS CONTINUOUS
Status: DISCONTINUED | OUTPATIENT
Start: 2024-03-01 | End: 2024-03-01 | Stop reason: HOSPADM

## 2024-03-01 RX ORDER — POLYETHYLENE GLYCOL 3350 17 G/17G
17 POWDER, FOR SOLUTION ORAL DAILY
Status: DISCONTINUED | OUTPATIENT
Start: 2024-03-01 | End: 2024-03-06 | Stop reason: HOSPADM

## 2024-03-01 RX ORDER — DIVALPROEX SODIUM 125 MG/1
125 TABLET, DELAYED RELEASE ORAL 3 TIMES DAILY
Status: DISCONTINUED | OUTPATIENT
Start: 2024-03-01 | End: 2024-03-06 | Stop reason: HOSPADM

## 2024-03-01 RX ORDER — IPRATROPIUM BROMIDE AND ALBUTEROL SULFATE 2.5; .5 MG/3ML; MG/3ML
1 SOLUTION RESPIRATORY (INHALATION)
Status: DISCONTINUED | OUTPATIENT
Start: 2024-03-01 | End: 2024-03-01 | Stop reason: HOSPADM

## 2024-03-01 RX ORDER — TRAMADOL HYDROCHLORIDE 50 MG/1
50 TABLET ORAL EVERY 6 HOURS PRN
Status: DISCONTINUED | OUTPATIENT
Start: 2024-03-01 | End: 2024-03-06 | Stop reason: HOSPADM

## 2024-03-01 RX ORDER — SODIUM CHLORIDE, SODIUM LACTATE, POTASSIUM CHLORIDE, CALCIUM CHLORIDE 600; 310; 30; 20 MG/100ML; MG/100ML; MG/100ML; MG/100ML
INJECTION, SOLUTION INTRAVENOUS CONTINUOUS PRN
Status: DISCONTINUED | OUTPATIENT
Start: 2024-03-01 | End: 2024-03-01 | Stop reason: SDUPTHER

## 2024-03-01 RX ORDER — ONDANSETRON 2 MG/ML
4 INJECTION INTRAMUSCULAR; INTRAVENOUS EVERY 6 HOURS PRN
Status: DISCONTINUED | OUTPATIENT
Start: 2024-03-01 | End: 2024-03-06 | Stop reason: HOSPADM

## 2024-03-01 RX ORDER — NAPROXEN 250 MG/1
250 TABLET ORAL 2 TIMES DAILY PRN
Status: DISCONTINUED | OUTPATIENT
Start: 2024-03-01 | End: 2024-03-06 | Stop reason: HOSPADM

## 2024-03-01 RX ORDER — LOPERAMIDE HYDROCHLORIDE 2 MG/1
2 CAPSULE ORAL 4 TIMES DAILY PRN
Status: DISCONTINUED | OUTPATIENT
Start: 2024-03-01 | End: 2024-03-06 | Stop reason: HOSPADM

## 2024-03-01 RX ORDER — POLYETHYLENE GLYCOL 3350 17 G/17G
17 POWDER, FOR SOLUTION ORAL DAILY PRN
Status: DISCONTINUED | OUTPATIENT
Start: 2024-03-01 | End: 2024-03-01

## 2024-03-01 RX ORDER — LANOLIN ALCOHOL/MO/W.PET/CERES
3 CREAM (GRAM) TOPICAL NIGHTLY PRN
Status: DISCONTINUED | OUTPATIENT
Start: 2024-03-01 | End: 2024-03-06 | Stop reason: HOSPADM

## 2024-03-01 RX ORDER — ENOXAPARIN SODIUM 100 MG/ML
40 INJECTION SUBCUTANEOUS DAILY
Status: DISCONTINUED | OUTPATIENT
Start: 2024-03-02 | End: 2024-03-01

## 2024-03-01 RX ADMIN — ACETAMINOPHEN 325MG 650 MG: 325 TABLET ORAL at 18:06

## 2024-03-01 RX ADMIN — SODIUM CHLORIDE: 9 INJECTION, SOLUTION INTRAVENOUS at 14:15

## 2024-03-01 RX ADMIN — ONDANSETRON 4 MG: 2 INJECTION INTRAMUSCULAR; INTRAVENOUS at 12:20

## 2024-03-01 RX ADMIN — TOBRAMYCIN OPHTHALMIC SOLUTION 1 DROP: 3 SOLUTION/ DROPS OPHTHALMIC at 17:44

## 2024-03-01 RX ADMIN — DIVALPROEX SODIUM 125 MG: 125 TABLET, DELAYED RELEASE ORAL at 14:55

## 2024-03-01 RX ADMIN — MORPHINE SULFATE 2 MG: 2 INJECTION, SOLUTION INTRAMUSCULAR; INTRAVENOUS at 00:38

## 2024-03-01 RX ADMIN — EPHEDRINE SULFATE 10 MG: 50 INJECTION INTRAMUSCULAR; INTRAVENOUS; SUBCUTANEOUS at 12:26

## 2024-03-01 RX ADMIN — EPHEDRINE SULFATE 10 MG: 50 INJECTION INTRAMUSCULAR; INTRAVENOUS; SUBCUTANEOUS at 12:25

## 2024-03-01 RX ADMIN — DIVALPROEX SODIUM 250 MG: 250 TABLET, DELAYED RELEASE ORAL at 14:57

## 2024-03-01 RX ADMIN — BUSPIRONE HYDROCHLORIDE 5 MG: 5 TABLET ORAL at 08:32

## 2024-03-01 RX ADMIN — PANTOPRAZOLE SODIUM 20 MG: 20 TABLET, DELAYED RELEASE ORAL at 08:32

## 2024-03-01 RX ADMIN — DOCUSATE SODIUM 100 MG: 100 CAPSULE, LIQUID FILLED ORAL at 22:08

## 2024-03-01 RX ADMIN — SODIUM CHLORIDE: 9 INJECTION, SOLUTION INTRAVENOUS at 06:07

## 2024-03-01 RX ADMIN — MORPHINE SULFATE 2 MG: 2 INJECTION, SOLUTION INTRAMUSCULAR; INTRAVENOUS at 04:19

## 2024-03-01 RX ADMIN — VANCOMYCIN HYDROCHLORIDE 1000 MG: 1 INJECTION, POWDER, LYOPHILIZED, FOR SOLUTION INTRAVENOUS at 11:49

## 2024-03-01 RX ADMIN — LEVOTHYROXINE SODIUM 25 MCG: 25 TABLET ORAL at 08:32

## 2024-03-01 RX ADMIN — LIDOCAINE HYDROCHLORIDE 50 MG: 10 INJECTION, SOLUTION EPIDURAL; INFILTRATION; INTRACAUDAL; PERINEURAL at 11:44

## 2024-03-01 RX ADMIN — BUSPIRONE HYDROCHLORIDE 5 MG: 5 TABLET ORAL at 22:09

## 2024-03-01 RX ADMIN — SODIUM CHLORIDE, SODIUM LACTATE, POTASSIUM CHLORIDE, AND CALCIUM CHLORIDE: 600; 310; 30; 20 INJECTION, SOLUTION INTRAVENOUS at 11:34

## 2024-03-01 RX ADMIN — ENOXAPARIN SODIUM 30 MG: 100 INJECTION SUBCUTANEOUS at 14:56

## 2024-03-01 RX ADMIN — SODIUM CHLORIDE, PRESERVATIVE FREE 10 ML: 5 INJECTION INTRAVENOUS at 22:09

## 2024-03-01 RX ADMIN — POTASSIUM BICARBONATE 40 MEQ: 782 TABLET, EFFERVESCENT ORAL at 14:56

## 2024-03-01 RX ADMIN — MIRTAZAPINE 3.75 MG: 7.5 TABLET, FILM COATED ORAL at 22:09

## 2024-03-01 RX ADMIN — TOBRAMYCIN OPHTHALMIC SOLUTION 1 DROP: 3 SOLUTION/ DROPS OPHTHALMIC at 22:09

## 2024-03-01 RX ADMIN — DIVALPROEX SODIUM 125 MG: 125 TABLET, DELAYED RELEASE ORAL at 08:32

## 2024-03-01 RX ADMIN — SODIUM CHLORIDE, POTASSIUM CHLORIDE, SODIUM LACTATE AND CALCIUM CHLORIDE: 600; 310; 30; 20 INJECTION, SOLUTION INTRAVENOUS at 11:23

## 2024-03-01 RX ADMIN — SODIUM CHLORIDE: 9 INJECTION, SOLUTION INTRAVENOUS at 18:21

## 2024-03-01 RX ADMIN — ROCURONIUM BROMIDE 50 MG: 10 INJECTION, SOLUTION INTRAVENOUS at 11:44

## 2024-03-01 RX ADMIN — TRAMADOL HYDROCHLORIDE 50 MG: 50 TABLET, COATED ORAL at 18:12

## 2024-03-01 RX ADMIN — MORPHINE SULFATE 1 MG: 2 INJECTION, SOLUTION INTRAMUSCULAR; INTRAVENOUS at 08:40

## 2024-03-01 RX ADMIN — FENTANYL CITRATE 10 MCG: 0.05 INJECTION, SOLUTION INTRAMUSCULAR; INTRAVENOUS at 12:41

## 2024-03-01 RX ADMIN — METOPROLOL SUCCINATE 25 MG: 25 TABLET, EXTENDED RELEASE ORAL at 08:32

## 2024-03-01 RX ADMIN — ACETAMINOPHEN 325MG 650 MG: 325 TABLET ORAL at 08:32

## 2024-03-01 RX ADMIN — PROPOFOL 15 MG: 10 INJECTION, EMULSION INTRAVENOUS at 11:44

## 2024-03-01 RX ADMIN — SUGAMMADEX 100 MG: 100 INJECTION, SOLUTION INTRAVENOUS at 12:38

## 2024-03-01 ASSESSMENT — PAIN DESCRIPTION - LOCATION
LOCATION: HIP
LOCATION: LEG
LOCATION: HIP
LOCATION: HIP

## 2024-03-01 ASSESSMENT — PAIN - FUNCTIONAL ASSESSMENT
PAIN_FUNCTIONAL_ASSESSMENT: ADULT NONVERBAL PAIN SCALE (NPVS)
PAIN_FUNCTIONAL_ASSESSMENT: PREVENTS OR INTERFERES WITH MANY ACTIVE NOT PASSIVE ACTIVITIES

## 2024-03-01 ASSESSMENT — PAIN SCALES - GENERAL
PAINLEVEL_OUTOF10: 7
PAINLEVEL_OUTOF10: 5
PAINLEVEL_OUTOF10: 5

## 2024-03-01 ASSESSMENT — PAIN DESCRIPTION - ORIENTATION
ORIENTATION: LEFT

## 2024-03-01 ASSESSMENT — LIFESTYLE VARIABLES: SMOKING_STATUS: 0

## 2024-03-01 NOTE — ACP (ADVANCE CARE PLANNING)
Advance Care Planning     Advance Care Planning Inpatient Note  [facility] Department    Today's Date: 3/1/2024  Unit: [facility] OR    Received request from Other: Palliative care .  Upon review of chart and communication with care team, patient's decision making abilities are not in question.. Pt is unable to make her own decisions at this time and her POA's are making all medical decisions. Granddaughter  was/were present in the room during visit.    Goals of ACP Conversation:  Discuss advance care planning documents    Health Care Decision Makers:       Primary Decision Maker: OLEGARIOMICHELLE - Child - [phone]    Primary Decision Maker: JORDAN PAULINO - Grandchild - [phone]    Supplemental (Other) Decision Maker: OlegarioAshu - Spouse - [phone]    Supplemental (Other) Decision Maker: Rosa Zepeda - Brother/Sister - [phone]  Summary:  Verified Documents    Advance Care Planning Documents (Patient Wishes):  Healthcare Power of /Advance Directive Appointment of Health Care Agent Pt also has an EMS DNR.    Assessment:  Pt is a 89 year old female with a  a few years younger than she is. They reside in [facility]. She is in the memory care area. Pt does not speak due to advanced dementia. She fractured a hip last night trying to get out of bed. Sitter caught her and did not let her fall to the floor, per her granddaughter. Pt is to have surgery this afternoon. She is a DNR and will not have chest compressions if her heart stops during surgery, per POA. If able pt will return to [facility].     Interventions:  Confirmed documents, decision makers, and code status.     Care Preferences Communicated:     Hospitalization:  If the patient's health worsens and it becomes clear that the chance of recovery is unlikely,     the patient wants hospitalization.    Ventilation:   If the patient, in their present state of health, suddenly became very ill and unable to breathe on their own,     the patient would NOT  desire the use of a ventilator (breathing machine).    If their health worsens and it becomes clear that the change of recovery is unlikely,     the patient would NOT desire the use of a ventilator (breathing machine).    Resuscitation:  In the event the patient's heart stopped as a result of an underlying serious health condition, the patient communicates a preference for      a natural death (no CPR).    Outcomes/Plan:  ACP Discussion: Completed    Electronically signed by Mary Behrens, Chaplain on 3/1/2024 at 10:57 AM

## 2024-03-01 NOTE — CONSULTS
Orthopaedic Surgery  Inpatient Consultation    Brandy Melvin (11/10/1926)  3/1/2024    Requesting Physician: Dr Lemos  Consulting Physician: Dr Delgadillo  2/29/2024 10:12 PM    CHIEF COMPLAINT:  left hip fx s/p fall    History Obtained From:  patient, family member - daughter, MICHELL    HISTORY OF PRESENT ILLNESS:     Brandy Melvin [facility] dementia, CVA, aphasia, anxiety, hypothyroidism, GERD, presents to [facility] after mechanical fall, she fell out of her bed at [facility] living [facility] after which she developed left hip pain, pain is severe, constant, worse with any movement. No head trauma. No complains of chest pain, dyspnea, cough, wheezing, abdominal pain, N/V/D. No dysuria or urinary frequency.      CT pelvis show acute intertrochanteric fracture of the proximal left femur, subacute healing fractures of the left pubic rami, bilateral sacral insufficiency fractures     Pt was sleeping this am on exam. Decided not to wake  at the request of family.        Past Medical History:        Diagnosis Date    Anxiety 10/02/2019    Chest tightness or pressure 08/26/2013 8/26/2013  lexiscan negative for myocardial ischemia, EF 61%    Edema     Essential and other specified forms of tremor     Generalized anxiety disorder     HTN (hypertension)     Hyperlipidemia     Hypertension     Insomnia, unspecified     Neuropathy     both legs up to both hips    Other and unspecified ovarian cyst     Other left bundle branch block     Palliative care patient 06/23/2022    Pure hypercholesterolemia     Restless legs syndrome (RLS)     Solitary cyst of breast     Spinal stenosis, lumbar region, without neurogenic claudication     Unspecified hypothyroidism     Urinary frequency     UTI (urinary tract infection) 03/25/2022       Past Surgical History:        Procedure Laterality Date    APPENDECTOMY      CARDIAC CATHETERIZATION  5/26/15  MDL    with aortic root injection. EF 60%    CATARACT REMOVAL      CHOLECYSTECTOMY  Tremor     Lumbar spinal stenosis     Vertebrobasilar artery syndrome     Anxiety     Palliative care patient     History of CVA     Aphasia     Acquired hypothyroidism       Plan:    Tfn this afternoon, risks d/w fanily to include but not limited to post anesthesia issues, bleeding ,infection, swelling, dvt, NVI and death. They understand and wish to proceed.    Approximately 30 minutes was spent face to face with the patient discussing the current condition. All risks and benefits of treatment options were discussed at great length and all expressed understanding and agreement with medial reasoning.    Elijah Ybarra PA-C 03/01/24 9:00 AM

## 2024-03-01 NOTE — PROGRESS NOTES
for input(s): \"NITRITE\", \"COLORU\", \"PHUR\", \"LABCAST\", \"WBCUA\", \"RBCUA\", \"MUCUS\", \"TRICHOMONAS\", \"YEAST\", \"BACTERIA\", \"CLARITYU\", \"SPECGRAV\", \"LEUKOCYTESUR\", \"UROBILINOGEN\", \"BILIRUBINUR\", \"BLOODU\", \"GLUCOSEU\", \"AMORPHOUS\" in the last 72 hours.    Invalid input(s): \"KETONESU\"  A1C: No results for input(s): \"LABA1C\" in the last 72 hours.  ABG:No results for input(s): \"PHART\", \"FFE3CSM\", \"PO2ART\", \"JOS6RJG\", \"BEART\", \"HGBAE\", \"F5IZEBAZ\", \"CARBOXHGBART\" in the last 72 hours.    RAD:   CT PELVIS WO CONTRAST Additional Contrast? None    Result Date: 3/1/2024  Impression: 1.  Acute intertrochanteric fracture of the proximal left femur. 2.  Subacute healing fractures of the left pubic rami. 3.  Bilateral sacral insufficiency fractures  All CT scans are performed using dose optimization techniques as appropriate to the performed exam and include at least one of the following: Automated exposure control, adjustment of the mA and/or kV according to size, and the use of iterative reconstruction technique.  ______________________________________ Electronically signed by: VALDEMAR WILDE M.D. Date:     03/01/2024 Time:    23:49     XR HIP 2-3 VW W PELVIS LEFT    Result Date: 2/29/2024  Impression: Questionable left-sided pelvic fractures.  Recommend further evaluation with pelvic CT   ______________________________________ Electronically signed by: VALDEMAR WILDE M.D. Date:     02/29/2024 Time:    22:50          Assessment/Plan   Principal Problem:    Closed left hip fracture, initial encounter ([facility])  Active Problems:    Closed fracture of femur, intertrochanteric, left, initial encounter ([facility])  Resolved Problems:    * No resolved hospital problems. *    Principal Problem:  Closed fracture of femur, intertrochanteric, left, initial encounter ([facility])  Orthopedic consult, completed cephalomedullary nailing of left intertrochanteric femur fracture 3/1/2024  IVF per order  Monitor daily labs  Pain control  Fall precautions  Bed  alarm  PT/OT per Ortho recommendation  Case management to assist with discharge planning    Active Problems:  Sacral stage 3 pressure ulcer-full thickness with necrotic tissue present   Wound care evaluation completed, recommendation treatment plan   General Surgery consult for evaluation and recommendation    Bilateral eye redness and green drainage   Tobramycin gtts per order    Acquired hypothyroidism   TSH 6.6   FreeT4, 1.26, normal     Patient active problem with:  Hypertension  Ataxia  Tremor  Lumbar spinal stenos  Vertebrobasilar artery syndrome  Anxiety  Palliative care patient  History of CVA  Aphasia   Noted   Continue current home meds per order    Antibiotic:  Vancomycin-surgical prophylasis    DVT Prophylaxis:  Lovenox    GI prophylaxis:  none    Lenin Buck, APRN - CNP, 3/1/2024 8:38 AM

## 2024-03-01 NOTE — OP NOTE
Operative Note          Pt Name: Brandy Melvin  MRN: [id number]  YOB: 1926  Date: 3/1/2024    PREOPERATIVE DIAGNOSIS: Left intertrochanteric femur fracture       POSTOPERATIVE DIAGNOSIS: Left intertrochanteric femur fracture     PROCEDURE: Cephalomedullary nailing of left intertrochanteric femur fracture.     SURGEON: Renato Delgadillo MD     ASSISTANT: none     ANESTHESIA: General endotracheal anesthesia.     ESTIMATED BLOOD LOSS: 100 mL.       COMPLICATIONS: None.     CONDITION: Stable.       BRIEF HISTORY: This is a 89-year-old female who fell yesterday onto the left hip. The patient presented to the Emergency Department where x-rays demonstrated an intertrochanteric femur fracture.  Based on this, decision made to take the patient to the operating room for a left hip cephalomedullary nailing.     DESCRIPTION OF PROCEDURE: The patient was interviewed in the preanesthesia  area where her left hip was marked with a marking pen. The patient was then  taken to the operative suite where general endotracheal anesthesia was  performed by the anesthesia team. A timeout was then called, confirming the  patient, the operative site as well as the planned procedure and the  administration of antibiotics. The patient was then positioned on a standard  fracture table with the non-operative limb placed in a well-padded well leg  funk, taking care to pad the peroneal nerve. The operative limb was placed  in a well-padded traction boot, and she was placed over a padded perineal  post. Initially, gentle traction was placed across the limb and care was  taken to make sure that the patella was facing directly anterior.    Intraoperative fluoroscopy was then obtained to confirm our reduction. The  patient was then prepped and draped in a standard sterile fashion using  ChloraPrep.     Fluoroscopy was brought in to localize the tip of the trochanter. An incision  was then made from the tip of the trochanter approximately 5 cm  proximally.    Careful dissection was then carried down through the subcutaneous tissue and  the glutofemoral fascia down to the tip of the trochanter. A guidepin was  then placed at the tip of the trochanter and advanced down the femur. This  was checked on the AP and lateral views to be certain it was at the tip of the  trochanter on the center of the canal on the lateral view. We then brought  the Synthes entry reamer in to enter the femoral canal.     A size 11 short Synthes TFN nail was then placed down the shaft of the femur.    The outrigger guide was placed and a small incision was made laterally and  careful dissection was carried down to the lateral cortex of the femur. The  outrigger sleeve was then placed against the lateral cortex of the femur  through our incision. A guidepin was then placed into the center-center  position on the AP and lateral views of the femoral head. I then measured  for our helical blade, reamed the outer cortex and reamed 5 mm short of our  measured distance into the femoral head. I then placed the helical blade in  a standard fashion into the subchondral bone approximately 5 mm from the tip  of the subchondral bone on the AP and lateral views in the center of the  femoral head. The nail was then locked proximally.       Our attention was then turned distally to the interlocking screw. A standard  bicortical interlocking screw was placed through the sleeve of the outrigger  guide. The outrigger was then removed. AP and lateral views confirmed an  appropriate reduction of the fracture with appropriate placement of hardware.     Wound was then copiously irrigated with bulb syringe lavage. The deep tissue  was approximated with Vicryl sutures. Skin was closed with a superglue and mesh. The  patient was placed in a sterile dressing. She awoke from anesthesia without  difficulty and transferred to the PACU in stable condition. All sponge,  needle, and instrument counts were correct

## 2024-03-01 NOTE — TELEPHONE ENCOUNTER
Called and spoke to Ximena Arteaga today after Mrs Arteaga missed her appointment.  Mrs Arteaga is in [facility] which has their own APRN.      Yesterday she took a fall and broke her hip. She had surgery this morning to put pins in.  She is doing fine this afternoon.

## 2024-03-01 NOTE — PROGRESS NOTES
Palliative Care/Spiritual Care: Met with pt and pt's granddaughter Minda Pacheco to initiate palliative care. Pt is not speaking due to advanced dementia. She is in an [facility] memory care with 24/7 sitters for safety. Pt's granddaughter says she had fallen many times. She currently has a broken hip, per her granddaughter and will be having surgery this afternoon. Pt has other diagnoses in past medical history and is known to palliative care.         Advance Directives: Pt has a POA. His granddaughter Minda Pacheco and WILLY Melvin are named as POA's and primary decision makers. Pt is a DNR and family does not want the pt to have CPR or Ventilator. Pt will have a ventilator during surgery but her POA Minda says if her heart stops in surgery she is not to receive chest compressions. She says she signed to acknowledge this. SEE ACP NOTE.         Pain/other symptoms: Pt is having pain and is being given pain medication.         Social/Spiritual: Pt is Jewish, and her  is a retired Jewish .         Pt/family discussion r/t goals: Pt lives at [facility]. She and her  live there. She is in the memory care area. She has 24/7 sitters for safety. She is total care. She can feed herself but needs assistance with all other daily living skills. Pt is having surgery today and the goal is for her to return to [facility] after surgery maybe tomorrow, per her granddaughter.       Provided spiritual care with support, sustaining presence, a listening ear, and prayer. Pt's granddaughter expressed gratitude for spiritual care.     Electronically signed by Mary Behrens on 3/1/2024 at 10:57 AM

## 2024-03-01 NOTE — PROGRESS NOTES
When going over surgical consents, the patient's granddaughter, who is also her POA, stated the patient is a DNR. The granddaughter provided me with a copy of the DNR that I placed in the patient's chart. I messaged Lenin OQUENDO to see if we could get her code status changed. Lenin messaged back that we could change it as long as the family provided paperwork.
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
## 2024-03-01 NOTE — ANESTHESIA PRE PROCEDURE
chloride 10 mEq/100 mL IVPB (Peripheral Line)  10 mEq IntraVENous PRN Loli Moreno MD       • [MAR Hold] magnesium sulfate 2000 mg in 50 mL IVPB premix  2,000 mg IntraVENous PRN Loli Moreno MD       • [MAR Hold] enoxaparin (LOVENOX) injection 40 mg  40 mg SubCUTAneous Daily Loli Moreno MD       • [MAR Hold] ondansetron (ZOFRAN-ODT) disintegrating tablet 4 mg  4 mg Oral Q8H PRN Loli Moreno MD        Or   • [MAR Hold] ondansetron (ZOFRAN) injection 4 mg  4 mg IntraVENous Q6H PRN Loli Moreno MD       • [MAR Hold] acetaminophen (TYLENOL) tablet 650 mg  650 mg Oral Q6H PRN Loli Moreno MD   650 mg at 03/01/24 0832    Or   • [MAR Hold] acetaminophen (TYLENOL) suppository 650 mg  650 mg Rectal Q6H PRN Loli Moreno MD       • [MAR Hold] 0.9 % sodium chloride infusion   IntraVENous Continuous Loli Moreno MD 75 mL/hr at 03/01/24 0607 New Bag at 03/01/24 0607   • [MAR Hold] potassium chloride 10 mEq/100 mL IVPB (Peripheral Line)  10 mEq IntraVENous PRN Loli Moreno MD       • [MAR Hold] melatonin tablet 3 mg  3 mg Oral Nightly PRN Loli Moreno MD       • [MAR Hold] morphine (PF) injection 2 mg  2 mg IntraVENous Q2H PRN Loli Moreno MD   1 mg at 03/01/24 0840   • [MAR Hold] aspirin chewable tablet 81 mg  81 mg Oral Daily Loli Moreno MD       • [MAR Hold] busPIRone (BUSPAR) tablet 5 mg  5 mg Oral BID Loli Moreno MD   5 mg at 03/01/24 0832   • [MAR Hold] divalproex (DEPAKOTE) DR tablet 125 mg  125 mg Oral TID Loli Moreno MD   125 mg at 03/01/24 0832   • [MAR Hold] docusate sodium (COLACE) capsule 100 mg  100 mg Oral BID Loli Moreno MD       • [MAR Hold] levothyroxine (SYNTHROID) tablet 25 mcg  25 mcg Oral Daily Loli Moreno MD   25 mcg at 03/01/24 0832   • [MAR Hold] loperamide (IMODIUM) capsule 2 mg  2 mg Oral 4x Daily PRN

## 2024-03-01 NOTE — ED NOTES
Pt family at bedside. Pt family went out to ER registration to make complaints about how pt has been waiting for a room and no admission. This nurse informed Pt family member the hospitalist had been in multiple emergent situations and was working towards the admission order. Pt family stated she would like to place a complaint formally. Clinical house Kavya, notified of situation.

## 2024-03-01 NOTE — H&P
History and Physical    Patient Name:  Brandy Melvin    :  11/10/1926    Chief Complaint:   Left hip pain    History of Present Illness:   Brandy Melvin iwith PMH dementia, CVA, aphasia, anxiety, hypothyroidism, GERD, presents to [facility] after mechanical fall, she fell out of her bed at [facility] living facility after which she developed left hip pain, pain is severe, constant, worse with any movement. No head trauma. No complains of chest pain, dyspnea, cough, wheezing, abdominal pain, N/V/D. No dysuria or urinary frequency.     CT pelvis show acute intertrochanteric fracture of the proximal left femur, subacute healing fractures of the left pubic rami, bilateral sacral insufficiency fractures   Past Medical History:   has a past medical history of Anxiety, Chest tightness or pressure, Edema, Essential and other specified forms of tremor, Generalized anxiety disorder, HTN (hypertension), Hyperlipidemia, Hypertension, Insomnia, unspecified, Neuropathy, Other and unspecified ovarian cyst, Other left bundle branch block, Palliative care patient, Pure hypercholesterolemia, Restless legs syndrome (RLS), Solitary cyst of breast, Spinal stenosis, lumbar region, without neurogenic claudication, Unspecified hypothyroidism, Urinary frequency, and UTI (urinary tract infection).    Surgical History:   has a past surgical history that includes Hemorrhoid surgery; Cataract removal; Cholecystectomy; Hysterectomy; Appendectomy; Cardiac catheterization (5/26/15  MDL); Nerve Block Lumb Facet Level 1 Bilateral (Bilateral, 2016); and Breast cyst incision and drainage.     Social History:   reports that she has never smoked. She has never used smokeless tobacco. She reports that she does not drink alcohol and does not use drugs.     Family History:  family history includes Other in her father.     Medications:  Prior to Admission medications    Medication Sig Start Date End Date Taking? Authorizing Provider

## 2024-03-01 NOTE — ANESTHESIA POSTPROCEDURE EVALUATION
Department of Anesthesiology  Postprocedure Note    Patient: Brandy Melvin  MRN: [id number]  YOB: 1926  Date of evaluation: 3/1/2024    Procedure Summary       Date: 03/01/24 Room / Location: [address]    Anesthesia Start: 1134 Anesthesia Stop: 1256    Procedure: LEFT SHORT TFN (Left: Thigh) Diagnosis:       Closed fracture of femur, intertrochanteric, left, initial encounter ([facility])      (Closed fracture of femur, intertrochanteric, left, initial encounter ([facility]) [S72.142A])    Surgeons: Renato Delgadillo MD Responsible Provider: Tirso Bobby APRN - CRNA    Anesthesia Type: general ASA Status: 4            Anesthesia Type: No value filed.    Rosa Phase I: Rosa Score: 5    Rosa Phase II:      Anesthesia Post Evaluation    Patient location during evaluation: bedside  Patient participation: complete - patient participated  Level of consciousness: awake and alert  Pain score: 0  Airway patency: patent  Nausea & Vomiting: no nausea  Cardiovascular status: hemodynamically stable  Respiratory status: acceptable  Hydration status: euvolemic  Comments: BP (!) 162/84   Pulse 78   Temp 97.4 °F (36.3 °C)   Resp 14   Wt 35.8 kg (79 lb)   LMP  (LMP Unknown)   SpO2 94%   BMI 14.45 kg/m²       No notable events documented.
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
## 2024-03-01 NOTE — PROGRESS NOTES
4 Eyes Skin Assessment     NAME:  Brandy Melvin  YOB: 1926  MEDICAL RECORD NUMBER:  [id number]    The patient is being assessed for  Admission    I agree that at least one RN has performed a thorough Head to Toe Skin Assessment on the patient. ALL assessment sites listed below have been assessed.      Areas assessed by both nurses:    Head, Face, Ears, Shoulders, Back, Chest, Arms, Elbows, Hands, Sacrum. Buttock, Coccyx, Ischium, and Legs. Feet and Heels        Does the Patient have a Wound? No noted wound(s)       Albino Prevention initiated by RN: No  Wound Care Orders initiated by RN: No    Pressure Injury (Stage 3,4, Unstageable, DTI, NWPT, and Complex wounds) if present, place Wound referral order by RN under : No    New Ostomies, if present place, Ostomy referral order under : No     Nurse 1 eSignature: Electronically signed by Zenaida Fong RN on 3/1/24 at 4:49 AM CST    **SHARE this note so that the co-signing nurse can place an eSignature**    Nurse 2 eSignature: Electronically signed by Joselyn Valdez RN on 3/1/24 at 4:50 AM CST

## 2024-03-02 LAB
ANION GAP SERPL CALCULATED.3IONS-SCNC: 13 MMOL/L (ref 7–19)
BUN SERPL-MCNC: 17 MG/DL (ref 8–23)
CALCIUM SERPL-MCNC: 8.3 MG/DL (ref 8.2–9.6)
CHLORIDE SERPL-SCNC: 105 MMOL/L (ref 98–111)
CO2 SERPL-SCNC: 21 MMOL/L (ref 22–29)
CREAT SERPL-MCNC: 0.3 MG/DL (ref 0.5–0.9)
ERYTHROCYTE [DISTWIDTH] IN BLOOD BY AUTOMATED COUNT: 18.2 % (ref 11.5–14.5)
GLUCOSE SERPL-MCNC: 96 MG/DL (ref 74–109)
HCT VFR BLD AUTO: 27.3 % (ref 37–47)
HGB BLD-MCNC: 8.6 G/DL (ref 12–16)
MCH RBC QN AUTO: 31.7 PG (ref 27–31)
MCHC RBC AUTO-ENTMCNC: 31.5 G/DL (ref 33–37)
MCV RBC AUTO: 100.7 FL (ref 81–99)
PLATELET # BLD AUTO: 162 K/UL (ref 130–400)
PMV BLD AUTO: 10.8 FL (ref 9.4–12.3)
POTASSIUM SERPL-SCNC: 4 MMOL/L (ref 3.5–5)
RBC # BLD AUTO: 2.71 M/UL (ref 4.2–5.4)
SODIUM SERPL-SCNC: 139 MMOL/L (ref 136–145)
WBC # BLD AUTO: 5 K/UL (ref 4.8–10.8)

## 2024-03-02 PROCEDURE — 1200000000 HC SEMI PRIVATE

## 2024-03-02 PROCEDURE — 36415 COLL VENOUS BLD VENIPUNCTURE: CPT

## 2024-03-02 PROCEDURE — 2580000003 HC RX 258: Performed by: ORTHOPAEDIC SURGERY

## 2024-03-02 PROCEDURE — 85027 COMPLETE CBC AUTOMATED: CPT

## 2024-03-02 PROCEDURE — 6360000002 HC RX W HCPCS: Performed by: ORTHOPAEDIC SURGERY

## 2024-03-02 PROCEDURE — 97162 PT EVAL MOD COMPLEX 30 MIN: CPT

## 2024-03-02 PROCEDURE — 97530 THERAPEUTIC ACTIVITIES: CPT

## 2024-03-02 PROCEDURE — 6370000000 HC RX 637 (ALT 250 FOR IP): Performed by: ORTHOPAEDIC SURGERY

## 2024-03-02 PROCEDURE — 80048 BASIC METABOLIC PNL TOTAL CA: CPT

## 2024-03-02 RX ADMIN — DIVALPROEX SODIUM 125 MG: 125 TABLET, DELAYED RELEASE ORAL at 09:49

## 2024-03-02 RX ADMIN — DOCUSATE SODIUM 100 MG: 100 CAPSULE, LIQUID FILLED ORAL at 20:55

## 2024-03-02 RX ADMIN — LORAZEPAM 0.5 MG: 0.5 TABLET ORAL at 20:55

## 2024-03-02 RX ADMIN — ACETAMINOPHEN 325MG 650 MG: 325 TABLET ORAL at 02:43

## 2024-03-02 RX ADMIN — DIVALPROEX SODIUM 250 MG: 250 TABLET, DELAYED RELEASE ORAL at 15:57

## 2024-03-02 RX ADMIN — TOBRAMYCIN OPHTHALMIC SOLUTION 1 DROP: 3 SOLUTION/ DROPS OPHTHALMIC at 05:17

## 2024-03-02 RX ADMIN — PANTOPRAZOLE SODIUM 20 MG: 20 TABLET, DELAYED RELEASE ORAL at 05:17

## 2024-03-02 RX ADMIN — BUSPIRONE HYDROCHLORIDE 5 MG: 5 TABLET ORAL at 20:55

## 2024-03-02 RX ADMIN — VANCOMYCIN HYDROCHLORIDE 1000 MG: 10 INJECTION, POWDER, LYOPHILIZED, FOR SOLUTION INTRAVENOUS at 00:06

## 2024-03-02 RX ADMIN — BUSPIRONE HYDROCHLORIDE 5 MG: 5 TABLET ORAL at 09:48

## 2024-03-02 RX ADMIN — ENOXAPARIN SODIUM 30 MG: 100 INJECTION SUBCUTANEOUS at 15:57

## 2024-03-02 RX ADMIN — Medication 0.52 G: at 09:49

## 2024-03-02 RX ADMIN — RISPERIDONE 0.25 MG: 0.5 TABLET ORAL at 20:55

## 2024-03-02 RX ADMIN — ACETAMINOPHEN 325MG 650 MG: 325 TABLET ORAL at 16:16

## 2024-03-02 RX ADMIN — DIVALPROEX SODIUM 125 MG: 125 TABLET, DELAYED RELEASE ORAL at 15:57

## 2024-03-02 RX ADMIN — DIVALPROEX SODIUM 125 MG: 125 TABLET, DELAYED RELEASE ORAL at 20:56

## 2024-03-02 RX ADMIN — OXYCODONE HYDROCHLORIDE AND ACETAMINOPHEN 500 MG: 500 TABLET ORAL at 09:48

## 2024-03-02 RX ADMIN — POTASSIUM CHLORIDE 20 MEQ: 1500 TABLET, EXTENDED RELEASE ORAL at 09:48

## 2024-03-02 RX ADMIN — DIVALPROEX SODIUM 250 MG: 250 TABLET, DELAYED RELEASE ORAL at 20:55

## 2024-03-02 RX ADMIN — MIRTAZAPINE 3.75 MG: 7.5 TABLET, FILM COATED ORAL at 20:55

## 2024-03-02 RX ADMIN — METOPROLOL SUCCINATE 25 MG: 25 TABLET, EXTENDED RELEASE ORAL at 09:49

## 2024-03-02 RX ADMIN — TOBRAMYCIN OPHTHALMIC SOLUTION 1 DROP: 3 SOLUTION/ DROPS OPHTHALMIC at 10:05

## 2024-03-02 RX ADMIN — LEVOTHYROXINE SODIUM 25 MCG: 25 TABLET ORAL at 05:17

## 2024-03-02 RX ADMIN — DOCUSATE SODIUM 100 MG: 100 CAPSULE, LIQUID FILLED ORAL at 09:48

## 2024-03-02 RX ADMIN — SODIUM CHLORIDE, PRESERVATIVE FREE 10 ML: 5 INJECTION INTRAVENOUS at 20:55

## 2024-03-02 RX ADMIN — DIVALPROEX SODIUM 250 MG: 250 TABLET, DELAYED RELEASE ORAL at 10:01

## 2024-03-02 RX ADMIN — NAPROXEN 250 MG: 250 TABLET ORAL at 02:43

## 2024-03-02 RX ADMIN — POLYETHYLENE GLYCOL 3350 17 G: 17 POWDER, FOR SOLUTION ORAL at 09:48

## 2024-03-02 RX ADMIN — ASPIRIN 81 MG: 81 TABLET, CHEWABLE ORAL at 09:48

## 2024-03-02 NOTE — PROGRESS NOTES
03/02/24 1400   Subjective   Subjective Patient BTB TR by BRANDIN.       Electronically signed by Kalen Crews PTA on 3/2/2024 at 2:16 PM

## 2024-03-02 NOTE — PROGRESS NOTES
Orthopedic Surgery Progress Note    Brandy Melvin  3/2/2024      Subjective:     Patient without complaints of pain.  She is confused.    Objective:     Patient Vitals for the past 24 hrs:   BP Temp Temp src Pulse Resp SpO2 Weight   03/02/24 0747 128/69 98.2 °F (36.8 °C) -- (!) 111 15 (!) 89 % --   03/02/24 0509 -- -- -- -- -- 93 % --   03/02/24 0424 (!) 103/52 98.6 °F (37 °C) Temporal 93 16 (!) 88 % --   03/01/24 1933 134/62 97.5 °F (36.4 °C) Temporal 78 16 93 % --   03/01/24 1801 (!) 176/89 97 °F (36.1 °C) Temporal 89 18 100 % --   03/01/24 1605 (!) 148/70 97 °F (36.1 °C) Temporal 66 16 95 % --   03/01/24 1505 134/71 96.8 °F (36 °C) Temporal 78 16 96 % --   03/01/24 1400 (!) 145/84 97.4 °F (36.3 °C) -- 83 16 96 % --   03/01/24 1340 (!) 155/85 -- -- 83 12 98 % --   03/01/24 1335 (!) 162/87 -- -- 78 14 99 % --   03/01/24 1331 -- -- -- 76 -- -- --   03/01/24 1330 (!) 162/85 97.4 °F (36.3 °C) -- 78 12 98 % --   03/01/24 1325 (!) 162/85 -- -- 80 12 99 % --   03/01/24 1320 (!) 161/87 -- -- 78 10 99 % --   03/01/24 1315 (!) 169/87 -- -- 75 10 98 % --   03/01/24 1310 (!) 169/87 -- -- 77 12 99 % --   03/01/24 1305 (!) 171/79 -- -- 80 12 99 % --   03/01/24 1300 (!) 179/91 -- -- 81 12 100 % --   03/01/24 1256 (!) 179/91 -- -- 77 14 92 % --   03/01/24 1255 (!) 162/84 97.4 °F (36.3 °C) -- 78 14 94 % --   03/01/24 1254 (!) 162/84 97.8 °F (36.6 °C) Temporal 78 15 93 % --   03/01/24 1030 -- -- -- -- -- -- 35.8 kg (79 lb)         left lower  General: alert, appears stated age and cooperative   Wound: clean, dry, intact             Dressing: clean, dry, and intact   Extremity: Distal NVI           DVT Exam: No evidence of DVT seen on physical exam.                   Data Review:  Recent Labs     03/01/24  0423 03/02/24  0147   HGB 11.6* 8.6*     Recent Labs     03/02/24  0147      K 4.0   CREATININE 0.3*       Assessment:     POD# 1 status post left hip TFN.    Plan:      1:  DVT prophylaxis, ICE, elevate  2:  Pain  control  3:  Physical therapy/Occupational therapy  4:  Anticipate discharge to next week..  5: Weight bearing as tolerated      Renato Delgadillo MD

## 2024-03-02 NOTE — PROGRESS NOTES
[facility]      Patient:  Brandy Melvin  YOB: 1926  Date of Service: 3/2/2024  MRN: [id number]   Acct: [id number]   Primary Care Physician: oJce Azevedo MD  Advance Directive: DNR  Admit Date: 2/29/2024       Hospital Day: 1  Portions of this note have been copied forward, however, changed to reflect the most current clinical status of this patient.  CHIEF COMPLAINT   Left hip pain/fall    SUBJECTIVE:  Resting in the bed, pleasantly confused.  Complaints of sore throat, yelled a lot the prior night.  Niece present.  Complaints of red eyes with noted green drainage, started eye gtts improved this AM.      CUMULATIVE HOSPITAL COURSE:  Patient is a 89-year-old female presented to [facility] after mechanical fall, reported she fell out of bed at the [facility] living facility after which she developed left hip pain, pain was noted to be severe, constant, and worse with movement.  No head trauma noted.  No complaints of chest pain, dyspnea, cough, wheezing or abdominal pain denies nausea vomiting or diarrhea.  No reported dysuria or urinary frequency.      Patient was admitted to hospital services for medical management.  Orthopedic consult, patient was taken for Cephalomedullary nailing of left intertrochanteric femur fracture.      Stable post op, remains confused, will continue to monitor and trend. Will monitor hgb 8.6, 11.6 on admission.  PT/OT  evaluation and recommendation.         Review of Systems:   Review of Systems   Unable to perform ROS: Dementia       Objective:   VITALS:  /69   Pulse (!) 111   Temp 98.2 °F (36.8 °C)   Resp 15   Wt 35.8 kg (79 lb)   LMP  (LMP Unknown)   SpO2 (!) 89%   BMI 14.45 kg/m²   24HR INTAKE/OUTPUT:    Intake/Output Summary (Last 24 hours) at 3/2/2024 0994  Last data filed at 3/2/2024 0515  Gross per 24 hour   Intake 1100 ml   Output 700 ml   Net 400 ml         Physical Exam  Vitals and nursing note reviewed.   HENT:      Mouth/Throat:       Mouth: Mucous membranes are dry.   Cardiovascular:      Rate and Rhythm: Normal rate and regular rhythm.   Pulmonary:      Effort: Pulmonary effort is normal.      Breath sounds: Normal breath sounds.      Comments: Decreased air movement noted bilateral  Musculoskeletal:      Cervical back: Normal range of motion.      Right lower leg: No edema.      Left lower leg: No edema.   Skin:     General: Skin is warm and dry.   Neurological:      Mental Status: She is alert. Mental status is at baseline. She is disoriented.      Comments: At baseline per sharon   Psychiatric:         Behavior: Behavior normal.          Medications:      sodium chloride      [Held by provider] sodium chloride 75 mL/hr at 03/01/24 0607    sodium chloride      sodium chloride 125 mL/hr at 03/01/24 1821      sodium chloride flush  5-40 mL IntraVENous 2 times per day    aspirin  81 mg Oral Daily    busPIRone  5 mg Oral BID    divalproex  125 mg Oral TID    docusate sodium  100 mg Oral BID    levothyroxine  25 mcg Oral Daily    LORazepam  0.5 mg Oral Nightly    metoprolol succinate  25 mg Oral Daily    mirtazapine  3.75 mg Oral Nightly    pantoprazole  20 mg Oral QAM AC    psyllium  0.52 g Oral Daily    risperiDONE  0.25 mg Oral Nightly    vitamin C  500 mg Oral Daily    polyethylene glycol  17 g Oral Daily    divalproex  250 mg Oral TID    estradiol  1 g Vaginal Once per day on Mon Thu    potassium chloride  20 mEq Oral Daily    sodium chloride flush  5-40 mL IntraVENous 2 times per day    enoxaparin  30 mg SubCUTAneous Q24H    tobramycin  1 drop Both Eyes 4 times per day     sodium chloride flush, sodium chloride, potassium chloride **OR** potassium alternative oral replacement **OR** potassium chloride, magnesium sulfate, ondansetron **OR** ondansetron, acetaminophen **OR** acetaminophen, potassium chloride, melatonin, morphine, loperamide, naproxen, traMADol, ondansetron, sodium chloride flush, sodium chloride, oxyCODONE **OR**

## 2024-03-02 NOTE — PROGRESS NOTES
Physical Therapy  Facility/Department: [facility] SURG SERVICES  Physical Therapy Initial Assessment    Name: Brandy Melvin  : 11/10/1926  MRN: [id number]  Date of Service: 3/2/2024    Discharge Recommendations:  Continue to assess pending progress, 24 hour supervision or assist, Patient would benefit from continued therapy after discharge          Patient Diagnosis(es): The primary encounter diagnosis was Closed 2-part intertrochanteric fracture of left femur, initial encounter ([facility]). Diagnoses of Left hip pain and Closed fracture of femur, intertrochanteric, left, initial encounter ([facility]) were also pertinent to this visit.  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  Past Surgical History:  has a past surgical history that includes Hemorrhoid surgery; Cataract removal; Cholecystectomy; Hysterectomy; Appendectomy; Cardiac catheterization (5/26/15  [facility]); Nerve Block Lumb Facet Level 1 Bilateral (Bilateral, 2016); and Breast cyst incision and drainage.    Assessment   Body Structures, Functions, Activity Limitations Requiring Skilled Therapeutic Intervention: Decreased functional mobility ;Decreased ADL status;Decreased ROM;Decreased strength;Decreased cognition;Decreased balance;Decreased endurance;Increased pain  Assessment: Pt ABLE TO STAND WITH ASSIST AND PIVOT TO RECLINER. REQUIRES FULL GUIDANCE AND CUEING FOR EACH TASK. WILL PROGRESS AS TOLERATED.  Requires PT Follow-Up: Yes  Activity  arm  Patient Position: Supine  MAP (Calculated): 89  Respirations: 15  SpO2: (!) 89 %  O2 Device: Nasal cannula  Temp: 98.2 °F (36.8 °C)     Observation/Palpation  Posture: Fair  Gross Assessment  AROM: Generally decreased, functional  Strength: Generally decreased, functional                    Bed mobility  Supine to Sit: Moderate assistance  Transfers  Sit to Stand: Moderate Assistance  Stand to Sit: Moderate Assistance  Bed to Chair: Moderate assistance  Stand Pivot Transfers: Moderate Assistance (HHA, DEC WB, Pt REACHED FOR ARM OF CHAIR)  Comment: DID NOT ATTEMPT RW AS Pt IS NOT USED TO THAT, BUT MAY TRY NEXT VISIT        Balance  Sitting - Dynamic: Fair;+  Standing - Dynamic: Poor;+           OutComes Score                                                  AM-PAC - Mobility              Tinneti Score       Goals  Short Term Goals  Time Frame for Short Term Goals: 14 DAYS  Short Term Goal 1: BED MOB MIN ASSIST  Short Term Goal 2: BED TO CHAIR MIN ASSIST       Education  Patient Education  Education Given To: Patient;Family  Education Provided: Role of Therapy;Plan of Care  Barriers to Learning: Cognition      Therapy Time   Individual Concurrent Group Co-treatment   Time In           Time Out           Minutes                   Marta Holley, PT

## 2024-03-03 LAB
ABO/RH: NORMAL
ANION GAP SERPL CALCULATED.3IONS-SCNC: 11 MMOL/L (ref 7–19)
ANTIBODY SCREEN: NORMAL
BACTERIA UR CULT: NORMAL
BLOOD BANK DISPENSE STATUS: NORMAL
BLOOD BANK PRODUCT CODE: NORMAL
BPU ID: NORMAL
BUN SERPL-MCNC: 18 MG/DL (ref 8–23)
CALCIUM SERPL-MCNC: 8.4 MG/DL (ref 8.2–9.6)
CHLORIDE SERPL-SCNC: 108 MMOL/L (ref 98–111)
CO2 SERPL-SCNC: 20 MMOL/L (ref 22–29)
CREAT SERPL-MCNC: 0.4 MG/DL (ref 0.5–0.9)
DESCRIPTION BLOOD BANK: NORMAL
ERYTHROCYTE [DISTWIDTH] IN BLOOD BY AUTOMATED COUNT: 18 % (ref 11.5–14.5)
GLUCOSE SERPL-MCNC: 82 MG/DL (ref 74–109)
HCT VFR BLD AUTO: 22.6 % (ref 37–47)
HCT VFR BLD AUTO: 30.9 % (ref 37–47)
HGB BLD-MCNC: 10.3 G/DL (ref 12–16)
HGB BLD-MCNC: 7 G/DL (ref 12–16)
MCH RBC QN AUTO: 33.7 PG (ref 27–31)
MCHC RBC AUTO-ENTMCNC: 31 G/DL (ref 33–37)
MCV RBC AUTO: 108.7 FL (ref 81–99)
PLATELET # BLD AUTO: 116 K/UL (ref 130–400)
PMV BLD AUTO: 10.7 FL (ref 9.4–12.3)
POTASSIUM SERPL-SCNC: 3.4 MMOL/L (ref 3.5–5)
RBC # BLD AUTO: 2.08 M/UL (ref 4.2–5.4)
SODIUM SERPL-SCNC: 139 MMOL/L (ref 136–145)
WBC # BLD AUTO: 6.2 K/UL (ref 4.8–10.8)

## 2024-03-03 PROCEDURE — 30233N1 TRANSFUSION OF NONAUTOLOGOUS RED BLOOD CELLS INTO PERIPHERAL VEIN, PERCUTANEOUS APPROACH: ICD-10-PCS | Performed by: INTERNAL MEDICINE

## 2024-03-03 PROCEDURE — P9016 RBC LEUKOCYTES REDUCED: HCPCS

## 2024-03-03 PROCEDURE — 80048 BASIC METABOLIC PNL TOTAL CA: CPT

## 2024-03-03 PROCEDURE — 1200000000 HC SEMI PRIVATE

## 2024-03-03 PROCEDURE — 85014 HEMATOCRIT: CPT

## 2024-03-03 PROCEDURE — 85018 HEMOGLOBIN: CPT

## 2024-03-03 PROCEDURE — 6370000000 HC RX 637 (ALT 250 FOR IP): Performed by: ORTHOPAEDIC SURGERY

## 2024-03-03 PROCEDURE — 86923 COMPATIBILITY TEST ELECTRIC: CPT

## 2024-03-03 PROCEDURE — 36415 COLL VENOUS BLD VENIPUNCTURE: CPT

## 2024-03-03 PROCEDURE — 86850 RBC ANTIBODY SCREEN: CPT

## 2024-03-03 PROCEDURE — 36430 TRANSFUSION BLD/BLD COMPNT: CPT

## 2024-03-03 PROCEDURE — 6360000002 HC RX W HCPCS: Performed by: ORTHOPAEDIC SURGERY

## 2024-03-03 PROCEDURE — 86901 BLOOD TYPING SEROLOGIC RH(D): CPT

## 2024-03-03 PROCEDURE — 85027 COMPLETE CBC AUTOMATED: CPT

## 2024-03-03 PROCEDURE — 2580000003 HC RX 258: Performed by: ORTHOPAEDIC SURGERY

## 2024-03-03 PROCEDURE — 86900 BLOOD TYPING SEROLOGIC ABO: CPT

## 2024-03-03 RX ORDER — SODIUM CHLORIDE 9 MG/ML
INJECTION, SOLUTION INTRAVENOUS PRN
Status: DISCONTINUED | OUTPATIENT
Start: 2024-03-03 | End: 2024-03-06 | Stop reason: HOSPADM

## 2024-03-03 RX ADMIN — LEVOTHYROXINE SODIUM 25 MCG: 25 TABLET ORAL at 05:54

## 2024-03-03 RX ADMIN — BUSPIRONE HYDROCHLORIDE 5 MG: 5 TABLET ORAL at 09:17

## 2024-03-03 RX ADMIN — DIVALPROEX SODIUM 125 MG: 125 TABLET, DELAYED RELEASE ORAL at 09:30

## 2024-03-03 RX ADMIN — SODIUM CHLORIDE, PRESERVATIVE FREE 10 ML: 5 INJECTION INTRAVENOUS at 09:47

## 2024-03-03 RX ADMIN — MORPHINE SULFATE 2 MG: 2 INJECTION, SOLUTION INTRAMUSCULAR; INTRAVENOUS at 09:42

## 2024-03-03 RX ADMIN — POLYETHYLENE GLYCOL 3350 17 G: 17 POWDER, FOR SOLUTION ORAL at 09:17

## 2024-03-03 RX ADMIN — DIVALPROEX SODIUM 125 MG: 125 TABLET, DELAYED RELEASE ORAL at 14:20

## 2024-03-03 RX ADMIN — DIVALPROEX SODIUM 250 MG: 250 TABLET, DELAYED RELEASE ORAL at 09:29

## 2024-03-03 RX ADMIN — DOCUSATE SODIUM 100 MG: 100 CAPSULE, LIQUID FILLED ORAL at 09:17

## 2024-03-03 RX ADMIN — SODIUM CHLORIDE, PRESERVATIVE FREE 10 ML: 5 INJECTION INTRAVENOUS at 21:23

## 2024-03-03 RX ADMIN — TOBRAMYCIN OPHTHALMIC SOLUTION 1 DROP: 3 SOLUTION/ DROPS OPHTHALMIC at 05:54

## 2024-03-03 RX ADMIN — RISPERIDONE 0.25 MG: 0.5 TABLET ORAL at 21:22

## 2024-03-03 RX ADMIN — ASPIRIN 81 MG: 81 TABLET, CHEWABLE ORAL at 09:17

## 2024-03-03 RX ADMIN — DOCUSATE SODIUM 100 MG: 100 CAPSULE, LIQUID FILLED ORAL at 21:22

## 2024-03-03 RX ADMIN — ACETAMINOPHEN 325MG 650 MG: 325 TABLET ORAL at 21:22

## 2024-03-03 RX ADMIN — TOBRAMYCIN OPHTHALMIC SOLUTION 1 DROP: 3 SOLUTION/ DROPS OPHTHALMIC at 12:33

## 2024-03-03 RX ADMIN — PANTOPRAZOLE SODIUM 20 MG: 20 TABLET, DELAYED RELEASE ORAL at 05:54

## 2024-03-03 RX ADMIN — POTASSIUM CHLORIDE 20 MEQ: 1500 TABLET, EXTENDED RELEASE ORAL at 09:17

## 2024-03-03 RX ADMIN — SODIUM CHLORIDE: 9 INJECTION, SOLUTION INTRAVENOUS at 09:45

## 2024-03-03 RX ADMIN — METOPROLOL SUCCINATE 25 MG: 25 TABLET, EXTENDED RELEASE ORAL at 09:17

## 2024-03-03 RX ADMIN — MIRTAZAPINE 3.75 MG: 7.5 TABLET, FILM COATED ORAL at 21:22

## 2024-03-03 RX ADMIN — TOBRAMYCIN OPHTHALMIC SOLUTION 1 DROP: 3 SOLUTION/ DROPS OPHTHALMIC at 17:15

## 2024-03-03 RX ADMIN — OXYCODONE HYDROCHLORIDE AND ACETAMINOPHEN 500 MG: 500 TABLET ORAL at 09:17

## 2024-03-03 RX ADMIN — POTASSIUM BICARBONATE 40 MEQ: 782 TABLET, EFFERVESCENT ORAL at 05:54

## 2024-03-03 RX ADMIN — DIVALPROEX SODIUM 125 MG: 125 TABLET, DELAYED RELEASE ORAL at 21:27

## 2024-03-03 RX ADMIN — BUSPIRONE HYDROCHLORIDE 5 MG: 5 TABLET ORAL at 21:23

## 2024-03-03 RX ADMIN — LORAZEPAM 0.5 MG: 0.5 TABLET ORAL at 21:23

## 2024-03-03 RX ADMIN — DIVALPROEX SODIUM 250 MG: 250 TABLET, DELAYED RELEASE ORAL at 14:20

## 2024-03-03 RX ADMIN — DIVALPROEX SODIUM 250 MG: 250 TABLET, DELAYED RELEASE ORAL at 21:27

## 2024-03-03 RX ADMIN — Medication 0.52 G: at 09:17

## 2024-03-03 ASSESSMENT — PAIN DESCRIPTION - LOCATION: LOCATION: HIP

## 2024-03-03 ASSESSMENT — PAIN DESCRIPTION - DESCRIPTORS: DESCRIPTORS: THROBBING

## 2024-03-03 ASSESSMENT — PAIN SCALES - GENERAL: PAINLEVEL_OUTOF10: 7

## 2024-03-03 ASSESSMENT — PAIN DESCRIPTION - ORIENTATION: ORIENTATION: RIGHT

## 2024-03-03 NOTE — PROGRESS NOTES
Received call from Dr. Heaton.  He stated he keeps getting orders to sign off on patient.  He is not apart of the treatment team.  He stated he signed them yesterday, but he will decline today.  One of the orders he received today was for a type and screen.  Reviewed order and it states ordering provider at [facility].  Called [facility] blood bank.  They did not see anything with Dr. Hess name on it.  Discussed with charge nurse.  It may be an IT epic issue that will need to be resolved.

## 2024-03-03 NOTE — PROGRESS NOTES
03/03/24 1300   Subjective   Subjective Attempt .  No TX per BRIAN Puentes patient is being transfused.   PT Plan of Care   Sunday D       Electronically signed by Kalen Crews PTA on 3/3/2024 at 1:24 PM

## 2024-03-03 NOTE — PROGRESS NOTES
[facility]      Patient:  Brandy Melvin  YOB: 1926  Date of Service: 3/3/2024  MRN: [id number]   Acct: [id number]   Primary Care Physician: Joce Azevedo MD  Advance Directive: DNR  Admit Date: 2/29/2024       Hospital Day: 2  Portions of this note have been copied forward, however, changed to reflect the most current clinical status of this patient.  CHIEF COMPLAINT   Left hip pain/fall    SUBJECTIVE:  Resting in the bed, pleasantly confused.  Niece present.  Complaints of red eyes with noted green drainage, started eye gtts improved this AM.      CUMULATIVE HOSPITAL COURSE:  Patient is a 89-year-old female presented to [facility] after mechanical fall, reported she fell out of bed at the [facility] assisted living facility after which she developed left hip pain, pain was noted to be severe, constant, and worse with movement.  No head trauma noted.  No complaints of chest pain, dyspnea, cough, wheezing or abdominal pain denies nausea vomiting or diarrhea.  No reported dysuria or urinary frequency.      Patient was admitted to hospital services for medical management.  Orthopedic consult, patient was taken for Cephalomedullary nailing of left intertrochanteric femur fracture.      Stable post op, remains confused, will continue to monitor and trend. Will monitor hgb 7.0, 11.6 on admission, transfuse one unit PRBC, and recheck HH posttransfusion, 10.3.  PT/OT  evaluation and recommendation. Case management to assist with discharge planning.  Home health referral at discharge for PT/OT.      Review of Systems:   Review of Systems   Unable to perform ROS: Dementia       Objective:   VITALS:  BP (!) 141/62   Pulse 82   Temp 97.3 °F (36.3 °C)   Resp 16   Wt 35.8 kg (79 lb)   LMP  (LMP Unknown)   SpO2 96%   BMI 14.45 kg/m²   24HR INTAKE/OUTPUT:    Intake/Output Summary (Last 24 hours) at 3/3/2024 0899  Last data filed at 3/3/2024 0638  Gross per 24 hour   Intake 305 ml   Output 1450 ml  LEFT    Result Date: 2/29/2024  Impression: Questionable left-sided pelvic fractures.  Recommend further evaluation with pelvic CT   ______________________________________ Electronically signed by: VALDEMAR WILDE M.D. Date:     02/29/2024 Time:    22:50          Assessment/Plan   Principal Problem:    Closed left hip fracture, initial encounter ([facility])  Active Problems:    Closed fracture of femur, intertrochanteric, left, initial encounter ([facility])  Resolved Problems:    * No resolved hospital problems. *    Principal Problem:  Closed fracture of femur, intertrochanteric, left, initial encounter ([facility])  Orthopedic consult, completed cephalomedullary nailing of left intertrochanteric femur fracture 3/1/2024  IVF per order  Monitor daily labs  Pain control  Fall precautions  Bed alarm  PT/OT per Ortho recommendation  Case management to assist with discharge planning  Home Health referral for PT/OT    Active Problems:   Acute blood loss anemia-11.6 on admission   Monitor HH, 7.0   Transfuse for hgb less than 7, 1 unit 3/3 for 7.0, post HH 10.3/30.9   Monitor     Bilateral eye redness and green drainage   Tobramycin gtts per order    Acquired hypothyroidism   TSH 6.6   FreeT4, 1.26, normal     Patient active problem with:  Hypertension  Ataxia  Tremor  Lumbar spinal stenos  Vertebrobasilar artery syndrome  Anxiety  Palliative care patient  History of CVA  Aphasia   Noted   Continue current home meds per order    Antibiotic:  Vancomycin-surgical prophylasis    DVT Prophylaxis:  Lovenox    GI prophylaxis:  none    JERE Faulkner - CNP, 3/3/2024 8:59 AM

## 2024-03-03 NOTE — PROGRESS NOTES
Orthopedic Surgery Progress Note    Brandy GONZALES Olegario  3/3/2024      Subjective:    No complaints.    Objective:     Patient Vitals for the past 24 hrs:   BP Temp Temp src Pulse Resp SpO2   03/03/24 0830 (!) 141/62 97.3 °F (36.3 °C) -- 82 -- 96 %   03/03/24 0354 (!) 137/93 97.7 °F (36.5 °C) Temporal 97 16 91 %   03/02/24 1943 (!) 124/56 97.7 °F (36.5 °C) Temporal 86 16 90 %   03/02/24 1630 (!) 140/62 99.8 °F (37.7 °C) Temporal 92 16 92 %         left lower  General: alert, appears stated age and cooperative   Wound: clean, dry, intact             Dressing: clean, dry, and intact   Extremity: Distal NVI           DVT Exam: No evidence of DVT seen on physical exam.                   Data Review:  Recent Labs     03/02/24  0147 03/03/24  0233   HGB 8.6* 7.0*     Recent Labs     03/03/24  0233      K 3.4*   CREATININE 0.4*       Assessment:     POD# 2     Plan:      1:  DVT prophylaxis, ICE, elevate  2:  Pain control  3:  Physical therapy/Occupational therapy  4:  Anticipate discharge early this week.  5: Full weight bearing      Renato Delgadillo MD

## 2024-03-04 PROCEDURE — 6370000000 HC RX 637 (ALT 250 FOR IP): Performed by: ORTHOPAEDIC SURGERY

## 2024-03-04 PROCEDURE — 1200000000 HC SEMI PRIVATE

## 2024-03-04 PROCEDURE — 94760 N-INVAS EAR/PLS OXIMETRY 1: CPT

## 2024-03-04 PROCEDURE — 97166 OT EVAL MOD COMPLEX 45 MIN: CPT

## 2024-03-04 PROCEDURE — 97535 SELF CARE MNGMENT TRAINING: CPT

## 2024-03-04 PROCEDURE — 97530 THERAPEUTIC ACTIVITIES: CPT

## 2024-03-04 RX ADMIN — RISPERIDONE 0.25 MG: 0.5 TABLET ORAL at 19:38

## 2024-03-04 RX ADMIN — LEVOTHYROXINE SODIUM 25 MCG: 25 TABLET ORAL at 05:58

## 2024-03-04 RX ADMIN — DOCUSATE SODIUM 100 MG: 100 CAPSULE, LIQUID FILLED ORAL at 19:38

## 2024-03-04 RX ADMIN — DIVALPROEX SODIUM 125 MG: 125 TABLET, DELAYED RELEASE ORAL at 19:38

## 2024-03-04 RX ADMIN — DOCUSATE SODIUM 100 MG: 100 CAPSULE, LIQUID FILLED ORAL at 08:18

## 2024-03-04 RX ADMIN — DIVALPROEX SODIUM 250 MG: 250 TABLET, DELAYED RELEASE ORAL at 19:37

## 2024-03-04 RX ADMIN — OXYCODONE 5 MG: 5 TABLET ORAL at 08:35

## 2024-03-04 RX ADMIN — TOBRAMYCIN OPHTHALMIC SOLUTION 1 DROP: 3 SOLUTION/ DROPS OPHTHALMIC at 16:54

## 2024-03-04 RX ADMIN — LORAZEPAM 0.5 MG: 0.5 TABLET ORAL at 19:38

## 2024-03-04 RX ADMIN — Medication 0.52 G: at 08:18

## 2024-03-04 RX ADMIN — DIVALPROEX SODIUM 125 MG: 125 TABLET, DELAYED RELEASE ORAL at 08:18

## 2024-03-04 RX ADMIN — ASPIRIN 81 MG: 81 TABLET, CHEWABLE ORAL at 08:18

## 2024-03-04 RX ADMIN — DIVALPROEX SODIUM 250 MG: 250 TABLET, DELAYED RELEASE ORAL at 08:35

## 2024-03-04 RX ADMIN — POLYETHYLENE GLYCOL 3350 17 G: 17 POWDER, FOR SOLUTION ORAL at 08:18

## 2024-03-04 RX ADMIN — OXYCODONE HYDROCHLORIDE AND ACETAMINOPHEN 500 MG: 500 TABLET ORAL at 08:18

## 2024-03-04 RX ADMIN — BUSPIRONE HYDROCHLORIDE 5 MG: 5 TABLET ORAL at 08:18

## 2024-03-04 RX ADMIN — PANTOPRAZOLE SODIUM 20 MG: 20 TABLET, DELAYED RELEASE ORAL at 08:18

## 2024-03-04 RX ADMIN — MIRTAZAPINE 3.75 MG: 7.5 TABLET, FILM COATED ORAL at 19:38

## 2024-03-04 RX ADMIN — METOPROLOL SUCCINATE 25 MG: 25 TABLET, EXTENDED RELEASE ORAL at 08:18

## 2024-03-04 RX ADMIN — BUSPIRONE HYDROCHLORIDE 5 MG: 5 TABLET ORAL at 19:38

## 2024-03-04 RX ADMIN — POTASSIUM CHLORIDE 20 MEQ: 1500 TABLET, EXTENDED RELEASE ORAL at 08:18

## 2024-03-04 ASSESSMENT — PAIN DESCRIPTION - LOCATION: LOCATION: HIP

## 2024-03-04 ASSESSMENT — PAIN - FUNCTIONAL ASSESSMENT: PAIN_FUNCTIONAL_ASSESSMENT: PREVENTS OR INTERFERES SOME ACTIVE ACTIVITIES AND ADLS

## 2024-03-04 ASSESSMENT — PAIN DESCRIPTION - DESCRIPTORS: DESCRIPTORS: THROBBING

## 2024-03-04 ASSESSMENT — PAIN DESCRIPTION - ORIENTATION: ORIENTATION: LEFT

## 2024-03-04 NOTE — PROGRESS NOTES
Source: Temporal  Pulse: 79  Heart Rate Source: Monitor  Respirations: 14  BP: (!) 154/70  MAP (Calculated): 98  MAP (mmHg): 95  BP Location: Left upper arm  Patient Position: Supine  Oxygen Therapy  SpO2: 91 %  O2 Device: None (Room air)    Social/Functional History  Social/Functional History  Lives With: Home care staff (24/7 [facility]IVER AT [facility])  Type of Home: Assisted living  Ambulation Assistance: Non-ambulatory (SINCE RECENT PELVIC FX)  Transfer Assistance: Needs assistance (STAND PIVOT ASSIST X 2 RECENTLY)       Objective   Hearing: Exceptions to WFL  Hearing Exceptions: Hard of hearing/hearing concerns;Bilateral hearing aid                ADL  Feeding: Minimal assistance;Setup  Grooming: Minimal assistance;Setup  UE Bathing: Maximum assistance  LE Bathing: Maximum assistance  UE Dressing: Maximum assistance  LE Dressing: Maximum assistance  Toileting: Maximum assistance        Bed mobility  Supine to Sit: Moderate assistance;2 Person assistance  Sit to Supine: Moderate assistance;2 Person assistance  Bed Mobility Comments: pad utilized for scooting EOB        Cognition  Overall Cognitive Status: Exceptions  Arousal/Alertness: Inconsistent responses to stimuli  Following Commands: Inconsistently follows commands;Follows one step commands with repetition;Follows one step commands with increased time  Attention Span: Difficulty attending to directions  Memory: Decreased recall of recent events  Safety Judgement: Decreased awareness of need for safety;Decreased awareness of need for assistance  Problem Solving: Assistance required to generate solutions;Assistance required to identify errors made;Assistance required to implement solutions;Decreased awareness of errors  Insights: Not aware of deficits  Initiation: Requires cues for all  Sequencing: Requires cues for all               Gross Assessment  AROM: Generally decreased, functional  Strength: Generally decreased, functional (B UE strength grossly 3+/5)

## 2024-03-04 NOTE — PROGRESS NOTES
Orthopedic Surgery Progress Note    Brandy JANET Olegario  3/4/2024      Subjective:     Patient without complaints.    Objective:     Patient Vitals for the past 24 hrs:   BP Temp Temp src Pulse Resp SpO2   03/04/24 0758 -- -- -- -- -- 93 %   03/04/24 0728 (!) 154/70 97.9 °F (36.6 °C) Temporal 79 14 91 %   03/04/24 0354 (!) 145/66 97.2 °F (36.2 °C) Temporal 77 14 94 %   03/03/24 2017 (!) 154/80 97.2 °F (36.2 °C) -- 58 18 91 %   03/03/24 1712 (!) 155/90 97.5 °F (36.4 °C) Temporal 91 16 99 %         left lower  General: alert, appears stated age and cooperative   Wound: clean, dry, intact             Dressing: clean, dry, and intact   Extremity: Distal NVI           DVT Exam: No evidence of DVT seen on physical exam.                   Data Review:  Recent Labs     03/03/24  0233 03/03/24  1643   HGB 7.0* 10.3*     Recent Labs     03/03/24  0233      K 3.4*   CREATININE 0.4*       Assessment:     POD# 3     Plan:      1:  DVT prophylaxis, ICE, elevate  2:  Pain control  3:  Physical therapy/Occupational therapy  4:  Anticipate discharge to extended-care facility this week  5: Weight bearing as tolerated  6.  Follow-up in 6 weeks for an x-ray.  May remove the mesh dressing in 2 to 3 weeks from the time of surgery.    Renato Delgadillo MD

## 2024-03-04 NOTE — CARE COORDINATION
Pt is unable to go back to [facility] at this time. KE spoke with Pt family and they have requested referrals to [facility] and [facility]. SW will send the requested referrals.     [facility] (formerly [facility])   [phone] P  [phone] F  [phone] Referral fax number for     [facility]  [phone] P  [phone] F  Electronically signed by Laci Rivera on 3/4/2024 at 3:32 PM

## 2024-03-04 NOTE — PROGRESS NOTES
Physical Therapy    Name: Brandy Melvin  MRN: [id number]  Date of service: 3/4/2024    Pt asleep in bed upon entering room. Sitter mentioned her being very tired from therapy this morning and requested to let her rest. Mentioned therapy will be back in the morning to see patient.       Electronically signed by Ricardo Castro PTA on 3/4/2024 at 2:25 PM

## 2024-03-04 NOTE — PROGRESS NOTES
Physical Therapy    Name: Brandy Melvin  MRN: [id number]  Date of service: 3/4/2024       03/04/24 0900   Restrictions/Precautions   Restrictions/Precautions Fall Risk;Weight Bearing   Lower Extremity Weight Bearing Restrictions   Right Lower Extremity Weight Bearing Weight Bearing As Tolerated   General   Diagnosis L SHORT TFN   General Comment   Comments in bed, sitter present   Subjective   Subjective Pt ALERT, COOPERATIVE, HAS BEEN TELLING FAMILY SHE WANTS UP   Subjective   Subjective no complaint or faces   Bed mobility   Supine to Sit Moderate assistance;2 Person assistance   Bed Mobility Comments sit EOB mod x 2 for sitting balance at [facility] on session. After sitting about 5 min, pt able to help with balance requiring min x 2. After attempting to stand pt requring mod/max x 2 for sitting. Total sitting time 15+ min   Transfers   Sit to Stand Moderate Assistance;Maximum Assistance;2 Person Assistance   Stand to Sit Moderate Assistance;Maximum Assistance;2 Person Assistance   Comment 2 attempts to stand, not able to fully stand   Ambulation   Comments not appropriate at this time   Short Term Goals   Time Frame for Short Term Goals 14 DAYS   Short Term Goal 1 BED MOB MIN ASSIST   Short Term Goal 2 BED TO CHAIR MIN ASSIST   Activity Tolerance   Activity Tolerance Patient limited by fatigue;Treatment limited secondary to decreased cognition   Assessment   Assessment Pt not speaking or responding to questions. Sitter states she said she wanted to get out of bed. Peformed supine to sit mod x2. Sat EOB requiring different level of assist as balance was come and go. Pt nodded head for wanting to attempt to stand. Stood 2x mod/max of 2 person with only a partial stand. Pt unable to stand fully. Remained EOB for OT task. then positioned pt back in bed comfortable.   Discharge Recommendations Continue to assess pending progress;24 hour supervision or assist;Patient would benefit from continued therapy after discharge

## 2024-03-04 NOTE — CARE COORDINATION
HH referral received.  Per chart, patient will dc to skilled facility.  No further HH interventions identified   Electronically signed by Grace Thomas on 3/4/24 at 3:35 PM CST

## 2024-03-04 NOTE — PROGRESS NOTES
[facility]      Progress Note    Patient:  Brandy Melvin  YOB: 1926  Date of Service: 3/4/2024  MRN: [id number]   Acct: [id number]   Primary Care Physician: Joce Azevedo MD  Advance Directive: DNR  Admit Date: 2/29/2024       Hospital Day: 3    Portions of this note have been copied forward, however, updated to reflect the most current clinical status of this patient.     CHIEF COMPLAINT Left hip pain/ fall    SUBJECTIVE:  Ms. Melvin was resting in bed this morning. Somnolent after working with therapy this morning.       CUMULATIVE HOSPITAL COURSE:   The patient is a 89 y.o. female with PMH of dementia, hypertension, hyperlipidemia, stroke, neuropathy, hypothyroidism, and anxiety who presented to [facility] ED with complaints of left hip pain after having a mechanical fall at assisted living.  Reportedly fell out of bed and developed left hip pain.  Workup in ED revealed unremarkable chemistry, Hgb 11.6, x-ray left hip indicated questionable left-sided pelvic fracture.  CT pelvis indicated acute intertrochanteric fracture of the proximal left femur, subacute healing fracture of the left pubic ramus, bilateral sacral insufficiency fractures. Patient was admitted to hospital medicine for further evaluation with orthopedic surgery consultation.  Underwent cephalomedullary nailing of left intertrochanteric femur fracture on 3/1/2024.  Orthopedic surgery recommended DVT prophylaxis, pain control, PT/OT, weightbearing as tolerated. Hgb noted to be 7.0 on 3/3/2024, requiring 1 unit of PRBC.  Repeat Hgb 10.3.  Social service assisting with DC planning to SNF.          Review of Systems   Unable to perform ROS: Dementia        Objective:   VITALS:  BP (!) 154/70   Pulse 79   Temp 97.9 °F (36.6 °C) (Temporal)   Resp 14   Wt 35.8 kg (79 lb)   LMP  (LMP Unknown)   SpO2 93%   BMI 14.45 kg/m²   24HR INTAKE/OUTPUT:    Intake/Output Summary (Last 24 hours) at 3/4/2024 4905  Last data filed at

## 2024-03-05 LAB
ANION GAP SERPL CALCULATED.3IONS-SCNC: 10 MMOL/L (ref 7–19)
BASOPHILS # BLD: 0 K/UL (ref 0–0.2)
BASOPHILS NFR BLD: 0.5 % (ref 0–1)
BUN SERPL-MCNC: 12 MG/DL (ref 8–23)
CALCIUM SERPL-MCNC: 8.3 MG/DL (ref 8.2–9.6)
CHLORIDE SERPL-SCNC: 109 MMOL/L (ref 98–111)
CO2 SERPL-SCNC: 21 MMOL/L (ref 22–29)
CREAT SERPL-MCNC: 0.3 MG/DL (ref 0.5–0.9)
EOSINOPHIL # BLD: 0.2 K/UL (ref 0–0.6)
EOSINOPHIL NFR BLD: 3.9 % (ref 0–5)
ERYTHROCYTE [DISTWIDTH] IN BLOOD BY AUTOMATED COUNT: 18.8 % (ref 11.5–14.5)
GLUCOSE SERPL-MCNC: 77 MG/DL (ref 74–109)
HCT VFR BLD AUTO: 30.2 % (ref 37–47)
HGB BLD-MCNC: 9.9 G/DL (ref 12–16)
IMM GRANULOCYTES # BLD: 0 K/UL
LYMPHOCYTES # BLD: 0.4 K/UL (ref 1.1–4.5)
LYMPHOCYTES NFR BLD: 11.4 % (ref 20–40)
MAGNESIUM SERPL-MCNC: 2 MG/DL (ref 1.7–2.3)
MCH RBC QN AUTO: 33.2 PG (ref 27–31)
MCHC RBC AUTO-ENTMCNC: 32.8 G/DL (ref 33–37)
MCV RBC AUTO: 101.3 FL (ref 81–99)
MONOCYTES # BLD: 0.5 K/UL (ref 0–0.9)
MONOCYTES NFR BLD: 12.2 % (ref 0–10)
NEUTROPHILS # BLD: 2.8 K/UL (ref 1.5–7.5)
NEUTS SEG NFR BLD: 71.5 % (ref 50–65)
PLATELET # BLD AUTO: 182 K/UL (ref 130–400)
PMV BLD AUTO: 10.3 FL (ref 9.4–12.3)
POTASSIUM SERPL-SCNC: 3.4 MMOL/L (ref 3.5–5)
RBC # BLD AUTO: 2.98 M/UL (ref 4.2–5.4)
SODIUM SERPL-SCNC: 140 MMOL/L (ref 136–145)
WBC # BLD AUTO: 3.9 K/UL (ref 4.8–10.8)

## 2024-03-05 PROCEDURE — 6370000000 HC RX 637 (ALT 250 FOR IP): Performed by: NURSE PRACTITIONER

## 2024-03-05 PROCEDURE — 6370000000 HC RX 637 (ALT 250 FOR IP): Performed by: ORTHOPAEDIC SURGERY

## 2024-03-05 PROCEDURE — 6360000002 HC RX W HCPCS: Performed by: ORTHOPAEDIC SURGERY

## 2024-03-05 PROCEDURE — 2580000003 HC RX 258: Performed by: NURSE PRACTITIONER

## 2024-03-05 PROCEDURE — 80048 BASIC METABOLIC PNL TOTAL CA: CPT

## 2024-03-05 PROCEDURE — 36415 COLL VENOUS BLD VENIPUNCTURE: CPT

## 2024-03-05 PROCEDURE — 97530 THERAPEUTIC ACTIVITIES: CPT

## 2024-03-05 PROCEDURE — 1200000000 HC SEMI PRIVATE

## 2024-03-05 PROCEDURE — 83735 ASSAY OF MAGNESIUM: CPT

## 2024-03-05 PROCEDURE — 94760 N-INVAS EAR/PLS OXIMETRY 1: CPT

## 2024-03-05 PROCEDURE — 85025 COMPLETE CBC W/AUTO DIFF WBC: CPT

## 2024-03-05 RX ORDER — BISACODYL 10 MG
10 SUPPOSITORY, RECTAL RECTAL DAILY
Status: DISCONTINUED | OUTPATIENT
Start: 2024-03-05 | End: 2024-03-06

## 2024-03-05 RX ADMIN — Medication 0.52 G: at 09:20

## 2024-03-05 RX ADMIN — TOBRAMYCIN OPHTHALMIC SOLUTION 1 DROP: 3 SOLUTION/ DROPS OPHTHALMIC at 13:57

## 2024-03-05 RX ADMIN — OXYCODONE 5 MG: 5 TABLET ORAL at 05:34

## 2024-03-05 RX ADMIN — RISPERIDONE 0.25 MG: 0.5 TABLET ORAL at 20:17

## 2024-03-05 RX ADMIN — ASPIRIN 81 MG: 81 TABLET, CHEWABLE ORAL at 09:20

## 2024-03-05 RX ADMIN — LORAZEPAM 0.5 MG: 0.5 TABLET ORAL at 20:17

## 2024-03-05 RX ADMIN — DOCUSATE SODIUM 100 MG: 100 CAPSULE, LIQUID FILLED ORAL at 09:20

## 2024-03-05 RX ADMIN — SODIUM CHLORIDE: 9 INJECTION, SOLUTION INTRAVENOUS at 09:11

## 2024-03-05 RX ADMIN — BISACODYL 10 MG: 10 SUPPOSITORY RECTAL at 13:51

## 2024-03-05 RX ADMIN — BUSPIRONE HYDROCHLORIDE 5 MG: 5 TABLET ORAL at 20:17

## 2024-03-05 RX ADMIN — DIVALPROEX SODIUM 250 MG: 250 TABLET, DELAYED RELEASE ORAL at 20:17

## 2024-03-05 RX ADMIN — METOPROLOL SUCCINATE 25 MG: 25 TABLET, EXTENDED RELEASE ORAL at 09:20

## 2024-03-05 RX ADMIN — POLYETHYLENE GLYCOL 3350 17 G: 17 POWDER, FOR SOLUTION ORAL at 09:24

## 2024-03-05 RX ADMIN — ENOXAPARIN SODIUM 30 MG: 100 INJECTION SUBCUTANEOUS at 13:51

## 2024-03-05 RX ADMIN — POTASSIUM BICARBONATE 40 MEQ: 782 TABLET, EFFERVESCENT ORAL at 05:34

## 2024-03-05 RX ADMIN — TOBRAMYCIN OPHTHALMIC SOLUTION 1 DROP: 3 SOLUTION/ DROPS OPHTHALMIC at 05:35

## 2024-03-05 RX ADMIN — BUSPIRONE HYDROCHLORIDE 5 MG: 5 TABLET ORAL at 09:20

## 2024-03-05 RX ADMIN — DIVALPROEX SODIUM 125 MG: 125 TABLET, DELAYED RELEASE ORAL at 20:17

## 2024-03-05 RX ADMIN — OXYCODONE 5 MG: 5 TABLET ORAL at 09:21

## 2024-03-05 RX ADMIN — POTASSIUM CHLORIDE 20 MEQ: 1500 TABLET, EXTENDED RELEASE ORAL at 09:20

## 2024-03-05 RX ADMIN — PANTOPRAZOLE SODIUM 20 MG: 20 TABLET, DELAYED RELEASE ORAL at 05:34

## 2024-03-05 RX ADMIN — OXYCODONE HYDROCHLORIDE AND ACETAMINOPHEN 500 MG: 500 TABLET ORAL at 09:20

## 2024-03-05 RX ADMIN — MIRTAZAPINE 3.75 MG: 7.5 TABLET, FILM COATED ORAL at 20:17

## 2024-03-05 RX ADMIN — LEVOTHYROXINE SODIUM 25 MCG: 25 TABLET ORAL at 05:34

## 2024-03-05 ASSESSMENT — PAIN DESCRIPTION - PAIN TYPE: TYPE: ACUTE PAIN

## 2024-03-05 ASSESSMENT — PAIN DESCRIPTION - ORIENTATION: ORIENTATION: LEFT

## 2024-03-05 ASSESSMENT — PAIN SCALES - WONG BAKER: WONGBAKER_NUMERICALRESPONSE: 4

## 2024-03-05 ASSESSMENT — PAIN DESCRIPTION - FREQUENCY: FREQUENCY: CONTINUOUS

## 2024-03-05 ASSESSMENT — PAIN DESCRIPTION - LOCATION: LOCATION: HIP

## 2024-03-05 ASSESSMENT — PAIN DESCRIPTION - DESCRIPTORS: DESCRIPTORS: PATIENT UNABLE TO DESCRIBE

## 2024-03-05 ASSESSMENT — PAIN - FUNCTIONAL ASSESSMENT: PAIN_FUNCTIONAL_ASSESSMENT: PREVENTS OR INTERFERES SOME ACTIVE ACTIVITIES AND ADLS

## 2024-03-05 NOTE — PROGRESS NOTES
3/3/2024   - Monitor labs closely   - Transfuse for Hgb less than 7 per protocol      Hypokalemia-    - Replace K+   - PRN K+ replacement protocol in place    - Monitor labs closely      Hypertension-     - Hypertensive at times   - Continue current medications   - Monitor BP closely           DVT Prophylaxis: Lovenox      GI prophylaxis:  Protonix      Discharge planning: Social service assisting with DC planning to SNF       Further Orders per Clinical course/attending.     Electronically signed by JERE Torres CNP on 3/5/2024 at 2:52 PM       EMR Dragon/Transcription disclaimer:   Much of this encounter note is an electronic transcription/translation of spoken language to printed text. The electronic translation of spoken language may permit erroneous, or at times, nonsensical words or phrases to be inadvertently transcribed; although attempts have made to review the note for such errors, some may still exist.

## 2024-03-05 NOTE — DISCHARGE INSTRUCTIONS
Dr Cannonotal Hip Nail  Home Instructions     * Elevate and ice affected extremity as needed for swelling and pain.     Use a barrier ( cloth) between ice pack and skin to prevent frostbite.     *Surgical Site Care:         You may shower on post op day 4 and keep incision open to the air         The adhesive dressing will be removed at the office appointment          May shower and clean wound but do not scrub incision. Pat dry.          NO tub bathing or swimming.         Wash hands frequently during the day.           *Activity:         SAFETY FIRST walk with a walker         Touch down weight bearing until surgeon releases you          Do NOT walk for exercise ( it will increase pain and swelling )          Walk several times a day but only for short distances                                                                                                           FEVER of 101.5 or less  *Pain Medicines:                                                                            Take Tylenol x 2          Take prescribed medicine as needed for pain                         Deep breath x 10          Take Tylenol as your pain decreases                                      Cough, Cough, Cough          Take a stool softener of your choice while on pain meds        Recheck in 1-11/2 hours     *To prevent blood clots:          Take prescribed blood thinner as directed          Do ankle pump exercises when sitting in the chair             *To Prevent Pneumonia:           Sit up and take deep breaths several times a day and use the incentive spirometer     *Call the office if:           Increased redness, drainage or an opening at your incision, severe pain, new onset fever or chills.            Also notify of any calf pain, tenderness, swelling or redness.           Notify of any rash, nausea and vomiting             **If you have any questions or problems please call our office at 1 [phone]**  Thank you

## 2024-03-06 VITALS
HEART RATE: 87 BPM | DIASTOLIC BLOOD PRESSURE: 71 MMHG | TEMPERATURE: 97.5 F | OXYGEN SATURATION: 94 % | RESPIRATION RATE: 16 BRPM | BODY MASS INDEX: 14.45 KG/M2 | WEIGHT: 79 LBS | SYSTOLIC BLOOD PRESSURE: 148 MMHG

## 2024-03-06 LAB
ANION GAP SERPL CALCULATED.3IONS-SCNC: 14 MMOL/L (ref 7–19)
BASOPHILS # BLD: 0 K/UL (ref 0–0.2)
BASOPHILS NFR BLD: 0.9 % (ref 0–1)
BUN SERPL-MCNC: 9 MG/DL (ref 8–23)
CALCIUM SERPL-MCNC: 8.7 MG/DL (ref 8.2–9.6)
CHLORIDE SERPL-SCNC: 102 MMOL/L (ref 98–111)
CO2 SERPL-SCNC: 22 MMOL/L (ref 22–29)
CREAT SERPL-MCNC: 0.3 MG/DL (ref 0.5–0.9)
EOSINOPHIL # BLD: 0.1 K/UL (ref 0–0.6)
EOSINOPHIL NFR BLD: 2.6 % (ref 0–5)
ERYTHROCYTE [DISTWIDTH] IN BLOOD BY AUTOMATED COUNT: 16.6 % (ref 11.5–14.5)
GLUCOSE SERPL-MCNC: 88 MG/DL (ref 74–109)
HCT VFR BLD AUTO: 30.6 % (ref 37–47)
HGB BLD-MCNC: 10.9 G/DL (ref 12–16)
IMM GRANULOCYTES # BLD: 0 K/UL
LYMPHOCYTES # BLD: 0.3 K/UL (ref 1.1–4.5)
LYMPHOCYTES NFR BLD: 8.4 % (ref 20–40)
MAGNESIUM SERPL-MCNC: 1.9 MG/DL (ref 1.7–2.3)
MCH RBC QN AUTO: 32.4 PG (ref 27–31)
MCHC RBC AUTO-ENTMCNC: 35.6 G/DL (ref 33–37)
MCV RBC AUTO: 91.1 FL (ref 81–99)
MONOCYTES # BLD: 0.6 K/UL (ref 0–0.9)
MONOCYTES NFR BLD: 18.2 % (ref 0–10)
NEUTROPHILS # BLD: 2.4 K/UL (ref 1.5–7.5)
NEUTS SEG NFR BLD: 69.3 % (ref 50–65)
PLATELET # BLD AUTO: 244 K/UL (ref 130–400)
PMV BLD AUTO: 9.5 FL (ref 9.4–12.3)
POTASSIUM SERPL-SCNC: 2.6 MMOL/L (ref 3.5–5)
POTASSIUM SERPL-SCNC: 4 MMOL/L (ref 3.5–5)
RBC # BLD AUTO: 3.36 M/UL (ref 4.2–5.4)
SODIUM SERPL-SCNC: 138 MMOL/L (ref 136–145)
WBC # BLD AUTO: 3.5 K/UL (ref 4.8–10.8)

## 2024-03-06 PROCEDURE — 6360000002 HC RX W HCPCS: Performed by: ORTHOPAEDIC SURGERY

## 2024-03-06 PROCEDURE — 36415 COLL VENOUS BLD VENIPUNCTURE: CPT

## 2024-03-06 PROCEDURE — 83735 ASSAY OF MAGNESIUM: CPT

## 2024-03-06 PROCEDURE — 84132 ASSAY OF SERUM POTASSIUM: CPT

## 2024-03-06 PROCEDURE — 6370000000 HC RX 637 (ALT 250 FOR IP): Performed by: ORTHOPAEDIC SURGERY

## 2024-03-06 PROCEDURE — 6360000002 HC RX W HCPCS: Performed by: NURSE PRACTITIONER

## 2024-03-06 PROCEDURE — 85025 COMPLETE CBC W/AUTO DIFF WBC: CPT

## 2024-03-06 PROCEDURE — 80048 BASIC METABOLIC PNL TOTAL CA: CPT

## 2024-03-06 PROCEDURE — 6370000000 HC RX 637 (ALT 250 FOR IP): Performed by: NURSE PRACTITIONER

## 2024-03-06 RX ORDER — ASPIRIN 81 MG/1
81 TABLET ORAL 2 TIMES DAILY
Qty: 70 TABLET | Refills: 0 | Status: SHIPPED | OUTPATIENT
Start: 2024-03-06 | End: 2024-04-10

## 2024-03-06 RX ORDER — NIFEDIPINE 30 MG/1
30 TABLET, EXTENDED RELEASE ORAL DAILY
Qty: 30 TABLET | Refills: 0 | Status: SHIPPED | OUTPATIENT
Start: 2024-03-07

## 2024-03-06 RX ORDER — ASPIRIN 325 MG
325 TABLET, DELAYED RELEASE (ENTERIC COATED) ORAL 2 TIMES DAILY
Qty: 70 TABLET | Refills: 0 | Status: SHIPPED | OUTPATIENT
Start: 2024-03-06 | End: 2024-03-06

## 2024-03-06 RX ORDER — TRAMADOL HYDROCHLORIDE 50 MG/1
50 TABLET ORAL EVERY 6 HOURS PRN
Qty: 12 TABLET | Refills: 0 | Status: SHIPPED | OUTPATIENT
Start: 2024-03-06 | End: 2024-03-09

## 2024-03-06 RX ORDER — ASPIRIN 325 MG
325 TABLET, DELAYED RELEASE (ENTERIC COATED) ORAL 2 TIMES DAILY
Qty: 60 TABLET | Refills: 0 | Status: SHIPPED | OUTPATIENT
Start: 2024-03-06 | End: 2024-03-06

## 2024-03-06 RX ORDER — NIFEDIPINE 30 MG/1
30 TABLET, EXTENDED RELEASE ORAL DAILY
Status: DISCONTINUED | OUTPATIENT
Start: 2024-03-06 | End: 2024-03-06 | Stop reason: HOSPADM

## 2024-03-06 RX ORDER — LORAZEPAM 0.5 MG/1
0.5 TABLET ORAL NIGHTLY
Qty: 3 TABLET | Refills: 0 | Status: SHIPPED | OUTPATIENT
Start: 2024-03-06 | End: 2024-03-09

## 2024-03-06 RX ORDER — HYDRALAZINE HYDROCHLORIDE 20 MG/ML
10 INJECTION INTRAMUSCULAR; INTRAVENOUS EVERY 6 HOURS PRN
Status: DISCONTINUED | OUTPATIENT
Start: 2024-03-06 | End: 2024-03-06 | Stop reason: HOSPADM

## 2024-03-06 RX ORDER — POTASSIUM CHLORIDE 7.45 MG/ML
10 INJECTION INTRAVENOUS
Status: COMPLETED | OUTPATIENT
Start: 2024-03-06 | End: 2024-03-06

## 2024-03-06 RX ORDER — POLYETHYLENE GLYCOL 3350 17 G/17G
17 POWDER, FOR SOLUTION ORAL DAILY PRN
Qty: 30 PACKET | Refills: 0 | Status: SHIPPED | OUTPATIENT
Start: 2024-03-06 | End: 2024-04-05

## 2024-03-06 RX ADMIN — BUSPIRONE HYDROCHLORIDE 5 MG: 5 TABLET ORAL at 09:38

## 2024-03-06 RX ADMIN — DIVALPROEX SODIUM 250 MG: 250 TABLET, DELAYED RELEASE ORAL at 09:39

## 2024-03-06 RX ADMIN — POTASSIUM BICARBONATE 40 MEQ: 782 TABLET, EFFERVESCENT ORAL at 09:36

## 2024-03-06 RX ADMIN — METOPROLOL SUCCINATE 25 MG: 25 TABLET, EXTENDED RELEASE ORAL at 09:37

## 2024-03-06 RX ADMIN — DIVALPROEX SODIUM 250 MG: 250 TABLET, DELAYED RELEASE ORAL at 15:47

## 2024-03-06 RX ADMIN — POTASSIUM CHLORIDE 10 MEQ: 10 INJECTION, SOLUTION INTRAVENOUS at 13:34

## 2024-03-06 RX ADMIN — NIFEDIPINE 30 MG: 30 TABLET, EXTENDED RELEASE ORAL at 09:38

## 2024-03-06 RX ADMIN — DIVALPROEX SODIUM 125 MG: 125 TABLET, DELAYED RELEASE ORAL at 09:37

## 2024-03-06 RX ADMIN — DIVALPROEX SODIUM 125 MG: 125 TABLET, DELAYED RELEASE ORAL at 15:47

## 2024-03-06 RX ADMIN — POTASSIUM CHLORIDE 10 MEQ: 10 INJECTION, SOLUTION INTRAVENOUS at 09:35

## 2024-03-06 RX ADMIN — ENOXAPARIN SODIUM 30 MG: 100 INJECTION SUBCUTANEOUS at 15:47

## 2024-03-06 RX ADMIN — POTASSIUM CHLORIDE 10 MEQ: 10 INJECTION, SOLUTION INTRAVENOUS at 10:26

## 2024-03-06 RX ADMIN — LEVOTHYROXINE SODIUM 25 MCG: 25 TABLET ORAL at 06:30

## 2024-03-06 RX ADMIN — POTASSIUM CHLORIDE 20 MEQ: 1500 TABLET, EXTENDED RELEASE ORAL at 09:38

## 2024-03-06 RX ADMIN — OXYCODONE HYDROCHLORIDE AND ACETAMINOPHEN 500 MG: 500 TABLET ORAL at 09:38

## 2024-03-06 RX ADMIN — POTASSIUM CHLORIDE 10 MEQ: 10 INJECTION, SOLUTION INTRAVENOUS at 08:33

## 2024-03-06 RX ADMIN — ASPIRIN 81 MG: 81 TABLET, CHEWABLE ORAL at 09:38

## 2024-03-06 RX ADMIN — POTASSIUM CHLORIDE 10 MEQ: 10 INJECTION, SOLUTION INTRAVENOUS at 14:47

## 2024-03-06 RX ADMIN — PANTOPRAZOLE SODIUM 20 MG: 20 TABLET, DELAYED RELEASE ORAL at 06:30

## 2024-03-06 RX ADMIN — POTASSIUM CHLORIDE 10 MEQ: 10 INJECTION, SOLUTION INTRAVENOUS at 11:54

## 2024-03-06 NOTE — PROGRESS NOTES
Physical Therapy  Name: Brandy Melvin  MRN:  [id number]  Date of service:  3/6/2024       03/06/24 1138   Subjective   Subjective Attempted this AM but nurse recommended to hold therapy at this time due to medical issues. Will continue to follow.         Electronically signed by Chichi Esqueda PTA on 3/6/2024 at 11:40 AM

## 2024-03-06 NOTE — PLAN OF CARE
Problem: Discharge Planning  Goal: Discharge to home or other facility with appropriate resources  Outcome: Progressing  Flowsheets (Taken 3/5/2024 1130)  Discharge to home or other facility with appropriate resources: Identify barriers to discharge with patient and caregiver     Problem: Skin/Tissue Integrity  Goal: Absence of new skin breakdown  Description: 1.  Monitor for areas of redness and/or skin breakdown  2.  Assess vascular access sites hourly  3.  Every 4-6 hours minimum:  Change oxygen saturation probe site  4.  Every 4-6 hours:  If on nasal continuous positive airway pressure, respiratory therapy assess nares and determine need for appliance change or resting period.  Outcome: Progressing     Problem: Safety - Adult  Goal: Free from fall injury  Outcome: Progressing     Problem: ABCDS Injury Assessment  Goal: Absence of physical injury  Outcome: Progressing     Problem: Chronic Conditions and Co-morbidities  Goal: Patient's chronic conditions and co-morbidity symptoms are monitored and maintained or improved  Outcome: Progressing  Flowsheets (Taken 3/5/2024 1130)  Care Plan - Patient's Chronic Conditions and Co-Morbidity Symptoms are Monitored and Maintained or Improved: Monitor and assess patient's chronic conditions and comorbid symptoms for stability, deterioration, or improvement     Problem: Pain  Goal: Verbalizes/displays adequate comfort level or baseline comfort level  Outcome: Progressing     Problem: Skin/Tissue Integrity - Adult  Goal: Skin integrity remains intact  Outcome: Progressing  Flowsheets (Taken 3/5/2024 1130)  Skin Integrity Remains Intact: Monitor for areas of redness and/or skin breakdown  Goal: Incisions, wounds, or drain sites healing without S/S of infection  Outcome: Progressing  Flowsheets (Taken 3/5/2024 1130)  Incisions, Wounds, or Drain Sites Healing Without Sign and Symptoms of Infection: TWICE DAILY: Assess and document skin integrity     Problem: Neurosensory -

## 2024-03-06 NOTE — DISCHARGE SUMMARY
recommended. Patient is currently in stable condition to be discharged to SNF.         Significant Diagnostic Studies:     XR HIP 2-3 VW W PELVIS LEFT    Result Date: 3/1/2024  Radiology exam is complete. No Radiologist dictation. Please follow up with ordering provider.       CT PELVIS WO CONTRAST Additional Contrast? None  Result Date: 3/1/2024    Impression: 1.  Acute intertrochanteric fracture of the proximal left femur. 2.  Subacute healing fractures of the left pubic rami. 3.  Bilateral sacral insufficiency fractures  All CT scans are performed using dose optimization techniques as appropriate to the performed exam and include at least one of the following: Automated exposure control, adjustment of the mA and/or kV according to size, and the use of iterative reconstruction technique.  ______________________________________ Electronically signed by: VALDEMAR WILDE M.D. Date:     03/01/2024 Time:    23:49       XR HIP 2-3 VW W PELVIS LEFT  Result Date: 2/29/2024    Impression: Questionable left-sided pelvic fractures.  Recommend further evaluation with pelvic CT   ______________________________________ Electronically signed by: VALDEMAR WILDE M.D. Date:     02/29/2024 Time:    22:50       Pertinent Labs:   CBC:   Recent Labs     03/03/24  1643 03/05/24  0208 03/06/24  0714   WBC  --  3.9* 3.5*   HGB 10.3* 9.9* 10.9*   PLT  --  182 244     BMP:    Recent Labs     03/05/24  0208 03/06/24  0714 03/06/24  1344    138  --    K 3.4* 2.6* 4.0    102  --    CO2 21* 22  --    BUN 12 9  --    CREATININE 0.3* 0.3*  --    GLUCOSE 77 88  --        Physical Exam:   Vital Signs: BP (!) 146/76   Pulse 82   Temp 97 °F (36.1 °C) (Temporal)   Resp 14   Wt 35.8 kg (79 lb)   LMP  (LMP Unknown)   SpO2 97%   BMI 14.45 kg/m²   General appearance:.Alert and Cooperative   HEENT: Normocephalic.  Chest: Lung sounds clear bilaterally without wheezes or rhonchi.  Cardiac: RRR, S1, S2 normal. No murmurs, gallops, or  rubs auscultated.   Abdomen: soft, non-tender; non-distended normal bowel sounds no masses, no organomegaly.  Extremities: No clubbing or cyanosis. No peripheral edema. Peripheral pulses palpable. S/p Left hip TFN     Neurologic: Grossly intact.        Discharge Medications:          Medication List        START taking these medications      aspirin 81 MG EC tablet  Take 1 tablet by mouth in the morning and at bedtime  Replaces: aspirin 81 MG chewable tablet     NIFEdipine 30 MG extended release tablet  Commonly known as: PROCARDIA XL  Take 1 tablet by mouth daily  Start taking on: March 7, 2024     polyethylene glycol 17 g packet  Commonly known as: GLYCOLAX  Take 1 packet by mouth daily as needed for Constipation            CONTINUE taking these medications      acetaminophen 325 MG tablet  Commonly known as: Aminofen  Take 2 tablets by mouth every 6 hours as needed for Pain     busPIRone 5 MG tablet  Commonly known as: BUSPAR     candesartan 32 MG tablet  Commonly known as: ATACAND     Centrum Silver Adult 50+ Tabs     * divalproex 125 MG DR tablet  Commonly known as: DEPAKOTE     * divalproex 250 MG DR tablet  Commonly known as: DEPAKOTE     docusate sodium 100 MG capsule  Commonly known as: COLACE     estradiol 0.1 MG/GM vaginal cream  Commonly known as: ESTRACE VAGINAL  Place 1 g vaginally Twice a Week Place pea sized amount to tip of finger and apply vaginally twice a week     Klor-Con M20 20 MEQ extended release tablet  Generic drug: potassium chloride     levothyroxine 25 MCG tablet  Commonly known as: SYNTHROID  Take 1 tablet by mouth Daily     loperamide 2 MG capsule  Commonly known as: IMODIUM     LORazepam 0.5 MG tablet  Commonly known as: ATIVAN  Take 1 tablet by mouth nightly for 3 days. Max Daily Amount: 0.5 mg     METAMUCIL PO     metoprolol succinate 25 MG extended release tablet  Commonly known as: TOPROL XL  Take 1 tablet by mouth daily     mirtazapine 7.5 MG tablet  Commonly known as:

## 2024-03-06 NOTE — CARE COORDINATION
Pt has been accepted at [facility] and may DC on Wednesday March 6th if medically cleared. Pt does NOT need an updated covid test before DC. SW will ahead and update the [facility] pharmacy.     [facility] ([facility])   [phone] P  [phone] F  [phone] Referral fax number for   Electronically signed by Laci Rivera on 3/6/2024 at 6:42 AM

## 2024-03-06 NOTE — FLOWSHEET NOTE
Neurological checks done every 4 hours, neuro status has no significant changes since last assessment, fall precautions in place, will continue to monitor.

## 2024-03-06 NOTE — DISCHARGE INSTR - DIET
Good nutrition is important when healing from an illness, injury, or surgery.  Follow any nutrition recommendations given to you during your hospital stay.   If you were given an oral nutrition supplement while in the hospital, continue to take this supplement at home.  You can take it with meals, in-between meals, and/or before bedtime. These supplements can be purchased at most local grocery stores, pharmacies, and chain [employer]stores.   If you have any questions about your diet or nutrition, call the hospital and ask for the dietitian.    Regular diet  ADULT ORAL NUTRITION SUPPLEMENT; Breakfast, Lunch, Dinner; Standard High Calorie/High Protein Oral Supplement

## 2024-03-07 NOTE — PROGRESS NOTES
Pt left with EMS transport at 1947. Family aware at bedside. [facility] [place] notified.    Electronically signed by Vel Wilkins RN on 3/6/2024 at 7:53 PM

## 2024-03-08 NOTE — PROGRESS NOTES
Physician Progress Note      PATIENT:               LEONELA GARCIA  CSN #:                  [id number]  :                       11/10/1926  ADMIT DATE:       2024 10:12 PM  DISCH DATE:        3/6/2024 7:47 PM  RESPONDING  PROVIDER #:        DRAKE DALE          QUERY TEXT:    Pt admitted with closed hip fracture, left and with subacute healing fracture   of the left pubic ramus.  Pt noted to have Osteopenia on imaging. . If   possible, please document in progress notes and discharge summary if you are   evaluating and/or treating any of the following:    The medical record reflects the following:  Risk Factors: Advanced Age, Multiple Falls, Osteopenia  Clinical Indicators: Pt. presents for fall, with left hip pain, pt. was noted   to have osteopenia finding on the current imaging.  CT Pelvis notes: Acute   intertrochanteric fracture of the proximal left femur, subacute healing   fractures of the left pubic rami.  Pt does take Multi-vitamin daily  Treatment: XRAY, CT, Cephalomedullary nailing of the left femur fracture,    Viviana Pena, RN-BSN, CRCR  Clinical   [email or web address] [facility]  Options provided:  -- Pathological femur fracture due to osteopenia  -- Pathological femur fracture due to osteopenia following fall which would   not usually break a normal, healthy bone  -- Pathological pelvic and femur fracture due to osteopenia following fall   which would not usually break a normal, healthy bone  -- Traumatic femur fracture  -- Traumatic pelvic and femur fracture  -- Other - I will add my own diagnosis  -- Disagree - Not applicable / Not valid  -- Disagree - Clinically unable to determine / Unknown  -- Refer to Clinical Documentation Reviewer    PROVIDER RESPONSE TEXT:    This patient has a traumatic pelvic and femur fracture.    Query created by: Katerina Pena on 3/8/2024 12:07 PM      Electronically signed by:  DRAKE DALE  3/8/2024 5:44 PM

## 2024-04-02 ENCOUNTER — APPOINTMENT (OUTPATIENT)
Dept: GENERAL RADIOLOGY | Age: 89
End: 2024-04-02
Payer: MEDICARE

## 2024-04-02 ENCOUNTER — APPOINTMENT (OUTPATIENT)
Dept: CT IMAGING | Age: 89
End: 2024-04-02
Payer: MEDICARE

## 2024-04-02 ENCOUNTER — HOSPITAL ENCOUNTER (EMERGENCY)
Age: 89
Discharge: HOME | End: 2024-04-02
Attending: STUDENT IN AN ORGANIZED HEALTH CARE EDUCATION/TRAINING PROGRAM
Payer: MEDICARE

## 2024-04-02 VITALS
RESPIRATION RATE: 18 BRPM | SYSTOLIC BLOOD PRESSURE: 144 MMHG | OXYGEN SATURATION: 96 % | DIASTOLIC BLOOD PRESSURE: 88 MMHG | TEMPERATURE: 97.5 F | HEART RATE: 72 BPM

## 2024-04-02 DIAGNOSIS — R41.82 ALTERED MENTAL STATUS, UNSPECIFIED ALTERED MENTAL STATUS TYPE: Primary | ICD-10-CM

## 2024-04-02 DIAGNOSIS — N30.00 ACUTE CYSTITIS WITHOUT HEMATURIA: ICD-10-CM

## 2024-04-02 LAB
ALBUMIN SERPL-MCNC: 3.9 G/DL (ref 3.5–5.2)
ALP SERPL-CCNC: 152 U/L (ref 35–104)
ALT SERPL-CCNC: 12 U/L (ref 5–33)
ANION GAP SERPL CALCULATED.3IONS-SCNC: 12 MMOL/L (ref 7–19)
AST SERPL-CCNC: 30 U/L (ref 5–32)
BACTERIA URNS QL MICRO: NORMAL /HPF
BASOPHILS # BLD: 0 K/UL (ref 0–0.2)
BASOPHILS NFR BLD: 0.6 % (ref 0–1)
BILIRUB SERPL-MCNC: 0.5 MG/DL (ref 0.2–1.2)
BILIRUB UR QL STRIP: NEGATIVE
BUN SERPL-MCNC: 27 MG/DL (ref 8–23)
CALCIUM SERPL-MCNC: 9.4 MG/DL (ref 8.2–9.6)
CHLORIDE SERPL-SCNC: 100 MMOL/L (ref 98–111)
CLARITY UR: CLEAR
CO2 SERPL-SCNC: 23 MMOL/L (ref 22–29)
COLOR UR: YELLOW
CREAT SERPL-MCNC: 0.4 MG/DL (ref 0.5–0.9)
CRYSTALS URNS MICRO: NORMAL /HPF
EOSINOPHIL # BLD: 0 K/UL (ref 0–0.6)
EOSINOPHIL NFR BLD: 0.8 % (ref 0–5)
EPI CELLS #/AREA URNS AUTO: 0 /HPF (ref 0–5)
ERYTHROCYTE [DISTWIDTH] IN BLOOD BY AUTOMATED COUNT: 16.8 % (ref 11.5–14.5)
GLUCOSE SERPL-MCNC: 82 MG/DL (ref 74–109)
GLUCOSE UR STRIP.AUTO-MCNC: NEGATIVE MG/DL
HCT VFR BLD AUTO: 33.5 % (ref 37–47)
HGB BLD-MCNC: 10.8 G/DL (ref 12–16)
HGB UR STRIP.AUTO-MCNC: NEGATIVE MG/L
HYALINE CASTS #/AREA URNS AUTO: 0 /HPF (ref 0–8)
IMM GRANULOCYTES # BLD: 0 K/UL
KETONES UR STRIP.AUTO-MCNC: ABNORMAL MG/DL
LEUKOCYTE ESTERASE UR QL STRIP.AUTO: ABNORMAL
LIPASE SERPL-CCNC: 43 U/L (ref 13–60)
LYMPHOCYTES # BLD: 0.9 K/UL (ref 1.1–4.5)
LYMPHOCYTES NFR BLD: 17.9 % (ref 20–40)
MCH RBC QN AUTO: 32.8 PG (ref 27–31)
MCHC RBC AUTO-ENTMCNC: 32.2 G/DL (ref 33–37)
MCV RBC AUTO: 101.8 FL (ref 81–99)
MONOCYTES # BLD: 0.5 K/UL (ref 0–0.9)
MONOCYTES NFR BLD: 10.2 % (ref 0–10)
NEUTROPHILS # BLD: 3.4 K/UL (ref 1.5–7.5)
NEUTS SEG NFR BLD: 69.7 % (ref 50–65)
NITRITE UR QL STRIP.AUTO: POSITIVE
PH UR STRIP.AUTO: 8 [PH] (ref 5–8)
PLATELET # BLD AUTO: 228 K/UL (ref 130–400)
PMV BLD AUTO: 9.8 FL (ref 9.4–12.3)
POTASSIUM SERPL-SCNC: 3.8 MMOL/L (ref 3.5–5)
PROT SERPL-MCNC: 6.4 G/DL (ref 6.6–8.7)
PROT UR STRIP.AUTO-MCNC: NEGATIVE MG/DL
RBC # BLD AUTO: 3.29 M/UL (ref 4.2–5.4)
RBC #/AREA URNS AUTO: 1 /HPF (ref 0–4)
SODIUM SERPL-SCNC: 135 MMOL/L (ref 136–145)
SP GR UR STRIP.AUTO: 1.01 (ref 1–1.03)
T4 FREE SERPL-MCNC: 1.07 NG/DL (ref 0.93–1.7)
TROPONIN, HIGH SENSITIVITY: 30 NG/L (ref 0–14)
TROPONIN, HIGH SENSITIVITY: 30 NG/L (ref 0–14)
TSH SERPL DL<=0.005 MIU/L-ACNC: 8.07 UIU/ML (ref 0.27–4.2)
UROBILINOGEN UR STRIP.AUTO-MCNC: 0.2 E.U./DL
WBC # BLD AUTO: 4.8 K/UL (ref 4.8–10.8)
WBC #/AREA URNS AUTO: 2 /HPF (ref 0–5)

## 2024-04-02 PROCEDURE — 84443 ASSAY THYROID STIM HORMONE: CPT

## 2024-04-02 PROCEDURE — 71045 X-RAY EXAM CHEST 1 VIEW: CPT

## 2024-04-02 PROCEDURE — 81001 URINALYSIS AUTO W/SCOPE: CPT

## 2024-04-02 PROCEDURE — 84484 ASSAY OF TROPONIN QUANT: CPT

## 2024-04-02 PROCEDURE — 2580000003 HC RX 258: Performed by: STUDENT IN AN ORGANIZED HEALTH CARE EDUCATION/TRAINING PROGRAM

## 2024-04-02 PROCEDURE — 85025 COMPLETE CBC W/AUTO DIFF WBC: CPT

## 2024-04-02 PROCEDURE — 83690 ASSAY OF LIPASE: CPT

## 2024-04-02 PROCEDURE — 6370000000 HC RX 637 (ALT 250 FOR IP): Performed by: STUDENT IN AN ORGANIZED HEALTH CARE EDUCATION/TRAINING PROGRAM

## 2024-04-02 PROCEDURE — 70450 CT HEAD/BRAIN W/O DYE: CPT

## 2024-04-02 PROCEDURE — 36415 COLL VENOUS BLD VENIPUNCTURE: CPT

## 2024-04-02 PROCEDURE — 80053 COMPREHEN METABOLIC PANEL: CPT

## 2024-04-02 PROCEDURE — 84439 ASSAY OF FREE THYROXINE: CPT

## 2024-04-02 PROCEDURE — 93005 ELECTROCARDIOGRAM TRACING: CPT | Performed by: STUDENT IN AN ORGANIZED HEALTH CARE EDUCATION/TRAINING PROGRAM

## 2024-04-02 PROCEDURE — 99285 EMERGENCY DEPT VISIT HI MDM: CPT

## 2024-04-02 RX ORDER — NITROFURANTOIN 25; 75 MG/1; MG/1
100 CAPSULE ORAL 2 TIMES DAILY
Qty: 20 CAPSULE | Refills: 0 | Status: SHIPPED | OUTPATIENT
Start: 2024-04-02 | End: 2024-04-12

## 2024-04-02 RX ORDER — OLANZAPINE 10 MG/1
10 TABLET, ORALLY DISINTEGRATING ORAL ONCE
Status: COMPLETED | OUTPATIENT
Start: 2024-04-02 | End: 2024-04-02

## 2024-04-02 RX ORDER — 0.9 % SODIUM CHLORIDE 0.9 %
500 INTRAVENOUS SOLUTION INTRAVENOUS ONCE
Status: COMPLETED | OUTPATIENT
Start: 2024-04-02 | End: 2024-04-02

## 2024-04-02 RX ORDER — NITROFURANTOIN 25; 75 MG/1; MG/1
100 CAPSULE ORAL ONCE
Status: COMPLETED | OUTPATIENT
Start: 2024-04-02 | End: 2024-04-02

## 2024-04-02 RX ADMIN — SODIUM CHLORIDE 500 ML: 9 INJECTION, SOLUTION INTRAVENOUS at 10:46

## 2024-04-02 RX ADMIN — NITROFURANTOIN MONOHYDRATE/MACROCRYSTALS 100 MG: 75; 25 CAPSULE ORAL at 10:46

## 2024-04-02 RX ADMIN — OLANZAPINE 10 MG: 10 TABLET, ORALLY DISINTEGRATING ORAL at 11:08

## 2024-04-02 ASSESSMENT — ENCOUNTER SYMPTOMS
NAUSEA: 0
COUGH: 0
SORE THROAT: 0
VOMITING: 0
EYE REDNESS: 0
EYE PAIN: 0
SHORTNESS OF BREATH: 0
DIARRHEA: 0
ABDOMINAL PAIN: 0
CHEST TIGHTNESS: 0

## 2024-04-02 NOTE — ED PROVIDER NOTES
of this dictation.    EMERGENCY DEPARTMENT COURSE and DIFFERENTIAL DIAGNOSIS/MDM:   Vitals:    Vitals:    04/02/24 0839 04/02/24 1048 04/02/24 1153   BP:  (!) 150/98 (!) 144/88   Pulse: 69  72   Resp: 16  18   Temp:  97.1 °F (36.2 °C) 97.5 °F (36.4 °C)   SpO2: 95%  96%       MDM      Reassessment    Patient is a 89 y.o. female presenting with confusion, agitation, altered mental status.  Afebrile, hemodynamically stable.  No focal infectious symptoms, no focal neurodeficits.    CT head unremarkable, chest x-ray unremarkable.  EKG normal sinus rhythm, left bundle branch block as prior, unchanged.  Lab workup obtained as detailed above.  Notable for evidence of infection on urinalysis.  Initial troponin of 30, repeat still 30 so is stable. No evidence of acute injury or ACS.    Patient has extensive list of drug allergies, but appears that she tolerates Macrobid so we will use this for UTI.  Discussed results with daughter at bedside, agreeable to patient discharged back to facility.  Daughter will take her back by private vehicle.      CONSULTS:  None    :  Unless otherwise noted below, none     Procedures    FINAL IMPRESSION      1. Altered mental status, unspecified altered mental status type    2. Acute cystitis without hematuria          DISPOSITION/PLAN   DISPOSITION Decision To Discharge 04/02/2024 11:32:55 AM      PATIENT REFERRED TO:  Joce Azevedo MD  [address] [place] [address]  [phone]    Schedule an appointment as soon as possible for a visit in 2 days  As needed      DISCHARGE MEDICATIONS:  Discharge Medication List as of 4/2/2024 11:47 AM        START taking these medications    Details   nitrofurantoin, macrocrystal-monohydrate, (MACROBID) 100 MG capsule Take 1 capsule by mouth 2 times daily for 10 days, Disp-20 capsule, R-0Normal                (Please note that portions of this note were completed with a voice recognition program.  Efforts were made to edit thedictations but

## 2024-04-02 NOTE — DISCHARGE INSTRUCTIONS
Concerning your urinary tract infection:    Drink lots of fluids, cranberry juice is best.    Complete entire course of antibiotics until they are gone even if your symptoms improve.    Pyridium will help with your bladder pain (warning, pyridium will turn your urine bright yellow, which is normal). Do not use pyridium for greater than 3 consecutive days.    Return to the emergency department if your symptoms worsen or change, you develop fevers, chills, nausea, vomiting, flank pain, you are unable to tolerate oral antibiotics, or you have any concerns.    Follow-up with primary care in the next 3-5 days.    The examination and treatment you have received in the Emergency Department has been given on an emergency basis only.    This limited encounter is not a replacement for the comprehensive services provided by a primary care physician. We recommend follow up for further preventative and ongoing carlie screening, especially if your symptoms persists, worsen, or change in quality or character. When calling for a follow up appointment, please remember to inform the office that you were seen in the Emergency Department and that you are requesting a follow up visit.    Again, it is very important that you return immediately if your condition worsens, any new symptoms develop, or you do not recover as expected.

## 2024-04-03 LAB
EKG P AXIS: NORMAL DEGREES
EKG P-R INTERVAL: NORMAL MS
EKG Q-T INTERVAL: 424 MS
EKG QRS DURATION: 126 MS
EKG QTC CALCULATION (BAZETT): 457 MS
EKG T AXIS: 92 DEGREES

## 2024-04-03 PROCEDURE — 93010 ELECTROCARDIOGRAM REPORT: CPT | Performed by: INTERNAL MEDICINE

## 2024-04-17 ENCOUNTER — TELEPHONE (OUTPATIENT)
Dept: INTERNAL MEDICINE | Facility: CLINIC | Age: 89
End: 2024-04-17
Payer: MEDICARE

## 2024-04-17 DIAGNOSIS — F03.90 DEMENTIA WITHOUT BEHAVIORAL DISTURBANCE: Primary | Chronic | ICD-10-CM

## 2024-04-17 NOTE — TELEPHONE ENCOUNTER
Amy Barber with [facility] needs a referral to hospice for patient faxed to [phone]

## 2024-04-18 ENCOUNTER — LAB REQUISITION (OUTPATIENT)
Dept: LAB | Facility: HOSPITAL | Age: 89
End: 2024-04-18
Payer: MEDICARE

## 2024-04-18 ENCOUNTER — TELEPHONE (OUTPATIENT)
Dept: INTERNAL MEDICINE | Facility: CLINIC | Age: 89
End: 2024-04-18

## 2024-04-18 DIAGNOSIS — E87.6 HYPOKALEMIA: ICD-10-CM

## 2024-04-18 LAB
ANION GAP SERPL CALCULATED.3IONS-SCNC: 9 MMOL/L (ref 5–15)
BUN SERPL-MCNC: 26 MG/DL (ref 8–23)
BUN/CREAT SERPL: 50 (ref 7–25)
CALCIUM SPEC-SCNC: 9 MG/DL (ref 8.2–9.6)
CHLORIDE SERPL-SCNC: 102 MMOL/L (ref 98–107)
CO2 SERPL-SCNC: 28 MMOL/L (ref 22–29)
CREAT SERPL-MCNC: 0.52 MG/DL (ref 0.57–1)
EGFRCR SERPLBLD CKD-EPI 2021: 84.6 ML/MIN/1.73
GLUCOSE SERPL-MCNC: 86 MG/DL (ref 65–99)
POTASSIUM SERPL-SCNC: 4 MMOL/L (ref 3.5–5.2)
SODIUM SERPL-SCNC: 139 MMOL/L (ref 136–145)

## 2024-04-18 PROCEDURE — 36415 COLL VENOUS BLD VENIPUNCTURE: CPT

## 2024-04-18 PROCEDURE — 80048 BASIC METABOLIC PNL TOTAL CA: CPT

## 2024-04-18 NOTE — TELEPHONE ENCOUNTER
Hub staff attempted to follow warm transfer process and was unsuccessful     Caller: Ximena Arteaga    Relationship to patient: Emergency Contact    Best call back number:  [phone]     Patient is needing: IF YOU WANT TO CALL DAUGHTER YOU CAN.  SHE WANTED YOU TO KNOW THAT SHE HAS A MEETING WITH THEM TOMORROW. IN CHART DOCUMENTED.  SHE STATES IF YOU NEED TO CALL HER PLEASE DO IF NOT, MEETING IS ON 4PM TOMORROW.

## 2024-04-25 LAB
BACTERIA URNS QL MICRO: ABNORMAL /HPF
BILIRUB UR QL STRIP: NEGATIVE
CLARITY UR: ABNORMAL
COLOR UR: YELLOW
CRYSTALS URNS MICRO: ABNORMAL /HPF
EPI CELLS #/AREA URNS AUTO: 1 /HPF (ref 0–5)
GLUCOSE UR STRIP.AUTO-MCNC: NEGATIVE MG/DL
HGB UR STRIP.AUTO-MCNC: NEGATIVE MG/L
HYALINE CASTS #/AREA URNS AUTO: 2 /HPF (ref 0–8)
KETONES UR STRIP.AUTO-MCNC: NEGATIVE MG/DL
LEUKOCYTE ESTERASE UR QL STRIP.AUTO: ABNORMAL
NITRITE UR QL STRIP.AUTO: POSITIVE
PH UR STRIP.AUTO: 6.5 [PH] (ref 5–8)
PROT UR STRIP.AUTO-MCNC: NEGATIVE MG/DL
RBC #/AREA URNS AUTO: 1 /HPF (ref 0–4)
SP GR UR STRIP.AUTO: 1.01 (ref 1–1.03)
UROBILINOGEN UR STRIP.AUTO-MCNC: 1 E.U./DL
WBC #/AREA URNS AUTO: 16 /HPF (ref 0–5)

## 2024-04-27 LAB
BACTERIA UR CULT: ABNORMAL
BACTERIA UR CULT: ABNORMAL
ORGANISM: ABNORMAL

## 2024-05-17 ENCOUNTER — TELEPHONE (OUTPATIENT)
Dept: CASE MANAGEMENT | Age: 89
End: 2024-05-17

## 2024-05-17 NOTE — TELEPHONE ENCOUNTER
Kiki Clay, Hospice RN [facility] has been notified that per Dr. Rushing, okay to hold ativan for 1 week.    Thank You,   Electronically signed by Shahnaz Neal RN on 5/17/2024 at 3:15 PM

## 2024-05-17 NOTE — TELEPHONE ENCOUNTER
Patient: Brandy Melvin  : 11/10/1926  HMD: Dr. Rushing  DNR Status  Allergy: Ampicillin, Cephalosporins, Codeine, Darifenacin, Doxycycline  DX: CVA, Vascular Dementia, ataxia following CVA, essential tremor    Patient was started on Risperdal and Ativan.  Patients granddaughter believes patient is getting too much mediation at once, states patient has been \"out of it\".  Would like Ativan to be HELD for 1 week to allow patient to adjust to being on Risperdal.  Is this okay?    Thank you,   Electronically signed by Shahnaz Neal RN on 2024 at 2:48 PM  (Covering for Evy Joseph RN Liaison)

## 2025-05-23 NOTE — ASSESSMENT & PLAN NOTE
BP initially elevated, recheck was normal.  Takes medications at night.  
Send over cream to [place] [place] the DDDGB cream 1 ounce to try  
Stable, some days better than others.  
Stable.  Takes BDZs.  Although high risk given her age, she has been on this for some time and tolerated it well prior to me assuming the care of the patient.  
0 (no pain/absence of nonverbal indicators of pain)

## (undated) DEVICE — BIT DRL L300MM DIA10MM CANN TAPR L QUIK CPL FOR DH DC TFN

## (undated) DEVICE — SHEET,DRAPE,53X77,STERILE: Brand: MEDLINE

## (undated) DEVICE — BIT DRL L L266MM DIA16MM FEM FLX CANN QUIK CPL

## (undated) DEVICE — SENSR O2 OXIMAX FNGR A/ 18IN NONSTR

## (undated) DEVICE — GLOVE SURG SZ 7 L12IN FNGR THK79MIL GRN LTX FREE

## (undated) DEVICE — C-ARM: Brand: UNBRANDED

## (undated) DEVICE — THE CHANNEL CLEANING BRUSH IS A NYLON FLEXI BRUSH ATTACHED TO A FLEXIBLE PLASTIC SHEATH DESIGNED TO SAFELY REMOVE DEBRIS FROM FLEXIBLE ENDOSCOPES.

## (undated) DEVICE — BIT DRL L500MM DIA6X9MM CANN STP L QUIK CPL FOR DH DC TFN

## (undated) DEVICE — UNDERGLOVE SURG SZ 8 FNGR THK0.21MIL GRN LTX BEAD CUF

## (undated) DEVICE — 6617 IOBAN II PATIENT ISOLATION DRAPE 5/BX,4BX/CS: Brand: STERI-DRAPE™ IOBAN™ 2

## (undated) DEVICE — DRESSING MEPILEX BORDER FLEX LITE 5X12.5CM

## (undated) DEVICE — CUFF,BP,DISP,1 TUBE,ADULT,HP: Brand: MEDLINE

## (undated) DEVICE — SUTURE STRATAFIX SPRL MCRYL + 3 0 SGL ARMED PS 1 24 IN LEN SXMP1B103

## (undated) DEVICE — BIT DRL L330MM DIA4.2MM CALIB 100MM 3 FLUT QUIK CPL

## (undated) DEVICE — GUIDEWIRE ORTH L400MM DIA3.2MM FOR TFN

## (undated) DEVICE — SUTURE VCRL SZ 3-0 L27IN ABSRB UD L19MM PS-2 3/8 CIR PRIM J427H

## (undated) DEVICE — GLOVE SURG SZ 8 CRM LTX FREE POLYISOPRENE POLYMER BEAD ANTI

## (undated) DEVICE — C-ARMOR C-ARM EQUIPMENT COVERS CLEAR STERILE UNIVERSAL FIT 12 PER CASE: Brand: C-ARMOR

## (undated) DEVICE — ENDOGATOR AUXILIARY WATER JET CONNECTOR: Brand: ENDOGATOR

## (undated) DEVICE — TBG SMPL FLTR LINE NASL 02/C02 A/ BX/100

## (undated) DEVICE — Device

## (undated) DEVICE — CONMED SCOPE SAVER BITE BLOCK, 20X27 MM: Brand: SCOPE SAVER

## (undated) DEVICE — SUTURE STRATAFIX SYMMETRIC PDS + SZ 0 L18IN ABSRB L36MM SXPP1A401

## (undated) DEVICE — SURGICAL PROCEDURE PACK LOWER EXTREMITY LOURDES HOSP

## (undated) DEVICE — Device: Brand: DEFENDO AIR/WATER/SUCTION AND BIOPSY VALVE

## (undated) DEVICE — ADHESIVE SKIN CLOSURE WND 8.661X1.5 IN 22 CM LIQUIBAND SECUR

## (undated) DEVICE — SUTURE VCRL + SZ 0 L27IN ABSRB UD CT-1 L36MM 1/2 CIR TAPR VCP260H

## (undated) DEVICE — SUTURE STRATAFIX SYMMETRIC PDS + SZ 0 L9IN ABSRB VIO L36MM SXPP1A425

## (undated) DEVICE — FRCP BIOP COLD ENDOJAW ALLGTR W/NDL 2.8X2300MM BLU

## (undated) DEVICE — COVER POS PERINL POST NS 082501

## (undated) DEVICE — GLOVE SURG SZ 65 CRM LTX FREE POLYISOPRENE POLYMER BEAD ANTI

## (undated) DEVICE — NEPTUNE E-SEP SMOKE EVACUATION PENCIL, COATED, 70MM BLADE, ROCKER SWITCH: Brand: NEPTUNE E-SEP